# Patient Record
Sex: MALE | Race: WHITE | NOT HISPANIC OR LATINO | Employment: OTHER | URBAN - METROPOLITAN AREA
[De-identification: names, ages, dates, MRNs, and addresses within clinical notes are randomized per-mention and may not be internally consistent; named-entity substitution may affect disease eponyms.]

---

## 2018-02-11 ENCOUNTER — HOSPITAL ENCOUNTER (EMERGENCY)
Facility: HOSPITAL | Age: 68
Discharge: HOME | End: 2018-02-11
Attending: EMERGENCY MEDICINE | Admitting: EMERGENCY MEDICINE
Payer: MEDICARE

## 2018-02-11 ENCOUNTER — APPOINTMENT (EMERGENCY)
Dept: RADIOLOGY | Facility: HOSPITAL | Age: 68
End: 2018-02-11
Payer: MEDICARE

## 2018-02-11 VITALS
WEIGHT: 201 LBS | HEART RATE: 89 BPM | OXYGEN SATURATION: 96 % | BODY MASS INDEX: 29.68 KG/M2 | SYSTOLIC BLOOD PRESSURE: 129 MMHG | TEMPERATURE: 98.4 F | DIASTOLIC BLOOD PRESSURE: 66 MMHG | RESPIRATION RATE: 18 BRPM

## 2018-02-11 DIAGNOSIS — S73.102A HIP SPRAIN, LEFT, INITIAL ENCOUNTER: Primary | ICD-10-CM

## 2018-02-11 PROCEDURE — 73502 X-RAY EXAM HIP UNI 2-3 VIEWS: CPT

## 2018-02-11 PROCEDURE — 99283 EMERGENCY DEPT VISIT LOW MDM: CPT

## 2018-02-11 RX ORDER — OXYCODONE HYDROCHLORIDE AND ACETAMINOPHEN 5; 325 MG/1; MG/1
1 TABLET ORAL EVERY 6 HOURS PRN
Qty: 15 TABLET | Refills: 0 | Status: SHIPPED | OUTPATIENT
Start: 2018-02-11 | End: 2021-01-21

## 2018-02-11 RX ORDER — OXYCODONE HYDROCHLORIDE AND ACETAMINOPHEN 5; 325 MG/1; MG/1
1 TABLET ORAL ONCE
Status: COMPLETED | OUTPATIENT
Start: 2018-02-11 | End: 2018-02-11

## 2018-02-11 RX ADMIN — OXYCODONE HYDROCHLORIDE AND ACETAMINOPHEN 1 TABLET: 5; 325 TABLET ORAL at 20:52

## 2018-02-12 NOTE — DISCHARGE INSTRUCTIONS
Hip Sprain   AMBULATORY CARE:   A hip sprain  is when a ligament in your hip is stretched or torn  Ligaments (tough tissue that connects bones) surround your hip and hold it in place  Contact your healthcare provider if:   · You have trouble standing on the leg on your injured side  · You develop new or increased stiffness or trouble moving your injured hip  · You develop new or increased numbness in the leg on your injured side  · You have increased swelling and pain in your hip  · You have bluish or pale skin on the leg on your injured side  Treatment for a hip sprain  may include any of the following:  · NSAIDs , such as ibuprofen, help decrease swelling, pain, and fever  This medicine is available with or without a doctor's order  NSAIDs can cause stomach bleeding or kidney problems in certain people  If you take blood thinner medicine, always ask your healthcare provider if NSAIDs are safe for you  Always read the medicine label and follow directions  · Hip exercises  help decrease stiffness  Your healthcare provider may want you to start hip exercises after you rest your hip for about 3 days  He may also have you start physical therapy  A physical therapist teaches you light exercises to help decrease pain and swelling and improve hip movement  Once you are able to move your hip without pain, you will be taught exercises to improve your strength  If you have a severe sprain, you may need to wait 1 to 3 weeks before you exercise your hip  Manage your hip sprain:   · Rest your hip for 2 to 3 days after your injury  This will help decrease the risk of more damage to your hip  · Apply ice  on your hip for 15 to 20 minutes every hour or as directed  Use an ice pack, or put crushed ice in a plastic bag  Cover it with a towel  Ice helps prevent tissue damage and decreases swelling and pain  You may need to apply ice to your hip at least 4 to 8 times each day for the first 48 hours    Prevent another hip sprain:   · Do not exercise when you feel pain or you are tired  · Exercise daily  Make sure you warm up and stretch before you exercise  · Wear equipment to protect yourself when you play sports  · Wear shoes that fit well to decrease your risk for falls  · Stop exercising and playing sports if your symptoms from a past injury return  Follow up with your healthcare provider as directed:  Write down your questions so you remember to ask them during your visits  © 2017 [address] Information is for End User's use only and may not be sold, redistributed or otherwise used for commercial purposes  All illustrations and images included in CareNotes® are the copyrighted property of [employer] M [employer]  or [address]  The above information is an  only  It is not intended as medical advice for individual conditions or treatments  Talk to your doctor, nurse or pharmacist before following any medical regimen to see if it is safe and effective for you

## 2018-02-12 NOTE — ED PROVIDER NOTES
History  Chief Complaint   Patient presents with    Hip Pain     pt reports tripping over dog last night, twisting body to stop fall and felling L hip pain  pt denies radiation of pain  75-year-old male presents with chief complaint of left hip pain  Patient reports last night when he got up from the chair accidentally stepped on his dog  This startled him and he jumped up  Patient reports he almost fell but was able to catch himself prior to falling  He did twist his hip however  Patient had pain last night the pain was worse today specially with ambulation  Pain is in the lateral aspect of his hip radiating around to the inguinal crease  No deformity or shortening  No loss of sensation or strength  History provided by:  Patient   used: No    Hip Pain   Location:  Left hip, lateral radiating around to inguinal canal  Severity:  Moderate  Onset quality:  Sudden  Duration:  2 days  Timing:  Constant  Progression:  Unchanged  Chronicity:  New  Context:  Twisted to avoid falling  Relieved by:  Rest, immobilization  Worsened by:  Ambulation, bearing weight  Ineffective treatments:  Nothing   Associated symptoms: fever        None       Past Medical History:   Diagnosis Date    Arthritis     GERD (gastroesophageal reflux disease)        History reviewed  No pertinent surgical history  History reviewed  No pertinent family history  I have reviewed and agree with the history as documented  Social History   Substance Use Topics    Smoking status: Never Smoker    Smokeless tobacco: Never Used    Alcohol use No        Review of Systems   Constitutional: Positive for fever  Negative for chills  Musculoskeletal: Positive for arthralgias (left hip)  Negative for gait problem  Skin: Negative for color change and wound  Neurological: Negative for weakness and numbness  Hematological: Does not bruise/bleed easily     All other systems reviewed and are negative  Physical Exam  ED Triage Vitals   Temperature Pulse Respirations Blood Pressure SpO2   02/11/18 1940 02/11/18 1938 02/11/18 1938 02/11/18 1938 02/11/18 1938   98 4 °F (36 9 °C) 99 18 138/70 97 %      Temp Source Heart Rate Source Patient Position - Orthostatic VS BP Location FiO2 (%)   02/11/18 1940 02/11/18 1938 02/11/18 1938 02/11/18 1938 --   Oral Monitor Lying Right arm       Pain Score       02/11/18 1939       Worst Possible Pain           Orthostatic Vital Signs  Vitals:    02/11/18 1938   BP: 138/70   Pulse: 99   Patient Position - Orthostatic VS: Lying       Physical Exam   Constitutional: He is oriented to person, place, and time  He appears well-developed and well-nourished  He appears distressed (mild)  HENT:   Head: Normocephalic and atraumatic  Eyes: EOM are normal  Pupils are equal, round, and reactive to light  Neck: Normal range of motion  No JVD present  Cardiovascular: Normal rate, regular rhythm, normal heart sounds and intact distal pulses  Exam reveals no gallop and no friction rub  No murmur heard  Pulmonary/Chest: Effort normal and breath sounds normal  No respiratory distress  He has no wheezes  He has no rales  He exhibits no tenderness  Musculoskeletal: Normal range of motion  He exhibits tenderness (over greater trochanter left hip)  He exhibits no deformity  Neurological: He is alert and oriented to person, place, and time  Skin: Skin is warm and dry  Psychiatric: He has a normal mood and affect  His behavior is normal  Judgment and thought content normal    Nursing note and vitals reviewed  ED Medications  Medications   oxyCODONE-acetaminophen (PERCOCET) 5-325 mg per tablet 1 tablet (1 tablet Oral Given 2/11/18 2052)       Diagnostic Studies  Results Reviewed     None                 XR hip/pelv 2-3 vws left   ED Interpretation by Papito Mcpherson MD (02/11 2149)   This film was interpreted independently by me  No fracture or dislocation  Procedures  Procedures       Phone Contacts  ED Phone Contact    ED Course  ED Course                                MDM  Number of Diagnoses or Management Options  Hip sprain, left, initial encounter: new and requires workup  Diagnosis management comments: Background: 69 y o  male with left hip pain after injury    Differential DX includes but is not limited to: fracture vs contusion vs sprain     Plan: imaging, symptom control         Amount and/or Complexity of Data Reviewed  Tests in the radiology section of CPT®: ordered and reviewed  Independent visualization of images, tracings, or specimens: yes    Patient Progress  Patient progress: stable    CritCare Time    Disposition  Final diagnoses:   Hip sprain, left, initial encounter     Time reflects when diagnosis was documented in both MDM as applicable and the Disposition within this note     Time User Action Codes Description Comment    2/11/2018  9:51 PM Axel Gillette Add [C06 102A] Hip sprain, left, initial encounter       ED Disposition     ED Disposition Condition Comment    Discharge  Zaria Burn discharge to home/self care  Condition at discharge: Good        Follow-up Information     Follow up With Specialties Details Why [address]  Schedule an appointment as soon as possible for a visit As needed [place]  [phone]        Patient's Medications   Discharge Prescriptions    OXYCODONE-ACETAMINOPHEN (PERCOCET) 5-325 MG PER TABLET    Take 1 tablet by mouth every 6 (six) hours as needed for moderate pain for up to 15 doses Max Daily Amount: 4 tablets       Start Date: 2/11/2018 End Date: --       Order Dose: 1 tablet       Quantity: 15 tablet    Refills: 0     No discharge procedures on file      ED Provider  Electronically Signed by           Lucita Hoover MD  02/11/18 6866

## 2019-06-12 ENCOUNTER — LAB REQUISITION (OUTPATIENT)
Dept: LAB | Facility: HOSPITAL | Age: 69
End: 2019-06-12
Payer: MEDICARE

## 2019-06-12 DIAGNOSIS — K22.70 BARRETT'S ESOPHAGUS WITHOUT DYSPLASIA: ICD-10-CM

## 2019-06-12 PROCEDURE — 88305 TISSUE EXAM BY PATHOLOGIST: CPT | Performed by: PATHOLOGY

## 2019-10-05 ENCOUNTER — HOSPITAL ENCOUNTER (EMERGENCY)
Facility: HOSPITAL | Age: 69
Discharge: HOME | End: 2019-10-05
Attending: EMERGENCY MEDICINE | Admitting: EMERGENCY MEDICINE
Payer: MEDICARE

## 2019-10-05 VITALS
HEIGHT: 69 IN | WEIGHT: 205.03 LBS | TEMPERATURE: 98.4 F | OXYGEN SATURATION: 98 % | RESPIRATION RATE: 18 BRPM | BODY MASS INDEX: 30.37 KG/M2 | HEART RATE: 83 BPM | SYSTOLIC BLOOD PRESSURE: 118 MMHG | DIASTOLIC BLOOD PRESSURE: 68 MMHG

## 2019-10-05 DIAGNOSIS — S05.02XA LEFT CORNEAL ABRASION, INITIAL ENCOUNTER: Primary | ICD-10-CM

## 2019-10-05 PROCEDURE — 99284 EMERGENCY DEPT VISIT MOD MDM: CPT | Performed by: PHYSICIAN ASSISTANT

## 2019-10-05 PROCEDURE — 99283 EMERGENCY DEPT VISIT LOW MDM: CPT

## 2019-10-05 RX ORDER — TETRACAINE HYDROCHLORIDE 5 MG/ML
2 SOLUTION OPHTHALMIC ONCE
Status: COMPLETED | OUTPATIENT
Start: 2019-10-05 | End: 2019-10-05

## 2019-10-05 RX ORDER — CIPROFLOXACIN HYDROCHLORIDE 3.5 MG/ML
1 SOLUTION/ DROPS TOPICAL ONCE
Status: COMPLETED | OUTPATIENT
Start: 2019-10-05 | End: 2019-10-05

## 2019-10-05 RX ADMIN — CIPROFLOXACIN HYDROCHLORIDE 1 DROP: 3 SOLUTION/ DROPS OPHTHALMIC at 18:22

## 2019-10-05 RX ADMIN — FLUORESCEIN SODIUM 1 STRIP: 1 STRIP OPHTHALMIC at 17:50

## 2019-10-05 RX ADMIN — TETRACAINE HYDROCHLORIDE 2 DROP: 5 SOLUTION OPHTHALMIC at 17:50

## 2019-10-05 NOTE — DISCHARGE INSTRUCTIONS
Corneal Abrasion   WHAT YOU NEED TO KNOW:   A corneal abrasion is a scratch on the cornea of your eye  The cornea is the clear layer that covers the front of your eye  A small scratch may heal in 1 to 2 days  Deeper or larger scratches may take longer to heal         DISCHARGE INSTRUCTIONS:   Contact your healthcare provider if:   · Your eye pain or vision gets worse  · You have yellow or green drainage from your eye  · You have questions or concerns about your condition or care  Medicines:   · Medicines  may be given in the form of eyedrops or ointment to help prevent an eye infection  You may also be given eye drops to decrease pain  Ask how to take this medicine safely  · Take your medicine as directed  Contact your healthcare provider if you think your medicine is not helping or if you have side effects  Tell him or her if you are allergic to any medicine  Keep a list of the medicines, vitamins, and herbs you take  Include the amounts, and when and why you take them  Bring the list or the pill bottles to follow-up visits  Carry your medicine list with you in case of an emergency  Follow up with your healthcare provider as directed:  Write down your questions so you remember to ask them during your visits  Self-care:   · Do not touch or rub your eye  · Ask your healthcare provider when you can start your normal activities  · Ask your healthcare provider when you can wear your contact lenses  · Wear sunglasses in bright light until your eyes feel better  Help prevent corneal abrasions:   · Remove your contact lenses if your eyes feel dry or irritated  · Wash your hands if you need to touch your eyes or your face  · Trim your child's fingernails so he cannot scratch his eye  · Wear protective eyewear when you work with chemicals, wood, dust, or metal      · Wear protective eyewear when you play sports  · Do not wear your contacts for longer than you should       · Do not wear colored lenses or lenses with shapes on them  These lenses may cause eye damage and vision loss  · Do not wear glitter makeup  Glitter can easily get into your eyes and under contact lenses  · Do not sleep with your contacts in your eyes  © 2017 [address] Information is for End User's use only and may not be sold, redistributed or otherwise used for commercial purposes  All illustrations and images included in CareNotes® are the copyrighted property of [employer] Inc  or [employer]  The above information is an  only  It is not intended as medical advice for individual conditions or treatments  Talk to your doctor, nurse or pharmacist before following any medical regimen to see if it is safe and effective for you

## 2019-10-05 NOTE — ED PROVIDER NOTES
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 when he feels like something may have injured his left eye  Patient reports that he believes this was most likely pollen  Patient states he has been using over-the-counter eye wash without improvement of symptoms  Wife states that during I washing he felt like there was an object in the center of his eye that went toward the lateral aspect of his eye  Patient admits to tearing  Denies changes in vision, pain with extraocular movement, photophobia, headache, nausea, vomiting, chest pain, shortness of breath, fever, chills  History provided by:  Patient  Eye Problem   Location:  Left eye  Quality:  Stinging and tearing  Duration:  3 days  Associated symptoms: itching, redness and tearing    Associated symptoms: no blurred vision, no crusting, no decreased vision, no discharge, no double vision, no facial rash, no headaches, no inflammation, no nausea, no numbness, no photophobia, no scotomas, no swelling, no tingling, no vomiting and no weakness        Prior to Admission Medications   Prescriptions Last Dose Informant Patient Reported? Taking?   oxyCODONE-acetaminophen (PERCOCET) 5-325 mg per tablet   No No   Sig: Take 1 tablet by mouth every 6 (six) hours as needed for moderate pain for up to 15 doses Max Daily Amount: 4 tablets      Facility-Administered Medications: None       Past Medical History:   Diagnosis Date    Arthritis     GERD (gastroesophageal reflux disease)        History reviewed  No pertinent surgical history  History reviewed  No pertinent family history    I have reviewed and agree with the history as documented  Social History     Tobacco Use    Smoking status: Never Smoker    Smokeless tobacco: Never Used   Substance Use Topics    Alcohol use: No    Drug use: Yes     Types: Marijuana     Comment: daily        Review of Systems   Constitutional: Negative for appetite change, chills, fever and unexpected weight change  HENT: Negative for congestion, drooling, ear pain, rhinorrhea, sore throat, trouble swallowing and voice change  Eyes: Positive for pain, redness and itching  Negative for blurred vision, double vision, photophobia, discharge and visual disturbance  Respiratory: Negative for cough, shortness of breath, wheezing and stridor  Cardiovascular: Negative for chest pain, palpitations and leg swelling  Gastrointestinal: Negative for abdominal pain, blood in stool, constipation, diarrhea, nausea and vomiting  Genitourinary: Negative for dysuria, flank pain, frequency, hematuria and urgency  Musculoskeletal: Negative for gait problem, joint swelling, neck pain and neck stiffness  Skin: Negative for color change and rash  Neurological: Negative for dizziness, tingling, seizures, weakness, light-headedness, numbness and headaches  Physical Exam  Physical Exam   Constitutional: He is oriented to person, place, and time  He appears well-developed and well-nourished  HENT:   Head: Normocephalic and atraumatic  Nose: Nose normal    Mouth/Throat: Oropharynx is clear and moist    Eyes: Pupils are equal, round, and reactive to light  EOM and lids are normal  Lids are everted and swept, no foreign bodies found  Left conjunctiva is injected  Lateral aspect of the left conjunctiva injected, no erythema or edema of the eyelid  Fluorescein examination revealed fluorescein uptake of the lateral aspect of the left eye and overlying the iris at the 8' o' clock position  Negative Solomon sign  No pain with extraocular movement     Neck: Normal range of motion  Neck supple  Cardiovascular: Normal rate, regular rhythm and intact distal pulses  Pulmonary/Chest: Effort normal and breath sounds normal    Musculoskeletal: Normal range of motion  Neurological: He is alert and oriented to person, place, and time  Skin: Skin is warm and dry  Capillary refill takes less than 2 seconds  Nursing note and vitals reviewed  Vital Signs  ED Triage Vitals [10/05/19 1717]   Temperature Pulse Respirations Blood Pressure SpO2   98 4 °F (36 9 °C) 83 18 118/68 98 %      Temp Source Heart Rate Source Patient Position - Orthostatic VS BP Location FiO2 (%)   Oral Monitor Lying Right arm --      Pain Score       1           Vitals:    10/05/19 1717   BP: 118/68   Pulse: 83   Patient Position - Orthostatic VS: Lying         Visual Acuity  Visual Acuity      Most Recent Value   Visual acuity R eye is  20/100   Visual acuity Left eye is  20/100   Visual acuity in both eyes is  20/100   Wearing corrective eyewear/lenses? Yes          ED Medications  Medications   fluorescein sodium sterile ophthalmic strip 1 strip (has no administration in time range)   tetracaine 0 5 % ophthalmic solution 2 drop (has no administration in time range)   ciprofloxacin (CILOXAN) 0 3 % ophthalmic solution 1 drop (has no administration in time range)       Diagnostic Studies  Results Reviewed     None                 No orders to display              Procedures  Procedures       ED Course                               MDM  Number of Diagnoses or Management Options  Left corneal abrasion, initial encounter: new and does not require workup  Diagnosis management comments: Chano Man is a 77 y o  male who presents to the ED with complaints of burning stinging pain of the left eye and left redness since Thursday  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 when he feels like something may have injured his left eye  Patient reports that he believes this was most likely pollen    Patient states he has been using over-the-counter eye wash without improvement of symptoms  Wife states that during I washing he felt like there was an object in the center of his eye that went toward the lateral aspect of his eye  Patient admits to tearing  Although no foreign body was visualized, patient states he felt better after eyelid inversion  During fluorescein examination, there was uptake to the lateral aspect of the left eye and overlying the iris  Patient will be advised to follow up with outpatient ophthalmology  I provided patient with strict RTER precautions  I advised patient follow-up with PCP in 24-48 hours  Patient verbalized understanding  Amount and/or Complexity of Data Reviewed  Review and summarize past medical records: yes    Risk of Complications, Morbidity, and/or Mortality  Presenting problems: low  Diagnostic procedures: low  Management options: low    Patient Progress  Patient progress: improved      Disposition  Final diagnoses:   Left corneal abrasion, initial encounter     Time reflects when diagnosis was documented in both MDM as applicable and the Disposition within this note     Time User Action Codes Description Comment    10/5/2019  6:00 PM Briana Post [S05  02XA] Left corneal abrasion, initial encounter       ED Disposition     ED Disposition Condition Date/Time Comment    Discharge Stable Sat Oct 5, 2019  6:00 PM [address] discharge to home/self care              Follow-up Information     Follow up With Specialties Details Why Contact Info Additional [address] Emergency Department Emergency Medicine Go to  If symptoms worsen [address]  [phone] [facility], [place], [place], [address]    Fauzia Fernandez MD Internal Medicine Schedule an appointment as soon as possible for a visit   [facility]  [address]  [address] [address]       Maribel Boozcharlene Emerson Finnegan MD Ophthalmology Schedule an appointment as soon as possible for a visit   [address]  [address] [address] [address]  [phone]             Patient's Medications   Discharge Prescriptions    CIPROFLOXACIN (CILOXAN) 0 3 % OPHTHALMIC OINTMENT    Administer into the left eye every 6 (six) hours while awake for 5 days       Start Date: 10/5/2019 End Date: 10/10/2019       Order Dose: --       Quantity: 3 5 g    Refills: 0     No discharge procedures on file      ED Provider  Electronically Signed by           Trinidad Jenkins PA-C  10/05/19 8973

## 2020-12-19 ENCOUNTER — HOSPITAL ENCOUNTER (EMERGENCY)
Facility: HOSPITAL | Age: 70
Discharge: HOME | End: 2020-12-19
Attending: EMERGENCY MEDICINE | Admitting: EMERGENCY MEDICINE
Payer: MEDICARE

## 2020-12-19 ENCOUNTER — APPOINTMENT (EMERGENCY)
Dept: RADIOLOGY | Facility: HOSPITAL | Age: 70
End: 2020-12-19
Payer: MEDICARE

## 2020-12-19 VITALS
HEART RATE: 101 BPM | RESPIRATION RATE: 20 BRPM | OXYGEN SATURATION: 98 % | DIASTOLIC BLOOD PRESSURE: 77 MMHG | TEMPERATURE: 99.4 F | SYSTOLIC BLOOD PRESSURE: 129 MMHG

## 2020-12-19 DIAGNOSIS — J12.82 PNEUMONIA DUE TO COVID-19 VIRUS: ICD-10-CM

## 2020-12-19 DIAGNOSIS — J18.9 PNEUMONIA: Primary | ICD-10-CM

## 2020-12-19 DIAGNOSIS — U07.1 PNEUMONIA DUE TO COVID-19 VIRUS: ICD-10-CM

## 2020-12-19 LAB
ALBUMIN SERPL BCP-MCNC: 3.7 G/DL (ref 3.5–5)
ALP SERPL-CCNC: 112 U/L (ref 46–116)
ALT SERPL W P-5'-P-CCNC: 62 U/L (ref 12–78)
ANION GAP SERPL CALCULATED.3IONS-SCNC: 10 MMOL/L (ref 4–13)
AST SERPL W P-5'-P-CCNC: 52 U/L (ref 5–45)
BILIRUB SERPL-MCNC: 0.81 MG/DL (ref 0.2–1)
BUN SERPL-MCNC: 13 MG/DL (ref 5–25)
CALCIUM SERPL-MCNC: 9.1 MG/DL (ref 8.3–10.1)
CHLORIDE SERPL-SCNC: 101 MMOL/L (ref 100–108)
CK SERPL-CCNC: 128 U/L (ref 39–308)
CO2 SERPL-SCNC: 28 MMOL/L (ref 21–32)
CREAT SERPL-MCNC: 0.89 MG/DL (ref 0.6–1.3)
CRP SERPL QL: 141.5 MG/L
ERYTHROCYTE [DISTWIDTH] IN BLOOD BY AUTOMATED COUNT: 13.4 % (ref 11.6–15.1)
FERRITIN SERPL-MCNC: 284 NG/ML (ref 8–388)
GFR SERPL CREATININE-BSD FRML MDRD: 85 ML/MIN/1.73SQ M
GLUCOSE SERPL-MCNC: 137 MG/DL (ref 65–140)
HCT VFR BLD AUTO: 39.1 % (ref 36.5–49.3)
HGB BLD-MCNC: 15.5 G/DL (ref 12–17)
LACTATE SERPL-SCNC: 1.6 MMOL/L (ref 0.5–2)
MCH RBC QN AUTO: 38 PG (ref 26.8–34.3)
MCHC RBC AUTO-ENTMCNC: 39.6 G/DL (ref 31.4–37.4)
MCV RBC AUTO: 96 FL (ref 82–98)
NRBC BLD AUTO-RTO: 0 /100 WBCS
NT-PROBNP SERPL-MCNC: 114 PG/ML
PLATELET # BLD AUTO: 169 THOUSANDS/UL (ref 149–390)
PMV BLD AUTO: 11.9 FL (ref 8.9–12.7)
POTASSIUM SERPL-SCNC: 3.5 MMOL/L (ref 3.5–5.3)
PROCALCITONIN SERPL-MCNC: <0.05 NG/ML
PROT SERPL-MCNC: 7.9 G/DL (ref 6.4–8.2)
RBC # BLD AUTO: 4.08 MILLION/UL (ref 3.88–5.62)
SODIUM SERPL-SCNC: 139 MMOL/L (ref 136–145)
TROPONIN I SERPL-MCNC: <0.02 NG/ML
WBC # BLD AUTO: 4.35 THOUSAND/UL (ref 4.31–10.16)

## 2020-12-19 PROCEDURE — 84145 PROCALCITONIN (PCT): CPT | Performed by: EMERGENCY MEDICINE

## 2020-12-19 PROCEDURE — 86140 C-REACTIVE PROTEIN: CPT | Performed by: EMERGENCY MEDICINE

## 2020-12-19 PROCEDURE — 94640 AIRWAY INHALATION TREATMENT: CPT

## 2020-12-19 PROCEDURE — 71045 X-RAY EXAM CHEST 1 VIEW: CPT

## 2020-12-19 PROCEDURE — 83605 ASSAY OF LACTIC ACID: CPT | Performed by: EMERGENCY MEDICINE

## 2020-12-19 PROCEDURE — 83880 ASSAY OF NATRIURETIC PEPTIDE: CPT | Performed by: EMERGENCY MEDICINE

## 2020-12-19 PROCEDURE — 85025 COMPLETE CBC W/AUTO DIFF WBC: CPT | Performed by: EMERGENCY MEDICINE

## 2020-12-19 PROCEDURE — 99285 EMERGENCY DEPT VISIT HI MDM: CPT

## 2020-12-19 PROCEDURE — 87040 BLOOD CULTURE FOR BACTERIA: CPT | Performed by: EMERGENCY MEDICINE

## 2020-12-19 PROCEDURE — 99285 EMERGENCY DEPT VISIT HI MDM: CPT | Performed by: EMERGENCY MEDICINE

## 2020-12-19 PROCEDURE — 82728 ASSAY OF FERRITIN: CPT | Performed by: EMERGENCY MEDICINE

## 2020-12-19 PROCEDURE — 93005 ELECTROCARDIOGRAM TRACING: CPT

## 2020-12-19 PROCEDURE — 36415 COLL VENOUS BLD VENIPUNCTURE: CPT | Performed by: EMERGENCY MEDICINE

## 2020-12-19 PROCEDURE — 84484 ASSAY OF TROPONIN QUANT: CPT | Performed by: EMERGENCY MEDICINE

## 2020-12-19 PROCEDURE — 80053 COMPREHEN METABOLIC PANEL: CPT | Performed by: EMERGENCY MEDICINE

## 2020-12-19 PROCEDURE — 82550 ASSAY OF CK (CPK): CPT | Performed by: EMERGENCY MEDICINE

## 2020-12-19 RX ORDER — DIPHENOXYLATE HYDROCHLORIDE AND ATROPINE SULFATE 2.5; .025 MG/1; MG/1
1 TABLET ORAL EVERY MORNING
COMMUNITY

## 2020-12-19 RX ORDER — LANSOPRAZOLE 30 MG/1
30 CAPSULE, DELAYED RELEASE ORAL DAILY
COMMUNITY
Start: 2020-08-17 | End: 2021-05-13

## 2020-12-19 RX ORDER — DOXYCYCLINE HYCLATE 100 MG/1
100 CAPSULE ORAL EVERY 12 HOURS SCHEDULED
COMMUNITY
End: 2021-01-21

## 2020-12-19 RX ORDER — MELATONIN
1000 DAILY
COMMUNITY
End: 2021-05-13

## 2020-12-19 RX ORDER — ZINC GLUCONATE 50 MG
50 TABLET ORAL DAILY
COMMUNITY
End: 2021-05-13

## 2020-12-19 RX ORDER — CEFPODOXIME PROXETIL 200 MG/1
200 TABLET, FILM COATED ORAL ONCE
Status: COMPLETED | OUTPATIENT
Start: 2020-12-19 | End: 2020-12-19

## 2020-12-19 RX ORDER — ALBUTEROL SULFATE 90 UG/1
6 AEROSOL, METERED RESPIRATORY (INHALATION) ONCE
Status: COMPLETED | OUTPATIENT
Start: 2020-12-19 | End: 2020-12-19

## 2020-12-19 RX ADMIN — ALBUTEROL SULFATE 6 PUFF: 90 AEROSOL, METERED RESPIRATORY (INHALATION) at 15:26

## 2020-12-19 RX ADMIN — CEFPODOXIME PROXETIL 200 MG: 200 TABLET, FILM COATED ORAL at 16:38

## 2020-12-22 LAB
ATRIAL RATE: 88 BPM
P AXIS: 60 DEGREES
PR INTERVAL: 172 MS
QRS AXIS: -39 DEGREES
QRSD INTERVAL: 100 MS
QT INTERVAL: 342 MS
QTC INTERVAL: 413 MS
T WAVE AXIS: 52 DEGREES
VENTRICULAR RATE: 88 BPM

## 2020-12-22 PROCEDURE — 93010 ELECTROCARDIOGRAM REPORT: CPT | Performed by: INTERNAL MEDICINE

## 2020-12-23 ENCOUNTER — HOSPITAL ENCOUNTER (EMERGENCY)
Facility: HOSPITAL | Age: 70
Discharge: HOME | End: 2020-12-23
Attending: EMERGENCY MEDICINE | Admitting: EMERGENCY MEDICINE
Payer: MEDICARE

## 2020-12-23 ENCOUNTER — APPOINTMENT (EMERGENCY)
Dept: CT IMAGING | Facility: HOSPITAL | Age: 70
End: 2020-12-23
Payer: MEDICARE

## 2020-12-23 ENCOUNTER — APPOINTMENT (EMERGENCY)
Dept: RADIOLOGY | Facility: HOSPITAL | Age: 70
End: 2020-12-23
Payer: MEDICARE

## 2020-12-23 VITALS
OXYGEN SATURATION: 98 % | DIASTOLIC BLOOD PRESSURE: 77 MMHG | TEMPERATURE: 98.1 F | HEART RATE: 85 BPM | SYSTOLIC BLOOD PRESSURE: 156 MMHG | RESPIRATION RATE: 18 BRPM

## 2020-12-23 DIAGNOSIS — U07.1 PNEUMONIA DUE TO COVID-19 VIRUS: Primary | ICD-10-CM

## 2020-12-23 DIAGNOSIS — J12.82 PNEUMONIA DUE TO COVID-19 VIRUS: Primary | ICD-10-CM

## 2020-12-23 LAB
ANION GAP SERPL CALCULATED.3IONS-SCNC: 9 MMOL/L (ref 4–13)
BASOPHILS # BLD AUTO: 0.02 THOUSANDS/ΜL (ref 0–0.1)
BASOPHILS NFR BLD AUTO: 0 % (ref 0–1)
BNP SERPL-MCNC: 26.7 PG/ML (ref 1–100)
BUN SERPL-MCNC: 15 MG/DL (ref 6–20)
CALCIUM SERPL-MCNC: 9.3 MG/DL (ref 8.4–10.2)
CHLORIDE SERPL-SCNC: 103 MMOL/L (ref 96–108)
CO2 SERPL-SCNC: 27 MMOL/L (ref 22–33)
CREAT SERPL-MCNC: 0.77 MG/DL (ref 0.5–1.2)
D DIMER PPP FEU-MCNC: 1.97 MG/L FEU (ref 0.19–0.49)
EOSINOPHIL # BLD AUTO: 0.05 THOUSAND/ΜL (ref 0–0.61)
EOSINOPHIL NFR BLD AUTO: 1 % (ref 0–6)
ERYTHROCYTE [DISTWIDTH] IN BLOOD BY AUTOMATED COUNT: 12.4 % (ref 11.6–15.1)
GFR SERPL CREATININE-BSD FRML MDRD: 91 ML/MIN/1.73SQ M
GLUCOSE SERPL-MCNC: 143 MG/DL (ref 65–140)
HCT VFR BLD AUTO: 37.3 % (ref 36.5–49.3)
HGB BLD-MCNC: 14.1 G/DL (ref 12–17)
IMM GRANULOCYTES # BLD AUTO: 0.02 THOUSAND/UL (ref 0–0.2)
IMM GRANULOCYTES NFR BLD AUTO: 0 % (ref 0–2)
LYMPHOCYTES # BLD AUTO: 1.22 THOUSANDS/ΜL (ref 0.6–4.47)
LYMPHOCYTES NFR BLD AUTO: 21 % (ref 14–44)
MCH RBC QN AUTO: 35.7 PG (ref 26.8–34.3)
MCHC RBC AUTO-ENTMCNC: 37.8 G/DL (ref 31.4–37.4)
MCV RBC AUTO: 94 FL (ref 82–98)
MONOCYTES # BLD AUTO: 0.91 THOUSAND/ΜL (ref 0.17–1.22)
MONOCYTES NFR BLD AUTO: 16 % (ref 4–12)
NEUTROPHILS # BLD AUTO: 3.58 THOUSANDS/ΜL (ref 1.85–7.62)
NEUTS SEG NFR BLD AUTO: 62 % (ref 43–75)
PLATELET # BLD AUTO: 252 THOUSANDS/UL (ref 149–390)
PMV BLD AUTO: 11 FL (ref 8.9–12.7)
POTASSIUM SERPL-SCNC: 3.7 MMOL/L (ref 3.5–5)
RBC # BLD AUTO: 3.95 MILLION/UL (ref 3.88–5.62)
SODIUM SERPL-SCNC: 139 MMOL/L (ref 133–145)
TROPONIN I SERPL-MCNC: <0.03 NG/ML (ref 0–0.07)
WBC # BLD AUTO: 5.8 THOUSAND/UL (ref 4.31–10.16)

## 2020-12-23 PROCEDURE — 83880 ASSAY OF NATRIURETIC PEPTIDE: CPT | Performed by: EMERGENCY MEDICINE

## 2020-12-23 PROCEDURE — 80048 BASIC METABOLIC PNL TOTAL CA: CPT | Performed by: EMERGENCY MEDICINE

## 2020-12-23 PROCEDURE — 71045 X-RAY EXAM CHEST 1 VIEW: CPT

## 2020-12-23 PROCEDURE — 93005 ELECTROCARDIOGRAM TRACING: CPT

## 2020-12-23 PROCEDURE — 99285 EMERGENCY DEPT VISIT HI MDM: CPT

## 2020-12-23 PROCEDURE — 36415 COLL VENOUS BLD VENIPUNCTURE: CPT | Performed by: EMERGENCY MEDICINE

## 2020-12-23 PROCEDURE — G1004 CDSM NDSC: HCPCS

## 2020-12-23 PROCEDURE — 99284 EMERGENCY DEPT VISIT MOD MDM: CPT | Performed by: EMERGENCY MEDICINE

## 2020-12-23 PROCEDURE — 85025 COMPLETE CBC W/AUTO DIFF WBC: CPT | Performed by: EMERGENCY MEDICINE

## 2020-12-23 PROCEDURE — 84484 ASSAY OF TROPONIN QUANT: CPT | Performed by: EMERGENCY MEDICINE

## 2020-12-23 PROCEDURE — 71275 CT ANGIOGRAPHY CHEST: CPT

## 2020-12-23 PROCEDURE — 85379 FIBRIN DEGRADATION QUANT: CPT | Performed by: EMERGENCY MEDICINE

## 2020-12-23 RX ORDER — ACETAMINOPHEN 325 MG/1
650 TABLET ORAL ONCE
Status: COMPLETED | OUTPATIENT
Start: 2020-12-23 | End: 2020-12-23

## 2020-12-23 RX ORDER — SODIUM CHLORIDE 9 MG/ML
3 INJECTION INTRAVENOUS
Status: DISCONTINUED | OUTPATIENT
Start: 2020-12-23 | End: 2020-12-24 | Stop reason: HOSPADM

## 2020-12-23 RX ADMIN — IOHEXOL 85 ML: 350 INJECTION, SOLUTION INTRAVENOUS at 22:46

## 2020-12-23 RX ADMIN — ACETAMINOPHEN 650 MG: 325 TABLET, FILM COATED ORAL at 21:52

## 2020-12-24 LAB
BACTERIA BLD CULT: NORMAL
BACTERIA BLD CULT: NORMAL

## 2020-12-25 LAB
ATRIAL RATE: 103 BPM
P AXIS: 51 DEGREES
PR INTERVAL: 181 MS
QRS AXIS: -59 DEGREES
QRSD INTERVAL: 101 MS
QT INTERVAL: 350 MS
QTC INTERVAL: 458 MS
T WAVE AXIS: 53 DEGREES
VENTRICULAR RATE: 103 BPM

## 2020-12-25 PROCEDURE — 93010 ELECTROCARDIOGRAM REPORT: CPT | Performed by: INTERNAL MEDICINE

## 2021-01-20 RX ORDER — OLOPATADINE HYDROCHLORIDE 2 MG/ML
SOLUTION/ DROPS OPHTHALMIC
COMMUNITY
End: 2021-05-13

## 2021-01-20 RX ORDER — CELECOXIB 200 MG/1
CAPSULE ORAL
COMMUNITY
End: 2021-01-21

## 2021-01-21 ENCOUNTER — OFFICE VISIT (OUTPATIENT)
Dept: FAMILY MEDICINE CLINIC | Facility: CLINIC | Age: 71
End: 2021-01-21
Payer: MEDICARE

## 2021-01-21 VITALS
TEMPERATURE: 97.3 F | SYSTOLIC BLOOD PRESSURE: 122 MMHG | WEIGHT: 196.5 LBS | RESPIRATION RATE: 16 BRPM | HEART RATE: 86 BPM | BODY MASS INDEX: 29.1 KG/M2 | DIASTOLIC BLOOD PRESSURE: 82 MMHG | OXYGEN SATURATION: 99 % | HEIGHT: 69 IN

## 2021-01-21 DIAGNOSIS — Z12.11 SCREENING FOR COLON CANCER: ICD-10-CM

## 2021-01-21 DIAGNOSIS — J12.82 PNEUMONIA DUE TO COVID-19 VIRUS: ICD-10-CM

## 2021-01-21 DIAGNOSIS — Z11.59 NEED FOR HEPATITIS C SCREENING TEST: ICD-10-CM

## 2021-01-21 DIAGNOSIS — Z76.89 ENCOUNTER TO ESTABLISH CARE: Primary | ICD-10-CM

## 2021-01-21 DIAGNOSIS — Z28.21 INFLUENZA VACCINATION DECLINED BY PATIENT: ICD-10-CM

## 2021-01-21 DIAGNOSIS — U07.1 PNEUMONIA DUE TO COVID-19 VIRUS: ICD-10-CM

## 2021-01-21 DIAGNOSIS — Z12.5 SCREENING FOR PROSTATE CANCER: ICD-10-CM

## 2021-01-21 PROCEDURE — 99204 OFFICE O/P NEW MOD 45 MIN: CPT | Performed by: INTERNAL MEDICINE

## 2021-01-21 RX ORDER — DOXYCYCLINE HYCLATE 100 MG
TABLET ORAL
COMMUNITY
Start: 2020-12-18 | End: 2021-01-21

## 2021-01-21 RX ORDER — CEFPODOXIME PROXETIL 200 MG/1
TABLET, FILM COATED ORAL
COMMUNITY
Start: 2020-12-18 | End: 2021-01-21

## 2021-01-21 RX ORDER — ALBUTEROL SULFATE 90 UG/1
2 AEROSOL, METERED RESPIRATORY (INHALATION) EVERY 6 HOURS PRN
COMMUNITY
Start: 2021-01-06 | End: 2021-01-21

## 2021-01-21 NOTE — ASSESSMENT & PLAN NOTE
Seems much better-he wants to hold off on pulmonary consult or pfts and pneumonia vaccine for now-will let me know if anything changes

## 2021-01-21 NOTE — PROGRESS NOTES
BMI Counseling: Body mass index is 29 02 kg/m²  The BMI is above normal  Nutrition recommendations include reducing portion sizes, decreasing overall calorie intake, 3-5 servings of fruits/vegetables daily, reducing fast food intake, consuming healthier snacks, decreasing soda and/or juice intake and moderation in carbohydrate intake  Assessment/Plan:    Pneumonia due to COVID-19 virus  Seems much better-he wants to hold off on pulmonary consult or pfts and pneumonia vaccine for now-will let me know if anything changes    Screening for prostate cancer  UTD per patient    Screening for colon cancer  UTD per patient       Diagnoses and all orders for this visit:    Encounter to establish care    BMI 29 0-29 9,adult    Influenza vaccination declined by patient    Need for hepatitis C screening test  -     Hepatitis C antibody; Future    Pneumonia due to COVID-19 virus    Screening for prostate cancer    Screening for colon cancer    Other orders  -     Discontinue: cefpodoxime (VANTIN) 200 mg tablet; TAKE 1 TABLET (200 MG TOTAL) BY MOUTH 2 (TWO) TIMES A DAY FOR 7 DAYS  -     Discontinue: doxycycline hyclate (VIBRA-TABS) 100 mg tablet; TAKE 1 TABLET (100 MG TOTAL) BY MOUTH 2 (TWO) TIMES A DAY WITH MEALS FOR 7 DAYS  Subjective:      Patient ID: Stephany Jacobo is a 65 y o  male      Candace Phipps with a history of GERD, had tested positive for covid in December and ended up getting chest pain and sob and found to have covid associated pneumonia -CTA was negative for PE- he is much better now but still has episodes of some low energy and sob-non smoker but does have his medical marijuana card-utd on other screenings-I also reviewed his labs that were done recently-he is interested in receiving the covid vaccine-      The following portions of the patient's history were reviewed and updated as appropriate: allergies, past family history, past surgical history and problem list     Review of Systems Constitutional: Negative  HENT: Negative  Eyes: Negative  Respiratory: Positive for shortness of breath  Cardiovascular: Negative  Gastrointestinal: Negative  Endocrine: Negative  Genitourinary: Negative  Musculoskeletal: Negative  Neurological: Negative  Hematological: Negative  Psychiatric/Behavioral: Negative  Objective:      /82 (BP Location: Left arm, Patient Position: Sitting, Cuff Size: Adult)   Pulse 86   Temp (!) 97 3 °F (36 3 °C) (Temporal)   Resp 16   Ht 5' 9" (1 753 m)   Wt 89 1 kg (196 lb 8 oz)   SpO2 99%   BMI 29 02 kg/m²          Physical Exam  Vitals signs reviewed  Constitutional:       Appearance: Normal appearance  HENT:      Head: Normocephalic and atraumatic  Right Ear: External ear normal       Left Ear: External ear normal       Nose: Nose normal    Neck:      Musculoskeletal: Normal range of motion and neck supple  Vascular: No carotid bruit  Cardiovascular:      Rate and Rhythm: Normal rate and regular rhythm  Pulmonary:      Effort: Pulmonary effort is normal       Breath sounds: Normal breath sounds  Musculoskeletal: Normal range of motion  Right lower leg: No edema  Left lower leg: No edema  Neurological:      General: No focal deficit present  Mental Status: He is alert and oriented to person, place, and time  Psychiatric:         Mood and Affect: Mood normal          Behavior: Behavior normal          Thought Content:  Thought content normal          Judgment: Judgment normal

## 2021-02-13 DIAGNOSIS — Z23 ENCOUNTER FOR IMMUNIZATION: ICD-10-CM

## 2021-05-13 ENCOUNTER — OFFICE VISIT (OUTPATIENT)
Dept: FAMILY MEDICINE CLINIC | Facility: CLINIC | Age: 71
End: 2021-05-13
Payer: MEDICARE

## 2021-05-13 VITALS
SYSTOLIC BLOOD PRESSURE: 122 MMHG | DIASTOLIC BLOOD PRESSURE: 62 MMHG | HEART RATE: 79 BPM | OXYGEN SATURATION: 99 % | HEIGHT: 69 IN | RESPIRATION RATE: 16 BRPM | BODY MASS INDEX: 30.57 KG/M2 | WEIGHT: 206.38 LBS | TEMPERATURE: 97.5 F

## 2021-05-13 DIAGNOSIS — R73.9 HYPERGLYCEMIA: ICD-10-CM

## 2021-05-13 DIAGNOSIS — E78.5 HYPERLIPIDEMIA, UNSPECIFIED HYPERLIPIDEMIA TYPE: ICD-10-CM

## 2021-05-13 DIAGNOSIS — Z28.21 COVID-19 VACCINATION DECLINED: ICD-10-CM

## 2021-05-13 DIAGNOSIS — R53.83 OTHER FATIGUE: ICD-10-CM

## 2021-05-13 DIAGNOSIS — K22.719 BARRETT'S ESOPHAGUS WITH DYSPLASIA: ICD-10-CM

## 2021-05-13 DIAGNOSIS — Z00.00 MEDICARE ANNUAL WELLNESS VISIT, SUBSEQUENT: Primary | ICD-10-CM

## 2021-05-13 DIAGNOSIS — Z28.21 PNEUMOCOCCAL VACCINE REFUSED: ICD-10-CM

## 2021-05-13 DIAGNOSIS — Z28.21 TETANUS, DIPHTHERIA, AND ACELLULAR PERTUSSIS (TDAP) VACCINATION DECLINED: ICD-10-CM

## 2021-05-13 PROCEDURE — 1123F ACP DISCUSS/DSCN MKR DOCD: CPT | Performed by: INTERNAL MEDICINE

## 2021-05-13 PROCEDURE — G0438 PPPS, INITIAL VISIT: HCPCS | Performed by: INTERNAL MEDICINE
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
## 2021-05-13 NOTE — PATIENT INSTRUCTIONS

## 2021-05-25 ENCOUNTER — TELEPHONE (OUTPATIENT)
Dept: ADMINISTRATIVE | Facility: OTHER | Age: 71
End: 2021-05-25

## 2021-05-25 NOTE — LETTER
Procedure Request Form: Colonoscopy      Date Requested: 21  Patient: Daivd Maywood  Patient : 1950   Referring Provider: Bibi Coke, MD        Date of Procedure ______________________________       The above patient has informed us that they have completed their   most recent Colonoscopy at your facility  Please complete   this form and attach all corresponding procedure reports/results  Comments __________________________________________________________  ____________________________________________________________________  ____________________________________________________________________  ____________________________________________________________________    Facility Completing Procedure _________________________________________    Form Completed By (print name) _______________________________________      Signature __________________________________________________________      These reports are needed for  compliance    Please fax this completed form and a copy of the procedure report to our office located at [place] [address] as soon as possible to [phone] lexie Fuller: Phone [phone]    We thank you for your assistance in treating our mutual patient

## 2021-05-25 NOTE — LETTER
Procedure Request Form: Colonoscopy      Date Requested: 21  Patient: Garald Query  Patient : 1950   Referring Provider: Costa Cook, MD        Date of Procedure ___2016________________       The above patient has informed us that they have completed their   most recent Colonoscopy at your facility  Please complete   this form and attach all corresponding procedure reports/results  Comments _______________________________________________________  ___________________________________________________________________  ____________________________________________________________________  ____________________________________________________________________    Facility Completing Procedure _________________________________________    Form Completed By (print name) _______________________________________      Signature __________________________________________________________      These reports are needed for  compliance    Please fax this completed form and a copy of the procedure report to our office located at [place] [address] as soon as possible to [phone] lexie [employer]: Phone [phone]    We thank you for your assistance in treating our mutual patient

## 2021-06-17 ENCOUNTER — TELEPHONE (OUTPATIENT)
Dept: GASTROENTEROLOGY | Facility: CLINIC | Age: 71
End: 2021-06-17

## 2021-06-17 NOTE — TELEPHONE ENCOUNTER
Returned Pt call asking to be scheduled for colonoscopy  He is not an existing Pt and would need to be seen as new pt  LMOM asking him to return the call  Please schedule if he returns call

## 2021-08-20 ENCOUNTER — OFFICE VISIT (OUTPATIENT)
Dept: GASTROENTEROLOGY | Facility: CLINIC | Age: 71
End: 2021-08-20
Payer: MEDICARE

## 2021-08-20 VITALS
HEART RATE: 78 BPM | SYSTOLIC BLOOD PRESSURE: 146 MMHG | BODY MASS INDEX: 30.51 KG/M2 | DIASTOLIC BLOOD PRESSURE: 93 MMHG | HEIGHT: 69 IN | WEIGHT: 206 LBS

## 2021-08-20 DIAGNOSIS — K22.70 BARRETT'S ESOPHAGUS WITHOUT DYSPLASIA: ICD-10-CM

## 2021-08-20 DIAGNOSIS — R79.89 ELEVATED LFTS: ICD-10-CM

## 2021-08-20 DIAGNOSIS — Z86.010 HISTORY OF COLON POLYPS: Primary | ICD-10-CM

## 2021-08-20 PROBLEM — Z86.0100 HISTORY OF COLON POLYPS: Status: ACTIVE | Noted: 2021-08-20

## 2021-08-20 PROCEDURE — 99203 OFFICE O/P NEW LOW 30 MIN: CPT | Performed by: INTERNAL MEDICINE
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
## 2021-09-09 ENCOUNTER — ANESTHESIA EVENT (OUTPATIENT)
Dept: GASTROENTEROLOGY | Facility: AMBULATORY SURGERY CENTER | Age: 71
End: 2021-09-09

## 2021-09-09 ENCOUNTER — ANESTHESIA (OUTPATIENT)
Dept: GASTROENTEROLOGY | Facility: AMBULATORY SURGERY CENTER | Age: 71
End: 2021-09-09
Payer: MEDICARE

## 2021-09-09 ENCOUNTER — HOSPITAL ENCOUNTER (OUTPATIENT)
Dept: GASTROENTEROLOGY | Facility: AMBULATORY SURGERY CENTER | Age: 71
Discharge: HOME | End: 2021-09-09
Payer: MEDICARE

## 2021-09-09 VITALS
OXYGEN SATURATION: 98 % | DIASTOLIC BLOOD PRESSURE: 79 MMHG | RESPIRATION RATE: 18 BRPM | HEART RATE: 63 BPM | SYSTOLIC BLOOD PRESSURE: 126 MMHG | WEIGHT: 195 LBS | TEMPERATURE: 98 F | BODY MASS INDEX: 29.55 KG/M2 | HEIGHT: 68 IN

## 2021-09-09 DIAGNOSIS — Z86.010 HISTORY OF COLON POLYPS: ICD-10-CM

## 2021-09-09 PROCEDURE — 45385 COLONOSCOPY W/LESION REMOVAL: CPT | Performed by: INTERNAL MEDICINE

## 2021-09-09 PROCEDURE — 88305 TISSUE EXAM BY PATHOLOGIST: CPT | Performed by: SPECIALIST

## 2021-09-09 PROCEDURE — 00811 ANES LWR INTST NDSC NOS: CPT | Performed by: NURSE ANESTHETIST, CERTIFIED REGISTERED

## 2021-09-09 PROCEDURE — 45380 COLONOSCOPY AND BIOPSY: CPT | Performed by: INTERNAL MEDICINE

## 2021-09-09 PROCEDURE — 99100 ANES PT EXTEME AGE<1 YR&>70: CPT | Performed by: NURSE ANESTHETIST, CERTIFIED REGISTERED

## 2021-09-09 RX ORDER — PROPOFOL 10 MG/ML
INJECTION, EMULSION INTRAVENOUS AS NEEDED
Status: DISCONTINUED | OUTPATIENT
Start: 2021-09-09 | End: 2021-09-09

## 2021-09-09 RX ORDER — SODIUM CHLORIDE 9 MG/ML
INJECTION, SOLUTION INTRAVENOUS CONTINUOUS PRN
Status: DISCONTINUED | OUTPATIENT
Start: 2021-09-09 | End: 2021-09-09

## 2021-09-09 RX ORDER — SODIUM CHLORIDE 9 MG/ML
20 INJECTION, SOLUTION INTRAVENOUS CONTINUOUS
Status: DISCONTINUED | OUTPATIENT
Start: 2021-09-09 | End: 2021-09-13 | Stop reason: HOSPADM

## 2021-09-09 RX ORDER — SODIUM CHLORIDE 9 MG/ML
30 INJECTION, SOLUTION INTRAVENOUS CONTINUOUS
Status: DISCONTINUED | OUTPATIENT
Start: 2021-09-09 | End: 2021-09-13 | Stop reason: HOSPADM

## 2021-09-09 RX ORDER — LANSOPRAZOLE 30 MG/1
30 CAPSULE, DELAYED RELEASE ORAL EVERY MORNING
COMMUNITY

## 2021-09-09 RX ADMIN — PROPOFOL 100 MG: 10 INJECTION, EMULSION INTRAVENOUS at 13:46

## 2021-09-09 RX ADMIN — PROPOFOL 50 MG: 10 INJECTION, EMULSION INTRAVENOUS at 14:02

## 2021-09-09 RX ADMIN — PROPOFOL 50 MG: 10 INJECTION, EMULSION INTRAVENOUS at 13:55

## 2021-09-09 RX ADMIN — PROPOFOL 50 MG: 10 INJECTION, EMULSION INTRAVENOUS at 13:51

## 2021-09-09 RX ADMIN — SODIUM CHLORIDE: 9 INJECTION, SOLUTION INTRAVENOUS at 13:46

## 2021-09-09 NOTE — ANESTHESIA PREPROCEDURE EVALUATION
Procedure:  COLONOSCOPY    Relevant Problems   NEURO/PSYCH   (+) History of colon polyps      PULMONARY   (+) Pneumonia due to COVID-19 virus        Physical Exam    Airway    Mallampati score: I  TM Distance: <3 FB  Neck ROM: full     Dental   upper dentures and lower dentures,     Cardiovascular  Rhythm: regular, Rate: normal, Cardiovascular exam normal    Pulmonary  Pulmonary exam normal Breath sounds clear to auscultation,     Other Findings        Anesthesia Plan  ASA Score- 2     Anesthesia Type- IV sedation with anesthesia with ASA Monitors  Additional Monitors:   Airway Plan:           Plan Factors-    Chart reviewed  Induction- intravenous  Postoperative Plan-     Informed Consent- Anesthetic plan and risks discussed with patient

## 2021-09-09 NOTE — INTERVAL H&P NOTE
H&P reviewed  After examining the patient I find no changes in the patients condition since the H&P had been written      Vitals:    09/09/21 1307   BP: 116/76   Pulse: 70   Resp: 18   Temp: 98 2 °F (36 8 °C)   SpO2: 99%

## 2021-09-09 NOTE — ANESTHESIA POSTPROCEDURE EVALUATION
Post-Op Assessment Note    CV Status:  Stable  Pain Score: 0    Pain management: adequate     Mental Status:  Sleepy   Hydration Status:  Stable   PONV Controlled:  None   Airway Patency:  Patent      Post Op Vitals Reviewed: Yes      Staff: CRNA         No complications documented      /81 (09/09/21 1415)    Temp 98 °F (36 7 °C) (09/09/21 1415)    Pulse 60 (09/09/21 1415)   Resp 16 (09/09/21 1415)    SpO2 98 % (09/09/21 1415)

## 2021-09-09 NOTE — DISCHARGE INSTRUCTIONS
Colonoscopy   WHAT YOU NEED TO KNOW:   A colonoscopy is a procedure to examine the inside of your colon (intestine) with a scope  Polyps or tissue growths may have been removed during your colonoscopy  It is normal to feel bloated and to have some abdominal discomfort  You should be passing gas  If you have hemorrhoids or you had polyps removed, you may have a small amount of bleeding  DISCHARGE INSTRUCTIONS:   Seek care immediately if:    You have sudden, severe abdominal pain   You have problems swallowing   You have a large amount of black, sticky bowel movements or blood in your bowel movements   You have sudden trouble breathing   You feel weak, lightheaded, or faint or your heart beats faster than normal for you  Contact your healthcare provider if:    You have a fever and chills   You have nausea or are vomiting   Your abdomen is bloated or feels full and hard   You have abdominal pain   You have black, sticky bowel movements or blood in your bowel movements   You have not had a bowel movement for 3 days after your procedure   You have rash or hives   You have questions or concerns about your procedure  Activity:    Do not lift, strain, or run for 24 hours after your procedure   Rest after your procedure  You have been given medicine to relax you  Do not drive or make important decisions until the day after your procedure  Return to your normal activity as directed   Relieve gas and discomfort from bloating by lying on your right side with a heating pad on your abdomen  You may need to take short walks to help the gas move out  Eat small meals until bloating is relieved  Follow up with your healthcare provider as directed: Write down your questions so you remember to ask them during your visits  If you take a blood thinner, please review the specific instructions from your endoscopist about when you should resume it   These can be found in the Recommendation and Your Medication list sections of this After Visit Summary  Colorectal Polyps   WHAT YOU NEED TO KNOW:   Colorectal polyps are small growths of tissue in the lining of the colon and rectum  Most polyps are hyperplastic polyps and are usually benign (noncancerous)  Certain types of polyps, called adenomatous polyps, may turn into cancer  DISCHARGE INSTRUCTIONS:   Follow up with your healthcare provider or gastroenterologist as directed: You may need to return for more tests, such as another colonoscopy  Write down your questions so you remember to ask them during your visits  Reduce your risk for colorectal polyps:   · Eat a variety of healthy foods:  Healthy foods include fruit, vegetables, whole-grain breads, low-fat dairy products, beans, lean meat, and fish  Ask if you need to be on a special diet  · Maintain a healthy weight:  Ask your healthcare provider if you need to lose weight and how much you need to lose  Ask for help with a weight loss program     · Exercise:  Begin to exercise slowly and do more as you get stronger  Talk with your healthcare provider before you start an exercise program      · Limit alcohol:  Your risk for polyps increases the more you drink  · Do not smoke: If you smoke, it is never too late to quit  Ask for information about how to stop  For support and more information:   · Kiara Chang (NDDIC)  [address] [address]  Phone: [phone]  Web Address: www digestive  niddk nih gov    Contact your healthcare provider or gastroenterologist if:   · You have a fever  · You have chills, a cough, or feel weak and achy  · You have abdominal pain that does not go away or gets worse after you take medicine  · Your abdomen is swollen  · You are losing weight without trying  · You have questions or concerns about your condition or care      Seek care immediately or call 911 if:   · You have sudden shortness of breath  · You have a fast heart rate, fast breathing, or are too dizzy to stand up  · You have severe abdominal pain  · You see blood in your bowel movement  © Copyright [address] Information is for End User's use only and may not be sold, redistributed or otherwise used for commercial purposes  All illustrations and images included in CareNotes® are the copyrighted property of [employer]  or [facility] Valeriy Koroma   The above information is an  only  It is not intended as medical advice for individual conditions or treatments  Talk to your doctor, nurse or pharmacist before following any medical regimen to see if it is safe and effective for you  High Fiber Diet   WHAT YOU NEED TO KNOW:   What is a high-fiber diet? A high-fiber diet includes foods that have a high amount of fiber  Fiber is the part of fruits, vegetables, and grains that is not broken down by your body  Fiber keeps your bowel movements regular  Fiber can also help lower your cholesterol level, control blood sugar in people with diabetes, and relieve constipation  Fiber can also help you control your weight because it helps you feel full faster  Most adults should eat 25 to 35 grams of fiber each day  Talk to your dietitian or healthcare provider about the amount of fiber you need  What foods are good sources of fiber? · Foods with at least 4 grams of fiber per serving:      ? ? to ½ cup of high-fiber cereal (check the nutrition label on the box)    ? ½ cup of blackberries or raspberries    ? 4 dried prunes    ? 1 cooked artichoke    ? ½ cup of cooked legumes, such as lentils, or red, kidney, and merida beans    · Foods with 1 to 3 grams of fiber per serving:      ? 1 slice of whole-wheat, pumpernickel, or rye bread    ? ½ cup of cooked brown rice    ? 4 whole-wheat crackers    ?  1 cup of oatmeal    ? ½ cup of cereal with 1 to 3 grams of fiber per serving (check the nutrition label on the box)    ? 1 small piece of fruit, such as an apple, banana, pear, kiwi, or orange    ? 3 dates    ? ½ cup of canned apricots, fruit cocktail, peaches, or pears    ? ½ cup of raw or cooked vegetables, such as carrots, cauliflower, cabbage, spinach, squash, or corn  What are some ways that I can increase fiber in my diet? · Choose brown or wild rice instead of white rice  · Use whole wheat flour in recipes instead of white or all-purpose flour  · Add beans and peas to casseroles or soups  · Choose fresh fruit and vegetables with peels or skins on instead of juices  What other guidelines should I follow? · Add fiber to your diet slowly  You may have abdominal discomfort, bloating, and gas if you add fiber to your diet too quickly  · Drink plenty of liquids as you add fiber to your diet  You may have nausea or develop constipation if you do not drink enough water  Ask how much liquid to drink each day and which liquids are best for you  CARE AGREEMENT:   You have the right to help plan your care  Discuss treatment options with your healthcare provider to decide what care you want to receive  You always have the right to refuse treatment  The above information is an  only  It is not intended as medical advice for individual conditions or treatments  Talk to your doctor, nurse or pharmacist before following any medical regimen to see if it is safe and effective for you  © Copyright [employer] 2021 Information is for End User's use only and may not be sold, redistributed or otherwise used for commercial purposes   All illustrations and images included in CareNotes® are the copyrighted property of A D A M [employer]  or [address] [place] [address] [employer]

## 2022-02-17 ENCOUNTER — OFFICE VISIT (OUTPATIENT)
Dept: OBGYN CLINIC | Facility: CLINIC | Age: 72
End: 2022-02-17
Payer: MEDICARE

## 2022-02-17 VITALS — BODY MASS INDEX: 31.1 KG/M2 | HEART RATE: 68 BPM | OXYGEN SATURATION: 99 % | HEIGHT: 68 IN | WEIGHT: 205.2 LBS

## 2022-02-17 DIAGNOSIS — M12.811 ROTATOR CUFF TEAR ARTHROPATHY OF BOTH SHOULDERS: Primary | ICD-10-CM

## 2022-02-17 DIAGNOSIS — M12.812 ROTATOR CUFF TEAR ARTHROPATHY OF BOTH SHOULDERS: Primary | ICD-10-CM

## 2022-02-17 DIAGNOSIS — M75.102 ROTATOR CUFF TEAR ARTHROPATHY OF BOTH SHOULDERS: Primary | ICD-10-CM

## 2022-02-17 DIAGNOSIS — M75.101 ROTATOR CUFF TEAR ARTHROPATHY OF BOTH SHOULDERS: Primary | ICD-10-CM

## 2022-02-17 PROCEDURE — 99203 OFFICE O/P NEW LOW 30 MIN: CPT | Performed by: PHYSICIAN ASSISTANT
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
## 2022-03-03 ENCOUNTER — APPOINTMENT (OUTPATIENT)
Dept: RADIOLOGY | Facility: OTHER | Age: 72
End: 2022-03-03
Payer: MEDICARE

## 2022-03-03 ENCOUNTER — TELEPHONE (OUTPATIENT)
Dept: OBGYN CLINIC | Facility: HOSPITAL | Age: 72
End: 2022-03-03

## 2022-03-03 ENCOUNTER — TELEPHONE (OUTPATIENT)
Dept: OBGYN CLINIC | Facility: OTHER | Age: 72
End: 2022-03-03

## 2022-03-03 ENCOUNTER — OFFICE VISIT (OUTPATIENT)
Dept: OBGYN CLINIC | Facility: OTHER | Age: 72
End: 2022-03-03
Payer: MEDICARE

## 2022-03-03 VITALS
SYSTOLIC BLOOD PRESSURE: 131 MMHG | BODY MASS INDEX: 30.71 KG/M2 | WEIGHT: 202.6 LBS | DIASTOLIC BLOOD PRESSURE: 83 MMHG | HEART RATE: 75 BPM | HEIGHT: 68 IN

## 2022-03-03 DIAGNOSIS — M75.101 ROTATOR CUFF TEAR ARTHROPATHY OF RIGHT SHOULDER: Primary | ICD-10-CM

## 2022-03-03 DIAGNOSIS — M75.102 ROTATOR CUFF TEAR ARTHROPATHY OF LEFT SHOULDER: ICD-10-CM

## 2022-03-03 DIAGNOSIS — M25.511 RIGHT SHOULDER PAIN, UNSPECIFIED CHRONICITY: ICD-10-CM

## 2022-03-03 DIAGNOSIS — M12.812 ROTATOR CUFF TEAR ARTHROPATHY OF LEFT SHOULDER: ICD-10-CM

## 2022-03-03 DIAGNOSIS — M12.811 ROTATOR CUFF TEAR ARTHROPATHY OF RIGHT SHOULDER: Primary | ICD-10-CM

## 2022-03-03 PROCEDURE — 99214 OFFICE O/P EST MOD 30 MIN: CPT | Performed by: ORTHOPAEDIC SURGERY

## 2022-03-03 PROCEDURE — 73030 X-RAY EXAM OF SHOULDER: CPT

## 2022-03-03 RX ORDER — CHLORHEXIDINE GLUCONATE 0.12 MG/ML
15 RINSE ORAL ONCE
Status: CANCELLED | OUTPATIENT
Start: 2022-03-03 | End: 2022-03-03

## 2022-03-03 NOTE — PATIENT INSTRUCTIONS
What to Expect Before and After Shoulder Replacement Surgery  You are being scheduled for a shoulder replacement by Dr Janine Monet to treat your shoulder condition  Here is some information which may help to answer questions that you may have  Please do not hesitate to reach out to our team to answer questions not addressed here  Before Surgery  You will be contacted the evening prior to your surgery to confirm the scheduled time of the procedure and when to arrive at the hospital    Do not eat or drink anything after midnight the night before your surgery so that the anesthesia can be performed safely  If you have been fitted for a sling in the office prior to the surgery please remember to bring it to the hospital   You will meet the anesthesiologist the morning of the surgery  The surgery is performed under a general anesthetic but they will also offer you a regional block (shot to numb the arm) to help control your post-operative pain as well as with a catheter that is left in place after the block  The catheter is connected to a small pump which will continue to provide numbing medicine and help prolong the pain control from the block  Unfortunately this catheter is not as effective as the initial block, but can still be very helpful in managing the pain  After Surgery and in the Hospital  The shoulder replacement surgery typically takes 60-90 minutes  When surgery is completed, your surgeon will update your family and friends on your condition and progress  You will remain in the recovery room for at least an hour or until the anesthesia has worn off and your blood pressure and pulse are stable  If you have pain, the nurses will give you medication  Once out of surgery, your surgeon will decide on how long you will be using a sling in order to protect and position your shoulder  However, this won't keep you from starting physical therapy    Exercises typically begin on the day after surgery with emphasis on moving the shoulder, wrist, and hand  The physical therapist will be provided with a detailed protocol but typically the first 6 weeks are used to regain range of motion and then strengthening is initiated  Starting strengthening exercises too early may lead to complications  When You Are Discharged from the [address] [place] [address] can expect to be released from the hospital the day after surgery (this may change if you have special needs or medical conditions)  Before you are released, the treatment team (Orthopaedic surgery residents, physician assistants and physical therapists) will talk with you about the importance of limiting any sudden or stressful movements to the arm for several weeks or longer  Activities that involve pushing, pulling, and lifting should not be done until you are given permission from your surgeon  Your First Day at [address] [place] [address] may need help with your daily activities, so it is a good idea to have family and friends prepared to help you  It is okay to remove the sling and let the arm hang at the side so that you can get cleaned and change your clothes  To put on a shirt, place the bad arm in first and then the good arm  Reverse to take it off  Don't forget to wear the sling every night for at least the first month after surgery, and never use your arm to push yourself up in bed or from a chair  The added weight on your shoulder may cause you to re-injure the joint  How to [place] in the First Week  You are encouraged to return to your normal eating and sleeping patterns as soon as possible  It is important for you to be active in order to control your weight and muscle tone  It is ok to increase your activity level and even perform light aerobic exercise (like walking or riding a stationary bike) within the first week or so if you are feeling up to it    If there is concern about these activates best to wait until the first post-operative visit and discuss this with the monico Soni might be able to return to work within several days if you can perform your job while wearing a sling  Consult with your doctor, as this differs from patient to patient  However, if your job requires heavy lifting or climbing, there may be a delay for several months  Until you are seen for your first follow-up visit, please try and keep wound dry  It is okay to shower but try your best to keep the incision out of direct contact with the water (or consider using a waterproof bandage)  If it does gets wet please dry as best as possible afterwards  If you notice any drainage or a foul odor from your incision or your temperature goes above 101 5 degrees, please contact the office  What You Can Expect in the First Month  Your first post-operative appointment will be with Dr Sd De Santiago physician assistant ([place]) around 2 weeks after the surgery  You skin staples will be removed and X-rays will be obtained at that visit  Please understand that it is quite common to still experience pain at that time but the pain should be steadily improving  Hopefully physical therapy has already begun but if not it will be initiated at this visit and will continue for the 8-12 weeks  At about 12 weeks after surgery you will start a progressive strengthening program  Physical therapy is a deliberate process of not only strengthening your shoulder but also altering how you use your arm  It may be many months before your desired results are achieved, so do not get discouraged  Your shoulder will generally continue to improve steadily up to 6-8 months after surgery  After that point further improvement is very slow; although it has been shown that even after a year or more, activity can increase as muscle strength continues to improve  After the First Month at Home   Because each person heals differently, there are different recovery timelines  An average recovery period typically lasts about between 3-6 months  Talk with your surgeon about which activities will be appropriate for you once you have recovered

## 2022-03-03 NOTE — TELEPHONE ENCOUNTER
Spoke to patient, asked to have him get the disc for his xrays from [facility]

## 2022-03-03 NOTE — TELEPHONE ENCOUNTER
We cannot call another hospital to get xrays  He has to physically get himself in person from their radiology department  He will not be able to get 1 hour in advance of appointment  We will get new xrays when he gets to our office

## 2022-03-03 NOTE — PROGRESS NOTES
Assessment  Diagnoses and all orders for this visit:    Rotator cuff tear arthropathy of right shoulder    Rotator cuff tear arthropathy of left shoulder    Discussion and Plan:    · Explained to the patient that as he has tried and failed non operative treatments and is a candidate for a reverse total shoulder of the right shoulder as this is the more painful shoulder at this time  He understood and all questions were answered  He was agreeable to proceed forward with this treatment  He is affected with both shoulders but does state the right is worse so we will start with the right shoulder and then take on the contralateral shoulder when he is recovered enough  · A thorough discussion was performed with the patient reviewing all operative and nonoperative options as well as the risks of the procedure  Risks discussed include but not limited to persistent pain, dislocation, loosening of the prosthesis, infection, need for further surgery including revision to a reverse total shoulder, rotator cuff rupture , neurovascular injury, as well as the risk of anesthesia  After this discussion all questions were answered and informed consent was obtained for Reverse Total Shoulder Arthroplasty of the Right Shoulder  · Follow up post operatively with Dewayne Jenkins PA-C    Subjective:   Patient ID: Merna Davis is a 66 y o  male    The patient presents with a chief complaint of bilateral, right worse than left shoulder pain  The pain began several year(s) ago and is not associated with an acute injury  Although patient reports a slip and fall on ice on 2/6/2022 that exacerbated his already present symptoms  The patient describes the pain as aching and dull in intensity,  constant, awakening patient from sleep in timing, and localizes the pain to the  bilateral glenohumeral joint, deltoid  The pain is worse with movement, overhead work and overuse and relieved by rest, ice, avoiding the painful activities    The pain is not associated with numbness and tingling  The pain is not associated with constitutional symptoms  The patient is awoken at night by the pain  The patient has had prior treatment at [facility] with Dr Rebeca Adams in the form of a cortisone injection into his right shoulder as well as physical therapy without benefit  The following portions of the patient's history were reviewed and updated as appropriate: allergies, current medications, past family history, past medical history, past social history, past surgical history and problem list     Objective:  /83   Pulse 75   Ht 5' 8" (1 727 m)   Wt 91 9 kg (202 lb 9 6 oz)   BMI 30 81 kg/m²       Right Shoulder Exam     Tenderness   The patient is experiencing no tenderness  Muscle Strength   Abduction: 3/5   External rotation: 3/5     Other   Erythema: absent  Sensation: normal  Pulse: present    Comments:  Palpable anterior superior escape on examination      Left Shoulder Exam     Tenderness   The patient is experiencing no tenderness  Muscle Strength   Abduction: 3/5   External rotation: 3/5     Other   Erythema: absent  Sensation: normal  Pulse: present             Physical Exam  Constitutional:       General: He is not in acute distress  Appearance: He is well-developed  Eyes:      Conjunctiva/sclera: Conjunctivae normal       Pupils: Pupils are equal, round, and reactive to light  Cardiovascular:      Rate and Rhythm: Normal rate and regular rhythm  Pulses: Normal pulses  Heart sounds: Normal heart sounds  Pulmonary:      Effort: Pulmonary effort is normal       Breath sounds: Normal breath sounds  Abdominal:      General: Bowel sounds are normal       Palpations: Abdomen is soft  Musculoskeletal:      Cervical back: Normal range of motion and neck supple  Skin:     General: Skin is warm and dry  Findings: No erythema or rash  Neurological:      Mental Status: He is alert and oriented to person, place, and time  Deep Tendon Reflexes: Reflexes are normal and symmetric  Psychiatric:         Behavior: Behavior normal        I have personally reviewed pertinent films in PACS and my interpretation is as follows      X Ray Bilateral Shoulders 3/3/2022: High riding humeral head in relation to the glenoid, consistent with a chronic, retracted rotator cuff tear, with mild to moderate glenohumeral joint osteoarthritis    I did review the notes in the electronic medical record system of his visit with Dr Jaden Hooker at [facility] which document his treatment with corticosteroid injection as well as their diagnosis of rotator cuff tear arthropathy    Scribe Attestation    I,:  Aria Solorio am acting as a scribe while in the presence of the attending physician :       I,:  Natasha Guzmán MD personally performed the services described in this documentation    as scribed in my presence :

## 2022-03-03 NOTE — TELEPHONE ENCOUNTER
Patient called stating that he was to bring xrays from [facility] to [facility] he made 3 attempts at getting xrays and was unable to reach anyone  He was asking if there was a way that clinical team could reach out to [facility] to obtain Xrays  Patient asked to speak to clinical team   Patient seeing Dr Mackenzie Moe at [facility] today

## 2022-03-07 DIAGNOSIS — M12.811 ROTATOR CUFF TEAR ARTHROPATHY OF RIGHT SHOULDER: Primary | ICD-10-CM

## 2022-03-07 DIAGNOSIS — M75.101 ROTATOR CUFF TEAR ARTHROPATHY OF RIGHT SHOULDER: Primary | ICD-10-CM

## 2022-03-11 ENCOUNTER — HOSPITAL ENCOUNTER (OUTPATIENT)
Dept: CT IMAGING | Facility: HOSPITAL | Age: 72
Discharge: HOME | End: 2022-03-11
Attending: ORTHOPAEDIC SURGERY
Payer: MEDICARE

## 2022-03-11 DIAGNOSIS — M12.811 ROTATOR CUFF TEAR ARTHROPATHY OF RIGHT SHOULDER: ICD-10-CM

## 2022-03-11 DIAGNOSIS — M75.101 ROTATOR CUFF TEAR ARTHROPATHY OF RIGHT SHOULDER: ICD-10-CM

## 2022-03-11 PROCEDURE — 73200 CT UPPER EXTREMITY W/O DYE: CPT

## 2022-03-11 PROCEDURE — G1004 CDSM NDSC: HCPCS

## 2022-03-14 ENCOUNTER — HOSPITAL ENCOUNTER (EMERGENCY)
Facility: HOSPITAL | Age: 72
Discharge: HOME | End: 2022-03-14
Attending: EMERGENCY MEDICINE
Payer: MEDICARE

## 2022-03-14 VITALS
DIASTOLIC BLOOD PRESSURE: 87 MMHG | RESPIRATION RATE: 18 BRPM | OXYGEN SATURATION: 100 % | TEMPERATURE: 97.8 F | HEIGHT: 68 IN | BODY MASS INDEX: 30.44 KG/M2 | WEIGHT: 200.84 LBS | SYSTOLIC BLOOD PRESSURE: 156 MMHG | HEART RATE: 64 BPM

## 2022-03-14 DIAGNOSIS — M25.519 PAIN IN SHOULDER: Primary | ICD-10-CM

## 2022-03-14 PROCEDURE — 99283 EMERGENCY DEPT VISIT LOW MDM: CPT | Performed by: EMERGENCY MEDICINE

## 2022-03-14 PROCEDURE — 99283 EMERGENCY DEPT VISIT LOW MDM: CPT

## 2022-03-14 RX ORDER — ACETAMINOPHEN 325 MG/1
650 TABLET ORAL ONCE
Status: COMPLETED | OUTPATIENT
Start: 2022-03-14 | End: 2022-03-14

## 2022-03-14 RX ADMIN — ACETAMINOPHEN 650 MG: 325 TABLET, FILM COATED ORAL at 21:39
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
## 2022-03-18 ENCOUNTER — TELEPHONE (OUTPATIENT)
Dept: OBGYN CLINIC | Facility: OTHER | Age: 72
End: 2022-03-18

## 2022-03-18 ENCOUNTER — APPOINTMENT (OUTPATIENT)
Dept: LAB | Facility: HOSPITAL | Age: 72
End: 2022-03-18
Attending: ORTHOPAEDIC SURGERY
Payer: MEDICARE

## 2022-03-18 DIAGNOSIS — M12.811 ROTATOR CUFF TEAR ARTHROPATHY OF RIGHT SHOULDER: ICD-10-CM

## 2022-03-18 DIAGNOSIS — M75.101 ROTATOR CUFF TEAR ARTHROPATHY OF RIGHT SHOULDER: ICD-10-CM

## 2022-03-18 DIAGNOSIS — Z01.812 PRE-OPERATIVE LABORATORY EXAMINATION: ICD-10-CM

## 2022-03-18 DIAGNOSIS — Z01.818 OTHER SPECIFIED PRE-OPERATIVE EXAMINATION: ICD-10-CM

## 2022-03-18 LAB
ALBUMIN SERPL BCP-MCNC: 4.6 G/DL (ref 3.5–5)
ALP SERPL-CCNC: 79 U/L (ref 34–104)
ALT SERPL W P-5'-P-CCNC: 33 U/L (ref 7–52)
ANION GAP SERPL CALCULATED.3IONS-SCNC: 7 MMOL/L (ref 4–13)
ANISOCYTOSIS BLD QL SMEAR: PRESENT
APTT PPP: 34 SECONDS (ref 23–37)
AST SERPL W P-5'-P-CCNC: 23 U/L (ref 13–39)
BASOPHILS # BLD MANUAL: 0.04 THOUSAND/UL (ref 0–0.1)
BASOPHILS NFR MAR MANUAL: 1 % (ref 0–1)
BILIRUB SERPL-MCNC: 0.68 MG/DL (ref 0.2–1)
BUN SERPL-MCNC: 17 MG/DL (ref 5–25)
CALCIUM SERPL-MCNC: 9.4 MG/DL (ref 8.4–10.2)
CHLORIDE SERPL-SCNC: 103 MMOL/L (ref 96–108)
CO2 SERPL-SCNC: 29 MMOL/L (ref 21–32)
CREAT SERPL-MCNC: 0.87 MG/DL (ref 0.6–1.3)
EOSINOPHIL # BLD MANUAL: 0 THOUSAND/UL (ref 0–0.4)
EOSINOPHIL NFR BLD MANUAL: 0 % (ref 0–6)
ERYTHROCYTE [DISTWIDTH] IN BLOOD BY AUTOMATED COUNT: 12.6 % (ref 11.6–15.1)
GFR SERPL CREATININE-BSD FRML MDRD: 85 ML/MIN/1.73SQ M
GLUCOSE P FAST SERPL-MCNC: 108 MG/DL (ref 65–99)
HCT VFR BLD AUTO: 45.9 % (ref 36.5–49.3)
HGB BLD-MCNC: 15.7 G/DL (ref 12–17)
INR PPP: 1.02 (ref 0.84–1.19)
LYMPHOCYTES # BLD AUTO: 1.42 THOUSAND/UL (ref 0.6–4.47)
LYMPHOCYTES # BLD AUTO: 33 % (ref 14–44)
MCH RBC QN AUTO: 31.6 PG (ref 26.8–34.3)
MCHC RBC AUTO-ENTMCNC: 34.2 G/DL (ref 31.4–37.4)
MCV RBC AUTO: 92 FL (ref 82–98)
MONOCYTES # BLD AUTO: 0.26 THOUSAND/UL (ref 0–1.22)
MONOCYTES NFR BLD: 6 % (ref 4–12)
NEUTROPHILS # BLD MANUAL: 2.57 THOUSAND/UL (ref 1.85–7.62)
NEUTS SEG NFR BLD AUTO: 60 % (ref 43–75)
OVALOCYTES BLD QL SMEAR: PRESENT
PLATELET # BLD AUTO: 128 THOUSANDS/UL (ref 149–390)
PLATELET BLD QL SMEAR: ABNORMAL
PMV BLD AUTO: 11.7 FL (ref 8.9–12.7)
POIKILOCYTOSIS BLD QL SMEAR: PRESENT
POLYCHROMASIA BLD QL SMEAR: PRESENT
POTASSIUM SERPL-SCNC: 4.3 MMOL/L (ref 3.5–5.3)
PROT SERPL-MCNC: 6.8 G/DL (ref 6.4–8.4)
PROTHROMBIN TIME: 13.3 SECONDS (ref 11.6–14.5)
RBC # BLD AUTO: 4.97 MILLION/UL (ref 3.88–5.62)
RBC MORPH BLD: PRESENT
SODIUM SERPL-SCNC: 139 MMOL/L (ref 135–147)
WBC # BLD AUTO: 4.29 THOUSAND/UL (ref 4.31–10.16)

## 2022-03-18 PROCEDURE — 85730 THROMBOPLASTIN TIME PARTIAL: CPT

## 2022-03-18 PROCEDURE — 85007 BL SMEAR W/DIFF WBC COUNT: CPT

## 2022-03-18 PROCEDURE — 85610 PROTHROMBIN TIME: CPT

## 2022-03-18 PROCEDURE — 93005 ELECTROCARDIOGRAM TRACING: CPT

## 2022-03-18 PROCEDURE — 85027 COMPLETE CBC AUTOMATED: CPT

## 2022-03-18 PROCEDURE — 36415 COLL VENOUS BLD VENIPUNCTURE: CPT

## 2022-03-18 PROCEDURE — 80053 COMPREHEN METABOLIC PANEL: CPT

## 2022-03-18 PROCEDURE — 83036 HEMOGLOBIN GLYCOSYLATED A1C: CPT

## 2022-03-18 NOTE — TELEPHONE ENCOUNTER
----- Message from Rich De La Torre PA-C sent at 3/15/2022 11:26 AM EDT -----  Regarding: FW: injections  No   He cannot have a right shoulder injection, unless he wants to reschedule his total shoulder 3 months out  Surgery cannot be done within 3 months of a total joint  He can make an appointment for a left shoulder injection  Thanks  Herminia Gilliam   ----- Message -----  From: Jameel Torres MA  Sent: 3/15/2022  11:14 AM EDT  To: Jack Sanders MD  Subject: injections                                       Patient is having R shoulder replacement on 4/26, patient is asking if he could bilateral shoulder injections  Please advise, please call Carole to let her know to set him up      Thanks  [employer]

## 2022-03-19 ENCOUNTER — LAB REQUISITION (OUTPATIENT)
Dept: LAB | Facility: HOSPITAL | Age: 72
End: 2022-03-19
Payer: MEDICARE

## 2022-03-19 DIAGNOSIS — Z01.818 ENCOUNTER FOR OTHER PREPROCEDURAL EXAMINATION: ICD-10-CM

## 2022-03-19 LAB
ABO GROUP BLD: NORMAL
BLD GP AB SCN SERPL QL: NEGATIVE
EST. AVERAGE GLUCOSE BLD GHB EST-MCNC: 126 MG/DL
HBA1C MFR BLD: 6 %
RH BLD: POSITIVE
SPECIMEN EXPIRATION DATE: NORMAL

## 2022-03-19 PROCEDURE — 86850 RBC ANTIBODY SCREEN: CPT | Performed by: ORTHOPAEDIC SURGERY

## 2022-03-19 PROCEDURE — 86900 BLOOD TYPING SEROLOGIC ABO: CPT | Performed by: ORTHOPAEDIC SURGERY

## 2022-03-19 PROCEDURE — 86901 BLOOD TYPING SEROLOGIC RH(D): CPT | Performed by: ORTHOPAEDIC SURGERY

## 2022-03-21 ENCOUNTER — EVALUATION (OUTPATIENT)
Dept: PHYSICAL THERAPY | Facility: CLINIC | Age: 72
End: 2022-03-21
Payer: MEDICARE

## 2022-03-21 DIAGNOSIS — M12.811 ROTATOR CUFF TEAR ARTHROPATHY OF RIGHT SHOULDER: ICD-10-CM

## 2022-03-21 DIAGNOSIS — M75.101 ROTATOR CUFF TEAR ARTHROPATHY OF RIGHT SHOULDER: ICD-10-CM

## 2022-03-21 PROCEDURE — 97161 PT EVAL LOW COMPLEX 20 MIN: CPT | Performed by: PHYSICAL THERAPIST

## 2022-03-21 NOTE — PROGRESS NOTES
PT Evaluation     Today's date: 3/21/2022  Patient name: Aretha Solorio  : 1950  MRN: [id number]  Referring provider: Rand Goodwin  Dx:   Encounter Diagnosis     ICD-10-CM    1  Rotator cuff tear arthropathy of right shoulder  M75 101 Ambulatory referral to Physical Therapy    M12 [id number]                   Assessment  Assessment details: Aretha Solorio is a 66 y o  male presents for pre-op visit for R Reverse TSA  He presents with pain, decreased strength, decreased ROM, and postural  dysfunction  Due to these impairments, Patient has difficulty performing a/iadls, recreational activities and engaging in social activities  Patient's clinical presentation is consistent with their referring diagnosis  He will be returning on 22 for his first post-op visit  He has been given a home exercise program and is in agreement with the plan of care  Thank you for your referral   Impairments: abnormal or restricted ROM, activity intolerance, impaired physical strength, lacks appropriate home exercise program and pain with function    Symptom irritability: moderateUnderstanding of Dx/Px/POC: excellent  Goals  Return post-op post op TSA  1   Independent with HEP in 2 visits  2  Can don and doff sling independently in 2 visits  3  Improve ROM by 25% w/in protocol in 4 weeks  4  Increase strength by 25% in 4 weeks as per protocol  5    Reduce pain by 25% in 4 weeks       Plan  Patient would benefit from: skilled physical therapy  Referral necessary: No  Planned therapy interventions: manual therapy, muscle pump exercises, neuromuscular re-education, postural training, strengthening, stretching, therapeutic activities and therapeutic exercise  Frequency: 2x week  Duration in visits: 20  Duration in weeks: 20  Plan of Care beginning date: 3/21/2022  Plan of Care expiration date: 2022  Treatment plan discussed with: patient        Subjective Evaluation    History of Present Illness  Mechanism of injury: Patient reports that he has had a long history of B shoulder pain  He had a fall 5-6 years ago when he felt a pop in each shoulder  He notes that approx 1-2 months ago he was pouring a gallon of water and felt terrible pain  He went to the MD who referred him to PT  He states that he went for 1 visit and had more pain  Therefore he saw a second opinion with Dr Heather Schofield who found the tears in his shoulder and recommended the TSA  He did have one cortisone injection which made his pain worse  That has also delayed his surgery which is scheduled for 4/26/22  CC: He reports a burning sensation in the R shoulder as he is sitting  If he attempts to reach up wth the R he has moderate pain  He is RHD  He denies nighttime pain  He uses tylenol for pain  Function:  He is retired for 16 years  He worked for [employer]  He likes to exercise at the [employer]  He is unable to comb his hair or wash his back  He cannot wash his back  He is very active and splits wood at home also  Recurrent probem    Quality of life: excellent    Pain  Quality: sharp  Relieving factors: medications  Progression: worsening    Social Support    Employment status: not working  Hand dominance: right      Diagnostic Tests  X-ray: abnormal  CT scan: abnormal        Objective     Postural Observations  Seated posture: fair  Standing posture: fair        Palpation     Right   Tenderness of the biceps and posterior deltoid       Active Range of Motion   Left Shoulder   Flexion: 135 degrees   Abduction: 123 degrees   External rotation BTH: C2   Internal rotation BTB: sacrum     Right Shoulder   Flexion: 130 degrees   Abduction: 78 degrees   Internal rotation BTB: L3     Additional Active Range of Motion Details  Unable to reach top of head with R ER      Strength/Myotome Testing     Left Shoulder     Planes of Motion   Abduction: 4-   External rotation at 0°: 4-   External rotation at 45°: 3+     Right Shoulder     Planes of Motion   Flexion: 4-   Extension: 3+   External rotation at 0°: 3-   Internal rotation at 0°: 4-              Precautions: L RTCTs, melanomas  R Reverse TSA protocol      Manuals 3/21            PROM as per protocol             Protocol reviewed with patient 10'                                      Neuro Re-Ed                          SBS                                                                              Ther Ex                          Pulley             Pendulums                                                                              Ther Activity                                       Gait Training                                       Modalities

## 2022-03-22 LAB
ATRIAL RATE: 62 BPM
P AXIS: 31 DEGREES
PR INTERVAL: 190 MS
QRS AXIS: -30 DEGREES
QRSD INTERVAL: 102 MS
QT INTERVAL: 412 MS
QTC INTERVAL: 418 MS
T WAVE AXIS: 33 DEGREES
VENTRICULAR RATE: 62 BPM

## 2022-03-22 PROCEDURE — 93010 ELECTROCARDIOGRAM REPORT: CPT | Performed by: INTERNAL MEDICINE

## 2022-03-23 ENCOUNTER — ANESTHESIA EVENT (OUTPATIENT)
Dept: PERIOP | Facility: HOSPITAL | Age: 72
End: 2022-03-23
Payer: MEDICARE

## 2022-03-29 ENCOUNTER — OFFICE VISIT (OUTPATIENT)
Dept: OTOLARYNGOLOGY | Facility: CLINIC | Age: 72
End: 2022-03-29
Payer: MEDICARE

## 2022-03-29 VITALS — BODY MASS INDEX: 29.55 KG/M2 | WEIGHT: 195 LBS | HEIGHT: 68 IN | TEMPERATURE: 97.4 F

## 2022-03-29 DIAGNOSIS — H90.3 SENSORINEURAL HEARING LOSS (SNHL), BILATERAL: ICD-10-CM

## 2022-03-29 DIAGNOSIS — H61.23 BILATERAL IMPACTED CERUMEN: Primary | ICD-10-CM

## 2022-03-29 PROCEDURE — 99203 OFFICE O/P NEW LOW 30 MIN: CPT | Performed by: NURSE PRACTITIONER

## 2022-03-29 PROCEDURE — 69210 REMOVE IMPACTED EAR WAX UNI: CPT | Performed by: NURSE PRACTITIONER

## 2022-03-29 RX ORDER — ZINC SULFATE 50(220)MG
220 CAPSULE ORAL EVERY MORNING
COMMUNITY
End: 2022-07-11 | Stop reason: ALTCHOICE

## 2022-03-29 NOTE — PROGRESS NOTES
Assessment/Plan:    Bilateral impacted cerumen    On exam noted bilateral cerumen impaction and unable to fully view tympanic membrane  Cerumen impaction removed bilateral eac with alligator forceps and suction, pt tolerated procedure well  Upon removal, improved hearing and decreased clogged sensation of bilateral ears  Discussed routine cerumen care including avoidance of q-tips and cerumen softeners  Hydrocortisone cream to both ears at bedtime for 30 days then as needed for itching  Debrox (cleaning drops) ear drops - put drops in ears after hair and ear trimming  Encourage ongoing follow up in 3 months to monitor for cerumen and hearing  Audiogram if symptoms worsen  Continue binaural hearing aids             Diagnoses and all orders for this visit:    Bilateral impacted cerumen    Sensorineural hearing loss (SNHL), bilateral    Other orders  -     zinc sulfate (ZINCATE) 220 mg capsule; Take 220 mg by mouth daily  -     Ear cerumen removal          Subjective:      Patient ID: Mary Davison is a 66 y o  male  Presents today as a new patient due to hearing loss  Current hearing aids, about 3to 11years old  Hearing aid facility in [facility]  Difficulty hearing TV at times  Certain sounds difficulty hearing  During routine audiology care they did remove some cerumen  Both ears itch often  The following portions of the patient's history were reviewed and updated as appropriate: allergies, current medications, past family history, past medical history, past social history, past surgical history and problem list     Review of Systems   Constitutional: Negative  HENT: Positive for hearing loss  Negative for congestion, ear discharge, ear pain, nosebleeds, postnasal drip, rhinorrhea, sinus pressure, sinus pain, sore throat, tinnitus and voice change  Eyes: Negative  Respiratory: Negative for chest tightness and shortness of breath  Cardiovascular: Negative  Gastrointestinal: Negative  Endocrine: Negative  Musculoskeletal: Negative  Skin: Negative for color change  Neurological: Negative for dizziness, numbness and headaches  Psychiatric/Behavioral: Negative  Objective:      Temp (!) 97 4 °F (36 3 °C) (Temporal)   Ht 5' 8" (1 727 m)   Wt 88 5 kg (195 lb)   BMI 29 65 kg/m²            Physical Exam  Constitutional:       Appearance: He is well-developed  HENT:      Head: Normocephalic  Right Ear: Hearing, tympanic membrane, ear canal and external ear normal  No decreased hearing noted  No drainage or tenderness  There is impacted cerumen  Tympanic membrane is not perforated or erythematous  Left Ear: Hearing, tympanic membrane, ear canal and external ear normal  No decreased hearing noted  No drainage or tenderness  There is impacted cerumen  Tympanic membrane is not perforated or erythematous  Nose: Nose normal  No nasal deformity or septal deviation  Mouth/Throat:      Mouth: Mucous membranes are not pale and not dry  No oral lesions  Dentition: Normal dentition  Pharynx: Uvula midline  No oropharyngeal exudate  Neck:      Trachea: No tracheal deviation  Cardiovascular:      Rate and Rhythm: Normal rate  Pulmonary:      Effort: Pulmonary effort is normal  No accessory muscle usage or respiratory distress  Musculoskeletal:      Right shoulder: Normal range of motion  Cervical back: Full passive range of motion without pain, normal range of motion and neck supple  Lymphadenopathy:      Cervical: No cervical adenopathy  Skin:     General: Skin is warm and dry  Neurological:      Mental Status: He is alert and oriented to person, place, and time  Cranial Nerves: No cranial nerve deficit  Sensory: No sensory deficit  Psychiatric:         Behavior: Behavior is cooperative         Ear cerumen removal    Date/Time: 3/29/2022 2:45 PM  Performed by: Josph Cooks, CRNP  Authorized by: Yary Rothman CHANEL Walter   Universal Protocol:  Consent: Verbal consent obtained  Risks and benefits: risks, benefits and alternatives were discussed  Consent given by: patient  Patient understanding: patient states understanding of the procedure being performed      Patient location:  Clinic  Procedure details:     Local anesthetic:  None    Location:  L ear and R ear    Approach:  External  Post-procedure details:     Complication:  None    Hearing quality:  Normal    Patient tolerance of procedure:   Tolerated well, no immediate complications

## 2022-03-29 NOTE — PATIENT INSTRUCTIONS
Hydrocortisone cream to both ears at bedtime for 30 days then as needed for itching    Debrox (cleaning drops) ear drops - put drops in ears after hair and ear trimming

## 2022-04-07 NOTE — PRE-PROCEDURE INSTRUCTIONS
Pre-Surgery Instructions:   Medication Instructions    Cholecalciferol 25 MCG (1000 UT) tablet Instructed to avoid all ASA and OTC Vit/Supp 1 week prior to surgery and to avoid NSAIDs 3 days prior to surgery per anesthesia instructions  Tylenol ok to take prn   lansoprazole (PREVACID) 30 mg capsule Instructed to take as needed including DOS with sips water    multivitamin (THERAGRAN) TABS Instructed to avoid all ASA and OTC Vit/Supp 1 week prior to surgery and to avoid NSAIDs 3 days prior to surgery per anesthesia instructions  Tylenol ok to take prn   zinc sulfate (ZINCATE) 220 mg capsule Instructed to avoid all ASA and OTC Vit/Supp 1 week prior to surgery and to avoid NSAIDs 3 days prior to surgery per anesthesia instructions  Tylenol ok to take prn  Reviewed with patient, in detail, instructions from "My Surgical Experience"  Instructed to avoid all  OTC Vit/Supp 1 week prior to surgery and to avoid NSAIDs 3 days prior to surgery per anesthesia instructions  Tylenol ok to take prn  Advised patient that [facility] will call with surgery arrival time and hospital directions the business day prior to surgery  Advised patient nothing eat or drink after midnight  Instructed to call surgeon's office in meantime with any new illness  Patient verbalized understanding of current visitor restrictions and advised that he/she can confirm these at time of arrival call with [facility]  Pt will be calling back to alert us on an antibx he had in 2019 that he is allergic to  Instructed pt to bring immobilizer DOS  See Geriatric Assessment below        Cognitive Assessment: No issues   CAM: n/a   TUG <15 sec: n/a   Falls (last 6 months): None   Hand  score: n/a  -Attempt 1:  -Attempt 2:  -Attempt 3:   Sohail Total Score: 22   PHQ- 9 Depression Scale: 0   Nutrition Assessment Score: 11   METS: 9 89   Health goals:  -What are your overall health goals?  Wants to get back to his outside activities     -What brings you strength? His wife     -What activities are important to you? Wants to get back to gardening, mowing lawn, other outside activities and walks everyday  Does 8,000 steps  Patient verbalized understanding and knows to call surgeon's office with any additional questions prior to surgery

## 2022-04-22 ENCOUNTER — TELEPHONE (OUTPATIENT)
Dept: OTHER | Facility: OTHER | Age: 72
End: 2022-04-22

## 2022-04-22 PROCEDURE — U0005 INFEC AGEN DETEC AMPLI PROBE: HCPCS | Performed by: ORTHOPAEDIC SURGERY

## 2022-04-22 PROCEDURE — U0003 INFECTIOUS AGENT DETECTION BY NUCLEIC ACID (DNA OR RNA); SEVERE ACUTE RESPIRATORY SYNDROME CORONAVIRUS 2 (SARS-COV-2) (CORONAVIRUS DISEASE [COVID-19]), AMPLIFIED PROBE TECHNIQUE, MAKING USE OF HIGH THROUGHPUT TECHNOLOGIES AS DESCRIBED BY CMS-2020-01-R: HCPCS | Performed by: ORTHOPAEDIC SURGERY

## 2022-04-22 NOTE — TELEPHONE ENCOUNTER
Pt called in stating he is scheduled for shoulder replacement surgery on Tuesday and he needs a covid test  He is requesting a call back

## 2022-04-26 ENCOUNTER — HOSPITAL ENCOUNTER (OUTPATIENT)
Facility: HOSPITAL | Age: 72
Setting detail: OUTPATIENT SURGERY
Discharge: HOME | End: 2022-04-27
Attending: ORTHOPAEDIC SURGERY | Admitting: ORTHOPAEDIC SURGERY
Payer: MEDICARE

## 2022-04-26 ENCOUNTER — ANESTHESIA (OUTPATIENT)
Dept: PERIOP | Facility: HOSPITAL | Age: 72
End: 2022-04-26
Payer: MEDICARE

## 2022-04-26 ENCOUNTER — APPOINTMENT (OUTPATIENT)
Dept: RADIOLOGY | Facility: HOSPITAL | Age: 72
End: 2022-04-26
Payer: MEDICARE

## 2022-04-26 DIAGNOSIS — M12.811 ROTATOR CUFF TEAR ARTHROPATHY OF RIGHT SHOULDER: Primary | ICD-10-CM

## 2022-04-26 DIAGNOSIS — M75.101 ROTATOR CUFF TEAR ARTHROPATHY OF RIGHT SHOULDER: Primary | ICD-10-CM

## 2022-04-26 LAB
ABO GROUP BLD: NORMAL
BASOPHILS # BLD MANUAL: 0.04 THOUSAND/UL (ref 0–0.1)
BASOPHILS NFR MAR MANUAL: 1 % (ref 0–1)
BLD GP AB SCN SERPL QL: NEGATIVE
EOSINOPHIL # BLD MANUAL: 0 THOUSAND/UL (ref 0–0.4)
EOSINOPHIL NFR BLD MANUAL: 0 % (ref 0–6)
ERYTHROCYTE [DISTWIDTH] IN BLOOD BY AUTOMATED COUNT: 13.1 % (ref 11.6–15.1)
GLUCOSE SERPL-MCNC: 123 MG/DL (ref 65–140)
GLUCOSE SERPL-MCNC: 142 MG/DL (ref 65–140)
HCT VFR BLD AUTO: 44.1 % (ref 36.5–49.3)
HGB BLD-MCNC: 15.1 G/DL (ref 12–17)
LYMPHOCYTES # BLD AUTO: 1.45 THOUSAND/UL (ref 0.6–4.47)
LYMPHOCYTES # BLD AUTO: 38 % (ref 14–44)
MCH RBC QN AUTO: 31.6 PG (ref 26.8–34.3)
MCHC RBC AUTO-ENTMCNC: 34.2 G/DL (ref 31.4–37.4)
MCV RBC AUTO: 92 FL (ref 82–98)
MONOCYTES # BLD AUTO: 0.27 THOUSAND/UL (ref 0–1.22)
MONOCYTES NFR BLD: 7 % (ref 4–12)
NEUTROPHILS # BLD MANUAL: 2.06 THOUSAND/UL (ref 1.85–7.62)
NEUTS BAND NFR BLD MANUAL: 4 % (ref 0–8)
NEUTS SEG NFR BLD AUTO: 50 % (ref 43–75)
PLATELET # BLD AUTO: 106 THOUSANDS/UL (ref 149–390)
PLATELET BLD QL SMEAR: ABNORMAL
PMV BLD AUTO: 11.2 FL (ref 8.9–12.7)
RBC # BLD AUTO: 4.78 MILLION/UL (ref 3.88–5.62)
RH BLD: POSITIVE
SPECIMEN EXPIRATION DATE: NORMAL
WBC # BLD AUTO: 3.81 THOUSAND/UL (ref 4.31–10.16)

## 2022-04-26 PROCEDURE — C1713 ANCHOR/SCREW BN/BN,TIS/BN: HCPCS | Performed by: ORTHOPAEDIC SURGERY

## 2022-04-26 PROCEDURE — 85007 BL SMEAR W/DIFF WBC COUNT: CPT | Performed by: NURSE PRACTITIONER

## 2022-04-26 PROCEDURE — C1776 JOINT DEVICE (IMPLANTABLE): HCPCS | Performed by: ORTHOPAEDIC SURGERY

## 2022-04-26 PROCEDURE — 85027 COMPLETE CBC AUTOMATED: CPT | Performed by: NURSE PRACTITIONER

## 2022-04-26 PROCEDURE — NC001 PR NO CHARGE: Performed by: ORTHOPAEDIC SURGERY

## 2022-04-26 PROCEDURE — 73020 X-RAY EXAM OF SHOULDER: CPT

## 2022-04-26 PROCEDURE — 23472 RECONSTRUCT SHOULDER JOINT: CPT | Performed by: ORTHOPAEDIC SURGERY

## 2022-04-26 PROCEDURE — 86850 RBC ANTIBODY SCREEN: CPT | Performed by: ORTHOPAEDIC SURGERY

## 2022-04-26 PROCEDURE — C9290 INJ, BUPIVACAINE LIPOSOME: HCPCS | Performed by: ANESTHESIOLOGY

## 2022-04-26 PROCEDURE — 86901 BLOOD TYPING SEROLOGIC RH(D): CPT | Performed by: ORTHOPAEDIC SURGERY

## 2022-04-26 PROCEDURE — 23472 RECONSTRUCT SHOULDER JOINT: CPT | Performed by: PHYSICIAN ASSISTANT

## 2022-04-26 PROCEDURE — 82948 REAGENT STRIP/BLOOD GLUCOSE: CPT

## 2022-04-26 PROCEDURE — 99214 OFFICE O/P EST MOD 30 MIN: CPT | Performed by: INTERNAL MEDICINE

## 2022-04-26 PROCEDURE — 86900 BLOOD TYPING SEROLOGIC ABO: CPT | Performed by: ORTHOPAEDIC SURGERY

## 2022-04-26 DEVICE — SCREW CENTRAL 6 X 30MM: Type: IMPLANTABLE DEVICE | Site: SHOULDER | Status: FUNCTIONAL

## 2022-04-26 DEVICE — IMPLANTABLE DEVICE
Type: IMPLANTABLE DEVICE | Site: SHOULDER | Status: FUNCTIONAL
Brand: FLEX SHOULDER SYSTEM

## 2022-04-26 DEVICE — GLENOSPHERE STD 39MM: Type: IMPLANTABLE DEVICE | Site: SHOULDER | Status: FUNCTIONAL

## 2022-04-26 DEVICE — SCREW PERIPHERAL 5 X 30MM: Type: IMPLANTABLE DEVICE | Site: SHOULDER | Status: FUNCTIONAL

## 2022-04-26 DEVICE — SCREW PERIPHERAL 5 X 26MM: Type: IMPLANTABLE DEVICE | Site: SHOULDER | Status: FUNCTIONAL

## 2022-04-26 DEVICE — SCREW PERIPHERAL 5 X 18MM: Type: IMPLANTABLE DEVICE | Site: SHOULDER | Status: FUNCTIONAL

## 2022-04-26 DEVICE — AUGMENT BASEPLATE 15DEG 25MM FULL WEDGE: Type: IMPLANTABLE DEVICE | Site: SHOULDER | Status: FUNCTIONAL

## 2022-04-26 DEVICE — IMPLANTABLE DEVICE
Type: IMPLANTABLE DEVICE | Site: SHOULDER | Status: FUNCTIONAL
Brand: AEQUALIS™ ASCEND™ FLEX

## 2022-04-26 RX ORDER — FENTANYL CITRATE 50 UG/ML
INJECTION, SOLUTION INTRAMUSCULAR; INTRAVENOUS AS NEEDED
Status: DISCONTINUED | OUTPATIENT
Start: 2022-04-26 | End: 2022-04-26

## 2022-04-26 RX ORDER — HYDRALAZINE HYDROCHLORIDE 25 MG/1
25 TABLET, FILM COATED ORAL EVERY 8 HOURS PRN
Status: DISCONTINUED | OUTPATIENT
Start: 2022-04-26 | End: 2022-04-27 | Stop reason: HOSPADM

## 2022-04-26 RX ORDER — CEFAZOLIN SODIUM 2 G/50ML
2000 SOLUTION INTRAVENOUS ONCE
Status: COMPLETED | OUTPATIENT
Start: 2022-04-26 | End: 2022-04-26

## 2022-04-26 RX ORDER — ONDANSETRON 4 MG/1
4 TABLET, FILM COATED ORAL EVERY 8 HOURS PRN
Qty: 20 TABLET | Refills: 0 | Status: SHIPPED | OUTPATIENT
Start: 2022-04-26 | End: 2022-07-11 | Stop reason: ALTCHOICE

## 2022-04-26 RX ORDER — SODIUM CHLORIDE, SODIUM LACTATE, POTASSIUM CHLORIDE, CALCIUM CHLORIDE 600; 310; 30; 20 MG/100ML; MG/100ML; MG/100ML; MG/100ML
125 INJECTION, SOLUTION INTRAVENOUS CONTINUOUS
Status: DISCONTINUED | OUTPATIENT
Start: 2022-04-26 | End: 2022-04-27 | Stop reason: HOSPADM

## 2022-04-26 RX ORDER — ONDANSETRON 2 MG/ML
INJECTION INTRAMUSCULAR; INTRAVENOUS AS NEEDED
Status: DISCONTINUED | OUTPATIENT
Start: 2022-04-26 | End: 2022-04-26

## 2022-04-26 RX ORDER — EPHEDRINE SULFATE 50 MG/ML
INJECTION INTRAVENOUS AS NEEDED
Status: DISCONTINUED | OUTPATIENT
Start: 2022-04-26 | End: 2022-04-26

## 2022-04-26 RX ORDER — HYDROMORPHONE HCL/PF 1 MG/ML
0.5 SYRINGE (ML) INJECTION EVERY 2 HOUR PRN
Status: DISCONTINUED | OUTPATIENT
Start: 2022-04-26 | End: 2022-04-27 | Stop reason: HOSPADM

## 2022-04-26 RX ORDER — BUPIVACAINE HYDROCHLORIDE 2.5 MG/ML
INJECTION, SOLUTION EPIDURAL; INFILTRATION; INTRACAUDAL AS NEEDED
Status: DISCONTINUED | OUTPATIENT
Start: 2022-04-26 | End: 2022-04-26

## 2022-04-26 RX ORDER — GLYCOPYRROLATE 0.2 MG/ML
INJECTION INTRAMUSCULAR; INTRAVENOUS AS NEEDED
Status: DISCONTINUED | OUTPATIENT
Start: 2022-04-26 | End: 2022-04-26

## 2022-04-26 RX ORDER — SODIUM CHLORIDE, SODIUM LACTATE, POTASSIUM CHLORIDE, CALCIUM CHLORIDE 600; 310; 30; 20 MG/100ML; MG/100ML; MG/100ML; MG/100ML
1.5 INJECTION, SOLUTION INTRAVENOUS CONTINUOUS
Status: DISPENSED | OUTPATIENT
Start: 2022-04-26 | End: 2022-04-27

## 2022-04-26 RX ORDER — PANTOPRAZOLE SODIUM 40 MG/1
40 TABLET, DELAYED RELEASE ORAL
Status: DISCONTINUED | OUTPATIENT
Start: 2022-04-27 | End: 2022-04-27 | Stop reason: HOSPADM

## 2022-04-26 RX ORDER — ALBUMIN, HUMAN INJ 5% 5 %
12.5 SOLUTION INTRAVENOUS ONCE
Status: COMPLETED | OUTPATIENT
Start: 2022-04-26 | End: 2022-04-26

## 2022-04-26 RX ORDER — ONDANSETRON 2 MG/ML
4 INJECTION INTRAMUSCULAR; INTRAVENOUS ONCE AS NEEDED
Status: DISCONTINUED | OUTPATIENT
Start: 2022-04-26 | End: 2022-04-26 | Stop reason: HOSPADM

## 2022-04-26 RX ORDER — MIDAZOLAM HYDROCHLORIDE 2 MG/2ML
INJECTION, SOLUTION INTRAMUSCULAR; INTRAVENOUS AS NEEDED
Status: DISCONTINUED | OUTPATIENT
Start: 2022-04-26 | End: 2022-04-26

## 2022-04-26 RX ORDER — LIDOCAINE HYDROCHLORIDE 10 MG/ML
INJECTION, SOLUTION EPIDURAL; INFILTRATION; INTRACAUDAL; PERINEURAL AS NEEDED
Status: DISCONTINUED | OUTPATIENT
Start: 2022-04-26 | End: 2022-04-26

## 2022-04-26 RX ORDER — OXYCODONE HYDROCHLORIDE 5 MG/1
2.5 TABLET ORAL EVERY 4 HOURS PRN
Status: DISCONTINUED | OUTPATIENT
Start: 2022-04-26 | End: 2022-04-27 | Stop reason: HOSPADM

## 2022-04-26 RX ORDER — NEOSTIGMINE METHYLSULFATE 1 MG/ML
INJECTION INTRAVENOUS AS NEEDED
Status: DISCONTINUED | OUTPATIENT
Start: 2022-04-26 | End: 2022-04-26

## 2022-04-26 RX ORDER — MAGNESIUM HYDROXIDE 1200 MG/15ML
LIQUID ORAL AS NEEDED
Status: DISCONTINUED | OUTPATIENT
Start: 2022-04-26 | End: 2022-04-26 | Stop reason: HOSPADM

## 2022-04-26 RX ORDER — PROPOFOL 10 MG/ML
INJECTION, EMULSION INTRAVENOUS AS NEEDED
Status: DISCONTINUED | OUTPATIENT
Start: 2022-04-26 | End: 2022-04-26

## 2022-04-26 RX ORDER — ZINC SULFATE 50(220)MG
220 CAPSULE ORAL EVERY MORNING
Status: DISCONTINUED | OUTPATIENT
Start: 2022-04-27 | End: 2022-04-27 | Stop reason: HOSPADM

## 2022-04-26 RX ORDER — ONDANSETRON 2 MG/ML
4 INJECTION INTRAMUSCULAR; INTRAVENOUS EVERY 6 HOURS PRN
Status: DISCONTINUED | OUTPATIENT
Start: 2022-04-26 | End: 2022-04-27 | Stop reason: HOSPADM

## 2022-04-26 RX ORDER — CEFAZOLIN SODIUM 2 G/50ML
2000 SOLUTION INTRAVENOUS EVERY 8 HOURS
Status: COMPLETED | OUTPATIENT
Start: 2022-04-26 | End: 2022-04-26

## 2022-04-26 RX ORDER — OXYCODONE HYDROCHLORIDE 5 MG/1
5 TABLET ORAL EVERY 4 HOURS PRN
Status: DISCONTINUED | OUTPATIENT
Start: 2022-04-26 | End: 2022-04-27 | Stop reason: HOSPADM

## 2022-04-26 RX ORDER — ACETAMINOPHEN 325 MG/1
650 TABLET ORAL EVERY 6 HOURS PRN
Status: DISCONTINUED | OUTPATIENT
Start: 2022-04-26 | End: 2022-04-27 | Stop reason: HOSPADM

## 2022-04-26 RX ORDER — FENTANYL CITRATE/PF 50 MCG/ML
50 SYRINGE (ML) INJECTION
Status: DISCONTINUED | OUTPATIENT
Start: 2022-04-26 | End: 2022-04-26 | Stop reason: HOSPADM

## 2022-04-26 RX ORDER — SENNOSIDES 8.6 MG
1 TABLET ORAL DAILY
Status: DISCONTINUED | OUTPATIENT
Start: 2022-04-26 | End: 2022-04-27 | Stop reason: HOSPADM

## 2022-04-26 RX ORDER — SODIUM CHLORIDE, SODIUM LACTATE, POTASSIUM CHLORIDE, CALCIUM CHLORIDE 600; 310; 30; 20 MG/100ML; MG/100ML; MG/100ML; MG/100ML
20 INJECTION, SOLUTION INTRAVENOUS CONTINUOUS
Status: DISCONTINUED | OUTPATIENT
Start: 2022-04-26 | End: 2022-04-27 | Stop reason: HOSPADM

## 2022-04-26 RX ORDER — CHLORHEXIDINE GLUCONATE 0.12 MG/ML
15 RINSE ORAL ONCE
Status: COMPLETED | OUTPATIENT
Start: 2022-04-26 | End: 2022-04-26

## 2022-04-26 RX ORDER — OXYCODONE HYDROCHLORIDE 5 MG/1
TABLET ORAL
Qty: 12 TABLET | Refills: 0 | Status: SHIPPED | OUTPATIENT
Start: 2022-04-26 | End: 2022-05-09 | Stop reason: ALTCHOICE

## 2022-04-26 RX ORDER — SODIUM CHLORIDE, SODIUM LACTATE, POTASSIUM CHLORIDE, CALCIUM CHLORIDE 600; 310; 30; 20 MG/100ML; MG/100ML; MG/100ML; MG/100ML
INJECTION, SOLUTION INTRAVENOUS CONTINUOUS PRN
Status: DISCONTINUED | OUTPATIENT
Start: 2022-04-26 | End: 2022-04-26

## 2022-04-26 RX ORDER — DEXAMETHASONE SODIUM PHOSPHATE 10 MG/ML
INJECTION, SOLUTION INTRAMUSCULAR; INTRAVENOUS AS NEEDED
Status: DISCONTINUED | OUTPATIENT
Start: 2022-04-26 | End: 2022-04-26

## 2022-04-26 RX ORDER — ROCURONIUM BROMIDE 10 MG/ML
INJECTION, SOLUTION INTRAVENOUS AS NEEDED
Status: DISCONTINUED | OUTPATIENT
Start: 2022-04-26 | End: 2022-04-26

## 2022-04-26 RX ADMIN — LIDOCAINE HYDROCHLORIDE 40 MG: 10 INJECTION, SOLUTION EPIDURAL; INFILTRATION; INTRACAUDAL; PERINEURAL at 07:32

## 2022-04-26 RX ADMIN — GLYCOPYRROLATE 0.1 MG: 0.2 INJECTION, SOLUTION INTRAMUSCULAR; INTRAVENOUS at 07:25

## 2022-04-26 RX ADMIN — BUPIVACAINE HYDROCHLORIDE 10 ML: 2.5 INJECTION, SOLUTION EPIDURAL; INFILTRATION; INTRACAUDAL at 07:22

## 2022-04-26 RX ADMIN — CEFAZOLIN SODIUM 2000 MG: 2 SOLUTION INTRAVENOUS at 07:25

## 2022-04-26 RX ADMIN — PROPOFOL 50 MG: 10 INJECTION, EMULSION INTRAVENOUS at 07:35

## 2022-04-26 RX ADMIN — SODIUM CHLORIDE, SODIUM LACTATE, POTASSIUM CHLORIDE, AND CALCIUM CHLORIDE 1.5 ML/KG/HR: .6; .31; .03; .02 INJECTION, SOLUTION INTRAVENOUS at 21:44

## 2022-04-26 RX ADMIN — ROCURONIUM BROMIDE 30 MG: 50 INJECTION INTRAVENOUS at 07:33

## 2022-04-26 RX ADMIN — FENTANYL CITRATE 50 MCG: 50 INJECTION INTRAMUSCULAR; INTRAVENOUS at 07:35

## 2022-04-26 RX ADMIN — EPHEDRINE SULFATE 5 MG: 50 INJECTION INTRAVENOUS at 07:33

## 2022-04-26 RX ADMIN — SODIUM CHLORIDE, SODIUM LACTATE, POTASSIUM CHLORIDE, AND CALCIUM CHLORIDE 125 ML/HR: .6; .31; .03; .02 INJECTION, SOLUTION INTRAVENOUS at 14:11

## 2022-04-26 RX ADMIN — MIDAZOLAM 2 MG: 1 INJECTION INTRAMUSCULAR; INTRAVENOUS at 07:20

## 2022-04-26 RX ADMIN — DEXAMETHASONE SODIUM PHOSPHATE 10 MG: 10 INJECTION, SOLUTION INTRAMUSCULAR; INTRAVENOUS at 07:34

## 2022-04-26 RX ADMIN — GLYCOPYRROLATE 0.1 MG: 0.2 INJECTION, SOLUTION INTRAMUSCULAR; INTRAVENOUS at 07:51

## 2022-04-26 RX ADMIN — NEOSTIGMINE METHYLSULFATE 3 MG: 1 INJECTION INTRAVENOUS at 08:52

## 2022-04-26 RX ADMIN — SODIUM CHLORIDE, SODIUM LACTATE, POTASSIUM CHLORIDE, AND CALCIUM CHLORIDE: .6; .31; .03; .02 INJECTION, SOLUTION INTRAVENOUS at 08:46

## 2022-04-26 RX ADMIN — SODIUM CHLORIDE, SODIUM LACTATE, POTASSIUM CHLORIDE, AND CALCIUM CHLORIDE 1000 ML: .6; .31; .03; .02 INJECTION, SOLUTION INTRAVENOUS at 13:23

## 2022-04-26 RX ADMIN — MULTIPLE VITAMINS W/ MINERALS TAB 1 TABLET: TAB ORAL at 14:58

## 2022-04-26 RX ADMIN — OXYCODONE HYDROCHLORIDE 2.5 MG: 5 TABLET ORAL at 21:43

## 2022-04-26 RX ADMIN — ALBUMIN (HUMAN) 12.5 G: 12.5 INJECTION, SOLUTION INTRAVENOUS at 13:42

## 2022-04-26 RX ADMIN — SODIUM CHLORIDE, SODIUM LACTATE, POTASSIUM CHLORIDE, AND CALCIUM CHLORIDE: .6; .31; .03; .02 INJECTION, SOLUTION INTRAVENOUS at 06:53

## 2022-04-26 RX ADMIN — CHLORHEXIDINE GLUCONATE 15 ML: 1.2 SOLUTION ORAL at 06:05

## 2022-04-26 RX ADMIN — EPHEDRINE SULFATE 5 MG: 50 INJECTION INTRAVENOUS at 08:06

## 2022-04-26 RX ADMIN — GLYCOPYRROLATE 0.4 MG: 0.2 INJECTION, SOLUTION INTRAMUSCULAR; INTRAVENOUS at 08:52

## 2022-04-26 RX ADMIN — CEFAZOLIN SODIUM 2000 MG: 2 SOLUTION INTRAVENOUS at 22:39

## 2022-04-26 RX ADMIN — BUPIVACAINE 20 ML: 13.3 INJECTION, SUSPENSION, LIPOSOMAL INFILTRATION at 07:22

## 2022-04-26 RX ADMIN — SENNOSIDES 8.6 MG: 8.6 TABLET, FILM COATED ORAL at 14:58

## 2022-04-26 RX ADMIN — ONDANSETRON 4 MG: 2 INJECTION INTRAMUSCULAR; INTRAVENOUS at 08:41

## 2022-04-26 RX ADMIN — PROPOFOL 150 MG: 10 INJECTION, EMULSION INTRAVENOUS at 07:32

## 2022-04-26 RX ADMIN — CEFAZOLIN SODIUM 2000 MG: 2 SOLUTION INTRAVENOUS at 14:57

## 2022-04-26 RX ADMIN — NOREPINEPHRINE BITARTRATE 3 MCG/MIN: 1 INJECTION, SOLUTION, CONCENTRATE INTRAVENOUS at 07:45

## 2022-04-26 RX ADMIN — Medication 50 MCG: at 09:35

## 2022-04-26 NOTE — CONSULTS
Internal Medicine  Consultation Note    Patient: Buddy Harmon  Age/sex: 66 y o  male  Medical Record #: [id number]    Assessment/Plan    Status Post right reverseTotal Shoulder Arthroplasty   Continue post op pain control measures as prescribed   Follow bowel regimen to help decrease narcotic induced constipation    Follow post operative hemoglobin with serial CBC and treat accordingly   Monitor WBC and fever curve post op while encouraging use of incentive spirometer   DVT prophylaxis in place and reviewed  Impaired fasting glucose   Hgb A1c 6 0   Monitor FBS    Gerd  · Cont PPI      PRE-OP HGB LEVEL: 15 7    Subjective/ HPI: Buddy Harmon was seen and examined  Hx of shoulder pain failed out patient conservative measures  Elected to undergo total shoulder arthroplasty We are asked to see patient for post op management of underlying medical co-morbidities as outlined above  Pt did well intra and post operatively with good hemodynamics  Pt currently comfortable and without any reported post op nausea  ROS:   A 10 point ROS was performed; negative except as noted above       Social History:    Substance Use History:   Social History     Substance and Sexual Activity   Alcohol Use No    Comment: former drinker     Social History     Tobacco Use   Smoking Status Never Smoker   Smokeless Tobacco Never Used     Social History     Substance and Sexual Activity   Drug Use Yes    Types: Marijuana    Comment: daily  - medical card       Family History:    Family History   Problem Relation Age of Onset    Arthritis Mother     Other Mother         Gangrene    Diabetes Father     Heart disease Father     Lung cancer Father     Ovarian cancer Maternal Grandmother     Arthritis Maternal Grandmother          Review of Scheduled Meds:  Current Facility-Administered Medications   Medication Dose Route Frequency Provider Last Rate    acetaminophen  650 mg Oral Q6H PRN Pablo Dennis PA-C  cefazolin  2,000 mg Intravenous Q8H Sav Downs PA-C      HYDROmorphone  0 5 mg Intravenous Q2H PRN Sav Downs PA-C      lactated ringers  20 mL/hr Intravenous Continuous Shree Hendrickson MD      lactated ringers  125 mL/hr Intravenous Continuous Shree Hendrickson  mL/hr (22 1411)    lactated ringers  1 5 mL/kg/hr Intravenous Continuous Sav Downs PA-C      multivitamin-minerals  1 tablet Oral Daily Sav Downs PA-C      ondansetron  4 mg Intravenous Q6H PRN Sav Downs PA-C      oxyCODONE  2 5 mg Oral Q4H PRN Sav Downs PA-C      oxyCODONE  5 mg Oral Q4H PRN Sav Downs PA-C      [START ON 2022] pantoprazole  40 mg Oral Early Morning Sav Downs PA-C      senna  1 tablet Oral Daily Sav Downs PA-C      [START ON 2022] zinc sulfate  220 mg Oral QAM Sav Downs PA-C         Labs:     Results from last 7 days   Lab Units 22  0559   WBC Thousand/uL 3 81*   HEMOGLOBIN g/dL 15 1   HEMATOCRIT % 44 1   PLATELETS Thousands/uL 106*           Invalid input(s): LABGLOM, CMP               Results from last 7 days   Lab Units 22  0933 22  0558   POC GLUCOSE mg/dl 142* 123       Lab Results   Component Value Date    BLOODCX No Growth After 5 Days  2020    BLOODCX No Growth After 5 Days  2020       Input and Output Summary (last 24 hours):        Intake/Output Summary (Last 24 hours) at 2022 1429  Last data filed at 2022 1411  Gross per 24 hour   Intake 2250 ml   Output 1925 ml   Net 325 ml       Imaging:     XR shoulder right 1 view    (Results Pending)       *Labs /Radiology studiesLabs reviewed  *Medications reviewed and reconciled as needed  *Please refer to order section for additional ordered labs studies  *Case discussed with primary attending during morning huddle case rounds    Vitals:   Temp (24hrs), Av 6 °F (36 4 °C), Min:97 1 °F (36 2 °C), Max:98 1 °F (36 7 °C)    Temp:  [97 1 °F (36 2 °C)-98 1 °F (36 7 °C)] 97 6 °F (36 4 °C)  HR:  [] 114  Resp:  [13-22] 13  BP: (129-163)/(65-96) 163/96  SpO2:  [94 %-100 %] 95 %  Body mass index is 28 89 kg/m²  Physical Exam:   General Appearance: no distress, conversive  HEENT: PERRLA, conjuctiva normal; oropharynx clear; mucous membranes moist;   Neck:  Supple, no lymphadenopathy or thyromegaly  Lungs: CTA, normal respiratory effort, no retractions, expiratory effort normal  CV: regular rate and rhythm , PMI normal   ABD: soft non tender, no masses , no hepatic or splenomegaly  EXT: DP pulses intact, no lymphadenopathy, no edema ;  right shoulder dressing in place  Skin: normal turgor, normal texture, no rash  Psych: affect normal, mood normal  Neuro: AAOx3          Invasive Devices  Report    Peripheral Intravenous Line            Peripheral IV 04/26/22 Left Forearm <1 day                   Code Status: No Order  Current Length of Stay: 0 day(s)    Total floor / unit time spent today 1 hour  Coordination of patient's care was performed in conjunction with primary service  Time invested included review of patient's labs, vitals, and management of their comorbidities with continued monitoring, examination of patient as well as answering patient questions, documenting her findings and creating progress note in electronic medical record,  ordering appropriate diagnostic testing  ** Please Note: Fluency Direct voice to text software may have been used in the creation of this document   Audio transcription errors may occur**

## 2022-04-26 NOTE — ANESTHESIA PROCEDURE NOTES
Peripheral Block    Patient location during procedure: holding area  Start time: 4/26/2022 7:22 AM  Reason for block: at surgeon's request and post-op pain management  Staffing  Performed: Anesthesiologist   Anesthesiologist: Shree Hendrickson MD  Preanesthetic Checklist  Completed: patient identified, IV checked, site marked, risks and benefits discussed, surgical consent, monitors and equipment checked, pre-op evaluation and timeout performed  Peripheral Block  Patient position: supine  Prep: ChloraPrep  Patient monitoring: continuous pulse ox and frequent blood pressure checks  Block type: interscalene  Laterality: right  Injection technique: single-shot  Procedures: ultrasound guided, Ultrasound guidance required for the procedure to increase accuracy and safety of medication placement and decrease risk of complications    Ultrasound permanent image saved  Needle  Needle type: Stimuplex   Needle gauge: 22 G  Needle length: 5 cm  Needle localization: anatomical landmarks and ultrasound guidance  Test dose: negative  Assessment  Injection assessment: incremental injection, local visualized surrounding nerve on ultrasound, negative aspiration for heme and no paresthesia on injection  Paresthesia pain: none  patient tolerated the procedure well with no immediate complications

## 2022-04-26 NOTE — H&P
I identified and marked the patient in the pre-op holding area after confirming the surgical consent  No changes to medical health since the H&P was preformed  [place] [id number] [place] laws  Assessment  Diagnoses and all orders for this visit:     Rotator cuff tear arthropathy of right shoulder        Discussion and Plan:     · Explained to the patient that as he has tried and failed non operative treatments and is a candidate for a reverse total shoulder of the right shoulder as this is the more painful shoulder at this time  He understood and all questions were answered  He was agreeable to proceed forward with this treatment  He is affected with both shoulders but does state the right is worse so we will start with the right shoulder and then take on the contralateral shoulder when he is recovered enough  · A thorough discussion was performed with the patient reviewing all operative and nonoperative options as well as the risks of the procedure   Risks discussed include but not limited to persistent pain, dislocation, loosening of the prosthesis, infection, need for further surgery including revision to a reverse total shoulder, rotator cuff rupture , neurovascular injury, as well as the risk of anesthesia   After this discussion all questions were answered and informed consent was obtained for Reverse Total Shoulder Arthroplasty of the Right Shoulder  · Follow up post operatively with Mica Camejo PA-C     Subjective:   Patient ID: Constanza Heller is a 66 y o  male     The patient presents with a chief complaint of bilateral, right worse than left shoulder pain  The pain began several year(s) ago and is not associated with an acute injury  Although patient reports a slip and fall on ice on 2/6/2022 that exacerbated his already present symptoms   The patient describes the pain as aching and dull in intensity,  constant, awakening patient from sleep in timing, and localizes the pain to the  bilateral glenohumeral joint, deltoid  The pain is worse with movement, overhead work and overuse and relieved by rest, ice, avoiding the painful activities  The pain is not associated with numbness and tingling  The pain is not associated with constitutional symptoms  The patient is awoken at night by the pain  The patient has had prior treatment at [facility] with Dr Rebeca Beverly in the form of a cortisone injection into his right shoulder as well as physical therapy without benefit      The following portions of the patient's history were reviewed and updated as appropriate: allergies, current medications, past family history, past medical history, past social history, past surgical history and problem list      Objective:  /80   Pulse 67   Temp 98 1 °F (36 7 °C) (Temporal)   Resp 16   Ht 5' 8" (1 727 m)   Wt 86 2 kg (190 lb)   SpO2 99%   BMI 28 89 kg/m²           Right Shoulder Exam      Tenderness   The patient is experiencing no tenderness      Muscle Strength   Abduction: 3/5   External rotation: 3/5      Other   Erythema: absent  Sensation: normal  Pulse: present     Comments:  Palpable anterior superior escape on examination        Left Shoulder Exam      Tenderness   The patient is experiencing no tenderness       Muscle Strength   Abduction: 3/5   External rotation: 3/5      Other   Erythema: absent  Sensation: normal  Pulse: present                  Physical Exam  Constitutional:       General: He is not in acute distress  Appearance: He is well-developed  Eyes:      Conjunctiva/sclera: Conjunctivae normal       Pupils: Pupils are equal, round, and reactive to light  Cardiovascular:      Rate and Rhythm: Normal rate and regular rhythm  Pulses: Normal pulses  Heart sounds: Normal heart sounds  Pulmonary:      Effort: Pulmonary effort is normal       Breath sounds: Normal breath sounds  Abdominal:      General: Bowel sounds are normal       Palpations: Abdomen is soft  Musculoskeletal:      Cervical back: Normal range of motion and neck supple  Skin:     General: Skin is warm and dry  Findings: No erythema or rash  Neurological:      Mental Status: He is alert and oriented to person, place, and time  Deep Tendon Reflexes: Reflexes are normal and symmetric     Psychiatric:         Behavior: Behavior normal          I have personally reviewed pertinent films in PACS and my interpretation is as follows      X Ray Bilateral Shoulders 3/3/2022: High riding humeral head in relation to the glenoid, consistent with a chronic, retracted rotator cuff tear, with mild to moderate glenohumeral joint osteoarthritis     I did review the notes in the electronic medical record system of his visit with Dr Aries Watkins at [facility] which document his treatment with corticosteroid injection as well as their diagnosis of rotator cuff tear arthropathy

## 2022-04-26 NOTE — ANESTHESIA PREPROCEDURE EVALUATION
Procedure:  ARTHROPLASTY SHOULDER REVERSE (Right Shoulder)    Relevant Problems   GI/HEPATIC   (+) Gastroesophageal reflux disease without esophagitis      PULMONARY   (+) Pneumonia due to COVID-19 virus (5/2021)      Other   (+) Brady's esophagus with dysplasia        Physical Exam    Airway    Mallampati score: III  TM Distance: >3 FB  Neck ROM: full     Dental   No notable dental hx     Cardiovascular      Pulmonary      Other Findings        Anesthesia Plan  ASA Score- 2     Anesthesia Type- general with ASA Monitors  Additional Monitors:   Airway Plan: ETT  Comment: Right ISB  Plan Factors-    Chart reviewed  EKG reviewed  Existing labs reviewed  Patient summary reviewed  Induction- intravenous  Postoperative Plan- Plan for postoperative opioid use  Informed Consent- Anesthetic plan and risks discussed with patient  I personally reviewed this patient with the CRNA  Discussed and agreed on the Anesthesia Plan with the CRNA  Star Vora

## 2022-04-26 NOTE — OP NOTE
OPERATIVE REPORT  PATIENT NAME: Fernandez Dallas    :  1950  MRN: [id number]  Pt Location:  OR ROOM 15    SURGERY DATE: 2022     SURGEON: Drew Dorantes MD     ASSISTANT: Marielle Lipscomb PA-C and Mariela Martini PA-C     NOTE: Marielle Lipscomb PA-C and Mariela Martini PA-C were present throughout the entire procedure and performed essential assistance with patient prepping, draping, positioning, trial implantation/range of motion, final implant insertion, careful retraction, wound closure, sterile dressing application and sling application, all under my direct supervision  NOTE: No qualified resident physician was available for assistance    PREOPERATIVE DIAGNOSIS: Right Shoulder Rotator Cuff Tear Arthropathy    POSTOPERATIVE DIAGNOSIS: Same    PROCEDURES: Right Shoulder with Reverse Total Shoulder Arthroplasty     ANESTHESIA STAFF: Lino Hector MD     ANESTHESIA TYPE: General with endotracheal tube with ultrasound guided interscalene block (Exparel)  The interscalene block was provided by the anesthesia staff per my request for postoperative pain control and to decrease the use of postoperative narcotic medication for pain control  COMPLICATIONS: None    FINDINGS: Rotator Cuff Tear Arthropathy    SPECIMEN(S): None    ESTIMATED BLOOD LOSS: 75 mL    INDICATIONS FOR PROCEDURE:  The patient is a 66 y o  male presenting with pain and lack of function secondary to rotator cuff tear arthropathy of the right shoulder  After a thorough discussion of the risks and benefits of operative and nonoperative care the patient elected for right reverse total shoulder arthroplasty  Informed consent was obtained in the office  OPERATIVE TECHNIQUE:  The day of surgery I identified the patient's right shoulder and marked it with my initials  The patient was taken back to the operating room where anesthesia was induced by the anesthesia staff without complication   The patient was placed in the beachchair position with all bony prominences padded  The right shoulder was prepped and draped in routine sterile fashion and after a time-out for safety and confirming 2 grams of IV Cefazolin were given, a standard deltopectoral approach was performed  The dissection was carried down to the subscapularis which was torn and no longer available for repair, the anterior capsule however was available and so was carefully released and preserved for later repair at the end of the procedure  There was a long-head biceps tendon rupture so this was not not available for tenodesis  The humeral head was exposed and found to have severe degenerative change with no remaining rotator cuff attached  The humerus was prepared keeping with the surgical technique for a Tornier Flex reverse prosthesis  After preparing the humerus the glenoid was exposed and prepared for a 25 mm Tornier Perform Plus full wedge augmented baseplate  The full wedge augment was required to correct the significant superior inclination appreciated on preoperative templating  The baseplate was placed and then secured with a central 6 5 mm diameter 30 mm length screw  Additional fixation was achieved with a 30 mm non-locking screw followed by a superior 26 mm and an inferior 18 mm locking screws  These achieved excellent fixation of the baseplate to the glenoid  A 39 mm glenosphere was then fixated to the baseplate  The humerus was finished and a size 5A Tornier Flex Stem with a high offset tray and a +6 mm C angle polyethylene (135 degree construct) was trialed and found to have excellent stability with full range of motion and appropriate soft tissue tension  Final implants were placed and the area was irrigated with pulse lavage  The anterior capsule remnant was repaired to the anterior humerus using trans-osseous sutures  The deltopectoral interval was loosely closed with 0 Vicryl and the subdermal layer with 2-0 Vicryl with staples for skin   Sterile dressings and a sling with abduction pillow was placed and the patient was awoken  The patient was transported to the recovery room in good condition and will be admitted for post-operative care and physical therapy will be initiated following the standard reverse total shoulder arthroplasty protocol      PATIENT DISPOSITION:  Stable to PACU      SIGNATURE: Chalice Meckel, MD  DATE: April 26, 2022  TIME: 8:54 AM

## 2022-04-26 NOTE — DISCHARGE INSTRUCTIONS
What to Expect Before and After Shoulder Replacement Surgery  You are being scheduled for a shoulder replacement by Dr Aneesh Contreras to treat your shoulder condition  Here is some information which may help to answer questions that you may have  Please do not hesitate to reach out to our team to answer questions not addressed here  Before Surgery  You will be contacted the evening prior to your surgery to confirm the scheduled time of the procedure and when to arrive at the hospital    Do not eat or drink anything after midnight the night before your surgery so that the anesthesia can be performed safely  If you have been fitted for a sling in the office prior to the surgery please remember to bring it to the hospital   You will meet the anesthesiologist the morning of the surgery  The surgery is performed under a general anesthetic but they will also offer you a regional block (shot to numb the arm) to help control your post-operative pain as well as with a catheter that is left in place after the block  The catheter is connected to a small pump which will continue to provide numbing medicine and help prolong the pain control from the block  Unfortunately this catheter is not as effective as the initial block, but can still be very helpful in managing the pain  After Surgery and in the Hospital  The shoulder replacement surgery typically takes 60-90 minutes  When surgery is completed, your surgeon will update your family and friends on your condition and progress  You will remain in the recovery room for at least an hour or until the anesthesia has worn off and your blood pressure and pulse are stable  If you have pain, the nurses will give you medication  Once out of surgery, your surgeon will decide on how long you will be using a sling in order to protect and position your shoulder  However, this won't keep you from starting physical therapy    Exercises typically begin on the day after surgery with emphasis on moving the shoulder, wrist, and hand  The physical therapist will be provided with a detailed protocol but typically the first 6 weeks are used to regain range of motion and then strengthening is initiated  Starting strengthening exercises too early may lead to complications  When You Are Discharged from the [address] [place] [address] can expect to be released from the hospital the day after surgery (this may change if you have special needs or medical conditions)  Before you are released, the treatment team (Orthopaedic surgery residents, physician assistants and physical therapists) will talk with you about the importance of limiting any sudden or stressful movements to the arm for several weeks or longer  Activities that involve pushing, pulling, and lifting should not be done until you are given permission from your surgeon  Your First Day at [address] [place] [address] may need help with your daily activities, so it is a good idea to have family and friends prepared to help you  It is okay to remove the sling and let the arm hang at the side so that you can get cleaned and change your clothes  To put on a shirt, place the bad arm in first and then the good arm  Reverse to take it off  Don't forget to wear the sling every night for at least the first month after surgery, and never use your arm to push yourself up in bed or from a chair  The added weight on your shoulder may cause you to re-injure the joint  How to [place] in the First Week  You are encouraged to return to your normal eating and sleeping patterns as soon as possible  It is important for you to be active in order to control your weight and muscle tone  It is ok to increase your activity level and even perform light aerobic exercise (like walking or riding a stationary bike) within the first week or so if you are feeling up to it    If there is concern about these activates best to wait until the first post-operative visit and discuss this with the monico Hull Jose might be able to return to work within several days if you can perform your job while wearing a sling  Consult with your doctor, as this differs from patient to patient  However, if your job requires heavy lifting or climbing, there may be a delay for several months  Until you are seen for your first follow-up visit, please try and keep wound dry  It is okay to shower but try your best to keep the incision out of direct contact with the water (or consider using a waterproof bandage)  If it does gets wet please dry as best as possible afterwards  If you notice any drainage or a foul odor from your incision or your temperature goes above 101 5 degrees, please contact the office  What You Can Expect in the First Month  Your first post-operative appointment will be with Dr Astorga Session physician assistant (PA) around 2 weeks after the surgery  You skin staples will be removed and X-rays will be obtained at that visit  Please understand that it is quite common to still experience pain at that time but the pain should be steadily improving  Hopefully physical therapy has already begun but if not it will be initiated at this visit and will continue for the 8-12 weeks  At about 12 weeks after surgery you will start a progressive strengthening program  Physical therapy is a deliberate process of not only strengthening your shoulder but also altering how you use your arm  It may be many months before your desired results are achieved, so do not get discouraged  Your shoulder will generally continue to improve steadily up to 6-8 months after surgery  After that point further improvement is very slow; although it has been shown that even after a year or more, activity can increase as muscle strength continues to improve  After the First Month at Home   Because each person heals differently, there are different recovery timelines  An average recovery period typically lasts about between 3-6 months  Talk with your surgeon about which activities will be appropriate for you once you have recovered

## 2022-04-26 NOTE — PERIOPERATIVE NURSING NOTE
Late entry due to patient care  Pt's HR increasing into 120s  Dr Darwin Melendez aware, 1L IVF bolus ordered and given  HR remains 118-125  Albumin ordered and given  HR improved to 110-115   Per david Rutherford to send patient to [facility]

## 2022-04-26 NOTE — ANESTHESIA POSTPROCEDURE EVALUATION
Post-Op Assessment Note    CV Status:  Stable  Pain Score: 0    Pain management: adequate  Multimodal analgesia used between 6 hours prior to anesthesia start to PACU discharge    Mental Status:  Sleepy and arousable   Hydration Status:  Euvolemic   PONV Controlled:  Controlled   Airway Patency:  Patent  Airway: intubated      Post Op Vitals Reviewed: Yes      Staff: Anesthesiologist, CRNA         No complications documented      /67 (04/26/22 0910)    Temp (!) 97 1 °F (36 2 °C) (04/26/22 0910)    Pulse 84 (04/26/22 0910)   Resp 20 (04/26/22 0910)    SpO2 98 % (04/26/22 0910)

## 2022-04-27 VITALS
OXYGEN SATURATION: 93 % | BODY MASS INDEX: 28.79 KG/M2 | RESPIRATION RATE: 18 BRPM | WEIGHT: 190 LBS | HEART RATE: 114 BPM | TEMPERATURE: 99.1 F | DIASTOLIC BLOOD PRESSURE: 81 MMHG | HEIGHT: 68 IN | SYSTOLIC BLOOD PRESSURE: 144 MMHG

## 2022-04-27 LAB
ANION GAP SERPL CALCULATED.3IONS-SCNC: 4 MMOL/L (ref 4–13)
BUN SERPL-MCNC: 16 MG/DL (ref 5–25)
CALCIUM SERPL-MCNC: 9.3 MG/DL (ref 8.3–10.1)
CHLORIDE SERPL-SCNC: 106 MMOL/L (ref 100–108)
CO2 SERPL-SCNC: 30 MMOL/L (ref 21–32)
CREAT SERPL-MCNC: 0.98 MG/DL (ref 0.6–1.3)
ERYTHROCYTE [DISTWIDTH] IN BLOOD BY AUTOMATED COUNT: 13.6 % (ref 11.6–15.1)
GFR SERPL CREATININE-BSD FRML MDRD: 76 ML/MIN/1.73SQ M
GLUCOSE P FAST SERPL-MCNC: 138 MG/DL (ref 65–99)
GLUCOSE SERPL-MCNC: 138 MG/DL (ref 65–140)
HCT VFR BLD AUTO: 47.1 % (ref 36.5–49.3)
HGB BLD-MCNC: 16 G/DL (ref 12–17)
MCH RBC QN AUTO: 31.7 PG (ref 26.8–34.3)
MCHC RBC AUTO-ENTMCNC: 34 G/DL (ref 31.4–37.4)
MCV RBC AUTO: 94 FL (ref 82–98)
PLATELET # BLD AUTO: 105 THOUSANDS/UL (ref 149–390)
PMV BLD AUTO: 11.8 FL (ref 8.9–12.7)
POTASSIUM SERPL-SCNC: 4.1 MMOL/L (ref 3.5–5.3)
RBC # BLD AUTO: 5.04 MILLION/UL (ref 3.88–5.62)
SODIUM SERPL-SCNC: 140 MMOL/L (ref 136–145)
WBC # BLD AUTO: 9.83 THOUSAND/UL (ref 4.31–10.16)

## 2022-04-27 PROCEDURE — 97535 SELF CARE MNGMENT TRAINING: CPT

## 2022-04-27 PROCEDURE — 99214 OFFICE O/P EST MOD 30 MIN: CPT | Performed by: INTERNAL MEDICINE

## 2022-04-27 PROCEDURE — 85027 COMPLETE CBC AUTOMATED: CPT | Performed by: PHYSICIAN ASSISTANT

## 2022-04-27 PROCEDURE — 97166 OT EVAL MOD COMPLEX 45 MIN: CPT

## 2022-04-27 PROCEDURE — NC001 PR NO CHARGE: Performed by: ORTHOPAEDIC SURGERY

## 2022-04-27 PROCEDURE — 80048 BASIC METABOLIC PNL TOTAL CA: CPT | Performed by: PHYSICIAN ASSISTANT

## 2022-04-27 RX ADMIN — OXYCODONE HYDROCHLORIDE 5 MG: 5 TABLET ORAL at 01:58

## 2022-04-27 RX ADMIN — OXYCODONE HYDROCHLORIDE 5 MG: 5 TABLET ORAL at 06:28

## 2022-04-27 RX ADMIN — PANTOPRAZOLE SODIUM 40 MG: 40 TABLET, DELAYED RELEASE ORAL at 06:28
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
## 2022-04-27 NOTE — PLAN OF CARE
Problem: PAIN - ADULT  Goal: Verbalizes/displays adequate comfort level or baseline comfort level  Description: Interventions:  - Encourage patient to monitor pain and request assistance  - Assess pain using appropriate pain scale  - Administer analgesics based on type and severity of pain and evaluate response  - Implement non-pharmacological measures as appropriate and evaluate response  - Consider cultural and social influences on pain and pain management  - Notify physician/advanced practitioner if interventions unsuccessful or patient reports new pain  Outcome: Progressing     Problem: INFECTION - ADULT  Goal: Absence or prevention of progression during hospitalization  Description: INTERVENTIONS:  - Assess and monitor for signs and symptoms of infection  - Monitor lab/diagnostic results  - Monitor all insertion sites, i e  indwelling lines, tubes, and drains  - Monitor endotracheal if appropriate and nasal secretions for changes in amount and color  - [place] appropriate cooling/warming therapies per order  - Administer medications as ordered  - Instruct and encourage patient and family to use good hand hygiene technique  - Identify and instruct in appropriate isolation precautions for identified infection/condition  Outcome: Progressing  Goal: Absence of fever/infection during neutropenic period  Description: INTERVENTIONS:  - Monitor WBC    Outcome: Progressing

## 2022-04-27 NOTE — PROGRESS NOTES
Progress Note - Orthopedics   Corby Harkinss 66 y o  male MRN: [id number]  Unit/Bed#: -01      Subjective:    71 y o male POD 1 R rTSA  Doing well, some shoulder pain       Labs:  0   Lab Value Date/Time    HCT 44 1 04/26/2022 0559    HCT 45 9 03/18/2022 1346    HCT 37 3 12/23/2020 2147    HCT 47 4 02/26/2015 0334    HGB 15 1 04/26/2022 0559    HGB 15 7 03/18/2022 1346    HGB 14 1 12/23/2020 2147    HGB 15 8 02/26/2015 0334    INR 1 02 03/18/2022 1346    WBC 3 81 (L) 04/26/2022 0559    WBC 4 29 (L) 03/18/2022 1346    WBC 5 80 12/23/2020 2147    WBC 5 69 02/26/2015 0334     5 (H) 12/19/2020 1504       Meds:    Current Facility-Administered Medications:     acetaminophen (TYLENOL) tablet 650 mg, 650 mg, Oral, Q6H PRN, Yaneth Mcclendon PA-C    hydrALAZINE (APRESOLINE) tablet 25 mg, 25 mg, Oral, Q8H PRN, CHANEL Gasca    HYDROmorphone (DILAUDID) injection 0 5 mg, 0 5 mg, Intravenous, Q2H PRN, Yaneth Mcclendon PA-C    lactated ringers infusion, 20 mL/hr, Intravenous, Continuous, Kellie Menchaca MD    lactated ringers infusion, 125 mL/hr, Intravenous, Continuous, Kellie Menchaca MD, Stopped at 04/26/22 1454    lactated ringers infusion, 1 5 mL/kg/hr, Intravenous, Continuous, Alan Pal PA-C, Last Rate: 129 3 mL/hr at 04/26/22 2144, 1 5 mL/kg/hr at 04/26/22 2144    multivitamin-minerals (CENTRUM) tablet 1 tablet, 1 tablet, Oral, Daily, Yaneth Mcclendon PA-C, 1 tablet at 04/26/22 1458    ondansetron (ZOFRAN) injection 4 mg, 4 mg, Intravenous, Q6H PRN, Yaneth Mcclendon PA-C    oxyCODONE (ROXICODONE) IR tablet 2 5 mg, 2 5 mg, Oral, Q4H PRN, Yaneth Mcclendon PA-C, 2 5 mg at 04/26/22 2143    oxyCODONE (ROXICODONE) IR tablet 5 mg, 5 mg, Oral, Q4H PRN, Yaneth Mcclendon PA-C, 5 mg at 04/27/22 5814    pantoprazole (PROTONIX) EC tablet 40 mg, 40 mg, Oral, Early Morning, Alan Pal PA-C, 40 mg at 04/27/22 3989    senna (SENOKOT) tablet 8 6 mg, 1 tablet, Oral, Daily, Yaneth Mcclendon CLAUDIA, 8 6 mg at 04/26/22 1458    zinc sulfate (ZINCATE) capsule 220 mg, 220 mg, Oral, JAVIGail BENAVIDES PA-C    Blood Culture:   Lab Results   Component Value Date    BLOODCX No Growth After 5 Days  12/19/2020       Wound Culture:   No results found for: WOUNDCULT    Ins and Outs:  I/O last 24 hours: In: 3133 6 [I V :1883 6; IV Piggyback:1250]  Out: 6425 [Urine:3470; Blood:75]          Physical:  Vitals:    04/27/22 0259   BP: 151/82   Pulse: 101   Resp: 18   Temp: 99 1 °F (37 3 °C)   SpO2: 93%     Musculoskeletal: right Upper Extremity  · Dressing with small area of spotting at inferior portion  · TTP shoulder  · Sensation intact to light touch m/r/u  · Motor intact m/r/u/ain/pin  · 2+ rad pulse    Assessment:    71 y o male POD 1 R rTSA  Doing well  Plan:  · NWB RUE  · Will monitor for ABLA  · PT/OT  · Pain control  · DVT PPX: SCDs, encourage ambulation  · Dispo:  Ok to discharge from ortho standpoint    Gabbi Marroquin MD

## 2022-04-27 NOTE — DISCHARGE SUMMARY
ORTHOPEDICS DISCHARGE SUMMARY  Cameron Harris 66 y o  male MRN: [id number]  Unit/Bed#: -55    Attending Physician: Josefina Tovar    Admitting diagnosis: Rotator cuff tear arthropathy of right shoulder [M75 101, M12 811]    Discharge diagnosis: Rotator cuff tear arthropathy of right shoulder [M75 101, M12 811]    Date of admission: 4/26/2022    Date of discharge: 04/27/22         Procedure:  Right reverse total shoulder arthroplasty    HPI:  This is a 66y o  year old male that presented to the office with signs and symptoms of right shoulder rotator cuff arthropathy and/or other pathology  They tried and failed conservative treatment measures and wished to proceed with surgical intervention  The risks, benefits, and complications of the procedure were discussed with the patient and informed consent was obtained  Hospital Course: The patient was admitted to the hospital on 4/26/2022 and underwent an uncomplicated right reverse total shoulder arthroplasty  They were transferred to the floor after a brief stay in the post-anesthesia care unit  Their pain was well managed with IV and oral pain medications  On discharge date pt was cleared by PT and the medicine team and determined to be safe for discharge    0   Lab Value Date/Time    HGB 16 0 04/27/2022 0640    HGB 15 1 04/26/2022 0559    HGB 15 7 03/18/2022 1346    HGB 14 1 12/23/2020 2147    HGB 15 5 12/19/2020 1504    HGB 15 8 02/26/2015 0334       Discharge Instructions: The patient was discharged nonweight bearing to the right upper extremity  Refrain from PT/OT until cleared by surgeon  Take pain medications as instructed  Discharge Medications: For the complete list of discharge medications, please refer to the patient's medication reconciliation
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
## 2022-04-27 NOTE — PROGRESS NOTES
Internal Medicine Progress Note  Patient: Kamila Banda  Age/sex: 66 y o  male  Medical Record #: [id number]      ASSESSMENT/PLAN: (Interval History)  Kamila Banda is seen and examined and management for following issues:    Status Post right reverse Total SHOULDER ARTHROPLASTY   Pain controlled   Continue encourage incentive spirometry; monitor fever curve   DVT prophylaxis in place and reviewed  Results from last 7 days   Lab Units 04/27/22  0640   WBC Thousand/uL 9 83   HEMOGLOBIN g/dL 16 0   HEMATOCRIT % 47 1   PLATELETS Thousands/uL 105*        Impaired fasting glucose  · Hgb A1c 6 0  · Monitor FBS     Gerd  · Cont PPI      OK for dc from medicine standpoint  PRE-OP HGB LEVEL: 15 7  The above assessment and plan was reviewed and updated as determined by my evaluation of the patient on 4/27/2022      Labs:   Results from last 7 days   Lab Units 04/27/22  0640 04/26/22  0559   WBC Thousand/uL 9 83 3 81*   HEMOGLOBIN g/dL 16 0 15 1   HEMATOCRIT % 47 1 44 1   PLATELETS Thousands/uL 105* 106*     Results from last 7 days   Lab Units 04/27/22  0640   SODIUM mmol/L 140   POTASSIUM mmol/L 4 1   CHLORIDE mmol/L 106   CO2 mmol/L 30   BUN mg/dL 16   CREATININE mg/dL 0 98   CALCIUM mg/dL 9 3             Results from last 7 days   Lab Units 04/26/22  0933 04/26/22  0558   POC GLUCOSE mg/dl 142* 123       Review of Scheduled Meds:  Current Facility-Administered Medications   Medication Dose Route Frequency Provider Last Rate    acetaminophen  650 mg Oral Q6H PRN Elizabeth Arnold PA-C      hydrALAZINE  25 mg Oral Q8H PRN CHANEL Ariza      HYDROmorphone  0 5 mg Intravenous Q2H PRN Elizabeth Arnold PA-C      lactated ringers  20 mL/hr Intravenous Continuous Nereyda Phan MD      lactated ringers  125 mL/hr Intravenous Continuous Nereyda Phan MD Stopped (04/26/22 7271)    multivitamin-minerals  1 tablet Oral Daily Alan Pal PA-C      ondansetron  4 mg Intravenous Q6H PRN Elizabeth Arnold PA-C  oxyCODONE  2 5 mg Oral Q4H PRN Jose Starch, PA-C      oxyCODONE  5 mg Oral Q4H PRN Jose Starch, PA-C      pantoprazole  40 mg Oral Early Morning Jose Starch, PA-C      senna  1 tablet Oral Daily Jose Starch, PA-C      zinc sulfate  220 mg Oral QAM Jose Starch, PA-C         Subjective/ HPI: Chano Man seen and examined  Patient's overnight issues or events were reviewed with nursing or staff during rounds or morning huddle session  New or overnight issues include the following:     Pt without any overnight events or reported nursing issues  Slept well ready for dc      ROS:   A 10 point ROS was performed; negative except as noted above         Imaging:     XR shoulder right 1 view    (Results Pending)       *Labs /Radiology studies Reviewed  *Medications  reviewed and reconciled as needed  *Please refer to order section for additional ordered labs studies  *Case discussed with primary attending during morning huddle case rounds    Physical Examination:  Vitals:   Vitals:    04/26/22 1845 04/26/22 2312 04/27/22 0259 04/27/22 0759   BP: (!) 144/107 147/82 151/82 144/81   Pulse: (!) 117 99 101 (!) 114   Resp: 17 18 18 18   Temp: 97 9 °F (36 6 °C) 98 6 °F (37 °C) 99 1 °F (37 3 °C) 99 1 °F (37 3 °C)   TempSrc:       SpO2: 92% 94% 93% 93%   Weight:       Height:           General Appearance: no distress, conversive  HEENT: PERRLA, conjuctiva normal; oropharynx clear; mucous membranes moist;   Neck:  Supple, no lymphadenopathy or thyromegaly  Lungs: CTA, normal respiratory effort, no retractions, expiratory effort normal  CV: regular rate and rhythm , PMI normal   ABD: soft non tender, no masses , no hepatic or splenomegaly  EXT: DP pulses intact, no lymphadenopathy, no edema; right shoulder dressing in place  Skin: normal turgor, normal texture, no rash  Psych: affect normal, mood normal  Neuro: AAOx3      The above physical exam was reviewed and updated as determined by my evaluation of the patient on 4/27/2022  Invasive Devices  Report    Peripheral Intravenous Line            Peripheral IV 04/26/22 Left Forearm 1 day                   VTE Pharmacologic Prophylaxis: none required  Code Status: No Order  Current Length of Stay: 0 day(s)      Total floor / unit time spent today 30 minutes  Coordination of patient's care was performed in conjunction with primary service  Time invested included review of patient's labs, vitals, and management of their comorbidities with continued monitoring, examination of patient as well as answering patient questions, documenting her findings and creating progress note in electronic medical record,  ordering appropriate diagnostic testing  ** Please Note:  voice to text software may have been used in the creation of this document   Although proof errors in transcription or interpretation are a potential of such software**

## 2022-04-27 NOTE — PLAN OF CARE
Problem: PAIN - ADULT  Goal: Verbalizes/displays adequate comfort level or baseline comfort level  Description: Interventions:  - Encourage patient to monitor pain and request assistance  - Assess pain using appropriate pain scale  - Administer analgesics based on type and severity of pain and evaluate response  - Implement non-pharmacological measures as appropriate and evaluate response  - Consider cultural and social influences on pain and pain management  - Notify physician/advanced practitioner if interventions unsuccessful or patient reports new pain  4/27/2022 1007 by Radha Copeland RN  Outcome: Adequate for Discharge  4/27/2022 1007 by Radha Copeland RN  Outcome: Adequate for Discharge     Problem: INFECTION - ADULT  Goal: Absence or prevention of progression during hospitalization  Description: INTERVENTIONS:  - Assess and monitor for signs and symptoms of infection  - Monitor lab/diagnostic results  - Monitor all insertion sites, i e  indwelling lines, tubes, and drains  - Monitor endotracheal if appropriate and nasal secretions for changes in amount and color  - Finley appropriate cooling/warming therapies per order  - Administer medications as ordered  - Instruct and encourage patient and family to use good hand hygiene technique  - Identify and instruct in appropriate isolation precautions for identified infection/condition  4/27/2022 1007 by Radha Copeland RN  Outcome: Adequate for Discharge  4/27/2022 1007 by Radha Copeland RN  Outcome: Adequate for Discharge  Goal: Absence of fever/infection during neutropenic period  Description: INTERVENTIONS:  - Monitor WBC    4/27/2022 1007 by Radha Copeland RN  Outcome: Adequate for Discharge  4/27/2022 1007 by Radha Copeland RN  Outcome: Adequate for Discharge

## 2022-04-29 ENCOUNTER — TELEPHONE (OUTPATIENT)
Dept: OBGYN CLINIC | Facility: HOSPITAL | Age: 72
End: 2022-04-29

## 2022-04-29 NOTE — TELEPHONE ENCOUNTER
There is a DME fitter at [facility] AT [place] office today  They can stop in after lunch to have sling adjusted  It's too hard to explain on the phone  Strap on pillow attaches to the clip on side of pillow  Strap on the sling attaches to the clip on sling  Curved edge of pillow against belly

## 2022-04-29 NOTE — TELEPHONE ENCOUNTER
Pt and wife calling , they cannot get the brace back on, wasn't told how to put it back on      Please cb pt or spouse @ [phone]

## 2022-05-02 ENCOUNTER — OFFICE VISIT (OUTPATIENT)
Dept: PHYSICAL THERAPY | Facility: CLINIC | Age: 72
End: 2022-05-02
Payer: MEDICARE

## 2022-05-02 DIAGNOSIS — M25.511 ACUTE PAIN OF RIGHT SHOULDER: Primary | ICD-10-CM

## 2022-05-02 PROCEDURE — 97110 THERAPEUTIC EXERCISES: CPT | Performed by: PHYSICAL THERAPIST

## 2022-05-02 PROCEDURE — 97140 MANUAL THERAPY 1/> REGIONS: CPT | Performed by: PHYSICAL THERAPIST

## 2022-05-02 NOTE — PROGRESS NOTES
PT Evaluation     Today's date: 2022  Patient name: Brigette Berrios  : 1950  MRN: [id number]  Referring provider: Zulma Ramirez  Dx:   Encounter Diagnosis     ICD-10-CM    1  Rotator cuff tear arthropathy of right shoulder  M75 101 Ambulatory referral to Physical Therapy    M12 811                   Assessment  Assessment details: Brigette Berrios is a 66 y o  male presents for pre-op visit for R Reverse TSA  He presents with pain, decreased strength, decreased ROM, and postural  dysfunction  Due to these impairments, Patient has difficulty performing a/iadls, recreational activities and engaging in social activities  Patient's clinical presentation is consistent with their referring diagnosis  He will be returning on 22 for his first post-op visit  He has been given a home exercise program and is in agreement with the plan of care  Thank you for your referral   Impairments: abnormal or restricted ROM, activity intolerance, impaired physical strength, lacks appropriate home exercise program and pain with function    Symptom irritability: moderateUnderstanding of Dx/Px/POC: excellent  Goals  Return post-op post op TSA  1   Independent with HEP in 2 visits  2  Can don and doff sling independently in 2 visits - partially met  3  Improve ROM by 25% w/in protocol in 4 weeks  4  Increase strength by 25% in 4 weeks as per protocol  5    Reduce pain by 25% in 4 weeks       Plan  Patient would benefit from: skilled physical therapy  Referral necessary: No  Planned therapy interventions: manual therapy, muscle pump exercises, neuromuscular re-education, postural training, strengthening, stretching, therapeutic activities and therapeutic exercise  Frequency: 2x week  Duration in visits: 20  Duration in weeks: 20  Plan of Care beginning date: 2022  Plan of Care expiration date: 2022  Treatment plan discussed with: patient        Subjective Evaluation    History of Present Illness  Mechanism of injury: Patient reports that he has had a long history of B shoulder pain  He had a fall 5-6 years ago when he felt a pop in each shoulder  He notes that approx 1-2 months ago he was pouring a gallon of water and felt terrible pain  He went to the MD who referred him to PT  He states that he went for 1 visit and had more pain  Therefore he saw a second opinion with Dr Marielle Jarquin who found the tears in his shoulder and recommended the TSA  He did have one cortisone injection which made his pain worse  That has also delayed his surgery which is scheduled for 4/26/22  CC: He reports a burning sensation in the R shoulder as he is sitting  If he attempts to reach up wth the R he has moderate pain  He is RHD  He denies nighttime pain  He uses tylenol for pain  Function:  He is retired for 16 years  He worked for [employer]  He likes to exercise at the [facility]  He is unable to comb his hair or wash his back  He cannot wash his back  He is very active and splits wood at home also  5/2/22: Patient returns to PT s/p R Reverse TSA  He reports very little pain at this time  He is not using pain medication at this time  He is using medical marijuana for pain relief but he did not use that last night  He reports that he is sleeping well in a lounge chair  He reports that his fingers and UE near the biceps are swollen  He denies tingling sensation  He reports no discharge from the area of his incision  Recurrent probem    Quality of life: excellent    Pain  Quality: sharp  Relieving factors: medications  Progression: worsening    Social Support    Employment status: not working  Hand dominance: right      Diagnostic Tests  X-ray: abnormal  CT scan: abnormal        Objective     Postural Observations  Seated posture: fair  Standing posture: fair    Observation:  Mild edema noted in digits  Moderate bruising at the biceps region        Palpation     Right   Tenderness of the biceps muscle belly      Temperature:  wnl  Good cap refill    Active Range of Motion   Left Shoulder   Flexion: 135 degrees   Abduction: 123 degrees   External rotation BTH: C2   Internal rotation BTB: sacrum     Right Shoulder :                     5/2/22    Flexion: 130 degrees   /      90 degrees  Abduction: 78 degrees   Internal rotation BTB: L3   External Rotation:  /          28 degrees    Additional Active Range of Motion Details  Unable to reach top of head with R ER      Strength/Myotome Testing     Left Shoulder     Planes of Motion   Abduction: 4-   External rotation at 0°: 4-   External rotation at 45°: 3+     Right Shoulder                          5/2/22    Planes of Motion                         NT  Flexion: 4-   Extension: 3+   External rotation at 0°: 3-   Internal rotation at 0°: 4-              Precautions: L RTCTs, melanomas  R Reverse TSA protocol- NO IR;  NO EXT in supine                          Patient with B wrist fusion therefore is unable to perform wrist flexion/ ext      Manuals 5/2            PROM as per protocol flexion/ER            Protocol reviewed with patient 8'            Instruction in don/doff sling 10'                         Neuro Re-Ed                          SBS                                                                              Ther Ex                                       Pendulums             Putty             Elbow flexion/ext                                                    Ther Activity                                       Gait Training                                       Modalities

## 2022-05-03 ENCOUNTER — TELEPHONE (OUTPATIENT)
Dept: OBGYN CLINIC | Facility: HOSPITAL | Age: 72
End: 2022-05-03

## 2022-05-03 ENCOUNTER — HOSPITAL ENCOUNTER (EMERGENCY)
Facility: HOSPITAL | Age: 72
Discharge: HOME | End: 2022-05-04
Attending: EMERGENCY MEDICINE | Admitting: EMERGENCY MEDICINE
Payer: MEDICARE
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
## 2022-05-03 DIAGNOSIS — S49.91XA RIGHT SHOULDER INJURY, INITIAL ENCOUNTER: Primary | ICD-10-CM

## 2022-05-03 PROCEDURE — 99283 EMERGENCY DEPT VISIT LOW MDM: CPT

## 2022-05-03 PROCEDURE — 99285 EMERGENCY DEPT VISIT HI MDM: CPT | Performed by: EMERGENCY MEDICINE

## 2022-05-03 NOTE — TELEPHONE ENCOUNTER
Patient fell on his shoulder he just had surgery on  Patient advised it does not feel like he did anything to it , just a little sore  Patient would like a call at [phone]   Thank you

## 2022-05-03 NOTE — TELEPHONE ENCOUNTER
Wife and patient called back a few hours now after fall  Patient states the pain is worsening now  He does not see any deformity or worsening in swelling at this time  They will continue with above plan until they hear from DR/PA reply  Please advise  Patient is taking medical marijuana only for his pain control  Advised tylenol 1000mg TID, ice 30 min on 30 min off  Unable to take NSAIDS due to allergy

## 2022-05-03 NOTE — TELEPHONE ENCOUNTER
Patient given above information and will be evaluated tomorrow in PT  We will hear from patient tomorrow if office visit is needed for Thursday

## 2022-05-04 ENCOUNTER — APPOINTMENT (EMERGENCY)
Dept: RADIOLOGY | Facility: HOSPITAL | Age: 72
End: 2022-05-04
Payer: MEDICARE

## 2022-05-04 ENCOUNTER — OFFICE VISIT (OUTPATIENT)
Dept: PHYSICAL THERAPY | Facility: CLINIC | Age: 72
End: 2022-05-04
Payer: MEDICARE

## 2022-05-04 VITALS
TEMPERATURE: 98.7 F | SYSTOLIC BLOOD PRESSURE: 143 MMHG | OXYGEN SATURATION: 98 % | DIASTOLIC BLOOD PRESSURE: 62 MMHG | RESPIRATION RATE: 18 BRPM | HEART RATE: 90 BPM

## 2022-05-04 DIAGNOSIS — M25.511 ACUTE PAIN OF RIGHT SHOULDER: Primary | ICD-10-CM

## 2022-05-04 PROCEDURE — 73060 X-RAY EXAM OF HUMERUS: CPT

## 2022-05-04 PROCEDURE — 97140 MANUAL THERAPY 1/> REGIONS: CPT

## 2022-05-04 PROCEDURE — 73030 X-RAY EXAM OF SHOULDER: CPT

## 2022-05-04 RX ORDER — MORPHINE SULFATE 15 MG/1
7.5 TABLET ORAL ONCE
Status: COMPLETED | OUTPATIENT
Start: 2022-05-04 | End: 2022-05-04

## 2022-05-04 RX ADMIN — MORPHINE SULFATE 7.5 MG: 15 TABLET ORAL at 00:10
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
## 2022-05-04 NOTE — PROGRESS NOTES
Daily Note     Today's date: 2022  Patient name: Star Abel  : 1950  MRN: [id number]  Referring provider: Leroy Robb  Dx:   Encounter Diagnosis     ICD-10-CM    1  Acute pain of right shoulder  M25 511                   Subjective: Patient states that he fell off his bottom step at home yesterday  Patient was transported to the ED and x-rays were negative for acute fracture  Patient states that he was sore to begin treatment and will f/u with MD on Monday  Objective: See treatment diary below      Assessment: Tolerated treatment fair  Reviewed HEP   Moderate guarding with gentle PROM  Plan: Continue per plan of care        Precautions: L RTCTs, melanomas  R Reverse TSA protocol      Manuals 3/21 5/4           PROM as per protocol  15           Protocol reviewed with patient 10'                                      Neuro Re-Ed                          SBS                                                                              Ther Ex                          Pulley             Pendulums  10": x 10                                                                             Ther Activity                                       Gait Training                                       Modalities             CP  10

## 2022-05-09 ENCOUNTER — HOSPITAL ENCOUNTER (OUTPATIENT)
Dept: RADIOLOGY | Facility: HOSPITAL | Age: 72
Discharge: HOME | End: 2022-05-09
Payer: MEDICARE

## 2022-05-09 ENCOUNTER — APPOINTMENT (OUTPATIENT)
Dept: RADIOLOGY | Facility: OTHER | Age: 72
End: 2022-05-09
Payer: MEDICARE

## 2022-05-09 ENCOUNTER — OFFICE VISIT (OUTPATIENT)
Dept: OBGYN CLINIC | Facility: OTHER | Age: 72
End: 2022-05-09

## 2022-05-09 VITALS
BODY MASS INDEX: 29.55 KG/M2 | WEIGHT: 195 LBS | HEART RATE: 74 BPM | SYSTOLIC BLOOD PRESSURE: 126 MMHG | DIASTOLIC BLOOD PRESSURE: 73 MMHG | HEIGHT: 68 IN

## 2022-05-09 DIAGNOSIS — Z47.1 AFTERCARE FOLLOWING RIGHT SHOULDER JOINT REPLACEMENT SURGERY: Primary | ICD-10-CM

## 2022-05-09 DIAGNOSIS — M25.511 RIGHT SHOULDER PAIN, UNSPECIFIED CHRONICITY: ICD-10-CM

## 2022-05-09 DIAGNOSIS — Z96.611 AFTERCARE FOLLOWING RIGHT SHOULDER JOINT REPLACEMENT SURGERY: Primary | ICD-10-CM

## 2022-05-09 PROCEDURE — 99024 POSTOP FOLLOW-UP VISIT: CPT | Performed by: PHYSICIAN ASSISTANT

## 2022-05-09 PROCEDURE — 73030 X-RAY EXAM OF SHOULDER: CPT

## 2022-05-09 NOTE — PATIENT INSTRUCTIONS
1  Sling as per protocol, can discontinue on 5/22/2022  2  PT per protocol  3   Follow-up in 2 months

## 2022-05-09 NOTE — PROGRESS NOTES
Assessment:       1  Aftercare following right shoulder joint replacement surgery          Plan:        Patient is doing well postoperatively  Operative note, images, and reverse total shoulder arthroplasty rehab protocol were discussed  The patient should discontinue using hand attachment to sling and to keep an eye on the area of redness on his left thumb  We talked about discontinuing his oxycodone since he thinks his fall was related to his taking the medication  I encouraged him to actively move his right hand and wrist   All questions were addressed to the patient's satisfaction  Patient will continue with PT  Follow-up will be in 2 months to assess patient's progress  Subjective:     Patient ID: Viry Jacinto is a 66 y o  male  Chief Complaint:    HPI    The patient presents to the office for follow-up status post right reverse total shoulder arthroplasty on 04/26/2022  Patient was seen in the ED on 05/03/2022 after he had fallen  He reports his pain has improved since then  He reports his pain is controlled  Social History     Occupational History    Occupation: retired    Tobacco Use    Smoking status: Never Smoker    Smokeless tobacco: Never Used   Vaping Use    Vaping Use: Every day    Substances: THC   Substance and Sexual Activity    Alcohol use: No     Comment: former drinker    Drug use: Yes     Types: Marijuana     Comment: daily  - medical card    Sexual activity: Not on file      Review of Systems   Constitutional: Negative  Respiratory: Negative  Cardiovascular: Negative  Musculoskeletal: Positive for myalgias  Negative for arthralgias  Skin: Positive for wound  Neurological: Positive for weakness  Negative for numbness  Hematological: Bruises/bleeds easily  Psychiatric/Behavioral: Negative  Objective:     Ortho ExamPhysical Exam  HENT:      Head: Atraumatic  Cardiovascular:      Pulses: Normal pulses     Pulmonary:      Effort: Pulmonary effort is normal    Musculoskeletal:         General: Normal range of motion  Right lower leg: Edema present  Comments: Painless circumduction   Skin:     General: Skin is warm and dry  Capillary Refill: Capillary refill takes less than 2 seconds  Comments: Surgical incisions dry and clean  Staples removed, Steri-Strips applied  Neurological:      Mental Status: He is alert and oriented to person, place, and time  Sensory: No sensory deficit  Psychiatric:         Mood and Affect: Mood normal          Behavior: Behavior normal            I have personally reviewed pertinent films in PACS and my interpretation is Consistent with reverse total shoulder arthroplasty  Prosthesis in excellent position, glenohumeral joint preserved  Sammi Love

## 2022-05-10 ENCOUNTER — OFFICE VISIT (OUTPATIENT)
Dept: PHYSICAL THERAPY | Facility: CLINIC | Age: 72
End: 2022-05-10
Payer: MEDICARE

## 2022-05-10 DIAGNOSIS — M25.511 ACUTE PAIN OF RIGHT SHOULDER: Primary | ICD-10-CM

## 2022-05-10 PROCEDURE — 97140 MANUAL THERAPY 1/> REGIONS: CPT | Performed by: PHYSICAL THERAPIST

## 2022-05-10 PROCEDURE — 97110 THERAPEUTIC EXERCISES: CPT | Performed by: PHYSICAL THERAPIST

## 2022-05-10 NOTE — PROGRESS NOTES
Daily Note     Today's date: 5/10/2022  Patient name: Viry Jacinto  : 1950  MRN: [id number]  Referring provider: Philipp Lewis  Dx:   Encounter Diagnosis     ICD-10-CM    1  Acute pain of right shoulder  M25 511                   Subjective: Patient reports that he has had the cushion removed  He has felt less pain in the shoulder since then  Objective: See treatment diary below      Assessment: Tolerated treatment well  Patient would benefit from continued PT  Patient continues with moderate tightness of the bicep due to swelling and bruising  Just superior to the elbow  Plan: Continue per plan of care  Progress note during next visit        Precautions: L RTCTs, melanomas  R Reverse TSA protocol      Manuals 3/21 5/4 5/10          PROM as per protocol  15 10          Protocol reviewed with patient 10'                                      Neuro Re-Ed                          SBS                                                                              Ther Ex                          Pulley             Pendulums  10": x 10  30          putty   3'rd          Bicep stretch (hanging)   10 x :05          Deltoid isometrics   10 x :05                                    Ther Activity                                       Gait Training                                       Modalities             CP  10 10'

## 2022-05-12 ENCOUNTER — OFFICE VISIT (OUTPATIENT)
Dept: PHYSICAL THERAPY | Facility: CLINIC | Age: 72
End: 2022-05-12
Payer: MEDICARE

## 2022-05-12 DIAGNOSIS — M25.511 ACUTE PAIN OF RIGHT SHOULDER: Primary | ICD-10-CM

## 2022-05-12 PROCEDURE — 97110 THERAPEUTIC EXERCISES: CPT

## 2022-05-12 PROCEDURE — 97140 MANUAL THERAPY 1/> REGIONS: CPT

## 2022-05-12 NOTE — PROGRESS NOTES
Daily Note     Today's date: 2022  Patient name: Constanza Heller  : 1950  MRN: [id number]  Referring provider: Owen Dugan  Dx:   Encounter Diagnosis     ICD-10-CM    1  Acute pain of right shoulder  M25 511                   Subjective: Patient states that he has been maximally compliant with HEP  Objective: See treatment diary below      Assessment: Tolerated treatment well  Patient exhibited good technique with therapeutic exercises      Plan: Continue per plan of care        Precautions: L RTCTs, melanomas  R Reverse TSA protocol      Manuals 3/21 5/4 5/10 5/12         PROM as per protocol  15 10 10         Protocol reviewed with patient 10'                                      Neuro Re-Ed                          SBS                                                                              Ther Ex                          Pulley             Pendulums  10": x 10  30 10          putty   3'rd 3 min red         Bicep stretch (hanging)   10 x :05 10" x 10          Deltoid isometrics   10 x :05 5' x 10                                    Ther Activity                                       Gait Training                                       Modalities             CP  10 10' 10

## 2022-05-14 ENCOUNTER — HOSPITAL ENCOUNTER (EMERGENCY)
Facility: HOSPITAL | Age: 72
Discharge: HOME | End: 2022-05-14
Attending: EMERGENCY MEDICINE | Admitting: EMERGENCY MEDICINE
Payer: MEDICARE

## 2022-05-14 ENCOUNTER — APPOINTMENT (EMERGENCY)
Dept: RADIOLOGY | Facility: HOSPITAL | Age: 72
End: 2022-05-14
Payer: MEDICARE

## 2022-05-14 VITALS
RESPIRATION RATE: 18 BRPM | OXYGEN SATURATION: 98 % | SYSTOLIC BLOOD PRESSURE: 119 MMHG | HEART RATE: 80 BPM | DIASTOLIC BLOOD PRESSURE: 81 MMHG | TEMPERATURE: 97.9 F

## 2022-05-14 DIAGNOSIS — S63.601A SPRAIN OF RIGHT THUMB, UNSPECIFIED SITE OF DIGIT, INITIAL ENCOUNTER: Primary | ICD-10-CM

## 2022-05-14 DIAGNOSIS — S63.501A SPRAIN OF RIGHT WRIST, INITIAL ENCOUNTER: ICD-10-CM

## 2022-05-14 PROCEDURE — 99283 EMERGENCY DEPT VISIT LOW MDM: CPT

## 2022-05-14 PROCEDURE — 73120 X-RAY EXAM OF HAND: CPT

## 2022-05-14 PROCEDURE — 29125 APPL SHORT ARM SPLINT STATIC: CPT | Performed by: EMERGENCY MEDICINE

## 2022-05-14 PROCEDURE — 99284 EMERGENCY DEPT VISIT MOD MDM: CPT | Performed by: EMERGENCY MEDICINE
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
## 2022-05-16 ENCOUNTER — OFFICE VISIT (OUTPATIENT)
Dept: PHYSICAL THERAPY | Facility: CLINIC | Age: 72
End: 2022-05-16
Payer: MEDICARE

## 2022-05-16 DIAGNOSIS — M25.511 ACUTE PAIN OF RIGHT SHOULDER: Primary | ICD-10-CM

## 2022-05-16 PROCEDURE — 97110 THERAPEUTIC EXERCISES: CPT | Performed by: PHYSICAL THERAPIST

## 2022-05-16 PROCEDURE — 97140 MANUAL THERAPY 1/> REGIONS: CPT | Performed by: PHYSICAL THERAPIST

## 2022-05-16 NOTE — PROGRESS NOTES
Daily Note     Today's date: 2022  Patient name: Charo Andujar  : 1950  MRN: [id number]  Referring provider: Shantel Pina  Dx:   Encounter Diagnosis     ICD-10-CM    1  Acute pain of right shoulder  M25 511                   Subjective: Patient reports that he has had severe thumb pain  He went to the ER to have an x-ray taken which was (-)  He states that his shoulder has been feeling well  Objective: See treatment diary below      Assessment: Tolerated treatment well  Patient would benefit from continued PT  Observation of moderate edema of the R thumb  Patient also has moderate bruising persisting at the bicep      Plan: Continue per plan of care        Precautions: L RTCTs, melanomas  R Reverse TSA protocol      Manuals 3/21 5/4 5/10 5/12 5/16        PROM as per protocol  15 10 10 10        Protocol reviewed with patient 10'            Bicep extension st     5x                     Neuro Re-Ed                          SBS                                                                              Ther Ex                          Pulley             Pendulums  10": x 10  30 10          putty   3'rd 3 min red 3'        Bicep stretch (hanging)   10 x :05 10" x 10  10 x "10        Deltoid isometrics   10 x :05 5' x 10  10 x :05        Table Slides flex     nv                     Ther Activity                                       Gait Training                                       Modalities             CP  10 10' 10

## 2022-05-18 ENCOUNTER — OFFICE VISIT (OUTPATIENT)
Dept: PHYSICAL THERAPY | Facility: CLINIC | Age: 72
End: 2022-05-18
Payer: MEDICARE

## 2022-05-18 DIAGNOSIS — M25.511 ACUTE PAIN OF RIGHT SHOULDER: Primary | ICD-10-CM

## 2022-05-18 PROCEDURE — 97110 THERAPEUTIC EXERCISES: CPT

## 2022-05-18 PROCEDURE — 97140 MANUAL THERAPY 1/> REGIONS: CPT

## 2022-05-18 NOTE — PROGRESS NOTES
Daily Note     Today's date: 2022  Patient name: Chano aMn  : 1950  MRN: [id number]  Referring provider: Damir Gayle: No diagnosis found  Subjective: Patient states that his hand remains swollen   Remains maximally compliant with HEP      Objective: See treatment diary below      Assessment: Tolerated treatment well  Patient exhibited good technique with therapeutic exercises      Plan: Continue per plan of care        Precautions: L RTCTs, melanomas  R Reverse TSA protocol      Manuals 3/21 5/4 5/10 5/12 5/16 5/18       PROM as per protocol  15 10 10 10 10        Protocol reviewed with patient 10'            Bicep extension st     5x 5x                    Neuro Re-Ed                          SBS                                                                              Ther Ex                          Pulley             Pendulums  10": x 10  30 10          putty   3'rd 3 min red 3' oragne 3 min        Bicep stretch (hanging)   10 x :05 10" x 10  10 x "10 10" x 10       Deltoid isometrics   10 x :05 5' x 10  10 x :05 5" x10        Table Slides flex     nv Flex 10" x 10        Pulleys       10" x10        Ther Activity                                       Gait Training                                       Modalities             CP  10 10' 10   10

## 2022-05-19 ENCOUNTER — OFFICE VISIT (OUTPATIENT)
Dept: OBGYN CLINIC | Facility: CLINIC | Age: 72
End: 2022-05-19
Payer: MEDICARE

## 2022-05-19 ENCOUNTER — TELEPHONE (OUTPATIENT)
Dept: OBGYN CLINIC | Facility: HOSPITAL | Age: 72
End: 2022-05-19

## 2022-05-19 ENCOUNTER — OFFICE VISIT (OUTPATIENT)
Dept: OCCUPATIONAL THERAPY | Facility: CLINIC | Age: 72
End: 2022-05-19
Payer: MEDICARE

## 2022-05-19 VITALS
SYSTOLIC BLOOD PRESSURE: 128 MMHG | WEIGHT: 193 LBS | BODY MASS INDEX: 29.25 KG/M2 | HEIGHT: 68 IN | DIASTOLIC BLOOD PRESSURE: 76 MMHG

## 2022-05-19 DIAGNOSIS — M18.11 OSTEOARTHRITIS OF CARPOMETACARPAL (CMC) JOINT OF RIGHT THUMB, UNSPECIFIED OSTEOARTHRITIS TYPE: Primary | ICD-10-CM

## 2022-05-19 DIAGNOSIS — M19.131 POST-TRAUMATIC OSTEOARTHRITIS OF RIGHT WRIST: ICD-10-CM

## 2022-05-19 DIAGNOSIS — M18.31 POST-TRAUMATIC OSTEOARTHRITIS OF FIRST CARPOMETACARPAL JOINT OF RIGHT HAND: ICD-10-CM

## 2022-05-19 DIAGNOSIS — M79.644 PAIN OF RIGHT THUMB: Primary | ICD-10-CM

## 2022-05-19 DIAGNOSIS — Z98.1 STATUS POST FUSION OF WRIST: ICD-10-CM

## 2022-05-19 PROCEDURE — 97760 ORTHOTIC MGMT&TRAING 1ST ENC: CPT | Performed by: OCCUPATIONAL THERAPIST

## 2022-05-19 PROCEDURE — 99214 OFFICE O/P EST MOD 30 MIN: CPT | Performed by: PHYSICIAN ASSISTANT

## 2022-05-19 NOTE — PROGRESS NOTES
Orthosis    Diagnosis:   1  Osteoarthritis of carpometacarpal (CMC) joint of right thumb, unspecified osteoarthritis type       Indication: Arthritic Condition    Location: Right  thumb  Supplies: Custom Fit Orthotic  Orthosis type: Hand-Based SPICA  Wearing Schedule: night and as needed for acitivty  Describe Position: function    Precautions: Universal (skin contact/breakdown)    Patient or Caregiver expresses understanding of wearing Schedule and Precautions? Yes  Patient or Caregiver able to don/doff orthotic independently? Yes    Written orders provided to patient?  Yes  Orders Obtained: Written  Orders Obtained from: Daniela Siegel    Return for evaluation and treatment No

## 2022-05-19 NOTE — TELEPHONE ENCOUNTER
Patient called to advise his hand and  thumb on the right side are very swollen  He said the shoulder the surgery was performed on  is fine   He would like a call at [phone]
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
## 2022-05-19 NOTE — TELEPHONE ENCOUNTER
Pt should be coming out of sling multiple times a day to perform elbow, wrist and hand ROM  Not moving the arm will allow fluid to pool in his hand  Elevation helps

## 2022-05-23 ENCOUNTER — OFFICE VISIT (OUTPATIENT)
Dept: PHYSICAL THERAPY | Facility: CLINIC | Age: 72
End: 2022-05-23
Payer: MEDICARE

## 2022-05-23 DIAGNOSIS — M25.511 ACUTE PAIN OF RIGHT SHOULDER: Primary | ICD-10-CM

## 2022-05-23 PROCEDURE — 97110 THERAPEUTIC EXERCISES: CPT

## 2022-05-23 PROCEDURE — 97140 MANUAL THERAPY 1/> REGIONS: CPT

## 2022-05-23 NOTE — PROGRESS NOTES
Daily Note     Today's date: 2022  Patient name: Donovan Apgar  : 1950  MRN: [id number]  Referring provider: Mandie Kirkpatrick  Dx: No diagnosis found  Subjective: Patient states that he saw Ortho for pain in R thumb  Held putty per MD suggestion  Reviewed MD protocol and weaning from sling  Objective: See treatment diary below      Assessment: Tolerated treatment well  Patient exhibited good technique with therapeutic exercises      Plan: Continue per plan of care        Precautions: L RTCTs, melanomas  R Reverse TSA protocol      Manuals 3/21 5/4 5/10 5/12 5/16 5/18 5/23      PROM as per protocol  15 10 10 10 10  10       Protocol reviewed with patient 10'            Bicep extension st     5x 5x 5                   Neuro Re-Ed                          SBS                                                                              Ther Ex                                       Pendulums  10": x 10  30 10          putty   3'rd 3 min red 3' oragne 3 min  Do not perform per MD      Bicep stretch (hanging)   10 x :05 10" x 10  10 x "10 10" x 10 10" x 10       Deltoid isometrics   10 x :05 5' x 10  10 x :05 5" x10  5" x 1 0      Table Slides flex     nv Flex 10" x 10  Flex 10" x 10      Pulleys       10" x10  10" x 3 min       Ther Activity                                       Gait Training                                       Modalities             CP  10 10' 10   10 10

## 2022-05-25 ENCOUNTER — TELEPHONE (OUTPATIENT)
Dept: OBGYN CLINIC | Facility: HOSPITAL | Age: 72
End: 2022-05-25

## 2022-05-25 ENCOUNTER — OFFICE VISIT (OUTPATIENT)
Dept: PHYSICAL THERAPY | Facility: CLINIC | Age: 72
End: 2022-05-25
Payer: MEDICARE

## 2022-05-25 DIAGNOSIS — M25.511 ACUTE PAIN OF RIGHT SHOULDER: Primary | ICD-10-CM

## 2022-05-25 PROCEDURE — 97112 NEUROMUSCULAR REEDUCATION: CPT | Performed by: PHYSICAL THERAPIST

## 2022-05-25 PROCEDURE — 97110 THERAPEUTIC EXERCISES: CPT | Performed by: PHYSICAL THERAPIST

## 2022-05-25 NOTE — TELEPHONE ENCOUNTER
DR Blanquita Fish  RE shoulder injection    [phone]    Patient had Right shoulder replacement on 4/26 with Dr Blanquita Fish  Patient calls with complaints of a lot of pain in left shoulder from PT  Would he be able to get injection in left shoulder?      Please advise

## 2022-05-25 NOTE — PROGRESS NOTES
Daily Note     Today's date: 2022  Patient name: Gillian Yoder  : 1950  MRN: [id number]  Referring provider: Ziyad Browning  Dx:   Encounter Diagnosis     ICD-10-CM    1  Acute pain of right shoulder  M25 511                   Subjective: Patient report that he tried to sleep in bed but he had difficulty getting out of bed due to not being able to use his L UE/shoulder either  He states that his R shoulder is feeling well  Objective: See treatment diary below      Assessment: Tolerated treatment well  Patient would benefit from continued PT      Plan: Continue per plan of care        Precautions: L RTCTs, melanomas  R Reverse TSA protocol      Manuals 3/21 5/4 5/10 5/12 5/16 5/18 5/23 5/25     PROM as per protocol  15 10 10 10 10  10  [id number] degrees     Protocol reviewed with patient 10'      Week 4      Bicep extension st     5x 5x 5                   Neuro Re-Ed                          SBS        10 x :05                                                                      Ther Ex                          Supine flexion/             Pendulums  10": x 10  30 10          putty   3'rd 3 min red 3' oragne 3 min  Do not perform per MD shetty     Bicep stretch (hanging)   10 x :05 10" x 10  10 x "10 10" x 10 10" x 10  10 x :10     Deltoid isometrics   10 x :05 5' x 10  10 x :05 5" x10  5" x 1 0      Table Slides flex     nv Flex 10" x 10  Flex 10" x 10 10 x :10     Pulleys       10" x10  10" x 3 min  4'     Ther Activity                                       Gait Training                                       Modalities             CP  10 10' 10   10 10  10

## 2022-05-26 ENCOUNTER — OFFICE VISIT (OUTPATIENT)
Dept: OBGYN CLINIC | Facility: OTHER | Age: 72
End: 2022-05-26
Payer: MEDICARE

## 2022-05-26 VITALS
HEART RATE: 66 BPM | SYSTOLIC BLOOD PRESSURE: 142 MMHG | DIASTOLIC BLOOD PRESSURE: 85 MMHG | WEIGHT: 193.8 LBS | BODY MASS INDEX: 29.47 KG/M2

## 2022-05-26 DIAGNOSIS — M12.812 ROTATOR CUFF TEAR ARTHROPATHY OF LEFT SHOULDER: Primary | ICD-10-CM

## 2022-05-26 DIAGNOSIS — M18.31 POST-TRAUMATIC OSTEOARTHRITIS OF FIRST CARPOMETACARPAL JOINT OF RIGHT HAND: ICD-10-CM

## 2022-05-26 DIAGNOSIS — M18.31 POST-TRAUMATIC OSTEOARTHRITIS OF FIRST CARPOMETACARPAL JOINT OF RIGHT HAND: Primary | ICD-10-CM

## 2022-05-26 DIAGNOSIS — M75.102 ROTATOR CUFF TEAR ARTHROPATHY OF LEFT SHOULDER: Primary | ICD-10-CM

## 2022-05-26 PROCEDURE — 20600 DRAIN/INJ JOINT/BURSA W/O US: CPT | Performed by: ORTHOPAEDIC SURGERY

## 2022-05-26 PROCEDURE — 99214 OFFICE O/P EST MOD 30 MIN: CPT | Performed by: ORTHOPAEDIC SURGERY

## 2022-05-26 PROCEDURE — 20610 DRAIN/INJ JOINT/BURSA W/O US: CPT | Performed by: ORTHOPAEDIC SURGERY

## 2022-05-26 RX ORDER — BUPIVACAINE HYDROCHLORIDE 2.5 MG/ML
2 INJECTION, SOLUTION INFILTRATION; PERINEURAL
Status: COMPLETED | OUTPATIENT
Start: 2022-05-26 | End: 2022-05-26

## 2022-05-26 RX ORDER — LIDOCAINE HYDROCHLORIDE 10 MG/ML
0.5 INJECTION, SOLUTION EPIDURAL; INFILTRATION; INTRACAUDAL; PERINEURAL
Status: COMPLETED | OUTPATIENT
Start: 2022-05-26 | End: 2022-05-26

## 2022-05-26 RX ORDER — BETAMETHASONE SODIUM PHOSPHATE AND BETAMETHASONE ACETATE 3; 3 MG/ML; MG/ML
6 INJECTION, SUSPENSION INTRA-ARTICULAR; INTRALESIONAL; INTRAMUSCULAR; SOFT TISSUE
Status: COMPLETED | OUTPATIENT
Start: 2022-05-26 | End: 2022-05-26

## 2022-05-26 RX ADMIN — BETAMETHASONE SODIUM PHOSPHATE AND BETAMETHASONE ACETATE 6 MG: 3; 3 INJECTION, SUSPENSION INTRA-ARTICULAR; INTRALESIONAL; INTRAMUSCULAR; SOFT TISSUE at 08:52

## 2022-05-26 RX ADMIN — BUPIVACAINE HYDROCHLORIDE 2 ML: 2.5 INJECTION, SOLUTION INFILTRATION; PERINEURAL at 08:52

## 2022-05-26 RX ADMIN — LIDOCAINE HYDROCHLORIDE 0.5 ML: 10 INJECTION, SOLUTION EPIDURAL; INFILTRATION; INTRACAUDAL; PERINEURAL at 08:52

## 2022-05-26 NOTE — PATIENT INSTRUCTIONS

## 2022-05-26 NOTE — PROGRESS NOTES
Assessment  Diagnoses and all orders for this visit:    Rotator cuff tear arthropathy of left shoulder    Post-traumatic osteoarthritis of first carpometacarpal joint of right hand        Discussion and Plan:    The patient has an examination consistent with LEFT RTC arthropathy  I have discussed with the patient the pathophysiology of this diagnosis and reviewed how the examination correlates with this diagnosis  Surgical vs conservative treatment options were discussed at length and after discussing these treatment options, the patient elected for a CS injection today  Injection can be repeated in 4-6 mos if needed  Explained the definitive treatment would be total shoulder arthoplasty  Patient may discontinue the sling for the right shoulder  Patient was also provided with a Right CMC jt injection and comfort cool brace today  Subjective:   Patient ID: Valerio Harry is a 66 y o  male      HPI  The patient presents with a chief complaint of left shoulder pain  The pain began several year(s) ago and is not associated with an acute injury  The patient describes the pain as aching and dull in intensity,  intermittent in timing, and localizes the pain to the  left globally  The pain is worse with movement and relieved by rest   The pain is not associated with numbness and tingling  The pain is not associated with constitutional symptoms  The patient is awoken at night by the pain  Right reverse total shoulder arthroplasty 4/26/22      The following portions of the patient's history were reviewed and updated as appropriate: allergies, current medications, past family history, past medical history, past social history, past surgical history and problem list     Review of Systems   Constitutional: Negative for chills and fever  HENT: Negative for drooling and hearing loss  Eyes: Negative for visual disturbance  Respiratory: Negative for cough and shortness of breath      Cardiovascular: Negative for chest pain  Gastrointestinal: Negative for abdominal pain  Skin: Negative for rash  Psychiatric/Behavioral: Negative for agitation  Objective:  /85   Pulse 66   Wt 87 9 kg (193 lb 12 8 oz)   BMI 29 47 kg/m²       Left Shoulder Exam     Tenderness   The patient is experiencing no tenderness  Range of Motion   External rotation: abnormal   Forward flexion: abnormal     Other   Erythema: absent  Sensation: normal  Pulse: present     Comments:    right CMC Exam:  No adduction contracture  Positive localized tenderness over radial and dorsal aspect of thumb (CMC joint)  Grind test is Positive for pain                 Physical Exam  Vitals reviewed  Constitutional:       Appearance: He is well-developed  HENT:      Head: Normocephalic  Eyes:      Pupils: Pupils are equal, round, and reactive to light  Pulmonary:      Effort: Pulmonary effort is normal    Skin:     General: Skin is warm and dry           Large joint arthrocentesis: L glenohumeral  Universal Protocol:  Consent given by: patient  Patient understanding: patient states understanding of the procedure being performed  Site marked: the operative site was marked  Patient identity confirmed: verbally with patient    Supporting Documentation  Indications: pain   Procedure Details  Location: shoulder - L glenohumeral  Needle size: 22 G  Ultrasound guidance: no  Approach: lateral  Medications administered: 2 mL bupivacaine 0 25 %; 6 mg betamethasone acetate-betamethasone sodium phosphate 6 (3-3) mg/mL    Patient tolerance: patient tolerated the procedure well with no immediate complications  Dressing:  Sterile dressing applied    Small joint arthrocentesis: R thumb CMC  [place] Protocol:  Consent given by: patient  Patient understanding: patient states understanding of the procedure being performed  Site marked: the operative site was marked  Patient identity confirmed: verbally with patient    Supporting Documentation  Indications: pain   Procedure Details  Location: thumb - R thumb CMC  Preparation: Patient was prepped and draped in the usual sterile fashion  Needle size: 25 G  Ultrasound guidance: no  Approach: radial  Medications administered: 6 mg betamethasone acetate-betamethasone sodium phosphate 6 (3-3) mg/mL; 0 5 mL lidocaine (PF) 1 %    Patient tolerance: patient tolerated the procedure well with no immediate complications  Dressing:  Sterile dressing applied          I have personally reviewed pertinent films in PACS and my interpretation is as follows  Left shoulder x-rays 3/03/22 demonstrates: proximal migration of the humeral head, severe GH degenerative changes        Scribe Attestation    I,:  Madina Scott am acting as a scribe while in the presence of the attending physician :       I,:  Clyde Wilkins MD personally performed the services described in this documentation    as scribed in my presence :

## 2022-05-31 ENCOUNTER — TELEPHONE (OUTPATIENT)
Dept: OBGYN CLINIC | Facility: HOSPITAL | Age: 72
End: 2022-05-31

## 2022-05-31 ENCOUNTER — APPOINTMENT (OUTPATIENT)
Dept: PHYSICAL THERAPY | Facility: CLINIC | Age: 72
End: 2022-05-31
Payer: MEDICARE

## 2022-05-31 NOTE — TELEPHONE ENCOUNTER
Patient called to speak with someone regarding scheduling an appointment about a month before he would be due to have surgery  Patient having a lot of discomfort and would like to be able to talk with her prior to surgery        Please call patient at [phone]

## 2022-05-31 NOTE — TELEPHONE ENCOUNTER
I spoke to patient & an appt request was sent to scheduling team/Practice Admin for doctor  Patient was advised that our scheduling team/Practice Admin will reach out to patient directly

## 2022-06-01 ENCOUNTER — OFFICE VISIT (OUTPATIENT)
Dept: PHYSICAL THERAPY | Facility: CLINIC | Age: 72
End: 2022-06-01
Payer: MEDICARE

## 2022-06-01 DIAGNOSIS — M25.511 ACUTE PAIN OF RIGHT SHOULDER: Primary | ICD-10-CM

## 2022-06-01 PROCEDURE — 97110 THERAPEUTIC EXERCISES: CPT

## 2022-06-01 PROCEDURE — 97140 MANUAL THERAPY 1/> REGIONS: CPT

## 2022-06-01 NOTE — PROGRESS NOTES
Daily Note     Today's date: 2022  Patient name: Chad Quinn  : 1950  MRN: [id number]  Referring provider: Tao Daily  Dx:   Encounter Diagnosis     ICD-10-CM    1  Acute pain of right shoulder  M25 511                   Subjective: Patient states that he received cortisone injection in R thumb during MD f/u  MD is pleased with progress in R shoulder thus far  Objective: See treatment diary below      Assessment: Tolerated treatment well  Patient exhibited good technique with therapeutic exercises      Plan: Continue per plan of care  Precautions: L RTCTs, melanomas  R Reverse TSA protocol- R thumb pain  Unable to hold wand         Manuals 3/21 5/4 5/10 5/12 5/16 5/18 5/23 5/25 6/1    PROM as per protocol  15 10 10 10 10  10  [id number] degrees Per MD protocol x 10     Protocol reviewed with patient 10'      Week 4      Bicep extension st     5x 5x 5                   Neuro Re-Ed                          SBS        10 x :05                                                                      Ther Ex                          Supine flexion/         With opposite arm - 5" x 10     Pendulums  10": x 10  30 10          putty   3'rd 3 min red 3' oragne 3 min  Do not perform per MD dc DC    Bicep stretch (hanging)   10 x :05 10" x 10  10 x "10 10" x 10 10" x 10  10 x :10 pendulum position 10" x 10     Deltoid isometrics   10 x :05 5' x 10  10 x :05 5" x10  5" x 1 0  5" x 10     Table Slides flex     nv Flex 10" x 10  Flex 10" x 10 10 x :10 10" x 10     Pulleys       10" x10  10" x 3 min  5' 5    Ther Activity                                       Gait Training                                       Modalities             CP  10 10' 10   10 10  10 10

## 2022-06-02 ENCOUNTER — OFFICE VISIT (OUTPATIENT)
Dept: PHYSICAL THERAPY | Facility: CLINIC | Age: 72
End: 2022-06-02
Payer: MEDICARE

## 2022-06-02 DIAGNOSIS — M25.511 ACUTE PAIN OF RIGHT SHOULDER: Primary | ICD-10-CM

## 2022-06-02 PROCEDURE — 97110 THERAPEUTIC EXERCISES: CPT | Performed by: PHYSICAL THERAPIST

## 2022-06-02 PROCEDURE — 97140 MANUAL THERAPY 1/> REGIONS: CPT | Performed by: PHYSICAL THERAPIST

## 2022-06-02 NOTE — PROGRESS NOTES
Daily Note     Today's date: 2022  Patient name: Tracy Larson  : 1950  MRN: [id number]  Referring provider: Jesus Cuellar  Dx:   Encounter Diagnosis     ICD-10-CM    1  Acute pain of right shoulder  M25 511                   Subjective: Patient reports that he had some soreness with ER  Also notes that he is able to actively reached up over his head  Objective: See treatment diary below      Assessment: Tolerated treatment well  Patient would benefit from continued PT   ER = 28 degrees and Flexion:  120 degrees      Plan: Continue per plan of care  Precautions: L RTCTs, melanomas  R Reverse TSA protocol- R thumb pain  Unable to hold wand     Flexion:  120;  ER:  50;  IR to pocket      Manuals 3/21 5/4 5/10 5/12 5/16 5/18 5/23 5/25 6/1 6/2   PROM as per protocol  15 10 10 10 10  10  [id number] degrees Per MD protocol x 10  10   Protocol reviewed with patient 10'      Week 4      Bicep extension st     5x 5x 5                   Neuro Re-Ed                          SBS        10 x :05                                                                      Ther Ex                          Supine flexion/         With opposite arm - 5" x 10  10   Pendulums  10": x 10  30 10          putty   3'rd 3 min red 3' oragne 3 min  Do not perform per MD dc DC    Bicep stretch (hanging)   10 x :05 10" x 10  10 x "10 10" x 10 10" x 10  10 x :10 pendulum position 10" x 10     Deltoid isometrics   10 x :05 5' x 10  10 x :05 5" x10  5" x 1 0  5" x 10  10   Table Slides flex/ABD/ER     nv Flex 10" x 10  Flex 10" x 10 10 x :10 10" x 10  10 x :10   Pulleys       10" x10  10" x 3 min  5' 5 5'   Supine flexion AROM          x10   Supine ER AROM          nv                Gait Training                                       Modalities             CP  10 10' 10   10 10  10 10

## 2022-06-06 ENCOUNTER — OFFICE VISIT (OUTPATIENT)
Dept: PHYSICAL THERAPY | Facility: CLINIC | Age: 72
End: 2022-06-06
Payer: MEDICARE

## 2022-06-06 DIAGNOSIS — M25.511 ACUTE PAIN OF RIGHT SHOULDER: Primary | ICD-10-CM

## 2022-06-06 PROCEDURE — 97140 MANUAL THERAPY 1/> REGIONS: CPT

## 2022-06-06 PROCEDURE — 97110 THERAPEUTIC EXERCISES: CPT

## 2022-06-06 NOTE — PROGRESS NOTES
Daily Note     Today's date: 2022  Patient name: Gillian Yoder  : 1950  MRN: [id number]  Referring provider: Ziyad Browning  Dx:   Encounter Diagnosis     ICD-10-CM    1  Acute pain of right shoulder  M25 511                   Subjective: No issues over the weekend  Objective: See treatment diary below      Assessment: Tolerated treatment well  No issue with addition of supine AROM ER  Continue to progress as able per protocol  Patient would benefit from continued PT  Plan: Continue per plan of care  Precautions: L RTCTs, melanomas  R Reverse TSA protocol- R thumb pain  Unable to hold wand     Flexion:  120;  ER:  50;  IR to pocket      Manuals 3/21 5/4 5/10 5/12 5/16 5/18 5/23 5/25 6/1 6/2 6/6   PROM as per protocol  15 10 10 10 10  10  [id number] degrees Per MD protocol x 10  10 Per protocol  CM   Protocol reviewed with patient 10'      Week 4    Week 6   Bicep extension st     5x 5x 5                     Neuro Re-Ed                            SBS        10 x :05                                                                            Ther Ex                            Supine flexion/         With opposite arm - 5" x 10  10 W/ other UE-    5"x10   Pendulums  10": x 10  30 10           putty   3'rd 3 min red 3' oragne 3 min  Do not perform per MD dc DC     Bicep stretch (hanging)   10 x :05 10" x 10  10 x "10 10" x 10 10" x 10  10 x :10 pendulum position 10" x 10      Deltoid isometrics   10 x :05 5' x 10  10 x :05 5" x10  5" x 1 0  5" x 10  10 5"x10   Table Slides flex/ABD/ER     nv Flex 10" x 10  Flex 10" x 10 10 x :10 10" x 10  10 x :10 10"x10   Pulleys       10" x10  10" x 3 min  5' 5 5' 5 min   Supine flexion AROM          x10 1x10   Supine ER AROM          nv 1x10                 Gait Training                                          Modalities              CP  10 10' 10   10 10  10 10   10'   Supine

## 2022-06-08 ENCOUNTER — OFFICE VISIT (OUTPATIENT)
Dept: PHYSICAL THERAPY | Facility: CLINIC | Age: 72
End: 2022-06-08
Payer: MEDICARE

## 2022-06-08 DIAGNOSIS — M25.511 ACUTE PAIN OF RIGHT SHOULDER: Primary | ICD-10-CM

## 2022-06-08 PROCEDURE — 97140 MANUAL THERAPY 1/> REGIONS: CPT

## 2022-06-08 PROCEDURE — 97110 THERAPEUTIC EXERCISES: CPT

## 2022-06-08 PROCEDURE — 97112 NEUROMUSCULAR REEDUCATION: CPT

## 2022-06-08 NOTE — PROGRESS NOTES
Daily Note     Today's date: 2022  Patient name: Ramila Diggs  : 1950  MRN: [id number]  Referring provider: Jailyn Kang  Dx:   Encounter Diagnosis     ICD-10-CM    1  Acute pain of right shoulder  M25 511                   Subjective: Patient starting Week 6 of post op protocol  Shoulder pain rated 2/10 today  Objective: See treatment diary below      Assessment: Tolerated treatment well  Good technique with ROM exercises  Continue to progress as able per protocol  Patient would benefit from continued PT  Plan: Continue per plan of care  Precautions: L RTCTs, melanomas  R Reverse TSA protocol- R thumb pain  Unable to hold wand     Flexion:  120;  ER:  50;  IR to pocket      Manuals 3/21 5/4 5/10 5/12 5/16 5/18 5/23 5/25 6/1 6/2 6/6 6/8   PROM as per protocol  15 10 10 10 10  10  [id number] degrees Per MD protocol x 10  10 Per protocol  CM Per protocol KSG   Protocol reviewed with patient 10'      Week 4    Week 6    Bicep extension st     5x 5x 5                       Neuro Re-Ed                             SBS        10 x :05                                                                                  Ther Ex                             Supine flexion/         With opposite arm - 5" x 10  10 W/ other UE-    5"x10 w/other UE  5" x10   Pendulums  10": x 10  30 10            putty   3'rd 3 min red 3' oragne 3 min  Do not perform per MD dc DC      Bicep stretch (hanging)   10 x :05 10" x 10  10 x "10 10" x 10 10" x 10  10 x :10 pendulum position 10" x 10       Deltoid isometrics   10 x :05 5' x 10  10 x :05 5" x10  5" x 1 0  5" x 10  10 5"x10 5'x10   Table Slides flex/ABD/ER     nv Flex 10" x 10  Flex 10" x 10 10 x :10 10" x 10  10 x :10 10"x10 10"x10   Pulleys       10" x10  10" x 3 min  5' 5 5' 5 min 5 min   Supine flexion AROM          x10 1x10 x10   Supine ER AROM          nv 1x10 x10                  Gait Training            Modalities           6/6    CP  10 10' 10   10 10  10 10   10'   Supine 10' supine

## 2022-06-14 ENCOUNTER — OFFICE VISIT (OUTPATIENT)
Dept: PHYSICAL THERAPY | Facility: CLINIC | Age: 72
End: 2022-06-14
Payer: MEDICARE

## 2022-06-14 DIAGNOSIS — M25.511 ACUTE PAIN OF RIGHT SHOULDER: Primary | ICD-10-CM

## 2022-06-14 PROCEDURE — 97112 NEUROMUSCULAR REEDUCATION: CPT

## 2022-06-14 PROCEDURE — 97110 THERAPEUTIC EXERCISES: CPT

## 2022-06-14 PROCEDURE — 97140 MANUAL THERAPY 1/> REGIONS: CPT

## 2022-06-14 NOTE — PROGRESS NOTES
Daily Note     Today's date: 2022  Patient name: Moy Rossi  : 1950  MRN: [id number]  Referring provider: Thomas Dozier  Dx:   Encounter Diagnosis     ICD-10-CM    1  Acute pain of right shoulder  M25 511                   Subjective: Patient  ROM has improved significantly with minimal pain  Objective: See treatment diary below      Assessment: Tolerated treatment well  Patient will be able to progress at nv per MD protocol      Plan: Continue per plan of care  Precautions: L RTCTs, melanomas  R Reverse TSA protocol- R thumb pain  Unable to hold wand     Flexion:  120;  ER:  50;  IR to pocket      Manuals    PROM as per protocol 10 10   10 10  10  [id number] degrees Per MD protocol x 10  10 Per protocol  CM Per protocol KSG   Protocol reviewed with patient       Week 4    Week 6    Bicep extension st     5x 5x 5                       Neuro Re-Ed                             SBS        10 x :05                                                                                  Ther Ex              Supine flexion AAROM  X 15 supine             Supine flexion  AROM x 15        With opposite arm - 5" x 10  10 W/ other UE-    5"x10 w/other UE  5" x10   Standing flexion  X 10 AROM             Standing scaption  X 10 AROM             Bicep stretch (hanging)     10 x "10 10" x 10 10" x 10  10 x :10 pendulum position 10" x 10       Deltoid isometrics      10 x :05 5" x10  5" x 1 0  5" x 10  10 5"x10 5'x10   Table Slides flex/ABD/ER  10" x 10    nv Flex 10" x 10  Flex 10" x 10 10 x :10 10" x 10  10 x :10 10"x10 10"x10   Pulleys   5 min     10" x10  10" x 3 min  5' 5 5' 5 min 5 min             x10 1x10 x10   Supine ER AROM X 15 X  15         nv 1x10 x10   Strengthening   NV             Gait Training                                             Modalities               CP 10  10     10 10  10 10   10'   Supine 10' supine

## 2022-06-16 ENCOUNTER — APPOINTMENT (OUTPATIENT)
Dept: PHYSICAL THERAPY | Facility: CLINIC | Age: 72
End: 2022-06-16
Payer: MEDICARE

## 2022-06-21 ENCOUNTER — OFFICE VISIT (OUTPATIENT)
Dept: PHYSICAL THERAPY | Facility: CLINIC | Age: 72
End: 2022-06-21
Payer: MEDICARE

## 2022-06-21 DIAGNOSIS — M25.511 ACUTE PAIN OF RIGHT SHOULDER: Primary | ICD-10-CM

## 2022-06-21 PROCEDURE — 97110 THERAPEUTIC EXERCISES: CPT

## 2022-06-21 NOTE — PROGRESS NOTES
Daily Note     Today's date: 2022  Patient name: Corby Camacho  : 1950  MRN: [id number]  Referring provider: Romy Flanagan  Dx: No diagnosis found  Subjective: Patient states that he has noticed an improvement with pain and function  Advised to use arm as able to perform daily activities  Objective: See treatment diary below      Assessment: Tolerated treatment well  Patient exhibited good technique with therapeutic exercises      Plan: Continue per plan of care  Precautions: L RTCTs, melanomas  R Reverse TSA protocol- R thumb pain  Unable to hold wand     Flexion:  120;  ER:  50;  IR to pocket      Manuals    PROM as per protocol 10 10 10          Per protocol KSG   Protocol reviewed with patient               Bicep extension st                              Neuro Re-Ed                              SBS                                                                                          Ther Ex               Cabinet taps   1# x 5            Supine flexion  AROM x 15  1# x 10          w/other UE  5" x10   Standing flexion  X 10 AROM 1# x 10             Standing scaption  X 10 AROM 1# x 10             Theraband rows   NV            Theraband extension   NV         5'x10   Table Slides flex/ABD/ER  10" x 10           10"x10   Pulleys   5 min  5         5 min               x10   Supine ER AROM X 15 X  15           x10   Strengthening   NV             Gait Training                                             Modalities               CP 10  10  declined         10' supine

## 2022-06-23 ENCOUNTER — OFFICE VISIT (OUTPATIENT)
Dept: PHYSICAL THERAPY | Facility: CLINIC | Age: 72
End: 2022-06-23
Payer: MEDICARE

## 2022-06-23 DIAGNOSIS — M25.511 ACUTE PAIN OF RIGHT SHOULDER: Primary | ICD-10-CM

## 2022-06-23 PROCEDURE — 97110 THERAPEUTIC EXERCISES: CPT | Performed by: PHYSICAL THERAPIST

## 2022-06-23 PROCEDURE — 97140 MANUAL THERAPY 1/> REGIONS: CPT | Performed by: PHYSICAL THERAPIST

## 2022-06-23 PROCEDURE — 97112 NEUROMUSCULAR REEDUCATION: CPT | Performed by: PHYSICAL THERAPIST

## 2022-06-23 NOTE — PROGRESS NOTES
PT Evaluation     Today's date: 22  Patient name: Constanza Heller  : 1950  MRN: [id number]  Referring provider: Owen Dugan  Dx:   Encounter Diagnosis     ICD-10-CM    1  Rotator cuff tear arthropathy of right shoulder  M75 101 Ambulatory referral to Physical Therapy    M12 811                   Assessment:  Agatha Nassar has been attending physical therapy s/p R Reverse TSA  He has made excellent progress in decreasing pain and increasing strength, ROM and function  He would continue to benefit from skilled physical therapy at address noted limitations  Thank you for your referral   Assessment details: Constanza Heller is a 66 y o  male presents for pre-op visit for R Reverse TSA  He presents with pain, decreased strength, decreased ROM, and postural  dysfunction  Due to these impairments, Patient has difficulty performing a/iadls, recreational activities and engaging in social activities  Patient's clinical presentation is consistent with their referring diagnosis  He will be returning on 22 for his first post-op visit  He has been given a home exercise program and is in agreement with the plan of care  Thank you for your referral   Impairments: abnormal or restricted ROM, activity intolerance, impaired physical strength, lacks appropriate home exercise program and pain with function    Symptom irritability: moderateUnderstanding of Dx/Px/POC: excellent  Goals  Return post-op post op TSA  1   Independent with HEP in 2 visits - met  2  Can don and doff sling independently in 2 visits - partially met  3  Improve ROM by 25% w/in protocol in 4 weeks - partially met  4  Increase strength by 25% in 4 weeks as per protocol - partially met  5    Reduce pain by 25% in 4 weeks - met      Plan  Patient would benefit from: skilled physical therapy  Referral necessary: No  Planned therapy interventions: manual therapy, muscle pump exercises, neuromuscular re-education, postural training, strengthening, stretching, therapeutic activities and therapeutic exercise  Frequency: 2x week  Duration in visits: 20  Duration in weeks: 20  Plan of Care beginning date: 5/2/2022  Plan of Care expiration date: 8/2/2022  Treatment plan discussed with: patient        Subjective Evaluation    History of Present Illness  Mechanism of injury: Patient reports that he has had a long history of B shoulder pain  He had a fall 5-6 years ago when he felt a pop in each shoulder  He notes that approx 1-2 months ago he was pouring a gallon of water and felt terrible pain  He went to the MD who referred him to PT  He states that he went for 1 visit and had more pain  Therefore he saw a second opinion with Dr Gonzales Soriano who found the tears in his shoulder and recommended the TSA  He did have one cortisone injection which made his pain worse  That has also delayed his surgery which is scheduled for 4/26/22  CC: He reports a burning sensation in the R shoulder as he is sitting  If he attempts to reach up wth the R he has moderate pain  He is RHD  He denies nighttime pain  He uses tylenol for pain  Function:  He is retired for 16 years  He worked for [employer]  He likes to exercise at the [facility]  He is unable to comb his hair or wash his back  He cannot wash his back  He is very active and splits wood at home also  5/2/22: Patient returns to PT s/p R Reverse TSA  He reports very little pain at this time  He is not using pain medication at this time  He is using medical marijuana for pain relief but he did not use that last night  He reports that he is sleeping well in a lounge chair  He reports that his fingers and UE near the biceps are swollen  He denies tingling sensation  He reports no discharge from the area of his incision  6/23/22:  Patient reports that he is pleased with progress  He notes that he can touch the top of his head and tie his shoes  He has little pain            Recurrent probem    Quality of life: excellent    Pain  Quality: sharp  Relieving factors: medications  Progression: worsening    Social Support    Employment status: not working  Hand dominance: right      Diagnostic Tests  X-ray: abnormal  CT scan: abnormal        Objective     Postural Observations  Seated posture: fair  Standing posture: fair    Observation:  Mild edema noted in digits  Moderate bruising at the biceps region  Palpation     Right   Tenderness of the biceps muscle belly      Temperature:  wnl  Good cap refill    Active Range of Motion   Left Shoulder   Flexion: 135 degrees   Abduction: 123 degrees   External rotation BTH: C2   Internal rotation BTB: sacrum     Right Shoulder :                     5/2/22 6/23     Flexion: 130 degrees   /      90 degrees                      100 A   135 P  Abduction: 78 degrees                                                   87 A    115 P  Internal rotation BTB: L3   External Rotation:  /          28 degrees                          40 A     60 P      Additional Active Range of Motion Details  Unable to reach top of head with R ER      Strength/Myotome Testing     Left Shoulder     Planes of Motion   Abduction: 4-   External rotation at 0°: 4-   External rotation at 45°: 3+     Right Shoulder                          5/2/22 6/23    Planes of Motion                         NT                Within available range  Flexion: 4-                                                            4/5  Extension: 3+                                                       4-/5 scaption  External rotation at 0°: 3-                                     4-/5      Internal rotation at 0°: 4-                                       4/5

## 2022-06-23 NOTE — PROGRESS NOTES
Daily Note     Today's date: 2022  Patient name: Nam Patino  : 1950  MRN: [id number]  Referring provider: Rayshawn Sutherland  Dx:   Encounter Diagnosis     ICD-10-CM    1  Acute pain of right shoulder  M25 511                   Subjective: Patient reports that the thumb seems more problematic than the shoulder at this time      Objective: See treatment diary below      Assessment: Tolerated treatment well  Patient would benefit from continued PT      Plan: Continue per plan of care  Precautions: L RTCTs, melanomas  R Reverse TSA protocol- R thumb pain  Unable to hold wand     Flexion:  120;  ER:  50;  IR to pocket      Manuals    PROM as per protocol 10 10 10  10        Per protocol KSG   Protocol reviewed with patient               Bicep extension st                              Neuro Re-Ed                              SBS                                                                                          Ther Ex               Cabinet taps   1# x 5 1# x 10           Supine flexion  AROM x 15  1# x 10  1# x 10        w/other UE  5" x10   Standing flexion at wall  X 10 AROM 1# x 10  1# x 10 1 x 5           Standing scaption at wall  X 10 AROM 1# x 10  1# x 10 x 5           Theraband rows   NV RTB x 10           Theraband extension   NV RTB x 12        5'x10   Table Slides flex/ABD/ER  10" x 10   10 x :10        10"x10   Pulleys   5 min  5 5        5 min   S/l ABD            x10   Supine ER AROM X 15 X  15   x15 s/l        x10   Strengthening   NV             Gait Training                                             Modalities               CP 10  10  declined         10' supine

## 2022-06-27 ENCOUNTER — OFFICE VISIT (OUTPATIENT)
Dept: PHYSICAL THERAPY | Facility: CLINIC | Age: 72
End: 2022-06-27
Payer: MEDICARE

## 2022-06-27 DIAGNOSIS — M25.511 ACUTE PAIN OF RIGHT SHOULDER: Primary | ICD-10-CM

## 2022-06-27 PROCEDURE — 97140 MANUAL THERAPY 1/> REGIONS: CPT

## 2022-06-27 PROCEDURE — 97110 THERAPEUTIC EXERCISES: CPT

## 2022-06-27 NOTE — PROGRESS NOTES
Daily Note     Today's date: 2022  Patient name: Nam Patino  : 1950  MRN: [id number]  Referring provider: Rayshawn Sutherland  Dx: No diagnosis found  Subjective: Patient states that he has been more active at home with use of R arm to perform ADL's   Objective: See treatment diary below      Assessment: Tolerated treatment well  Patient exhibited good technique with therapeutic exercises      Plan: Continue per plan of care  Precautions: L RTCTs, melanomas  R Reverse TSA protocol- R thumb pain      Flexion:  120;  ER:  50;  IR to pocket      Manuals           PROM as per protocol 10 10 10  10 10           Protocol reviewed with patient               Bicep extension st                              Neuro Re-Ed                              SBS                                                                                          Ther Ex               Cabinet taps   1# x 5 1# x 10 1# 10          Supine flexion  AROM x 15  1# x 10  1# x 10 1# x 20          Standing flexion at wall  X 10 AROM 1# x 10  1# x 10 1 x 5 1# x 20           Standing scaption at wall  X 10 AROM 1# x 10  1# x 10 x 1 1# x 20           Theraband rows   NV RTB x 10 RTB x 20          Theraband extension   NV RTB x 12 RTB x 20           Table Slides flex/ABD/ER  10" x 10   10 x :10           Pulleys   5 min  5 5 5          S/l ABD     1# x 20           Supine ER AROM X 15 X  15   x15 s/l 1# X 20           Strengthening   NV             Gait Training                                             Modalities               CP 10  10  declined  declined

## 2022-06-29 ENCOUNTER — OFFICE VISIT (OUTPATIENT)
Dept: OTOLARYNGOLOGY | Facility: CLINIC | Age: 72
End: 2022-06-29
Payer: MEDICARE

## 2022-06-29 ENCOUNTER — OFFICE VISIT (OUTPATIENT)
Dept: PHYSICAL THERAPY | Facility: CLINIC | Age: 72
End: 2022-06-29
Payer: MEDICARE

## 2022-06-29 VITALS — HEIGHT: 68 IN | TEMPERATURE: 97.3 F | WEIGHT: 190 LBS | BODY MASS INDEX: 28.79 KG/M2

## 2022-06-29 DIAGNOSIS — H61.23 BILATERAL IMPACTED CERUMEN: Primary | ICD-10-CM

## 2022-06-29 DIAGNOSIS — H90.3 SENSORINEURAL HEARING LOSS (SNHL), BILATERAL: ICD-10-CM

## 2022-06-29 DIAGNOSIS — M25.511 ACUTE PAIN OF RIGHT SHOULDER: Primary | ICD-10-CM

## 2022-06-29 PROCEDURE — 97110 THERAPEUTIC EXERCISES: CPT

## 2022-06-29 PROCEDURE — 69210 REMOVE IMPACTED EAR WAX UNI: CPT | Performed by: NURSE PRACTITIONER

## 2022-06-29 PROCEDURE — 99213 OFFICE O/P EST LOW 20 MIN: CPT | Performed by: NURSE PRACTITIONER

## 2022-06-29 PROCEDURE — 97140 MANUAL THERAPY 1/> REGIONS: CPT

## 2022-06-29 NOTE — ASSESSMENT & PLAN NOTE
On exam noted bilateral cerumen impaction and unable to fully view tympanic membrane  Cerumen impaction removed bilateral eac with q-tip (outer auricle) and suction, pt tolerated procedure well  Upon removal, improved hearing and decreased clogged sensation of bilateral ears  Discussed routine cerumen care including avoidance of q-tips and cerumen softeners  Hydrocortisone cream to both ears at bedtime for 30 days then as needed for itching  Debrox (cleaning drops) ear drops - put drops in ears after hair and ear trimming  Encourage ongoing follow up in 3 months to monitor for cerumen and hearing  Audiogram if symptoms worsen    Continue binaural hearing aids

## 2022-06-29 NOTE — PROGRESS NOTES
Daily Note     Today's date: 2022  Patient name: Anthony Shea  : 1950  MRN: [id number]  Referring provider: Jonah Slater  Dx:   Encounter Diagnosis     ICD-10-CM    1  Acute pain of right shoulder  M25 511                   Subjective: Patient states that he has was able to drive a riding  yesterday without complaints of R shoulder pain  Objective: See treatment diary below      Assessment: Tolerated treatment well  Patient exhibited good technique with therapeutic exercises      Plan: Continue per plan of care  Precautions: L RTCTs, melanomas  R Reverse TSA protocol- R thumb pain      Flexion:  120;  ER:  50;  IR to pocket      Manuals          PROM as per protocol 10 10 10  10 10  10         Protocol reviewed with patient               Bicep extension st                              Neuro Re-Ed                              SBS                                                                                          Ther Ex               Cabinet taps   1# x 5 1# x 10 1# 10 1 5# x 10         Supine flexion  AROM x 15  1# x 10  1# x 10 1# x 20 1 5# x 20          Standing flexion at wall  X 10 AROM 1# x 10  1# x 10 1 x 5 1# x 20  1 5# x 20          Standing scaption at wall  X 10 AROM 1# x 10  1# x 10 x 1 1# x 20  1 5# x 20          Theraband rows   NV RTB x 10 RTB x 20 GTB x 20         Theraband extension   NV RTB x 12 RTB x 20  GTB x 20          Table Slides flex/ABD/ER  10" x 10   10 x :10           Pulleys   5 min  5 5 5 5         S/l ABD     1# x 20  1 5# x 20         Supine ER AROM X 15 X  15   x15 s/l 1# X 20  1# 20x         Strengthening   NV             Gait Training                                             Modalities               CP 10  10  declined  declined declined

## 2022-06-29 NOTE — PROGRESS NOTES
Assessment/Plan:    Bilateral impacted cerumen  On exam noted bilateral cerumen impaction and unable to fully view tympanic membrane  Cerumen impaction removed bilateral eac with q-tip (outer auricle) and suction, pt tolerated procedure well  Upon removal, improved hearing and decreased clogged sensation of bilateral ears  Discussed routine cerumen care including avoidance of q-tips and cerumen softeners  Hydrocortisone cream to both ears at bedtime for 30 days then as needed for itching  Debrox (cleaning drops) ear drops - put drops in ears after hair and ear trimming  Encourage ongoing follow up in 3 months to monitor for cerumen and hearing  Audiogram if symptoms worsen  Continue binaural hearing aids       Diagnoses and all orders for this visit:    Bilateral impacted cerumen    Sensorineural hearing loss (SNHL), bilateral    Other orders  -     Ear cerumen removal          Subjective:      Patient ID: Constanza Heller is a 66 y o  male  Presents today as a follow up due to hearing loss  Current hearing aids, about 3to 11years old  Hearing aid facility in [facility]  Difficulty hearing TV at times  Certain sounds difficulty hearing  During routine audiology care they did remove some cerumen  Both ears itch often  The following portions of the patient's history were reviewed and updated as appropriate: allergies, current medications, past family history, past medical history, past social history, past surgical history and problem list     Review of Systems   Constitutional: Negative  HENT: Positive for hearing loss  Negative for congestion, ear discharge, ear pain, nosebleeds, postnasal drip, rhinorrhea, sinus pressure, sinus pain, sore throat, tinnitus and voice change  Eyes: Negative  Respiratory: Negative for chest tightness and shortness of breath  Cardiovascular: Negative  Gastrointestinal: Negative  Endocrine: Negative      Musculoskeletal: Negative  Skin: Negative for color change  Neurological: Negative for dizziness, numbness and headaches  Psychiatric/Behavioral: Negative  Objective:      Temp (!) 97 3 °F (36 3 °C) (Temporal)   Ht 5' 8" (1 727 m)   Wt 86 2 kg (190 lb)   BMI 28 89 kg/m²          Physical Exam  Constitutional:       Appearance: He is well-developed  HENT:      Head: Normocephalic  Right Ear: Hearing, tympanic membrane, ear canal and external ear normal  No decreased hearing noted  No drainage or tenderness  There is impacted cerumen  Tympanic membrane is not perforated or erythematous  Left Ear: Hearing, tympanic membrane, ear canal and external ear normal  No decreased hearing noted  No drainage or tenderness  There is impacted cerumen  Tympanic membrane is not perforated or erythematous  Nose: Nose normal  No nasal deformity or septal deviation  Mouth/Throat:      Mouth: Mucous membranes are not pale and not dry  No oral lesions  Dentition: Normal dentition  Pharynx: Uvula midline  No oropharyngeal exudate  Neck:      Trachea: No tracheal deviation  Cardiovascular:      Rate and Rhythm: Normal rate  Pulmonary:      Effort: Pulmonary effort is normal  No accessory muscle usage or respiratory distress  Musculoskeletal:      Right shoulder: Normal range of motion  Cervical back: Full passive range of motion without pain, normal range of motion and neck supple  Lymphadenopathy:      Cervical: No cervical adenopathy  Skin:     General: Skin is warm and dry  Neurological:      Mental Status: He is alert and oriented to person, place, and time  Cranial Nerves: No cranial nerve deficit  Sensory: No sensory deficit  Psychiatric:         Behavior: Behavior is cooperative  Ear cerumen removal    Date/Time: 6/29/2022 3:07 PM  Performed by: CHANEL Shi  Authorized by: CHANEL Shi   Universal Protocol:  Consent: Verbal consent obtained    Risks and benefits: risks, benefits and alternatives were discussed  Consent given by: patient  Patient understanding: patient states understanding of the procedure being performed      Patient location:  Clinic  Procedure details:     Local anesthetic:  None    Location:  L ear and R ear    Approach:  External  Post-procedure details:     Complication:  None    Hearing quality:  Normal    Patient tolerance of procedure:   Tolerated well, no immediate complications

## 2022-07-07 ENCOUNTER — EVALUATION (OUTPATIENT)
Dept: PHYSICAL THERAPY | Facility: CLINIC | Age: 72
End: 2022-07-07
Payer: MEDICARE

## 2022-07-07 DIAGNOSIS — M25.511 ACUTE PAIN OF RIGHT SHOULDER: Primary | ICD-10-CM

## 2022-07-07 PROCEDURE — 97110 THERAPEUTIC EXERCISES: CPT | Performed by: PHYSICAL THERAPIST

## 2022-07-07 PROCEDURE — 97140 MANUAL THERAPY 1/> REGIONS: CPT | Performed by: PHYSICAL THERAPIST

## 2022-07-07 PROCEDURE — 97112 NEUROMUSCULAR REEDUCATION: CPT | Performed by: PHYSICAL THERAPIST

## 2022-07-07 NOTE — PROGRESS NOTES
PT Re-evaluation and Discharge    Today's date: 22  Patient name: King Coleman  : 1950  MRN: [id number]  Referring provider: Leopold Barker  Dx:   Encounter Diagnosis     ICD-10-CM    1  Rotator cuff tear arthropathy of right shoulder  M75 101 Ambulatory referral to Physical Therapy    M12 811            22:  Billy Dixon has discontinued his formal physical therapy as per MD   Thank you for your referral          Assessment:    22 Billy Dixon continues to make excellent progress in decreasing pain and increasing strength, ROM and function  He does continue to have a lag in AROM and strength and therefore would continue to benefit from skilled physical therapy  Thank you for your referral     Billy Dixon has been attending physical therapy s/p R Reverse TSA  He has made excellent progress in decreasing pain and increasing strength, ROM and function  He would continue to benefit from skilled physical therapy at address noted limitations  Thank you for your referral   Assessment details: King Coleman is a 66 y o  male presents for pre-op visit for R Reverse TSA  He presents with pain, decreased strength, decreased ROM, and postural  dysfunction  Due to these impairments, Patient has difficulty performing a/iadls, recreational activities and engaging in social activities  Patient's clinical presentation is consistent with their referring diagnosis  He will be returning on 22 for his first post-op visit  He has been given a home exercise program and is in agreement with the plan of care  Thank you for your referral   Impairments: abnormal or restricted ROM, activity intolerance, impaired physical strength, lacks appropriate home exercise program and pain with function    Symptom irritability: moderateUnderstanding of Dx/Px/POC: excellent  Goals  Return post-op post op TSA  1   Independent with HEP in 2 visits - met  2    Can don and doff sling independently in 2 visits - partially met  3  Improve ROM by 25% w/in protocol in 4 weeks - partially met  4  Increase strength by 25% in 4 weeks as per protocol - partially met  5  Reduce pain by 25% in 4 weeks - met      Plan  Patient would benefit from: skilled physical therapy  Referral necessary: No  Planned therapy interventions: manual therapy, muscle pump exercises, neuromuscular re-education, postural training, strengthening, stretching, therapeutic activities and therapeutic exercise  Frequency: 2x week  Duration in visits: 20  Duration in weeks: 20  Plan of Care beginning date: 5/2/2022  Plan of Care expiration date: 8/2/2022  Treatment plan discussed with: patient        Subjective Evaluation    History of Present Illness  Mechanism of injury: Patient reports that he has had a long history of B shoulder pain  He had a fall 5-6 years ago when he felt a pop in each shoulder  He notes that approx 1-2 months ago he was pouring a gallon of water and felt terrible pain  He went to the MD who referred him to PT  He states that he went for 1 visit and had more pain  Therefore he saw a second opinion with Dr Sonya Torres who found the tears in his shoulder and recommended the TSA  He did have one cortisone injection which made his pain worse  That has also delayed his surgery which is scheduled for 4/26/22  CC: He reports a burning sensation in the R shoulder as he is sitting  If he attempts to reach up wth the R he has moderate pain  He is RHD  He denies nighttime pain  He uses tylenol for pain  Function:  He is retired for 16 years  He worked for [employer]  He likes to exercise at the [facility]  He is unable to comb his hair or wash his back  He cannot wash his back  He is very active and splits wood at home also  5/2/22: Patient returns to PT s/p R Reverse TSA  He reports very little pain at this time  He is not using pain medication at this time  He is using medical marijuana for pain relief but he did not use that last night  He reports that he is sleeping well in a lounge chair  He reports that his fingers and UE near the biceps are swollen  He denies tingling sensation  He reports no discharge from the area of his incision  6/23/22:  Patient reports that he is pleased with progress  He notes that he can touch the top of his head and tie his shoes  He has little pain  7/7/22:  Patient reports that he is able to do more functional activities such as reaching to a shelf at shoulder height  He notes pain is worst with elevation  He is able to sleep without pain          Recurrent probem    Quality of life: excellent    Pain  Quality: sharp  Relieving factors: medications  Progression: worsening    Social Support    Employment status: not working  Hand dominance: right      Diagnostic Tests  X-ray: abnormal  CT scan: abnormal        Objective     Postural Observations  Seated posture: fair  Standing posture: fair    Observation:  Mild edema noted in digits  Moderate bruising at the biceps region  Palpation     Right   Tenderness of the biceps muscle belly      Temperature:  wnl  Good cap refill    Active Range of Motion                                              Left Shoulder   Flexion: 135 degrees   Abduction: 123 degrees   External rotation BTH: C2   Internal rotation BTB: sacrum     Right Shoulder :                     5/2/22 6/23 7/7                                                                                                                                        Active/Passive  Flexion: 130 degrees   /      90 degrees                      100 A   135 P                       110 A/ 146 degrees  Abduction: 78 degrees                                                   87 A    115 P                        95 A / 130 degrees  Internal rotation BTB: L3                                                                                             Pocket/   66 degrees  External Rotation:  /          28 degrees                          40 A     60 P                         Back of head  /  60 degrees                      Additional Active Range of Motion Details  Unable to reach top of head with R ER      Strength/Myotome Testing     Left Shoulder     Planes of Motion   Abduction: 4-   External rotation at 0°: 4-   External rotation at 45°: 3+     Right Shoulder                          5/2/22 6/23                                             7/7    Planes of Motion                         NT                Within available range  Flexion: 4-                                                            4/5                                           4/5  Extension: 3+                                                       4-/5 scaption                          4/5  External rotation at 0°: 3-                                     4-/5                                          4/5  Internal rotation at 0°: 4-                                       4/5                                            4/5+

## 2022-07-07 NOTE — PROGRESS NOTES
Daily Note     Today's date: 2022  Patient name: Guillermo Aguilar  : 1950  MRN: [id number]  Referring provider: Shruthi Jean  Dx:   Encounter Diagnosis     ICD-10-CM    1  Acute pain of right shoulder  M25 511                   Subjective: see REV      Objective: See treatment diary below      Assessment: Tolerated treatment well  Patient would benefit from continued PT      Plan: Continue per plan of care  Precautions: L RTCTs, melanomas  R Reverse TSA protocol- R thumb pain      Flexion:  120;  ER:  50;  IR to pocket      Manuals         PROM as per protocol 10 10 10  10 10  10 10        Protocol reviewed with patient               Bicep extension st               rev       5        Neuro Re-Ed                              SBS                                                                                          Ther Ex               Cabinet taps   1# x 5 1# x 10 1# 10 1 5# x 10 1 5# x 20        Supine flexion  AROM x 15  1# x 10  1# x 10 1# x 20 1 5# x 20  1 5# x 20        Standing flexion at wall  X 10 AROM 1# x 10  1# x 10 1 x 5 1# x 20  1 5# x 20  1 5# x 20        Standing scaption at wall  X 10 AROM 1# x 10  1# x 10 x 1 1# x 20  1 5# x 20  1 5# x 20        Theraband rows   NV RTB x 10 RTB x 20 GTB x 20 GTB x 20        Theraband extension   NV RTB x 12 RTB x 20  GTB x 20  GTB x 20        Table Slides flex/ABD/ER  10" x 10   10 x :10           Pulleys   5 min  5 5 5 5 5'        S/l ABD     1# x 20  1 5# x 20 1 5# x 20        Supine ER AROM X 15 X  15   x15 s/l 1# X 20  1# 20x 1 5# x 20        Strengthening   NV             Gait Training                                             Modalities               CP 10  10  declined  declined declined dc

## 2022-07-11 ENCOUNTER — OFFICE VISIT (OUTPATIENT)
Dept: OBGYN CLINIC | Facility: OTHER | Age: 72
End: 2022-07-11

## 2022-07-11 VITALS
WEIGHT: 204.4 LBS | DIASTOLIC BLOOD PRESSURE: 71 MMHG | HEART RATE: 69 BPM | HEIGHT: 68 IN | BODY MASS INDEX: 30.98 KG/M2 | SYSTOLIC BLOOD PRESSURE: 112 MMHG

## 2022-07-11 DIAGNOSIS — Z96.611 AFTERCARE FOLLOWING RIGHT SHOULDER JOINT REPLACEMENT SURGERY: ICD-10-CM

## 2022-07-11 DIAGNOSIS — M12.811 ROTATOR CUFF TEAR ARTHROPATHY OF RIGHT SHOULDER: Primary | ICD-10-CM

## 2022-07-11 DIAGNOSIS — Z47.1 AFTERCARE FOLLOWING RIGHT SHOULDER JOINT REPLACEMENT SURGERY: ICD-10-CM

## 2022-07-11 DIAGNOSIS — M75.101 ROTATOR CUFF TEAR ARTHROPATHY OF RIGHT SHOULDER: Primary | ICD-10-CM

## 2022-07-11 PROCEDURE — 99024 POSTOP FOLLOW-UP VISIT: CPT | Performed by: PHYSICIAN ASSISTANT

## 2022-07-11 NOTE — PROGRESS NOTES
Surgery: 4/26/22  Right reverse total shoulder    S: Patient is doing well 10 weeks post-operatively  Denies acute post-op events  Denies CP and SOB  Pt has completed a course of PT  He is able to do things around the house    /71 (BP Location: Right arm, Patient Position: Sitting, Cuff Size: Adult)   Pulse 69   Ht 5' 8" (1 727 m)   Wt 92 7 kg (204 lb 6 4 oz)   BMI 31 08 kg/m²     O: Right shoulder  FF: 140  Abd: 90  ER: 20  Skin - warm and dry  Good elbow wrist and hand range of motion without pain  SI  NVI    A/P: 2 weeks s/p reverse total shoulder arthoplasty - doing well  1  HEP per therapy  Stretching can help improve motion which is Jose's concern  He admits he had limited function prior to surgery and motion today is significant more than pre-op  2  Lifetime dental ppx required - patient to call office when needed  3  Lifetime lifting restriction of 25# isolated to operative arm  4  Follow up: sofya Medrano asked about surgery on the left shoulder  He has rotator cuff arthropathy on the left as well  He has good motion and function with minimal pain after the steroid injection  When symptoms begin to interfere with ADLs, he will give us a call

## 2022-07-14 ENCOUNTER — OFFICE VISIT (OUTPATIENT)
Dept: OBGYN CLINIC | Facility: CLINIC | Age: 72
End: 2022-07-14
Payer: MEDICARE

## 2022-07-14 VITALS
SYSTOLIC BLOOD PRESSURE: 133 MMHG | WEIGHT: 203 LBS | DIASTOLIC BLOOD PRESSURE: 79 MMHG | HEART RATE: 61 BPM | BODY MASS INDEX: 30.87 KG/M2

## 2022-07-14 DIAGNOSIS — M19.041 OSTEOARTHRITIS OF METACARPOPHALANGEAL (MCP) JOINT OF RIGHT THUMB: Primary | ICD-10-CM

## 2022-07-14 DIAGNOSIS — M18.11 PRIMARY OSTEOARTHRITIS OF FIRST CARPOMETACARPAL JOINT OF RIGHT HAND: ICD-10-CM

## 2022-07-14 PROCEDURE — 1124F ACP DISCUSS-NO DSCNMKR DOCD: CPT | Performed by: SURGERY

## 2022-07-14 PROCEDURE — 20600 DRAIN/INJ JOINT/BURSA W/O US: CPT | Performed by: SURGERY

## 2022-07-14 PROCEDURE — 99214 OFFICE O/P EST MOD 30 MIN: CPT | Performed by: SURGERY

## 2022-07-14 RX ORDER — TRIAMCINOLONE ACETONIDE 40 MG/ML
20 INJECTION, SUSPENSION INTRA-ARTICULAR; INTRAMUSCULAR
Status: COMPLETED | OUTPATIENT
Start: 2022-07-14 | End: 2022-07-14

## 2022-07-14 RX ORDER — BUPIVACAINE HYDROCHLORIDE 2.5 MG/ML
0.5 INJECTION, SOLUTION INFILTRATION; PERINEURAL
Status: COMPLETED | OUTPATIENT
Start: 2022-07-14 | End: 2022-07-14

## 2022-07-14 RX ADMIN — BUPIVACAINE HYDROCHLORIDE 0.5 ML: 2.5 INJECTION, SOLUTION INFILTRATION; PERINEURAL at 12:10

## 2022-07-14 RX ADMIN — TRIAMCINOLONE ACETONIDE 20 MG: 40 INJECTION, SUSPENSION INTRA-ARTICULAR; INTRAMUSCULAR at 12:10
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
## 2022-07-19 PROCEDURE — 88305 TISSUE EXAM BY PATHOLOGIST: CPT | Performed by: PATHOLOGY

## 2022-07-20 ENCOUNTER — LAB REQUISITION (OUTPATIENT)
Dept: LAB | Facility: HOSPITAL | Age: 72
End: 2022-07-20
Payer: MEDICARE

## 2022-07-20 DIAGNOSIS — K22.70 BARRETT'S ESOPHAGUS WITHOUT DYSPLASIA: ICD-10-CM

## 2022-07-20 DIAGNOSIS — K22.89 OTHER SPECIFIED DISEASE OF ESOPHAGUS: ICD-10-CM

## 2022-08-17 ENCOUNTER — HOSPITAL ENCOUNTER (EMERGENCY)
Facility: HOSPITAL | Age: 72
Discharge: HOME | End: 2022-08-18
Attending: EMERGENCY MEDICINE
Payer: MEDICARE

## 2022-08-17 ENCOUNTER — APPOINTMENT (OUTPATIENT)
Dept: RADIOLOGY | Facility: HOSPITAL | Age: 72
End: 2022-08-17
Payer: MEDICARE

## 2022-08-17 VITALS
SYSTOLIC BLOOD PRESSURE: 169 MMHG | OXYGEN SATURATION: 100 % | DIASTOLIC BLOOD PRESSURE: 85 MMHG | TEMPERATURE: 98.5 F | HEIGHT: 68 IN | HEART RATE: 63 BPM | BODY MASS INDEX: 30.31 KG/M2 | WEIGHT: 200 LBS | RESPIRATION RATE: 16 BRPM

## 2022-08-17 DIAGNOSIS — M25.511 RIGHT SHOULDER PAIN: Primary | ICD-10-CM

## 2022-08-17 PROCEDURE — 99282 EMERGENCY DEPT VISIT SF MDM: CPT | Performed by: STUDENT IN AN ORGANIZED HEALTH CARE EDUCATION/TRAINING PROGRAM

## 2022-08-17 PROCEDURE — 73030 X-RAY EXAM OF SHOULDER: CPT

## 2022-08-17 PROCEDURE — 99283 EMERGENCY DEPT VISIT LOW MDM: CPT

## 2022-08-18 NOTE — DISCHARGE INSTRUCTIONS
Continue Tylenol ibuprofen every 6 hours as needed for pain relief  Follow-up with primary care provider/orthopedic specialist who is caring for your shoulder replacement further evaluation if shoulder pain persists  Return emergency department any new or worsening symptoms including recurrence of injury to the shoulder, inability to move the shoulder, severe uncontrolled pain, severe swelling, loss of sensations in the fingers, blue or pale discoloration of the fingers
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
## 2022-08-22 ENCOUNTER — OFFICE VISIT (OUTPATIENT)
Dept: OBGYN CLINIC | Facility: OTHER | Age: 72
End: 2022-08-22
Payer: MEDICARE

## 2022-08-22 VITALS
WEIGHT: 202 LBS | DIASTOLIC BLOOD PRESSURE: 80 MMHG | HEART RATE: 91 BPM | BODY MASS INDEX: 30.62 KG/M2 | HEIGHT: 68 IN | SYSTOLIC BLOOD PRESSURE: 120 MMHG

## 2022-08-22 DIAGNOSIS — S46.911A MUSCLE STRAIN OF RIGHT SHOULDER, INITIAL ENCOUNTER: Primary | ICD-10-CM

## 2022-08-22 DIAGNOSIS — Z96.611 AFTERCARE FOLLOWING RIGHT SHOULDER JOINT REPLACEMENT SURGERY: ICD-10-CM

## 2022-08-22 DIAGNOSIS — Z47.1 AFTERCARE FOLLOWING RIGHT SHOULDER JOINT REPLACEMENT SURGERY: ICD-10-CM

## 2022-08-22 PROCEDURE — 99213 OFFICE O/P EST LOW 20 MIN: CPT | Performed by: PHYSICIAN ASSISTANT

## 2022-08-22 NOTE — PROGRESS NOTES
Assessment  Diagnoses and all orders for this visit:    Muscle strain of right shoulder, initial encounter    Aftercare following right shoulder joint replacement surgery      Discussion and Plan:    Analy Zurita has a muscle strain of the right shoulder  Implant looks good on x-ray  He did not fall or dislocate the shoulder  Recommend rest and avoiding any activities that cause pain  If pain persists over the next few weeks, recommend he go into a sling to limit activities  He will follow up in 4 weeks - sooner if problems arise  If symptoms persist, advanced imaging will be required  Pending recovery of the right shoulder, Analy Zurita would like to discuss treatment for his left shoulder  Subjective:   Patient ID: Bryson Rosa is a 66 y o  male      Analy Zurita is 4 months from reverse total shoulder  He went to the ER 4 days ago for pain after awkward movement of the right shoulder  He was taking photos and dropped lens  He suddenly reached down for the lens and hurt the shoulder  He did not fall  X-rays were obtained in the ER without any complication in hardware  He has good and bad moments with the right shoulder  He was able to easily touch his head prior to this incident, now he cannot lift his arm that high  He has pain with reaching, with putting on shoes  Denies dislocation and numbness/tingling  The following portions of the patient's history were reviewed and updated as appropriate: allergies, current medications, past family history, past medical history, past social history, past surgical history and problem list     Review of Systems   Constitutional: Negative for chills and fever  HENT: Negative for hearing loss  Eyes: Negative for visual disturbance  Respiratory: Negative for shortness of breath  Cardiovascular: Negative for chest pain  Gastrointestinal: Negative for abdominal pain  Musculoskeletal:        As reviewed in the HPI   Skin: Negative for rash     Neurological: As reviewed in the HPI   Psychiatric/Behavioral: Negative for agitation  Objective:  /80 (BP Location: Left arm, Patient Position: Sitting, Cuff Size: Adult)   Pulse 91   Ht 5' 8" (1 727 m)   Wt 91 6 kg (202 lb)   BMI 30 71 kg/m²       Right Shoulder Exam     Tenderness   Right shoulder tenderness location: deltoid  Range of Motion   Passive abduction: 90   External rotation: 20   Forward flexion: 110 (passive 145)   Internal rotation 0 degrees: Sacrum     Tests   Apprehension: negative    Other   Erythema: absent  Sensation: normal  Pulse: present            Physical Exam  Constitutional:       Appearance: He is well-developed  HENT:      Head: Normocephalic  Pulmonary:      Effort: No respiratory distress  Breath sounds: No wheezing  Musculoskeletal:      Cervical back: Normal range of motion  Skin:     General: Skin is warm and dry  Neurological:      Mental Status: He is alert and oriented to person, place, and time  Psychiatric:         Behavior: Behavior normal          Thought Content: Thought content normal          Judgment: Judgment normal            I have personally reviewed pertinent films in PACS and my interpretation is as follows      xrays from ER - no fx or dislocation

## 2022-09-01 ENCOUNTER — OFFICE VISIT (OUTPATIENT)
Dept: OBGYN CLINIC | Facility: CLINIC | Age: 72
End: 2022-09-01
Payer: MEDICARE

## 2022-09-01 VITALS
DIASTOLIC BLOOD PRESSURE: 82 MMHG | HEIGHT: 68 IN | RESPIRATION RATE: 17 BRPM | SYSTOLIC BLOOD PRESSURE: 137 MMHG | WEIGHT: 202 LBS | HEART RATE: 65 BPM | BODY MASS INDEX: 30.62 KG/M2

## 2022-09-01 DIAGNOSIS — M18.11 PRIMARY OSTEOARTHRITIS OF FIRST CARPOMETACARPAL JOINT OF RIGHT HAND: ICD-10-CM

## 2022-09-01 DIAGNOSIS — M19.041 OSTEOARTHRITIS OF METACARPOPHALANGEAL (MCP) JOINT OF RIGHT THUMB: Primary | ICD-10-CM

## 2022-09-01 PROCEDURE — 99213 OFFICE O/P EST LOW 20 MIN: CPT | Performed by: SURGERY
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
## 2022-09-08 ENCOUNTER — OFFICE VISIT (OUTPATIENT)
Dept: OBGYN CLINIC | Facility: OTHER | Age: 72
End: 2022-09-08
Payer: MEDICARE

## 2022-09-08 VITALS
DIASTOLIC BLOOD PRESSURE: 83 MMHG | WEIGHT: 202.8 LBS | BODY MASS INDEX: 30.74 KG/M2 | SYSTOLIC BLOOD PRESSURE: 154 MMHG | HEART RATE: 71 BPM | HEIGHT: 68 IN

## 2022-09-08 DIAGNOSIS — Z96.611 HX OF TOTAL SHOULDER REPLACEMENT, RIGHT: ICD-10-CM

## 2022-09-08 DIAGNOSIS — M25.511 ACUTE PAIN OF RIGHT SHOULDER: Primary | ICD-10-CM

## 2022-09-08 PROCEDURE — 99214 OFFICE O/P EST MOD 30 MIN: CPT | Performed by: ORTHOPAEDIC SURGERY

## 2022-09-08 NOTE — PROGRESS NOTES
Assessment  Diagnoses and all orders for this visit:    Acute pain of right shoulder    Hx of total shoulder replacement, right 4/26/22       Discussion and Plan:    · Recommend patient go back into sling  · Continue to rest the shoulder  · CT right shoulder with MARS to evaluation for structural issue  Plain radiographs do not reveal obvious abnormality of the prosthesis  · Patient will follow up once CT is complete to discuss further treatment options  If the patient continues to be symptomatic and no obvious abnormality is seen on the imaging then surgical exploration of the prosthesis would have to be considered    Subjective:   Patient ID: Willa Hayes is a 66 y o  male      HPI    Patient presents today for follow up of a right muscle strain following his Right reverse total shoulder arthroplasty 4/26/22  Pain started on 8/17/22  Patient states he moved quickly to catch his 15 lb camera lens that he dropped and experienced a sharp pain  Patient has been resting the shoulder as instructed  He reports he was doing well until this injury  The following portions of the patient's history were reviewed and updated as appropriate: allergies, current medications, past family history, past medical history, past social history, past surgical history and problem list     Review of Systems   Constitutional: Negative for chills and fever  HENT: Negative for drooling and hearing loss  Eyes: Negative for visual disturbance  Respiratory: Negative for cough and shortness of breath  Cardiovascular: Negative for chest pain  Gastrointestinal: Negative for abdominal pain  Skin: Negative for rash  Psychiatric/Behavioral: Negative for agitation         Objective:  /83 (BP Location: Left arm, Patient Position: Sitting, Cuff Size: Adult)   Pulse 71   Ht 5' 8" (1 727 m)   Wt 92 kg (202 lb 12 8 oz)   BMI 30 84 kg/m²       Right Shoulder Exam     Tenderness   The patient is experiencing no tenderness  Range of Motion   External rotation: 20   Forward flexion: 100   Right shoulder internal rotation 0 degrees: buttocks  Other   Erythema: absent  Sensation: normal  Pulse: present            Physical Exam  Vitals reviewed  Constitutional:       Appearance: He is well-developed  HENT:      Head: Normocephalic  Eyes:      Pupils: Pupils are equal, round, and reactive to light  Pulmonary:      Effort: Pulmonary effort is normal    Abdominal:      General: Abdomen is flat  There is no distension  Skin:     General: Skin is warm and dry  I have personally reviewed pertinent films in PACS and my interpretation is as follows  Right shoulder x-rays from 8/17/22 demonstrates no fracture or dislocation      Scribe Attestation    I,:  Andrez Short am acting as a scribe while in the presence of the attending physician :       I,:  Gil Washington MD personally performed the services described in this documentation    as scribed in my presence :

## 2022-09-14 ENCOUNTER — HOSPITAL ENCOUNTER (OUTPATIENT)
Dept: CT IMAGING | Facility: HOSPITAL | Age: 72
Discharge: HOME | End: 2022-09-14
Attending: ORTHOPAEDIC SURGERY
Payer: MEDICARE

## 2022-09-14 DIAGNOSIS — M25.511 ACUTE PAIN OF RIGHT SHOULDER: ICD-10-CM

## 2022-09-14 DIAGNOSIS — Z96.611 HX OF TOTAL SHOULDER REPLACEMENT, RIGHT: ICD-10-CM

## 2022-09-14 PROCEDURE — 73200 CT UPPER EXTREMITY W/O DYE: CPT

## 2022-09-14 PROCEDURE — G1004 CDSM NDSC: HCPCS

## 2022-09-19 ENCOUNTER — OFFICE VISIT (OUTPATIENT)
Dept: OBGYN CLINIC | Facility: OTHER | Age: 72
End: 2022-09-19
Payer: MEDICARE

## 2022-09-19 VITALS
HEIGHT: 68 IN | HEART RATE: 62 BPM | DIASTOLIC BLOOD PRESSURE: 80 MMHG | BODY MASS INDEX: 30.92 KG/M2 | SYSTOLIC BLOOD PRESSURE: 130 MMHG | WEIGHT: 204 LBS

## 2022-09-19 DIAGNOSIS — M25.511 ACUTE PAIN OF RIGHT SHOULDER: Primary | ICD-10-CM

## 2022-09-19 DIAGNOSIS — Z96.611 HX OF TOTAL SHOULDER REPLACEMENT, RIGHT: ICD-10-CM

## 2022-09-19 PROCEDURE — 99214 OFFICE O/P EST MOD 30 MIN: CPT | Performed by: ORTHOPAEDIC SURGERY

## 2022-09-19 NOTE — PROGRESS NOTES
Assessment  Diagnoses and all orders for this visit:    Acute pain of right shoulder    Hx of total shoulder replacement, right      Discussion and Plan:    Discussed with the patient that the CT scan does not show a structural issue that would indicated the need for surgery  Discuss this is likely a muscle strain with is a functional issue that will continue to improve on its own  Follow up as needed  Subjective:   Patient ID: Willa Hayes is a 66 y o  male      HPI  Patient presents today to discuss the findings of his CT scan  Patient reports overall he is feeling much better than his last appointment  Patient is s/p Right reverse total shoulder arthroplasty 4/26/22  He had been doing well until 8/17/22 when he moved quickly to catch his 15 lb camera lens  The following portions of the patient's history were reviewed and updated as appropriate: allergies, current medications, past family history, past medical history, past social history, past surgical history and problem list     Review of Systems   Constitutional: Negative for chills and fever  HENT: Negative for drooling and hearing loss  Eyes: Negative for visual disturbance  Respiratory: Negative for cough and shortness of breath  Cardiovascular: Negative for chest pain  Gastrointestinal: Negative for abdominal pain  Skin: Negative for rash  Psychiatric/Behavioral: Negative for agitation  Objective:  /80 (BP Location: Left arm, Patient Position: Sitting, Cuff Size: Standard)   Pulse 62   Ht 5' 8" (1 727 m)   Wt 92 5 kg (204 lb)   BMI 31 02 kg/m²       Right Shoulder Exam      Tenderness   The patient is experiencing no tenderness      Range of Motion   External rotation: 20   Forward flexion: 110   Right shoulder internal rotation 0 degrees: buttocks       Other   Erythema: absent  Sensation: normal  Pulse: present      Physical Exam  Vitals reviewed     Constitutional:       Appearance: He is well-developed  HENT:      Head: Normocephalic  Eyes:      Pupils: Pupils are equal, round, and reactive to light  Pulmonary:      Effort: Pulmonary effort is normal    Abdominal:      General: Abdomen is flat  There is no distension  Skin:     General: Skin is warm and dry  I have personally reviewed pertinent films in PACS and my interpretation is as follows  CT scan right shoulder demonstrates reverse total shoulder arthroplasty without complication      Scribe Attestation    I,:  Kem Kumar am acting as a scribe while in the presence of the attending physician :       I,:  Terry Monsalve MD personally performed the services described in this documentation    as scribed in my presence :

## 2022-10-11 PROBLEM — H61.23 BILATERAL IMPACTED CERUMEN: Status: RESOLVED | Noted: 2022-03-29 | Resolved: 2022-10-11

## 2022-10-12 PROBLEM — Z12.11 SCREENING FOR COLON CANCER: Status: RESOLVED | Noted: 2021-01-21 | Resolved: 2022-10-12

## 2022-10-12 PROBLEM — U07.1 PNEUMONIA DUE TO COVID-19 VIRUS: Status: RESOLVED | Noted: 2021-01-21 | Resolved: 2022-10-12

## 2022-10-12 PROBLEM — Z12.5 SCREENING FOR PROSTATE CANCER: Status: RESOLVED | Noted: 2021-01-21 | Resolved: 2022-10-12

## 2022-10-12 PROBLEM — J12.82 PNEUMONIA DUE TO COVID-19 VIRUS: Status: RESOLVED | Noted: 2021-01-21 | Resolved: 2022-10-12

## 2022-10-26 ENCOUNTER — OFFICE VISIT (OUTPATIENT)
Dept: OTOLARYNGOLOGY | Facility: CLINIC | Age: 72
End: 2022-10-26
Payer: MEDICARE

## 2022-10-26 VITALS — TEMPERATURE: 97.2 F | BODY MASS INDEX: 30.31 KG/M2 | WEIGHT: 200 LBS | HEIGHT: 68 IN

## 2022-10-26 DIAGNOSIS — H90.3 SENSORINEURAL HEARING LOSS (SNHL), BILATERAL: Primary | ICD-10-CM

## 2022-10-26 DIAGNOSIS — H61.23 BILATERAL IMPACTED CERUMEN: ICD-10-CM

## 2022-10-26 PROCEDURE — 69210 REMOVE IMPACTED EAR WAX UNI: CPT | Performed by: NURSE PRACTITIONER

## 2022-10-26 PROCEDURE — 99213 OFFICE O/P EST LOW 20 MIN: CPT | Performed by: NURSE PRACTITIONER

## 2022-10-26 NOTE — ASSESSMENT & PLAN NOTE
On exam noted bilateral cerumen impaction and unable to fully view tympanic membrane   Cerumen impaction removed bilateral eac with suction, pt tolerated procedure well   Upon removal, improved hearing and decreased clogged sensation of bilateral ears   Discussed routine cerumen care including avoidance of q-tips and cerumen softeners  Hydrocortisone cream to both ears at bedtime for 30 days then as needed for itching    Debrox (cleaning drops) ear drops - put drops in ears after hair and ear trimming    Encourage ongoing follow up in 3 to 4 months to monitor for cerumen and hearing   Audiogram if symptoms worsen   Continue binaural hearing aids

## 2022-10-26 NOTE — PROGRESS NOTES
Assessment/Plan:    Sensorineural hearing loss (SNHL), bilateral  On exam noted bilateral cerumen impaction and unable to fully view tympanic membrane   Cerumen impaction removed bilateral eac with suction, pt tolerated procedure well   Upon removal, improved hearing and decreased clogged sensation of bilateral ears   Discussed routine cerumen care including avoidance of q-tips and cerumen softeners  Hydrocortisone cream to both ears at bedtime for 30 days then as needed for itching  Debrox (cleaning drops) ear drops - put drops in ears after hair and ear trimming    Encourage ongoing follow up in 3 to 4 months to monitor for cerumen and hearing   Audiogram if symptoms worsen   Continue binaural hearing aids          Diagnoses and all orders for this visit:    Sensorineural hearing loss (SNHL), bilateral    Bilateral impacted cerumen  -     Ear cerumen removal          Subjective:      Patient ID: Karina Mcfarland is a 66 y o  male  Presents today as a follow up due to hearing loss  Current hearing aids, about 3to 11years old  Hearing aid facility in [employer] [facility]  Difficulty hearing TV at times  Certain sounds difficulty hearing  During routine audiology care they did remove some cerumen  Both ears itch often  Since last visit, obtained new hearing aids  Feels hearing is improved  The following portions of the patient's history were reviewed and updated as appropriate: allergies, current medications, past family history, past medical history, past social history, past surgical history and problem list     Review of Systems   Constitutional: Negative  HENT: Positive for hearing loss  Negative for congestion, ear discharge, ear pain, nosebleeds, postnasal drip, rhinorrhea, sinus pressure, sinus pain, sore throat, tinnitus and voice change  Eyes: Negative  Respiratory: Negative for chest tightness and shortness of breath  Cardiovascular: Negative      Gastrointestinal: Negative  Endocrine: Negative  Musculoskeletal: Negative  Skin: Negative for color change  Neurological: Negative for dizziness, numbness and headaches  Psychiatric/Behavioral: Negative  Objective:      Temp (!) 97 2 °F (36 2 °C) (Temporal)   Ht 5' 8" (1 727 m)   Wt 90 7 kg (200 lb)   BMI 30 41 kg/m²          Physical Exam  Constitutional:       Appearance: He is well-developed  HENT:      Head: Normocephalic  Right Ear: Hearing, tympanic membrane, ear canal and external ear normal  No decreased hearing noted  No drainage or tenderness  There is impacted cerumen  Tympanic membrane is not perforated or erythematous  Left Ear: Hearing, tympanic membrane, ear canal and external ear normal  No decreased hearing noted  No drainage or tenderness  There is impacted cerumen  Tympanic membrane is not perforated or erythematous  Nose: Nose normal  No nasal deformity or septal deviation  Mouth/Throat:      Mouth: Mucous membranes are not pale and not dry  No oral lesions  Dentition: Normal dentition  Pharynx: Uvula midline  No oropharyngeal exudate  Neck:      Trachea: No tracheal deviation  Cardiovascular:      Rate and Rhythm: Normal rate  Pulmonary:      Effort: Pulmonary effort is normal  No accessory muscle usage or respiratory distress  Musculoskeletal:      Right shoulder: Normal range of motion  Cervical back: Full passive range of motion without pain, normal range of motion and neck supple  Lymphadenopathy:      Cervical: No cervical adenopathy  Skin:     General: Skin is warm and dry  Neurological:      Mental Status: He is alert and oriented to person, place, and time  Cranial Nerves: No cranial nerve deficit  Sensory: No sensory deficit  Psychiatric:         Behavior: Behavior is cooperative         Ear cerumen removal    Date/Time: 10/26/2022 2:49 PM  Performed by: CHANEL Lopez  Authorized by: CHANEL Lopez   Universal Protocol:  Consent: Verbal consent obtained  Risks and benefits: risks, benefits and alternatives were discussed  Consent given by: patient  Patient understanding: patient states understanding of the procedure being performed      Patient location:  Clinic  Procedure details:     Local anesthetic:  None    Location:  L ear and R ear    Approach:  External  Post-procedure details:     Complication:  None    Hearing quality:  Normal    Patient tolerance of procedure:   Tolerated well, no immediate complications

## 2022-10-31 ENCOUNTER — HOSPITAL ENCOUNTER (EMERGENCY)
Facility: HOSPITAL | Age: 72
Discharge: HOME | End: 2022-10-31
Attending: EMERGENCY MEDICINE

## 2022-10-31 VITALS
RESPIRATION RATE: 18 BRPM | DIASTOLIC BLOOD PRESSURE: 75 MMHG | BODY MASS INDEX: 31.37 KG/M2 | SYSTOLIC BLOOD PRESSURE: 136 MMHG | HEART RATE: 76 BPM | WEIGHT: 207 LBS | OXYGEN SATURATION: 98 % | HEIGHT: 68 IN | TEMPERATURE: 97.9 F

## 2022-10-31 DIAGNOSIS — M54.50 ACUTE LEFT-SIDED LOW BACK PAIN WITHOUT SCIATICA: Primary | ICD-10-CM

## 2022-11-01 ENCOUNTER — NURSE TRIAGE (OUTPATIENT)
Dept: PHYSICAL THERAPY | Facility: OTHER | Age: 72
End: 2022-11-01

## 2022-11-01 ENCOUNTER — TELEPHONE (OUTPATIENT)
Dept: PHYSICAL THERAPY | Facility: OTHER | Age: 72
End: 2022-11-01

## 2022-11-01 DIAGNOSIS — M54.50 ACUTE BILATERAL LOW BACK PAIN, UNSPECIFIED WHETHER SCIATICA PRESENT: Primary | ICD-10-CM
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
## 2022-11-01 NOTE — TELEPHONE ENCOUNTER
Additional Information  • Negative: Has the patient had unexplained weight loss? • Negative: Does the patient have a fever? • Negative: Is the patient experiencing urine retention? • Negative: Is the patient experiencing acute drop foot or paralysis? • Negative: Has the patient experienced major trauma? (fall from height, high speed collision, direct blow to spine) and is also experiencing nausea, light-headedness, or loss of consciousness? • Negative: Is the patient experiencing blood in sputum? • Negative: Is this a chronic condition? Protocols used: SL AMB COMPREHENSIVE SPINE PROGRAM PROTOCOL    Nurse completed triage and NO RF s/s present  Referral entered for the [facility] site and contact info given to him as well  Patients information was sent to the preferred site and pt made aware clerical would be calling to schedule appointment  All information about patients intended care-plan reviewed  Patient appreciative of CB and is in agreement with care-plan discussed  Nurse wished him well and referral closed

## 2022-11-03 ENCOUNTER — EVALUATION (OUTPATIENT)
Dept: PHYSICAL THERAPY | Facility: CLINIC | Age: 72
End: 2022-11-03

## 2022-11-03 VITALS — SYSTOLIC BLOOD PRESSURE: 126 MMHG | DIASTOLIC BLOOD PRESSURE: 80 MMHG | HEART RATE: 98 BPM | TEMPERATURE: 97.1 F

## 2022-11-03 DIAGNOSIS — M54.6 ACUTE LEFT-SIDED THORACIC BACK PAIN: Primary | ICD-10-CM

## 2022-11-03 DIAGNOSIS — M54.50 ACUTE BILATERAL LOW BACK PAIN, UNSPECIFIED WHETHER SCIATICA PRESENT: ICD-10-CM
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
## 2022-11-09 ENCOUNTER — OFFICE VISIT (OUTPATIENT)
Dept: PHYSICAL THERAPY | Facility: CLINIC | Age: 72
End: 2022-11-09

## 2022-11-09 DIAGNOSIS — M54.6 ACUTE LEFT-SIDED THORACIC BACK PAIN: Primary | ICD-10-CM

## 2022-11-09 DIAGNOSIS — M54.50 ACUTE BILATERAL LOW BACK PAIN, UNSPECIFIED WHETHER SCIATICA PRESENT: ICD-10-CM

## 2022-11-09 NOTE — PROGRESS NOTES
Daily Note     Today's date: 2022  Patient name: Francis Hanson  : 1950  MRN: [id number]  Referring provider: Yovany Padgett PT  Dx:   Encounter Diagnosis     ICD-10-CM    1  Acute left-sided thoracic back pain  M54 6    2  Acute bilateral low back pain, unspecified whether sciatica present  M54 50        Start Time: 1700  Stop Time: 1738  Total time in clinic (min): 38 minutes    Subjective: Patient reports he got flexeril and takes one at night time and it has resolved his upper back pain  His low back is stiff  He feels he may not need therapy  Objective: See treatment diary below    Assessment: Patient tolerated gentle stretching very well  HEP provided and patient to perform these for 2 weeks to determine if symptoms completely resolve  Plan to DC at next visit if he continues to feel well  Plan: RE NV       Diagnosis: Acute muscle spasm and hypomobility   Precautions: hx of fall   Primary Goals: 1) thoracolumbar hypomobility  2) muscle spasm L paraspinals   *asterisks by exercise = given for HEP   Manuals 11/3 11/9      Prone PAs        UPAs        Breaking bread mob        Lumbar roll        IASTM/EPAT paraspinals        There Ex        Bike vs TM  5 min upright bike      LTR*  10 x 5 sec      Open books*  Arms in 10 x 5 sec      Thoracic AROM        FF in chair*  10 x 5 sec F and ea side      Doorway lat S        3 way ball rolls                        Neuro Re-Ed        Diaphragmatic breathing        TA        Kegel        Scap retractions                                                                         Re-evaluation    Education, discussion of POC          Ther Act                                         Modalities             Heat

## 2022-11-16 ENCOUNTER — APPOINTMENT (OUTPATIENT)
Dept: PHYSICAL THERAPY | Facility: CLINIC | Age: 72
End: 2022-11-16

## 2022-11-17 ENCOUNTER — APPOINTMENT (OUTPATIENT)
Dept: PHYSICAL THERAPY | Facility: CLINIC | Age: 72
End: 2022-11-17

## 2022-11-21 ENCOUNTER — APPOINTMENT (OUTPATIENT)
Dept: PHYSICAL THERAPY | Facility: CLINIC | Age: 72
End: 2022-11-21

## 2022-11-23 ENCOUNTER — APPOINTMENT (OUTPATIENT)
Dept: PHYSICAL THERAPY | Facility: CLINIC | Age: 72
End: 2022-11-23

## 2022-11-28 ENCOUNTER — APPOINTMENT (OUTPATIENT)
Dept: PHYSICAL THERAPY | Facility: CLINIC | Age: 72
End: 2022-11-28

## 2022-12-25 PROBLEM — H61.23 BILATERAL IMPACTED CERUMEN: Status: RESOLVED | Noted: 2022-03-29 | Resolved: 2022-12-25

## 2023-02-27 ENCOUNTER — OFFICE VISIT (OUTPATIENT)
Dept: OTOLARYNGOLOGY | Facility: CLINIC | Age: 73
End: 2023-02-27

## 2023-02-27 VITALS — HEIGHT: 68 IN | TEMPERATURE: 97.3 F | BODY MASS INDEX: 30.31 KG/M2 | WEIGHT: 200 LBS

## 2023-02-27 DIAGNOSIS — H61.23 BILATERAL IMPACTED CERUMEN: ICD-10-CM

## 2023-02-27 DIAGNOSIS — H90.3 SENSORINEURAL HEARING LOSS (SNHL), BILATERAL: Primary | ICD-10-CM

## 2023-02-27 NOTE — ASSESSMENT & PLAN NOTE
On exam noted bilateral cerumen impaction and unable to fully view tympanic membrane   Cerumen impaction removed bilateral eac with suction, pt tolerated procedure well   Upon removal, improved hearing and decreased clogged sensation of bilateral ears   Discussed routine cerumen care including avoidance of q-tips and cerumen softeners  Hydrocortisone cream to both ears at bedtime for 30 days then as needed for itching    Debrox (cleaning drops) ear drops - put drops in ears after hair and ear trimming    Encourage ongoing follow up in 4 to 6 months to monitor for cerumen and hearing   Audiogram if symptoms worsen   Continue binaural hearing aids

## 2023-02-27 NOTE — PROGRESS NOTES
Assessment/Plan:    Bilateral impacted cerumen  On exam noted bilateral cerumen impaction and unable to fully view tympanic membrane   Cerumen impaction removed bilateral eac with suction, pt tolerated procedure well   Upon removal, improved hearing and decreased clogged sensation of bilateral ears   Discussed routine cerumen care including avoidance of q-tips and cerumen softeners  Hydrocortisone cream to both ears at bedtime for 30 days then as needed for itching  Debrox (cleaning drops) ear drops - put drops in ears after hair and ear trimming    Encourage ongoing follow up in 4 to 6 months to monitor for cerumen and hearing   Audiogram if symptoms worsen   Continue binaural hearing aids       Diagnoses and all orders for this visit:    Sensorineural hearing loss (SNHL), bilateral    Bilateral impacted cerumen  -     Ear cerumen removal          Subjective:      Patient ID: Marvin Beebe is a 74 y o  male  Presents today as a follow up due to hearing loss  Current hearing aids, about 3to 11years old  Hearing aid facility in [facility]  Difficulty hearing TV at times  Certain sounds difficulty hearing  During routine audiology care they did remove some cerumen  Both ears itch often       Since last visit, obtained new hearing aids  Feels hearing is improved  Earache one week ago, resolved now            The following portions of the patient's history were reviewed and updated as appropriate: allergies, current medications, past family history, past medical history, past social history, past surgical history and problem list     Review of Systems   Constitutional: Negative  HENT: Positive for hearing loss  Negative for congestion, ear discharge, ear pain, nosebleeds, postnasal drip, rhinorrhea, sinus pressure, sinus pain, sore throat, tinnitus and voice change  Eyes: Negative  Respiratory: Negative for chest tightness and shortness of breath  Cardiovascular: Negative  Gastrointestinal: Negative  Endocrine: Negative  Musculoskeletal: Negative  Skin: Negative for color change  Neurological: Negative for dizziness, numbness and headaches  Psychiatric/Behavioral: Negative  Objective:      Temp (!) 97 3 °F (36 3 °C) (Temporal)   Ht 5' 8" (1 727 m)   Wt 90 7 kg (200 lb)   BMI 30 41 kg/m²          Physical Exam  Constitutional:       Appearance: He is well-developed  HENT:      Head: Normocephalic  Right Ear: Hearing, tympanic membrane, ear canal and external ear normal  No decreased hearing noted  No drainage or tenderness  There is impacted cerumen  Tympanic membrane is not perforated or erythematous  Left Ear: Hearing, tympanic membrane, ear canal and external ear normal  No decreased hearing noted  No drainage or tenderness  There is impacted cerumen  Tympanic membrane is not perforated or erythematous  Nose: Nose normal  No nasal deformity or septal deviation  Mouth/Throat:      Mouth: Mucous membranes are not pale and not dry  No oral lesions  Dentition: Normal dentition  Pharynx: Uvula midline  No oropharyngeal exudate  Neck:      Trachea: No tracheal deviation  Cardiovascular:      Rate and Rhythm: Normal rate  Pulmonary:      Effort: Pulmonary effort is normal  No accessory muscle usage or respiratory distress  Musculoskeletal:      Cervical back: Full passive range of motion without pain, normal range of motion and neck supple  Lymphadenopathy:      Cervical: No cervical adenopathy  Skin:     General: Skin is warm and dry  Neurological:      Mental Status: He is alert and oriented to person, place, and time  Cranial Nerves: No cranial nerve deficit  Sensory: No sensory deficit  Psychiatric:         Behavior: Behavior is cooperative         Ear cerumen removal    Date/Time: 2/27/2023 3:45 PM  Performed by: CHANEL See  Authorized by: CHANEL See   Universal Protocol:  Consent: Verbal consent obtained  Risks and benefits: risks, benefits and alternatives were discussed  Consent given by: patient  Patient understanding: patient states understanding of the procedure being performed      Patient location:  Clinic  Procedure details:     Local anesthetic:  None    Location:  L ear and R ear    Approach:  External  Post-procedure details:     Complication:  None    Hearing quality:  Normal    Patient tolerance of procedure:   Tolerated well, no immediate complications

## 2023-03-24 ENCOUNTER — HOSPITAL ENCOUNTER (EMERGENCY)
Facility: HOSPITAL | Age: 73
Discharge: HOME | End: 2023-03-24
Attending: EMERGENCY MEDICINE

## 2023-03-24 VITALS
WEIGHT: 207.01 LBS | HEIGHT: 68 IN | DIASTOLIC BLOOD PRESSURE: 39 MMHG | BODY MASS INDEX: 31.37 KG/M2 | SYSTOLIC BLOOD PRESSURE: 121 MMHG | RESPIRATION RATE: 18 BRPM | TEMPERATURE: 97.8 F | OXYGEN SATURATION: 100 % | HEART RATE: 66 BPM

## 2023-03-24 DIAGNOSIS — M10.9 GOUT ATTACK: Primary | ICD-10-CM

## 2023-03-24 RX ORDER — PREDNISONE 20 MG/1
60 TABLET ORAL ONCE
Status: COMPLETED | OUTPATIENT
Start: 2023-03-24 | End: 2023-03-24

## 2023-03-24 RX ORDER — PREDNISONE 10 MG/1
TABLET ORAL
Qty: 20 TABLET | Refills: 0 | Status: SHIPPED | OUTPATIENT
Start: 2023-03-24 | End: 2023-04-01

## 2023-03-24 RX ORDER — OXYCODONE HYDROCHLORIDE AND ACETAMINOPHEN 5; 325 MG/1; MG/1
1 TABLET ORAL ONCE
Status: COMPLETED | OUTPATIENT
Start: 2023-03-24 | End: 2023-03-24

## 2023-03-24 RX ORDER — OXYCODONE HYDROCHLORIDE 5 MG/1
5 TABLET ORAL ONCE
Status: DISCONTINUED | OUTPATIENT
Start: 2023-03-24 | End: 2023-03-24

## 2023-03-24 RX ORDER — PREDNISONE 10 MG/1
TABLET ORAL
Qty: 20 TABLET | Refills: 0 | Status: SHIPPED | OUTPATIENT
Start: 2023-03-24 | End: 2023-03-24 | Stop reason: SDUPTHER

## 2023-03-24 RX ADMIN — OXYCODONE HYDROCHLORIDE AND ACETAMINOPHEN 1 TABLET: 5; 325 TABLET ORAL at 00:28

## 2023-03-24 RX ADMIN — PREDNISONE 60 MG: 20 TABLET ORAL at 00:28

## 2023-03-24 RX ADMIN — DICLOFENAC SODIUM 2 G: 10 GEL TOPICAL at 00:29
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
## 2023-04-28 PROBLEM — H61.23 BILATERAL IMPACTED CERUMEN: Status: RESOLVED | Noted: 2022-03-29 | Resolved: 2023-04-28

## 2023-05-03 ENCOUNTER — HOSPITAL ENCOUNTER (EMERGENCY)
Facility: HOSPITAL | Age: 73
Discharge: HOME | End: 2023-05-04
Attending: EMERGENCY MEDICINE

## 2023-05-03 VITALS
TEMPERATURE: 98.7 F | SYSTOLIC BLOOD PRESSURE: 160 MMHG | DIASTOLIC BLOOD PRESSURE: 86 MMHG | HEART RATE: 103 BPM | RESPIRATION RATE: 20 BRPM | OXYGEN SATURATION: 98 %

## 2023-05-03 DIAGNOSIS — M54.50 ACUTE LEFT-SIDED LOW BACK PAIN WITHOUT SCIATICA: Primary | ICD-10-CM

## 2023-05-03 RX ORDER — LIDOCAINE 50 MG/G
1 PATCH TOPICAL ONCE
Status: DISCONTINUED | OUTPATIENT
Start: 2023-05-03 | End: 2023-05-04 | Stop reason: HOSPADM

## 2023-05-03 RX ORDER — ACETAMINOPHEN 325 MG/1
975 TABLET ORAL ONCE
Status: COMPLETED | OUTPATIENT
Start: 2023-05-03 | End: 2023-05-03

## 2023-05-03 RX ADMIN — LIDOCAINE 1 PATCH: 50 PATCH CUTANEOUS at 23:28

## 2023-05-03 RX ADMIN — ACETAMINOPHEN 975 MG: 325 TABLET ORAL at 23:27

## 2023-05-04 ENCOUNTER — TELEPHONE (OUTPATIENT)
Dept: PHYSICAL THERAPY | Facility: OTHER | Age: 73
End: 2023-05-04
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
## 2023-05-09 NOTE — TELEPHONE ENCOUNTER
Call placed to the patient per [facility] referral      V/m  left for patient to call back  Phone number and hours of business provided       This is the 2nd  attempt to reach the patient  Will defer per protocol

## 2023-08-28 ENCOUNTER — OFFICE VISIT (OUTPATIENT)
Dept: OTOLARYNGOLOGY | Facility: CLINIC | Age: 73
End: 2023-08-28
Payer: MEDICARE

## 2023-08-28 VITALS
HEIGHT: 68 IN | DIASTOLIC BLOOD PRESSURE: 74 MMHG | WEIGHT: 195 LBS | SYSTOLIC BLOOD PRESSURE: 126 MMHG | TEMPERATURE: 97.1 F | BODY MASS INDEX: 29.55 KG/M2

## 2023-08-28 DIAGNOSIS — H61.23 BILATERAL IMPACTED CERUMEN: ICD-10-CM

## 2023-08-28 DIAGNOSIS — H90.3 SENSORINEURAL HEARING LOSS (SNHL), BILATERAL: Primary | ICD-10-CM

## 2023-08-28 PROCEDURE — 99213 OFFICE O/P EST LOW 20 MIN: CPT | Performed by: NURSE PRACTITIONER

## 2023-08-28 PROCEDURE — 69210 REMOVE IMPACTED EAR WAX UNI: CPT | Performed by: NURSE PRACTITIONER

## 2023-08-28 NOTE — ASSESSMENT & PLAN NOTE
On exam noted bilateral cerumen impaction and unable to fully view tympanic membrane.  Cerumen impaction removed bilateral eac with suction, pt tolerated procedure well.  Upon removal, improved hearing and decreased clogged sensation of bilateral ears.  Discussed routine cerumen care including avoidance of q-tips and cerumen softeners. Hydrocortisone cream to both ears at bedtime for 30 days then as needed for itching.   Debrox (cleaning drops) ear drops - put drops in ears after hair and ear trimming.   Encourage ongoing follow up in 4 to 6 months to monitor for cerumen and hearing.  Audiogram if symptoms worsen.  Continue binaural hearing aids

## 2023-08-28 NOTE — PROGRESS NOTES
Assessment/Plan:    Bilateral impacted cerumen  On exam noted bilateral cerumen impaction and unable to fully view tympanic membrane.  Cerumen impaction removed bilateral eac with suction, pt tolerated procedure well.  Upon removal, improved hearing and decreased clogged sensation of bilateral ears.  Discussed routine cerumen care including avoidance of q-tips and cerumen softeners. Hydrocortisone cream to both ears at bedtime for 30 days then as needed for itching. Debrox (cleaning drops) ear drops - put drops in ears after hair and ear trimming.   Encourage ongoing follow up in 4 to 6 months to monitor for cerumen and hearing.  Audiogram if symptoms worsen.  Continue binaural hearing aids       Diagnoses and all orders for this visit:    Sensorineural hearing loss (SNHL), bilateral    Bilateral impacted cerumen    Other orders  -     Ear cerumen removal          Subjective:      Patient ID: Luiz Oneil is a 74 y.o. male. Presents today as a follow up due to hearing loss. Hearing aid facility in [place]. Difficulty hearing TV at times. Certain sounds difficulty hearing. During routine audiology care they did remove some cerumen. Both ears itch often.      Since last visit, obtained new hearing aids. Feels hearing is improved.         The following portions of the patient's history were reviewed and updated as appropriate: allergies, current medications, past family history, past medical history, past social history, past surgical history and problem list.    Review of Systems   Constitutional: Negative. HENT: Positive for hearing loss. Negative for congestion, ear discharge, ear pain, nosebleeds, postnasal drip, rhinorrhea, sinus pressure, sinus pain, sore throat, tinnitus and voice change. Respiratory: Negative for chest tightness and shortness of breath. Skin: Negative for color change. Neurological: Negative for dizziness, numbness and headaches. Psychiatric/Behavioral: Negative. Objective:      /74 (BP Location: Left arm, Patient Position: Sitting, Cuff Size: Adult)   Temp (!) 97.1 °F (36.2 °C) (Temporal)   Ht 5' 8" (1.727 m)   Wt 88.5 kg (195 lb)   BMI 29.65 kg/m²          Physical Exam  Constitutional:       Appearance: He is well-developed. HENT:      Head: Normocephalic. Right Ear: Hearing, tympanic membrane, ear canal and external ear normal. No decreased hearing noted. No drainage or tenderness. There is impacted cerumen. Tympanic membrane is not perforated or erythematous. Left Ear: Hearing, tympanic membrane, ear canal and external ear normal. No decreased hearing noted. No drainage or tenderness. There is impacted cerumen. Tympanic membrane is not perforated or erythematous. Nose: Nose normal. No nasal deformity or septal deviation. Mouth/Throat:      Mouth: Mucous membranes are not pale and not dry. No oral lesions. Dentition: Normal dentition. Pharynx: Uvula midline. No oropharyngeal exudate. Neck:      Trachea: No tracheal deviation. Pulmonary:      Effort: No accessory muscle usage or respiratory distress. Musculoskeletal:      Cervical back: Neck supple. Lymphadenopathy:      Cervical: No cervical adenopathy. Skin:     General: Skin is warm and dry. Neurological:      Mental Status: He is alert and oriented to person, place, and time. Cranial Nerves: No cranial nerve deficit. Sensory: No sensory deficit. Psychiatric:         Behavior: Behavior is cooperative. Ear cerumen removal    Date/Time: 8/28/2023 1:30 PM    Performed by: CHANEL Morris Ala  Authorized by: CHANEL Morris Ala  Universal Protocol:  Consent: Verbal consent obtained.   Risks and benefits: risks, benefits and alternatives were discussed  Consent given by: patient  Patient understanding: patient states understanding of the procedure being performed      Patient location:  Clinic  Procedure details: Local anesthetic:  None    Location:  L ear and R ear    Approach:  External  Post-procedure details:     Complication:  None    Hearing quality:  Normal    Patient tolerance of procedure:   Tolerated well, no immediate complications

## 2023-08-28 NOTE — PATIENT INSTRUCTIONS
Wax care at home - avoidance of q-tips, may use cerumen softeners every one to two months. Hydrocortisone cream pea sized amount on finger as needed for itching in ears.

## 2023-10-10 ENCOUNTER — APPOINTMENT (OUTPATIENT)
Dept: RADIOLOGY | Facility: HOSPITAL | Age: 73
DRG: 502 | End: 2023-10-10
Payer: MEDICARE

## 2023-10-10 ENCOUNTER — HOSPITAL ENCOUNTER (INPATIENT)
Facility: HOSPITAL | Age: 73
LOS: 3 days | DRG: 502 | End: 2023-10-14
Attending: EMERGENCY MEDICINE | Admitting: EMERGENCY MEDICINE
Payer: MEDICARE

## 2023-10-10 ENCOUNTER — APPOINTMENT (EMERGENCY)
Dept: RADIOLOGY | Facility: HOSPITAL | Age: 73
DRG: 502 | End: 2023-10-10
Payer: MEDICARE

## 2023-10-10 ENCOUNTER — APPOINTMENT (EMERGENCY)
Dept: CT IMAGING | Facility: HOSPITAL | Age: 73
DRG: 502 | End: 2023-10-10
Payer: MEDICARE

## 2023-10-10 DIAGNOSIS — S76.111A RUPTURE OF RIGHT QUADRICEPS TENDON, INITIAL ENCOUNTER: ICD-10-CM

## 2023-10-10 DIAGNOSIS — M25.562 ACUTE PAIN OF BOTH KNEES: ICD-10-CM

## 2023-10-10 DIAGNOSIS — S83.004A CLOSED DISLOCATION OF RIGHT PATELLA, INITIAL ENCOUNTER: ICD-10-CM

## 2023-10-10 DIAGNOSIS — R26.2 AMBULATORY DYSFUNCTION: ICD-10-CM

## 2023-10-10 DIAGNOSIS — M25.561 ACUTE PAIN OF BOTH KNEES: ICD-10-CM
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
## 2023-10-10 LAB
ALBUMIN SERPL BCP-MCNC: 4.5 G/DL (ref 3.5–5)
ALP SERPL-CCNC: 86 U/L (ref 34–104)
ALT SERPL W P-5'-P-CCNC: 25 U/L (ref 7–52)
ANION GAP SERPL CALCULATED.3IONS-SCNC: 7 MMOL/L
ANISOCYTOSIS BLD QL SMEAR: PRESENT
APTT PPP: 34 SECONDS (ref 23–37)
AST SERPL W P-5'-P-CCNC: 25 U/L (ref 13–39)
BASOPHILS # BLD MANUAL: 0 THOUSAND/UL (ref 0–0.1)
BASOPHILS NFR MAR MANUAL: 0 % (ref 0–1)
BILIRUB SERPL-MCNC: 0.6 MG/DL (ref 0.2–1)
BUN SERPL-MCNC: 17 MG/DL (ref 5–25)
CALCIUM SERPL-MCNC: 9.5 MG/DL (ref 8.4–10.2)
CHLORIDE SERPL-SCNC: 106 MMOL/L (ref 96–108)
CO2 SERPL-SCNC: 27 MMOL/L (ref 21–32)
CREAT SERPL-MCNC: 0.78 MG/DL (ref 0.6–1.3)
EOSINOPHIL # BLD MANUAL: 0 THOUSAND/UL (ref 0–0.4)
EOSINOPHIL NFR BLD MANUAL: 0 % (ref 0–6)
ERYTHROCYTE [DISTWIDTH] IN BLOOD BY AUTOMATED COUNT: 12.9 % (ref 11.6–15.1)
GFR SERPL CREATININE-BSD FRML MDRD: 88 ML/MIN/1.73SQ M
GLUCOSE SERPL-MCNC: 126 MG/DL (ref 65–140)
HCT VFR BLD AUTO: 45.3 % (ref 36.5–49.3)
HGB BLD-MCNC: 15.3 G/DL (ref 12–17)
INR PPP: 0.96 (ref 0.84–1.19)
LG PLATELETS BLD QL SMEAR: PRESENT
LYMPHOCYTES # BLD AUTO: 1.12 THOUSAND/UL (ref 0.6–4.47)
LYMPHOCYTES # BLD AUTO: 22 % (ref 14–44)
MCH RBC QN AUTO: 30.9 PG (ref 26.8–34.3)
MCHC RBC AUTO-ENTMCNC: 33.8 G/DL (ref 31.4–37.4)
MCV RBC AUTO: 92 FL (ref 82–98)
MONOCYTES # BLD AUTO: 0.44 THOUSAND/UL (ref 0–1.22)
MONOCYTES NFR BLD: 9 % (ref 4–12)
NEUTROPHILS # BLD MANUAL: 3.3 THOUSAND/UL (ref 1.85–7.62)
NEUTS BAND NFR BLD MANUAL: 4 % (ref 0–8)
NEUTS SEG NFR BLD AUTO: 64 % (ref 43–75)
PLATELET # BLD AUTO: 105 THOUSANDS/UL (ref 149–390)
PLATELET BLD QL SMEAR: ABNORMAL
PMV BLD AUTO: 12 FL (ref 8.9–12.7)
POTASSIUM SERPL-SCNC: 4.1 MMOL/L (ref 3.5–5.3)
PROT SERPL-MCNC: 6.8 G/DL (ref 6.4–8.4)
PROTHROMBIN TIME: 13.4 SECONDS (ref 11.6–14.5)
RBC # BLD AUTO: 4.95 MILLION/UL (ref 3.88–5.62)
RBC MORPH BLD: PRESENT
SODIUM SERPL-SCNC: 140 MMOL/L (ref 135–147)
VARIANT LYMPHS # BLD AUTO: 1 %
WBC # BLD AUTO: 4.85 THOUSAND/UL (ref 4.31–10.16)

## 2023-10-10 PROCEDURE — 73564 X-RAY EXAM KNEE 4 OR MORE: CPT

## 2023-10-10 PROCEDURE — 99284 EMERGENCY DEPT VISIT MOD MDM: CPT

## 2023-10-10 PROCEDURE — 85007 BL SMEAR W/DIFF WBC COUNT: CPT

## 2023-10-10 PROCEDURE — G1004 CDSM NDSC: HCPCS

## 2023-10-10 PROCEDURE — 73706 CT ANGIO LWR EXTR W/O&W/DYE: CPT

## 2023-10-10 PROCEDURE — 99223 1ST HOSP IP/OBS HIGH 75: CPT | Performed by: SURGERY

## 2023-10-10 PROCEDURE — 36415 COLL VENOUS BLD VENIPUNCTURE: CPT

## 2023-10-10 PROCEDURE — 80053 COMPREHEN METABOLIC PANEL: CPT

## 2023-10-10 PROCEDURE — 99285 EMERGENCY DEPT VISIT HI MDM: CPT | Performed by: EMERGENCY MEDICINE

## 2023-10-10 PROCEDURE — 85730 THROMBOPLASTIN TIME PARTIAL: CPT

## 2023-10-10 PROCEDURE — 73552 X-RAY EXAM OF FEMUR 2/>: CPT

## 2023-10-10 PROCEDURE — 85027 COMPLETE CBC AUTOMATED: CPT

## 2023-10-10 PROCEDURE — 85610 PROTHROMBIN TIME: CPT

## 2023-10-10 PROCEDURE — 73030 X-RAY EXAM OF SHOULDER: CPT

## 2023-10-10 RX ORDER — ACETAMINOPHEN 325 MG/1
975 TABLET ORAL EVERY 8 HOURS SCHEDULED
Status: DISCONTINUED | OUTPATIENT
Start: 2023-10-10 | End: 2023-10-14 | Stop reason: HOSPADM

## 2023-10-10 RX ORDER — OXYCODONE HYDROCHLORIDE 5 MG/1
5 TABLET ORAL ONCE
Status: COMPLETED | OUTPATIENT
Start: 2023-10-10 | End: 2023-10-10

## 2023-10-10 RX ORDER — ENOXAPARIN SODIUM 100 MG/ML
30 INJECTION SUBCUTANEOUS EVERY 12 HOURS
Status: DISCONTINUED | OUTPATIENT
Start: 2023-10-10 | End: 2023-10-14 | Stop reason: HOSPADM

## 2023-10-10 RX ORDER — OXYCODONE HYDROCHLORIDE 5 MG/1
5 TABLET ORAL EVERY 4 HOURS PRN
Status: DISCONTINUED | OUTPATIENT
Start: 2023-10-10 | End: 2023-10-12

## 2023-10-10 RX ORDER — ACETAMINOPHEN 325 MG/1
975 TABLET ORAL ONCE
Status: COMPLETED | OUTPATIENT
Start: 2023-10-10 | End: 2023-10-10

## 2023-10-10 RX ORDER — PANTOPRAZOLE SODIUM 40 MG/1
40 TABLET, DELAYED RELEASE ORAL
Status: DISCONTINUED | OUTPATIENT
Start: 2023-10-11 | End: 2023-10-12

## 2023-10-10 RX ORDER — SENNOSIDES 8.6 MG
2 TABLET ORAL DAILY
Status: DISCONTINUED | OUTPATIENT
Start: 2023-10-11 | End: 2023-10-14 | Stop reason: HOSPADM

## 2023-10-10 RX ORDER — LIDOCAINE 50 MG/G
1 PATCH TOPICAL DAILY
Status: DISCONTINUED | OUTPATIENT
Start: 2023-10-10 | End: 2023-10-14 | Stop reason: HOSPADM

## 2023-10-10 RX ADMIN — ACETAMINOPHEN 975 MG: 325 TABLET, FILM COATED ORAL at 22:35

## 2023-10-10 RX ADMIN — LIDOCAINE 5% 1 PATCH: 700 PATCH TOPICAL at 22:35

## 2023-10-10 RX ADMIN — OXYCODONE HYDROCHLORIDE 5 MG: 5 TABLET ORAL at 23:29

## 2023-10-10 RX ADMIN — OXYCODONE HYDROCHLORIDE 5 MG: 5 TABLET ORAL at 17:04

## 2023-10-10 RX ADMIN — ACETAMINOPHEN 975 MG: 325 TABLET, FILM COATED ORAL at 17:04

## 2023-10-10 RX ADMIN — ENOXAPARIN SODIUM 30 MG: 30 INJECTION SUBCUTANEOUS at 23:29

## 2023-10-10 RX ADMIN — IOHEXOL 120 ML: 350 INJECTION, SOLUTION INTRAVENOUS at 18:50

## 2023-10-10 NOTE — Clinical Note
Case was discussed with Resident and the patient's admission status was agreed to be Admission Status: observation status to the service of Dr. Katerin Alcocer .
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
## 2023-10-10 NOTE — ED ATTENDING ATTESTATION
10/10/2023  IDestini MD, saw and evaluated the patient. I have discussed the patient with the resident/non-physician practitioner and agree with the resident's/non-physician practitioner's findings, Plan of Care, and MDM as documented in the resident's/non-physician practitioner's note, except where noted. All available labs and Radiology studies were reviewed. I was present for key portions of any procedure(s) performed by the resident/non-physician practitioner and I was immediately available to provide assistance. At this point I agree with the current assessment done in the Emergency Department. I have conducted an independent evaluation of this patient a history and physical is as follows:    75-year-old male with a history of GERD presenting for evaluation after a fall. Patient states he was taking pictures when he tripped over a root and fell onto his bilateral knees. States he heard pops in both of his knees. Noted deformities to both knees with them both bent sideways. Was unable to ambulate at the scene. Patient did not strike his head or lose consciousness. He is not on anticoagulation. Patient also notes he has a slight increase in his chronic left shoulder pain, as well. Denies symptoms prior to his fall. Denies neck and back pain. Patient states in the ambulance, his right lower extremity straightened out when they went over a bump in his pain lessened. Tetanus vaccination is up-to-date. Please see resident documentation for histories and review of systems.     Exam: Vital signs and nursing notes reviewed  General: Awake, alert, no acute distress  HEENT: Normocephalic, atraumatic, mucous membranes moist  Neck: No midline cervical tenderness to palpation  Heart: Regular rate and rhythm, no chest wall tenderness to palpation  Abdomen: Soft, nontender, nondistended  Pelvis: Stable, nontender to compression  Extremities: Left lower extremity is shortened and externally rotated bent at the knee with tenderness to the distal femur. Good distal pulses and capillary refill in the left lower extremity. Right lower extremity is straight with a dimple formation superior to the right patella with good distal pulses and capillary refill. Varicose veins in the right lower extremity with bulging medially (family states these are chronic)  Skin: Warm, dry, small laceration to the left fourth digit  Neuro: No gross upper motor deficits, left lower extremity is limited by pain    ED Course  ED Course as of 10/10/23 2112   Tue Oct 10, 2023   174 Comprehensive metabolic panel  Grossly normal   174 POCT INR: 0.96   1744 PTT: 34   1744 Platelet Count(!): 153   1744 WBC: 4.85   174 Hemoglobin: 15.3   175 Platelet Estimate(!): Borderline   175 Segs Relative: 64   1757 Lymphocytes %: 1951 CTA lower extremity right w wo contrast  IMPRESSION:     No gross arterial contrast extravasation noting lack of unenhanced and delayed phase imaging for comparison.     There is irregularity and enlargement of a medial intramuscular artery originating off of the left popliteal artery supplying the vastus medialis muscle. The left medial muscle compartments of the distal left thigh appear to be mildly swollen as compared   to the right though no discrete hematoma is seen.     No acute fracture. No joint effusions.  Patient re-evaluated; legs both straight now. Can contract both quads. Will discuss with trauma; patient likely to need admission for inability to ambulate    Patient evaluated by trauma; will be admitted to their service for further care     ED course/medical decision m[age]-year-old male presenting for evaluation of bilateral knee pain after a fall. This fall is reported to be mechanical in nature and patient denies symptoms prior to his fall. However, suspect he will require admission for pain control and physical therapy, so laboratory studies were obtained.   The studies are notable for thrombocytopenia but are otherwise grossly normal.  Differential diagnosis for the patient's knee pain includes acute fracture, dislocation, ligamentous injury, quadriceps or patellar tendon rupture, intramuscular hematoma. Patient's right knee is concerning for dislocation. X-rays were obtained of the bilateral knees and the left femur, which per my interpretation, are negative for acute osseous abnormalities. X-ray of the left shoulder is negative for acute osseous abnormality per my interpretation. Given concern for dislocation of the right knee, CTA of the right lower extremity was obtained to evaluate for vascular injury. With persistent pain in the left lower extremity, CT was performed at the same time of that extremity, as well. There is no evidence of acute fracture or joint effusions. There is an irregularity and enlargement of the medial intramuscular artery originating off the left popliteal artery supplying the vastus medialis muscle with mild swelling to the left medial muscle compartments of the distal left thigh although there is no discrete hematoma. I suspect this is the cause of the patient's left lower extremity pain. His leg was straightened in CT, and patient states his pain has improved. He is able to contract both quadriceps tendon. Given concern for dislocation of the right knee with significant mobility of the patella on reexamination, trauma surgery was consulted for evaluation. Patient was treated supportively for his pain in the emergency department. Patient will be admitted to trauma for further management.     Diagnosis: Right knee injury, left knee injury, left thigh hematoma, fall, left shoulder pain  Disposition: Admission

## 2023-10-11 ENCOUNTER — ANESTHESIA EVENT (INPATIENT)
Dept: PERIOP | Facility: HOSPITAL | Age: 73
DRG: 502 | End: 2023-10-11
Payer: MEDICARE

## 2023-10-11 PROBLEM — S86.811A PATELLAR TENDON RUPTURE, RIGHT, INITIAL ENCOUNTER: Status: ACTIVE | Noted: 2023-10-11

## 2023-10-11 PROBLEM — G89.11 ACUTE PAIN DUE TO TRAUMA: Status: ACTIVE | Noted: 2023-10-11

## 2023-10-11 LAB
ABO GROUP BLD: NORMAL
BLD GP AB SCN SERPL QL: NEGATIVE
RH BLD: POSITIVE
SPECIMEN EXPIRATION DATE: NORMAL

## 2023-10-11 PROCEDURE — 99232 SBSQ HOSP IP/OBS MODERATE 35: CPT | Performed by: EMERGENCY MEDICINE

## 2023-10-11 PROCEDURE — 86850 RBC ANTIBODY SCREEN: CPT

## 2023-10-11 PROCEDURE — 99223 1ST HOSP IP/OBS HIGH 75: CPT | Performed by: STUDENT IN AN ORGANIZED HEALTH CARE EDUCATION/TRAINING PROGRAM

## 2023-10-11 PROCEDURE — 97760 ORTHOTIC MGMT&TRAING 1ST ENC: CPT

## 2023-10-11 PROCEDURE — 86901 BLOOD TYPING SEROLOGIC RH(D): CPT

## 2023-10-11 PROCEDURE — 86900 BLOOD TYPING SEROLOGIC ABO: CPT

## 2023-10-11 RX ORDER — SODIUM CHLORIDE, SODIUM GLUCONATE, SODIUM ACETATE, POTASSIUM CHLORIDE, MAGNESIUM CHLORIDE, SODIUM PHOSPHATE, DIBASIC, AND POTASSIUM PHOSPHATE .53; .5; .37; .037; .03; .012; .00082 G/100ML; G/100ML; G/100ML; G/100ML; G/100ML; G/100ML; G/100ML
50 INJECTION, SOLUTION INTRAVENOUS CONTINUOUS
Status: DISCONTINUED | OUTPATIENT
Start: 2023-10-11 | End: 2023-10-11

## 2023-10-11 RX ADMIN — ENOXAPARIN SODIUM 30 MG: 30 INJECTION SUBCUTANEOUS at 20:16

## 2023-10-11 RX ADMIN — LIDOCAINE 5% 1 PATCH: 700 PATCH TOPICAL at 20:16

## 2023-10-11 RX ADMIN — OXYCODONE HYDROCHLORIDE 5 MG: 5 TABLET ORAL at 13:16

## 2023-10-11 RX ADMIN — ACETAMINOPHEN 975 MG: 325 TABLET, FILM COATED ORAL at 05:49

## 2023-10-11 RX ADMIN — B-COMPLEX W/ C & FOLIC ACID TAB 1 TABLET: TAB at 08:28

## 2023-10-11 RX ADMIN — OXYCODONE HYDROCHLORIDE 5 MG: 5 TABLET ORAL at 07:52

## 2023-10-11 RX ADMIN — ACETAMINOPHEN 975 MG: 325 TABLET, FILM COATED ORAL at 14:05

## 2023-10-11 RX ADMIN — OXYCODONE HYDROCHLORIDE 5 MG: 5 TABLET ORAL at 03:38

## 2023-10-11 RX ADMIN — SENNOSIDES 17.2 MG: 8.6 TABLET, FILM COATED ORAL at 08:28

## 2023-10-11 RX ADMIN — OXYCODONE HYDROCHLORIDE 5 MG: 5 TABLET ORAL at 20:15

## 2023-10-11 RX ADMIN — ACETAMINOPHEN 975 MG: 325 TABLET, FILM COATED ORAL at 20:15

## 2023-10-11 RX ADMIN — PANTOPRAZOLE SODIUM 40 MG: 40 TABLET, DELAYED RELEASE ORAL at 05:49

## 2023-10-11 RX ADMIN — ENOXAPARIN SODIUM 30 MG: 30 INJECTION SUBCUTANEOUS at 11:05

## 2023-10-11 NOTE — PHYSICAL THERAPY NOTE
PHYSICAL THERAPY ORTHOTIC FITTING NOTE          Patient Name: Dany Ray  SRJDQ'D Date: 10/11/2023          Orthotic Fitting:   Time in: 9170  Time out: 1414  Total Time: 41 min    Orthotics Ordered: RLE TROM locked in extension; LLE sleeve/hinge knee brace unlocked  Orthotic Ordered by: Ortho PA Fauzia Rodriguez  Wearing Schedule: WBAT BLE      Pt educated on TROM brace fit, alignment, w/ pt agreeable to brace. Brace measured and fit for TROM locked in extension on RLE while pt supine in bed. Pt requires total assistance for donning/doffing brace at this time, as well as for adjustment of brace as needed. Pt verbalized understanding of donning/doffing brace, brace fit. Pt educated on knee sleeve/hinge brace fit, alignment, w/ pt agreeable to brace. Size L brace initially attempted; however, due to pt's swelling and c/o pain/tightness around thigh, pt re-fit for size XL brace. Pt requires total assistance for donning/doffing brace at this time, as well as for adjustment of brace as needed. Pt verbalized understanding of donning/doffing brace. Pt confirmed no further questions and/or concerns regarding brace(s) at this time. Additionally, PT eval orders received; however, will hold PT eval and mobilizing pt as pt is planned for OR intervention w/ Orthopedics. Please rec-consult PT post-op w/ updated activity orders and weight-bearing restrictions. At end of brace fittings, pt remained supine w/ bilateral braces donned, all needs w/in reach, and bed alarm on. Knee flexion feature of bed disabled to ensure pt's R knee remains in extension at this time w/ pt educated.          Vianney Rivera, PT, DPT  10/11/23

## 2023-10-11 NOTE — PROGRESS NOTES
[address] [place]  Progress Note  Name: Bandar Jolly  MRN: [id number]  Unit/Bed#: W -01 I Date of Admission: 10/10/2023   Date of Service: 10/11/2023 I Hospital Day: 0    Assessment/Plan   Acute pain due to trauma  Assessment & Plan  Pain in bilateral knee sustained from fall  Scheduled Tylenol  Lidoderm  As needed oxycodone  Bowel regimen while on narcotics    Patellar tendon rupture, right, initial encounter  Assessment & Plan  Sustained from fall  On physical exam high riding patella with void palpated inferiorly. Limited active flexion of right knee  Plan  Multimodal pain regimen as below  Ortho consult - apprec recs    Fall from standing  Assessment & Plan  Patient states he was hiking, tripped on room, landed on both his knees. Pain in both knees. Unable to flex either of them initially requiring prolonged rescue from hike. After being laid supine for CT scan, left knee did feel like it was better approximated he is now able to bend a bit but still having a lot of pain in both knees  Injuries as below  Multimodal pain regimen as below    Brady's esophagus with dysplasia  Assessment & Plan  Substitute Protonix for home prevacid    * Ambulatory dysfunction  Assessment & Plan  Bilateral knee pain, left greater than right sustained from fall.   Unable to ambulate due to pain  Plan  Multimodal pain regimen as below  Ortho consult - appreciate recs  PT/OT consult             TRAUMA TERTIARY SURVEY NOTE    VTE Prophylaxis:Lovenox     Disposition: rehab vs home    Code status:  Level 1 - Full Code    Consultants: IP CONSULT TO TRAUMA SURGERY  IP CONSULT TO CASE MANAGEMENT  IP CONSULT TO ORTHOPEDIC SURGERY    Subjective   Transfer from: site of injury    Mechanism of Injury:Fall     Chief Complaint: "I can't walk"    HPI/Last 24 hour events: Admitted to Trauma, Ortho consulted and seen patient, diet for now, pain control and therapy     Objective   Vitals:   Temp:  [97.6 °F (36.4 °C)-98.2 °F (36.8 °C)] 97.8 °F (36.6 °C)  HR:  [70-90] 70  Resp:  [17-18] 18  BP: (127-157)/(67-85) 127/67    I/O         10/09 0701  10/10 0700 10/10 0701  10/11 0700 10/11 0701  10/12 0700           Unmeasured Urine Occurrence   300 x             Physical Exam:   GENERAL APPEARANCE: pleasant and comfortable  NEURO: GCS - 15  HEENT: EO's intact  CV: RRR< no complaints of chest pain  LUNGS: CTA bilaterally, no shortness of breath  GI: tolerating a diet  : voiding  MSK: moving all extremities  SKIN: warm and dry    Invasive Devices       Peripheral Intravenous Line  Duration             Peripheral IV 10/10/23 Left;Ventral (anterior) Forearm <1 day                       1. Before the illness or injury that brought you to the Emergency, did you need someone to help you on a regular basis? 0=No   2. Since the illness or injury that brought you to the Emergency, have you needed more help than usual to take care of yourself? 1=Yes   3. Have you been hospitalized for one or more nights during the past 6 months (excluding a stay in the Emergency Department)? 0=No   4. In general, do you see well? 0=Yes   5. In general, do you have serious problems with your memory? 0=No   6. Do you take more than three different medications everyday? 1=Yes   TOTAL   2     Did you order a geriatric consult if the score was 2 or greater?: yes         Lab Results: none today    Imaging Results:   Chest Xray(s): none   FAST exam(s): N/A   CT Scan(s): See below   Additional Xray(s): Left shoulder - no abdnormality, evidence of rotator cutf arthropathy     Other Studies:  Right knee x-ray - Soft tissue swelling but no acute osseous abnormality is seen. CTA right lower extremity - No gross arterial contrast extravasation noting lack of unenhanced and delayed phase imaging for comparison. There is irregularity and enlargement of a medial intramuscular artery originating off of the left popliteal artery supplying the vastus medialis muscle. The left medial muscle compartments of the distal left thigh appear to be mildly swollen as compared   to the right though no discrete hematoma is seen. Other findings as above. If there is high or persistent clinical suspicion of injury, additional imaging such as MR may be obtained nonemergently as clinically warranted or at the discretion of the referring caregiver.       Left femur - neg    Left knee - neg  Right knee -  neg
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
## 2023-10-11 NOTE — ASSESSMENT & PLAN NOTE
· Sustained from fall  · On physical exam high riding patella with void palpated inferiorly.   Limited active flexion of right knee  · Plan  · Multimodal pain regimen as below  · Ortho consult

## 2023-10-11 NOTE — ASSESSMENT & PLAN NOTE
· Pain in bilateral knee sustained from fall  · Scheduled Tylenol  · Lidoderm  · As needed oxycodone  · Bowel regimen while on narcotics

## 2023-10-11 NOTE — PLAN OF CARE
Problem: MOBILITY - ADULT  Goal: Maintain or return to baseline ADL function  Description: INTERVENTIONS:  -  Assess patient's ability to carry out ADLs; assess patient's baseline for ADL function and identify physical deficits which impact ability to perform ADLs (bathing, care of mouth/teeth, toileting, grooming, dressing, etc.)  - Assess/evaluate cause of self-care deficits   - Assess range of motion  - Assess patient's mobility; develop plan if impaired  - Assess patient's need for assistive devices and provide as appropriate  - Encourage maximum independence but intervene and supervise when necessary  - Involve family in performance of ADLs  - Assess for home care needs following discharge   - Consider OT consult to assist with ADL evaluation and planning for discharge  - Provide patient education as appropriate  Outcome: Progressing  Goal: Maintains/Returns to pre admission functional level  Description: INTERVENTIONS:  - Perform BMAT or MOVE assessment daily.   - Set and communicate daily mobility goal to care team and patient/family/caregiver. - Collaborate with rehabilitation services on mobility goals if consulted  - Perform Range of Motion times a day. - Reposition patient every  hours.   - Dangle patient  times a day  - Stand patient  times a day  - Ambulate patient  times a day  - Out of bed to chair  times a day   - Out of bed for meals  times a day  - Out of bed for toileting  - Record patient progress and toleration of activity level   Outcome: Progressing     Problem: Prexisting or High Potential for Compromised Skin Integrity  Goal: Skin integrity is maintained or improved  Description: INTERVENTIONS:  - Identify patients at risk for skin breakdown  - Assess and monitor skin integrity  - Assess and monitor nutrition and hydration status  - Monitor labs   - Assess for incontinence   - Turn and reposition patient  - Assist with mobility/ambulation  - Relieve pressure over bony prominences  - Avoid friction and shearing  - Provide appropriate hygiene as needed including keeping skin clean and dry  - Evaluate need for skin moisturizer/barrier cream  - Collaborate with interdisciplinary team   - Patient/family teaching  - Consider wound care consult   Outcome: Progressing     Problem: PAIN - ADULT  Goal: Verbalizes/displays adequate comfort level or baseline comfort level  Description: Interventions:  - Encourage patient to monitor pain and request assistance  - Assess pain using appropriate pain scale  - Administer analgesics based on type and severity of pain and evaluate response  - Implement non-pharmacological measures as appropriate and evaluate response  - Consider cultural and social influences on pain and pain management  - Notify physician/advanced practitioner if interventions unsuccessful or patient reports new pain  Outcome: Progressing     Problem: INFECTION - ADULT  Goal: Absence or prevention of progression during hospitalization  Description: INTERVENTIONS:  - Assess and monitor for signs and symptoms of infection  - Monitor lab/diagnostic results  - Monitor all insertion sites, i.e. indwelling lines, tubes, and drains  - Monitor endotracheal if appropriate and nasal secretions for changes in amount and color  - Nikolai appropriate cooling/warming therapies per order  - Administer medications as ordered  - Instruct and encourage patient and family to use good hand hygiene technique  - Identify and instruct in appropriate isolation precautions for identified infection/condition  Outcome: Progressing  Goal: Absence of fever/infection during neutropenic period  Description: INTERVENTIONS:  - Monitor WBC    Outcome: Progressing     Problem: SAFETY ADULT  Goal: Maintain or return to baseline ADL function  Description: INTERVENTIONS:  -  Assess patient's ability to carry out ADLs; assess patient's baseline for ADL function and identify physical deficits which impact ability to perform ADLs (bathing, care of mouth/teeth, toileting, grooming, dressing, etc.)  - Assess/evaluate cause of self-care deficits   - Assess range of motion  - Assess patient's mobility; develop plan if impaired  - Assess patient's need for assistive devices and provide as appropriate  - Encourage maximum independence but intervene and supervise when necessary  - Involve family in performance of ADLs  - Assess for home care needs following discharge   - Consider OT consult to assist with ADL evaluation and planning for discharge  - Provide patient education as appropriate  Outcome: Progressing  Goal: Maintains/Returns to pre admission functional level  Description: INTERVENTIONS:  - Perform BMAT or MOVE assessment daily.   - Set and communicate daily mobility goal to care team and patient/family/caregiver. - Collaborate with rehabilitation services on mobility goals if consulted  - Perform Range of Motion times a day. - Reposition patient every  hours.   - Dangle patient  times a day  - Stand patient  times a day  - Ambulate patient  times a day  - Out of bed to chair  times a day   - Out of bed for meals  times a day  - Out of bed for toileting  - Record patient progress and toleration of activity level   Outcome: Progressing  Goal: Patient will remain free of falls  Description: INTERVENTIONS:  - Educate patient/family on patient safety including physical limitations  - Instruct patient to call for assistance with activity   - Consult OT/PT to assist with strengthening/mobility   - Keep Call bell within reach  - Keep bed low and locked with side rails adjusted as appropriate  - Keep care items and personal belongings within reach  - Initiate and maintain comfort rounds  - Make Fall Risk Sign visible to staff  - Offer Toileting every  Hours, in advance of need  - Initiate/Maintain alarm  - Obtain necessary fall risk management equipment  - Apply yellow socks and bracelet for high fall risk patients  - Consider moving patient to room near nurses station  Outcome: Progressing     Problem: DISCHARGE PLANNING  Goal: Discharge to home or other facility with appropriate resources  Description: INTERVENTIONS:  - Identify barriers to discharge w/patient and caregiver  - Arrange for needed discharge resources and transportation as appropriate  - Identify discharge learning needs (meds, wound care, etc.)  - Arrange for interpretive services to assist at discharge as needed  - Refer to Case Management Department for coordinating discharge planning if the patient needs post-hospital services based on physician/advanced practitioner order or complex needs related to functional status, cognitive ability, or social support system  Outcome: Progressing     Problem: Knowledge Deficit  Goal: Patient/family/caregiver demonstrates understanding of disease process, treatment plan, medications, and discharge instructions  Description: Complete learning assessment and assess knowledge base.   Interventions:  - Provide teaching at level of understanding  - Provide teaching via preferred learning methods  Outcome: Progressing

## 2023-10-11 NOTE — ASSESSMENT & PLAN NOTE
Bilateral knee pain, left greater than right sustained from fall.   Unable to ambulate due to pain  Plan  Multimodal pain regimen as below  Ortho consult - appreciate recs  PT/OT consult

## 2023-10-11 NOTE — ASSESSMENT & PLAN NOTE
Patient states he was hiking, tripped on room, landed on both his knees. Pain in both knees. Unable to flex either of them initially requiring prolonged rescue from hike.   After being laid supine for CT scan, left knee did feel like it was better approximated he is now able to bend a bit but still having a lot of pain in both knees  Injuries as below  Multimodal pain regimen as below

## 2023-10-11 NOTE — ASSESSMENT & PLAN NOTE
Sustained from fall  On physical exam high riding patella with void palpated inferiorly.   Limited active flexion of right knee  Plan  Multimodal pain regimen as below  Ortho consult - apprec recs

## 2023-10-11 NOTE — ASSESSMENT & PLAN NOTE
· Bilateral knee pain, left greater than right sustained from fall.   Unable to ambulate due to pain  · Plan  · Multimodal pain regimen as below  · Ortho consult  · PT/OT consult

## 2023-10-11 NOTE — OCCUPATIONAL THERAPY NOTE
Occupational Therapy Cancelled Session    Patient Name: Lesle Simmonds KEEAD'R Date: 10/11/2023     10/11/23 4362   Note Type   Note type Cancelled Session   Cancel Reasons Patient to operating room   Additional Comments OT orders received and chart review performed. Pt admitted s/p fall while hiking resulting in b/l knee injuries. Pt planned for OR tomorrow for repair tendon quadriceps. Will follow up post-op. Appreciate activity orders and WBS.      Jake Aldana, SHERI, OTR/L  [place] License [id number]  [address] [place], [place] [id number]
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
## 2023-10-11 NOTE — ASSESSMENT & PLAN NOTE
Pain in bilateral knee sustained from fall  Scheduled Tylenol  Lidoderm  As needed oxycodone  Bowel regimen while on narcotics

## 2023-10-12 ENCOUNTER — ANESTHESIA (INPATIENT)
Dept: PERIOP | Facility: HOSPITAL | Age: 73
DRG: 502 | End: 2023-10-12
Payer: MEDICARE

## 2023-10-12 LAB
ANION GAP SERPL CALCULATED.3IONS-SCNC: 6 MMOL/L
ANISOCYTOSIS BLD QL SMEAR: PRESENT
BASOPHILS # BLD AUTO: 0.01 THOUSANDS/ÂΜL (ref 0–0.1)
BASOPHILS NFR BLD AUTO: 0 % (ref 0–1)
BUN SERPL-MCNC: 16 MG/DL (ref 5–25)
CALCIUM SERPL-MCNC: 8.9 MG/DL (ref 8.4–10.2)
CHLORIDE SERPL-SCNC: 106 MMOL/L (ref 96–108)
CO2 SERPL-SCNC: 27 MMOL/L (ref 21–32)
CREAT SERPL-MCNC: 0.76 MG/DL (ref 0.6–1.3)
EOSINOPHIL # BLD AUTO: 0 THOUSAND/ÂΜL (ref 0–0.61)
EOSINOPHIL NFR BLD AUTO: 0 % (ref 0–6)
ERYTHROCYTE [DISTWIDTH] IN BLOOD BY AUTOMATED COUNT: 13.4 % (ref 11.6–15.1)
GFR SERPL CREATININE-BSD FRML MDRD: 89 ML/MIN/1.73SQ M
GLUCOSE SERPL-MCNC: 124 MG/DL (ref 65–140)
HCT VFR BLD AUTO: 45.5 % (ref 36.5–49.3)
HGB BLD-MCNC: 15.2 G/DL (ref 12–17)
IMM GRANULOCYTES # BLD AUTO: 0.29 THOUSAND/UL (ref 0–0.2)
IMM GRANULOCYTES NFR BLD AUTO: 5 % (ref 0–2)
LG PLATELETS BLD QL SMEAR: PRESENT
LYMPHOCYTES # BLD AUTO: 1.22 THOUSANDS/ÂΜL (ref 0.6–4.47)
LYMPHOCYTES NFR BLD AUTO: 19 % (ref 14–44)
MCH RBC QN AUTO: 30.8 PG (ref 26.8–34.3)
MCHC RBC AUTO-ENTMCNC: 33.4 G/DL (ref 31.4–37.4)
MCV RBC AUTO: 92 FL (ref 82–98)
MONOCYTES # BLD AUTO: 2.11 THOUSAND/ÂΜL (ref 0.17–1.22)
MONOCYTES NFR BLD AUTO: 33 % (ref 4–12)
NEUTROPHILS # BLD AUTO: 2.79 THOUSANDS/ÂΜL (ref 1.85–7.62)
NEUTS SEG NFR BLD AUTO: 43 % (ref 43–75)
NRBC BLD AUTO-RTO: 0 /100 WBCS
PLATELET # BLD AUTO: 104 THOUSANDS/UL (ref 149–390)
PLATELET BLD QL SMEAR: ABNORMAL
PMV BLD AUTO: 11.7 FL (ref 8.9–12.7)
POTASSIUM SERPL-SCNC: 4.2 MMOL/L (ref 3.5–5.3)
RBC # BLD AUTO: 4.94 MILLION/UL (ref 3.88–5.62)
RBC MORPH BLD: PRESENT
SODIUM SERPL-SCNC: 139 MMOL/L (ref 135–147)
WBC # BLD AUTO: 6.42 THOUSAND/UL (ref 4.31–10.16)

## 2023-10-12 PROCEDURE — 27385 REPAIR OF THIGH MUSCLE: CPT | Performed by: STUDENT IN AN ORGANIZED HEALTH CARE EDUCATION/TRAINING PROGRAM

## 2023-10-12 PROCEDURE — 0LQL0ZZ REPAIR RIGHT UPPER LEG TENDON, OPEN APPROACH: ICD-10-PCS | Performed by: STUDENT IN AN ORGANIZED HEALTH CARE EDUCATION/TRAINING PROGRAM

## 2023-10-12 PROCEDURE — 27385 REPAIR OF THIGH MUSCLE: CPT

## 2023-10-12 PROCEDURE — NC001 PR NO CHARGE: Performed by: NURSE PRACTITIONER

## 2023-10-12 PROCEDURE — 99232 SBSQ HOSP IP/OBS MODERATE 35: CPT | Performed by: EMERGENCY MEDICINE

## 2023-10-12 PROCEDURE — 80048 BASIC METABOLIC PNL TOTAL CA: CPT | Performed by: NURSE PRACTITIONER

## 2023-10-12 PROCEDURE — 85027 COMPLETE CBC AUTOMATED: CPT | Performed by: NURSE PRACTITIONER

## 2023-10-12 PROCEDURE — NC001 PR NO CHARGE: Performed by: STUDENT IN AN ORGANIZED HEALTH CARE EDUCATION/TRAINING PROGRAM

## 2023-10-12 RX ORDER — LANSOPRAZOLE 30 MG/1
30 CAPSULE, DELAYED RELEASE ORAL DAILY
Status: DISCONTINUED | OUTPATIENT
Start: 2023-10-13 | End: 2023-10-12

## 2023-10-12 RX ORDER — BISACODYL 10 MG
10 SUPPOSITORY, RECTAL RECTAL DAILY PRN
Status: DISCONTINUED | OUTPATIENT
Start: 2023-10-13 | End: 2023-10-14 | Stop reason: HOSPADM

## 2023-10-12 RX ORDER — FENTANYL CITRATE/PF 50 MCG/ML
25 SYRINGE (ML) INJECTION
Status: DISCONTINUED | OUTPATIENT
Start: 2023-10-12 | End: 2023-10-12 | Stop reason: HOSPADM

## 2023-10-12 RX ORDER — PROPOFOL 10 MG/ML
INJECTION, EMULSION INTRAVENOUS AS NEEDED
Status: DISCONTINUED | OUTPATIENT
Start: 2023-10-12 | End: 2023-10-12

## 2023-10-12 RX ORDER — DEXAMETHASONE SODIUM PHOSPHATE 10 MG/ML
INJECTION, SOLUTION INTRAMUSCULAR; INTRAVENOUS AS NEEDED
Status: DISCONTINUED | OUTPATIENT
Start: 2023-10-12 | End: 2023-10-12

## 2023-10-12 RX ORDER — HYDROMORPHONE HYDROCHLORIDE 2 MG/1
2 TABLET ORAL EVERY 4 HOURS PRN
Status: DISCONTINUED | OUTPATIENT
Start: 2023-10-12 | End: 2023-10-13

## 2023-10-12 RX ORDER — ONDANSETRON 2 MG/ML
INJECTION INTRAMUSCULAR; INTRAVENOUS AS NEEDED
Status: DISCONTINUED | OUTPATIENT
Start: 2023-10-12 | End: 2023-10-12

## 2023-10-12 RX ORDER — VANCOMYCIN HYDROCHLORIDE 1 G/20ML
INJECTION, POWDER, LYOPHILIZED, FOR SOLUTION INTRAVENOUS AS NEEDED
Status: DISCONTINUED | OUTPATIENT
Start: 2023-10-12 | End: 2023-10-12 | Stop reason: HOSPADM

## 2023-10-12 RX ORDER — SODIUM CHLORIDE, SODIUM GLUCONATE, SODIUM ACETATE, POTASSIUM CHLORIDE, MAGNESIUM CHLORIDE, SODIUM PHOSPHATE, DIBASIC, AND POTASSIUM PHOSPHATE .53; .5; .37; .037; .03; .012; .00082 G/100ML; G/100ML; G/100ML; G/100ML; G/100ML; G/100ML; G/100ML
50 INJECTION, SOLUTION INTRAVENOUS CONTINUOUS
Status: DISCONTINUED | OUTPATIENT
Start: 2023-10-12 | End: 2023-10-12

## 2023-10-12 RX ORDER — FENTANYL CITRATE 50 UG/ML
INJECTION, SOLUTION INTRAMUSCULAR; INTRAVENOUS AS NEEDED
Status: DISCONTINUED | OUTPATIENT
Start: 2023-10-12 | End: 2023-10-12

## 2023-10-12 RX ORDER — MAGNESIUM HYDROXIDE 1200 MG/15ML
LIQUID ORAL AS NEEDED
Status: DISCONTINUED | OUTPATIENT
Start: 2023-10-12 | End: 2023-10-12 | Stop reason: HOSPADM

## 2023-10-12 RX ORDER — CEFAZOLIN SODIUM 2 G/50ML
2000 SOLUTION INTRAVENOUS EVERY 8 HOURS
Status: COMPLETED | OUTPATIENT
Start: 2023-10-12 | End: 2023-10-13

## 2023-10-12 RX ORDER — ONDANSETRON 2 MG/ML
4 INJECTION INTRAMUSCULAR; INTRAVENOUS ONCE AS NEEDED
Status: DISCONTINUED | OUTPATIENT
Start: 2023-10-12 | End: 2023-10-12 | Stop reason: HOSPADM

## 2023-10-12 RX ORDER — ROCURONIUM BROMIDE 10 MG/ML
INJECTION, SOLUTION INTRAVENOUS AS NEEDED
Status: DISCONTINUED | OUTPATIENT
Start: 2023-10-12 | End: 2023-10-12

## 2023-10-12 RX ORDER — PHENYLEPHRINE HCL IN 0.9% NACL 1 MG/10 ML
SYRINGE (ML) INTRAVENOUS AS NEEDED
Status: DISCONTINUED | OUTPATIENT
Start: 2023-10-12 | End: 2023-10-12

## 2023-10-12 RX ORDER — HYDROMORPHONE HCL IN WATER/PF 6 MG/30 ML
0.2 PATIENT CONTROLLED ANALGESIA SYRINGE INTRAVENOUS
Status: DISCONTINUED | OUTPATIENT
Start: 2023-10-12 | End: 2023-10-12 | Stop reason: HOSPADM

## 2023-10-12 RX ORDER — MIDAZOLAM HYDROCHLORIDE 2 MG/2ML
INJECTION, SOLUTION INTRAMUSCULAR; INTRAVENOUS AS NEEDED
Status: DISCONTINUED | OUTPATIENT
Start: 2023-10-12 | End: 2023-10-12

## 2023-10-12 RX ORDER — CEFAZOLIN SODIUM 2 G/50ML
2000 SOLUTION INTRAVENOUS
Status: COMPLETED | OUTPATIENT
Start: 2023-10-12 | End: 2023-10-12

## 2023-10-12 RX ORDER — LIDOCAINE HYDROCHLORIDE 10 MG/ML
INJECTION, SOLUTION EPIDURAL; INFILTRATION; INTRACAUDAL; PERINEURAL AS NEEDED
Status: DISCONTINUED | OUTPATIENT
Start: 2023-10-12 | End: 2023-10-12

## 2023-10-12 RX ORDER — HYDROMORPHONE HYDROCHLORIDE 2 MG/1
4 TABLET ORAL EVERY 4 HOURS PRN
Status: DISCONTINUED | OUTPATIENT
Start: 2023-10-12 | End: 2023-10-13

## 2023-10-12 RX ORDER — LANSOPRAZOLE 30 MG/1
30 CAPSULE, DELAYED RELEASE ORAL DAILY
Status: DISCONTINUED | OUTPATIENT
Start: 2023-10-12 | End: 2023-10-14 | Stop reason: HOSPADM

## 2023-10-12 RX ORDER — HYDROMORPHONE HCL IN WATER/PF 6 MG/30 ML
0.2 PATIENT CONTROLLED ANALGESIA SYRINGE INTRAVENOUS
Status: DISCONTINUED | OUTPATIENT
Start: 2023-10-12 | End: 2023-10-14 | Stop reason: HOSPADM

## 2023-10-12 RX ADMIN — OXYCODONE HYDROCHLORIDE 5 MG: 5 TABLET ORAL at 13:13

## 2023-10-12 RX ADMIN — ACETAMINOPHEN 975 MG: 325 TABLET, FILM COATED ORAL at 05:09

## 2023-10-12 RX ADMIN — ACETAMINOPHEN 975 MG: 325 TABLET, FILM COATED ORAL at 13:12

## 2023-10-12 RX ADMIN — LIDOCAINE HYDROCHLORIDE 50 MG: 10 INJECTION, SOLUTION EPIDURAL; INFILTRATION; INTRACAUDAL; PERINEURAL at 10:36

## 2023-10-12 RX ADMIN — HYDROMORPHONE HYDROCHLORIDE 4 MG: 2 TABLET ORAL at 22:01

## 2023-10-12 RX ADMIN — SODIUM CHLORIDE, SODIUM GLUCONATE, SODIUM ACETATE, POTASSIUM CHLORIDE, MAGNESIUM CHLORIDE, SODIUM PHOSPHATE, DIBASIC, AND POTASSIUM PHOSPHATE 50 ML/HR: .53; .5; .37; .037; .03; .012; .00082 INJECTION, SOLUTION INTRAVENOUS at 07:58

## 2023-10-12 RX ADMIN — OXYCODONE HYDROCHLORIDE 5 MG: 5 TABLET ORAL at 01:37

## 2023-10-12 RX ADMIN — ACETAMINOPHEN 975 MG: 325 TABLET, FILM COATED ORAL at 22:01

## 2023-10-12 RX ADMIN — HYDROMORPHONE HYDROCHLORIDE 4 MG: 2 TABLET ORAL at 16:47

## 2023-10-12 RX ADMIN — DEXAMETHASONE SODIUM PHOSPHATE 10 MG: 10 INJECTION, SOLUTION INTRAMUSCULAR; INTRAVENOUS at 10:36

## 2023-10-12 RX ADMIN — PROPOFOL 200 MG: 10 INJECTION, EMULSION INTRAVENOUS at 10:35

## 2023-10-12 RX ADMIN — MAGNESIUM HYDROXIDE 30 ML: 2400 SUSPENSION ORAL at 22:02

## 2023-10-12 RX ADMIN — FENTANYL CITRATE 50 MCG: 50 INJECTION INTRAMUSCULAR; INTRAVENOUS at 10:38

## 2023-10-12 RX ADMIN — ROCURONIUM BROMIDE 50 MG: 10 INJECTION, SOLUTION INTRAVENOUS at 10:36

## 2023-10-12 RX ADMIN — ONDANSETRON 4 MG: 2 INJECTION INTRAMUSCULAR; INTRAVENOUS at 11:43

## 2023-10-12 RX ADMIN — CEFAZOLIN SODIUM 2000 MG: 2 SOLUTION INTRAVENOUS at 18:17

## 2023-10-12 RX ADMIN — Medication 100 MCG: at 10:44

## 2023-10-12 RX ADMIN — MIDAZOLAM 1 MG: 1 INJECTION INTRAMUSCULAR; INTRAVENOUS at 10:33

## 2023-10-12 RX ADMIN — PANTOPRAZOLE SODIUM 40 MG: 40 TABLET, DELAYED RELEASE ORAL at 05:09

## 2023-10-12 RX ADMIN — HYDROMORPHONE HYDROCHLORIDE 0.2 MG: 0.2 INJECTION, SOLUTION INTRAMUSCULAR; INTRAVENOUS; SUBCUTANEOUS at 20:18

## 2023-10-12 RX ADMIN — HYDROMORPHONE HYDROCHLORIDE 0.2 MG: 0.2 INJECTION, SOLUTION INTRAMUSCULAR; INTRAVENOUS; SUBCUTANEOUS at 15:21

## 2023-10-12 RX ADMIN — ENOXAPARIN SODIUM 30 MG: 30 INJECTION SUBCUTANEOUS at 22:01

## 2023-10-12 RX ADMIN — CEFAZOLIN SODIUM 2000 MG: 2 SOLUTION INTRAVENOUS at 10:33

## 2023-10-12 RX ADMIN — HYDROMORPHONE HYDROCHLORIDE 0.2 MG: 0.2 INJECTION, SOLUTION INTRAMUSCULAR; INTRAVENOUS; SUBCUTANEOUS at 23:49

## 2023-10-12 RX ADMIN — FENTANYL CITRATE 50 MCG: 50 INJECTION INTRAMUSCULAR; INTRAVENOUS at 10:35

## 2023-10-12 RX ADMIN — SUGAMMADEX 200 MG: 100 INJECTION, SOLUTION INTRAVENOUS at 11:43

## 2023-10-12 RX ADMIN — LANSOPRAZOLE 30 MG: 30 CAPSULE, DELAYED RELEASE ORAL at 22:20

## 2023-10-12 NOTE — PROGRESS NOTES
Assessment/Plan   Assessment & Plan  S/P Fixation of right Quadraceps Tendon  Subjective     Subjective:  I have pain"    Temp:  [97 °F (36.1 °C)-98.4 °F (36.9 °C)] 98.3 °F (36.8 °C)  HR:  [] 72  Resp:  [12-18] 16  BP: (128-163)/(62-91) 163/83  I/O last 3 completed shifts: In: 240 [P.O.:240]  Out: 200 [Urine:200]  I/O this shift: In: 800 [I.V.:800]  Out: -     Objective   Objective:  Vital signs (most recent): Blood pressure 163/83, pulse 72, temperature 98.3 °F (36.8 °C), resp. rate 16, height 5' 8" (1.727 m), weight 93.5 kg (206 lb 2.1 oz), SpO2 94 %. Principal Problem:    Ambulatory dysfunction  Active Problems:    Brady's esophagus with dysplasia    Fall from standing    Patellar tendon rupture, right, initial encounter    Acute pain due to trauma      Post op from Orthopedic surgery  Awake and alert  GCS - 15 and neuro intact  RRR< no complaints of chest pain  Lungs CTA b/l, no shortness of breath  Abdomen soft with bowel sounds present  Moving UE's bilaterally  Pain meds adjusted  Doing well.

## 2023-10-12 NOTE — PROGRESS NOTES
[address] [facility] [employer]  Progress Note  Name: Michael Beverly  MRN: [id number]  Unit/Bed#: W -01 I Date of Admission: 10/10/2023   Date of Service: 10/12/2023 I Hospital Day: 1    Assessment/Plan   Acute pain due to trauma  Assessment & Plan  Pain in bilateral knee sustained from fall  Scheduled Tylenol  Lidoderm  As needed oxycodone  Bowel regimen while on narcotics    Patellar tendon rupture, right, initial encounter  Assessment & Plan  Sustained from fall  On physical exam high riding patella with void palpated inferiorly. Limited active flexion of right knee  Plan  Multimodal pain regimen as below  Ortho consult - OR today for RLE, patellar tendon rupture    Fall from standing  Assessment & Plan  Patient states he was hiking, tripped on room, landed on both his knees. Pain in both knees. Unable to flex either of them initially requiring prolonged rescue from hike. After being laid supine for CT scan, left knee did feel like it was better approximated he is now able to bend a bit but still having a lot of pain in both knees  Injuries as below  Multimodal pain regimen as below  For OR today 10/12/23    Brady's esophagus with dysplasia  Assessment & Plan  Substitute Protonix for home prevacid    * Ambulatory dysfunction  Assessment & Plan  Bilateral knee pain, left greater than right sustained from fall. Unable to ambulate due to pain  Plan  Multimodal pain regimen as below  Ortho consult - appreciate recs  PT/OT consult             Bowel Regimen:  Senna  VTE Prophylaxis:Enoxaparin (Lovenox)     Disposition: home    Subjective   Chief Complaint: mild discomfort    Subjective: " Good morning. I'm ready to go now"     Objective   Vitals:   Temp:  [97.8 °F (36.6 °C)-98.4 °F (36.9 °C)] 98.4 °F (36.9 °C)  HR:  [] 93  Resp:  [12-18] 12  BP: (127-143)/(67-91) 140/91    I/O         10/10 0701  10/11 0700 10/11 0701  10/12 0700 10/12 0701  10/13 0700    P. O.  240     Total Intake(mL/kg) 240 (2.6)     Urine (mL/kg/hr)  200 (0.1)     Total Output  200     Net  +40            Unmeasured Urine Occurrence  300 x              Physical Exam:   GENERAL APPEARANCE comfortable, pain controlled  NEURO:GCS - 15  HEENT: EOMs intact  CV: RRR, no complaints of chest pain  LUNGS: CTA bilaterally  no shortness of breath, IS ordered  GI:NPO for OR this morning, will start fluids  : voiding  MSK: Right LE T-rom brace, short brace on LLE  SKIN: warm and dry    Invasive Devices       Peripheral Intravenous Line  Duration             Peripheral IV 10/10/23 Left;Ventral (anterior) Forearm 1 day                          Lab Results:    Latest Reference Range & Units 10/12/23 06:32   Sodium 135 - 147 mmol/L 139   Potassium 3.5 - 5.3 mmol/L 4.2   Chloride 96 - 108 mmol/L 106   CO2 21 - 32 mmol/L 27   Anion Gap mmol/L 6   BUN 5 - 25 mg/dL 16   Creatinine 0.60 - 1.30 mg/dL 0.76   Glucose, Random 65 - 140 mg/dL 124   Calcium 8.4 - 10.2 mg/dL 8.9   eGFR ml/min/1.73sq m 89     Imaging:  none  Other Studies: none

## 2023-10-12 NOTE — PROGRESS NOTES
Progress Note - Orthopedics   Penelope Mireles 74 y.o. male MRN: [id number]  Unit/Bed#: APU 3      Subjective:    72 y.o.male seen and evaluated in pre op holding. States that he continues to have bilateral knee pain, right worse than left. Braces have been applied. No other complaints. [facility] for OR. NPO status confirmed.      Labs:  0   Lab Value Date/Time    HCT 45.3 10/10/2023 1713    HCT 47.1 04/27/2022 0640    HCT 44.1 04/26/2022 0559    HCT 47.4 02/26/2015 0334    HGB 15.3 10/10/2023 1713    HGB 16.0 04/27/2022 0640    HGB 15.1 04/26/2022 0559    HGB 15.8 02/26/2015 0334    INR 0.96 10/10/2023 1713    WBC 4.85 10/10/2023 1713    WBC 9.83 04/27/2022 0640    WBC 3.81 (L) 04/26/2022 0559    WBC 5.69 02/26/2015 0334    .5 (H) 12/19/2020 1504       Meds:    Current Facility-Administered Medications:     [MAR Hold] acetaminophen (TYLENOL) tablet 975 mg, 975 mg, Oral, Q8H [facility], Cesar Barroso MD, 975 mg at 10/12/23 7815    ceFAZolin (ANCEF) IVPB (premix in dextrose) 2,000 mg 50 mL, 2,000 mg, Intravenous, On Call To OR, Anabel Bower PA-C    [facility] Hold] enoxaparin (LOVENOX) subcutaneous injection 30 mg, 30 mg, Subcutaneous, Q12H, Cesar Barroso MD, 30 mg at 10/11/23 2016    [MAR Hold] lidocaine (LIDODERM) 5 % patch 1 patch, 1 patch, Topical, Daily, Cesar Barroso MD, 1 patch at 10/11/23 2016    multi-electrolyte (PLASMALYTE-A/ISOLYTE-S PH 7.4) IV solution, 50 mL/hr, Intravenous, Continuous, Gabrielle Moncada PA-C, Last Rate: 50 mL/hr at 10/12/23 0758, 50 mL/hr at 10/12/23 0758    [MAR Hold] multivitamin stress formula tablet 1 tablet, 1 tablet, Oral, QAM, Cesar Barroso MD, 1 tablet at 10/11/23 0828    [MAR Hold] oxyCODONE (ROXICODONE) IR tablet 5 mg, 5 mg, Oral, Q4H PRN, 5 mg at 10/12/23 0137 **OR** [MAR Hold] oxyCODONE (ROXICODONE) split tablet 2.5 mg, 2.5 mg, Oral, Q4H PRN, Cesar Barroso MD    [MAR Hold] pantoprazole (PROTONIX) EC tablet 40 mg, 40 mg, Oral, Early Morning, [facility] [address] MD, 40 mg at 10/12/23 0509    [MAR Hold] senna (SENOKOT) tablet 17.2 mg, 2 tablet, Oral, Daily, Miah Alvarez MD, 17.2 mg at 10/11/23 6457    Blood Culture:   Lab Results   Component Value Date    BLOODCX No Growth After 5 Days. 12/19/2020       Wound Culture:   No results found for: "WOUNDCULT"    Ins and Outs:  I/O last 24 hours: In: 240 [P.O.:240]  Out: 200 [Urine:200]          Physical:  Vitals:    10/12/23 0748   BP:    Pulse:    Resp:    Temp:    SpO2: 95%     Musculoskeletal: bilateral upper extremity  Skin intact. Well healed surgical incisions over right shoulder. No significant ttp over the bilateral clavicles, shoulders, upper arms, elbows, forearms or wrists. ROM full. SILT m/r/u. Motor intact ain/pin/m/r/u  2+ radial and ulnar pulse  Musculature is soft and compressible, no pain with passive stretch     Musculoskeletal: bilateral lower extremity  Skin intact. TTP over the bilateral knees, right worse than left. Palpable gap over the superior patella on the right. SILT s/s/sp/dp/t. Able to perform straight leg raise on the left. Negative varus/valgus testing. Unable to perform straight leg raise on the right. Motor intact ankle dorsi/plantar flexion, EHL/FHL bilaterally. 2+ DP/PT pulse bilaterally. Musculature is soft and compressible, no pain with passive stretch bilaterally. Leg lengths equal    Assessment:    72 y.o.male s/p mechanical fall onto bilateral knees. CT and physical exam findings consistent with a right sided quadriceps tendon injury. Plan:  WBAT to the bilateral lower extremities. Right to be in TROM locked in extension  Left with sleeve/hinged knee brace, may be unlocked. Will plan for operative intervention to the right knee today. NPO   Consent obtained. Pre operative abx ordered. Clear for OR. Body mass index is 31.34 kg/m². mildly obese. Recommend behavior modifications and nutrition.   Dispo: Ortho will follow  Case reviewed and discussed with Dr. Rodrigo Cruz.      Fauzia Whitman PA-C

## 2023-10-12 NOTE — ANESTHESIA PREPROCEDURE EVALUATION
Procedure:  REPAIR TENDON QUADRICEPS, and all associated procedures (Right: Thigh)    Relevant Problems   ANESTHESIA (within normal limits)   (-) History of anesthesia complications      CARDIO   (-) Chest pain   (-) DANG (dyspnea on exertion)      GI/HEPATIC   (+) Gastroesophageal reflux disease without esophagitis      PULMONARY   (-) Shortness of breath      Musculoskeletal and Integument   (+) Patellar tendon rupture, right, initial encounter        Physical Exam    Airway    Mallampati score: II  TM Distance: >3 FB  Neck ROM: full     Dental    lower dentures and upper dentures    Cardiovascular      Pulmonary      Other Findings        Anesthesia Plan  ASA Score- 2     Anesthesia Type- general with ASA Monitors. Additional Monitors:     Airway Plan: ETT. Plan Factors-Exercise tolerance (METS): >4 METS. Chart reviewed. EKG reviewed. Existing labs reviewed. Patient summary reviewed. Induction- intravenous. Postoperative Plan-     Informed Consent- Anesthetic plan and risks discussed with patient. I personally reviewed this patient with the CRNA. Discussed and agreed on the Anesthesia Plan with the CRNA. Estephania Candelaria

## 2023-10-12 NOTE — DISCHARGE INSTR - AVS FIRST PAGE
Discharge Instructions - Orthopedics  Fawn Alvarenga 74 y.o. male MRN: [id number]  Unit/Bed#: AN OR MAIN    Weight Bearing Status:                                           Weightbearing as tolerated to right lower extremity  Remain locked in extension in T ROM brace for 2 weeks    DVT prophylaxis:  Lovenox while admitted, aspirin 325 mg twice daily x6 weeks    Pain:  Continue analgesics as directed    Dressing Instructions:   Please keep clean, dry and intact until follow up     Appt Instructions: If you do not have your appointment, please call the clinic at [phone] to schedule with Dr. Donny Pena  Otherwise follow up as scheduled. Contact the office sooner if you experience any increased numbness/tingling in the extremities.

## 2023-10-12 NOTE — ASSESSMENT & PLAN NOTE
Sustained from fall  On physical exam high riding patella with void palpated inferiorly.   Limited active flexion of right knee  Plan  Multimodal pain regimen as below  Ortho consult - OR today for RLE, patellar tendon rupture

## 2023-10-12 NOTE — PHYSICAL THERAPY NOTE
PHYSICAL THERAPY CANCELLATION NOTE          Patient Name: Zackary Zurita  CGEHF'Q Date: 10/12/2023             10/12/23 0920   Note Type   Note type Cancelled Session   Cancel Reasons Patient to operating room   Additional Comments PT eval orders received, chart review performed. Pt currently off floor w/ Ortho for quad tendon repair. Will hold PT eval at this time. Please re-consult PT post-op w/ updated activity orders and weight-bearing status. PT will continue to follow as appropriate and as schedule allows.          Sandrine Pittman, PT, DPT  10/12/23

## 2023-10-12 NOTE — OCCUPATIONAL THERAPY NOTE
Occupational Therapy Cancelled Session    Patient Name: Ata Nair  KTCIO'X Date: 10/12/2023     10/12/23 4732   Note Type   Note type Cancelled Session   Cancel Reasons Patient to operating room   Additional Comments Pt to OR today for quadriceps tendon repair. Will follow up post-op for OT evaluation. Appreciate activity orders and WBS.      SHERI Eaton, OTR/L  [place] License [id number]  [address] [place], [place] [id number]

## 2023-10-12 NOTE — ANESTHESIA POSTPROCEDURE EVALUATION
Post-Op Assessment Note    CV Status:  Stable  Pain Score: 0    Pain management: adequate     Mental Status:  Alert and awake   Hydration Status:  Euvolemic   PONV Controlled:  Controlled   Airway Patency:  Patent      Post Op Vitals Reviewed: Yes      Staff: Anesthesiologist         No notable events documented.   VSTANISHA TALLEY

## 2023-10-12 NOTE — ASSESSMENT & PLAN NOTE
Patient states he was hiking, tripped on room, landed on both his knees. Pain in both knees. Unable to flex either of them initially requiring prolonged rescue from hike.   After being laid supine for CT scan, left knee did feel like it was better approximated he is now able to bend a bit but still having a lot of pain in both knees  Injuries as below  Multimodal pain regimen as below  For OR today 10/12/23

## 2023-10-12 NOTE — OP NOTE
OPERATIVE REPORT  PATIENT NAME: Benedict Mosquera    :  1950  MRN: [id number]  Pt Location: AN OR ROOM 01    SURGERY DATE: 10/12/2023    Surgeon(s) and Role:     * Noa Soto, DO - Primary     ** Felicitas Mohs, PA-C - Assisting   I was present for the entire procedure., I was present for all critical portions of the procedure., A qualified resident physician was not available. , and A physician assistant was required during the procedure for retraction, tissue handling, dissection and suturing. Preop Diagnosis:  #1 right quadriceps tendon rupture    Post-Op Diagnosis:  #1 right quadriceps tendon rupture    Procedures:  1 surgical repair of right quadriceps tendon rupture      Specimen(s):  None    Estimated Blood Loss:   10 cc    Drains:  None    Anesthesia Type:   General endotracheal    Operative Indications:  Patient is a 68-year-old male that sustained a ground-level fall while hiking that sustained a right quadriceps tendon tear. The patient had disruption of his extensor mechanism with an inability to straight leg raise. In order to get the patient back to an active status and restore continuity of the extensor mechanism for ambulation patient was sent in for the above surgical procedures after discussion of operative versus nonoperative management      Implants:   #5 FiberWire suture    Tourniquet time:   Tourniquet was inflated 250 mmHg for 50 minutes      Complications:   No acute complications were encountered. Patient was transferred to PACU in stable condition    Operative findings:  Patient had a complete disruption of his right quadriceps tendon at its attachment to the patella. This extended medially down the extensor retinaculum distally to the level of the patellar tendon and laterally through the lateral retinaculum. There was no osteochondral injury with the patella everted and over the femoral condyles.   The tendon edges were debrided and cleaned and using drill holes through the patella the quadriceps tendon was repaired using #5 FiberWire in the retinaculum on the medial and lateral sides were repaired using 5 Ethibond. The quadriceps tendon was stable to 60 degrees of flexion before tension was applied to the sutures    Procedure and Technique:  Patient is a 55-year-old male that was seen and examined in the preoperative holding area. The operative extremities marked. All patient's questions were answered. Patient was then transferred to the operating room and general endotracheal anesthesia was administered by the point of anesthesia. The patient was then transferred over the operating table in CTL spine precautions. All bony prominences were well-padded. A well-padded nonsterile tourniquet was applied to the right upper thigh. The patient was then prepped and draped in a standard sterile orthopedic fashion. A timeout was performed confirm correct site, correct patient, correct procedure. All were in agreement procedure started. The right lower extremity was exsanguinated and the tourniquet was inflated to 250 mmHg. A midline incision was made using a #10 blade through skin subcutaneous tissues. Electrocautery was used to obtain hemostasis. Dissection was carried down to the level of quadriceps tendon. The disruption at the superior pole of the patella was clearly evident. It was complete in through the medial and lateral retinaculum. The medial retinacular tissue sustained higher disruption than the lateral side. There was very little quadriceps tendon remaining attached to the patella. The wound was copiously irrigated with sterile normal saline. The knee joint was inspected for any osteochondral injuries. No osteochondral injuries were found on the patellofemoral joint. The remaining tendon on the patella was debrided sharply with a #15 blade and roughened for eventual repair.   3 drill holes were made into the patella evenly spaced and small slits were made in the patellar tendon to allow for eventual passing of the sutures through these tunnels. Care was taken not to ensure that the drill holes did not enter the articular surface. Attention was then turned to placement of the suture. Using #5 FiberWire 2 sutures were utilized to make 4 rows of [place] sutures up along the medial and lateral sides of the tendon and into the middle aspect of the tendon. The sutures were then passed through 3 bone tunnels using a Haley suture passer. Once this was performed the knee was hyperextended using a sterile bump and while maintaining appropriate apposition of the lateral sutures the medial sutures were tied down and using the 2 strands of the FiberWire. After which the lateral sutures were then tied in a similar fashion. The repair allowed for good apposition of the tendon to the superior pole of the patella. After which the 2 figure-of-eight #5 Ethibond sutures were used on the medial and lateral retinacular tissues to repair the medial lateral retinaculum. The knee was then brought into flexion and sutures were tension free until approximately 60 degrees. The wound was then once again copiously irrigated with sterile normal saline. 1 g of vancomycin was placed deep in the wound. The deep tissues were repaired using 0 PDS suture. The subcutaneous tissues repaired with a 2-0 Monocryl suture. The skin was closed with staples. The wound was then dressed in a Mepilex dressing. The patient was then wrapped in an Ace wrap from the toes up to the upper thigh. The tourniquet was let down. A patient was placed into a T ROM with locked in extension with the settings at 0 and 40 degrees. The patient was then transferred to PACU in stable condition and extubated        Postoperative plan:  The patient will be weightbearing as tolerated to the right lower extremity with the knee locked in extension. He is weightbearing as tolerated to the left lower extremity.   The patient will maintain the knee in a locked position for the first 2 weeks to allow for incision healing. The patient will then begin his rehabilitation protocol to regain motion. The patient will receive 24 hours of postoperative antibiotics for infection prophylaxis. The patient will be started on aspirin 325 mg twice daily for DVT prophylaxis.   The patient will need to follow-up in 2 weeks for removal of staples and wound check    SIGNATURE: Harmeet Jacome DO  DATE: October 12, 2023  TIME: 12:22 PM

## 2023-10-13 PROBLEM — S80.12XA CONTUSION OF LEFT LEG, INITIAL ENCOUNTER: Status: ACTIVE | Noted: 2023-10-10

## 2023-10-13 LAB
ANION GAP SERPL CALCULATED.3IONS-SCNC: 6 MMOL/L
BUN SERPL-MCNC: 18 MG/DL (ref 5–25)
CALCIUM SERPL-MCNC: 8.8 MG/DL (ref 8.4–10.2)
CHLORIDE SERPL-SCNC: 105 MMOL/L (ref 96–108)
CO2 SERPL-SCNC: 29 MMOL/L (ref 21–32)
CREAT SERPL-MCNC: 0.75 MG/DL (ref 0.6–1.3)
ERYTHROCYTE [DISTWIDTH] IN BLOOD BY AUTOMATED COUNT: 13.2 % (ref 11.6–15.1)
GFR SERPL CREATININE-BSD FRML MDRD: 90 ML/MIN/1.73SQ M
GLUCOSE SERPL-MCNC: 145 MG/DL (ref 65–140)
HCT VFR BLD AUTO: 44.8 % (ref 36.5–49.3)
HGB BLD-MCNC: 15.2 G/DL (ref 12–17)
MCH RBC QN AUTO: 31.3 PG (ref 26.8–34.3)
MCHC RBC AUTO-ENTMCNC: 33.9 G/DL (ref 31.4–37.4)
MCV RBC AUTO: 92 FL (ref 82–98)
PLATELET # BLD AUTO: 104 THOUSANDS/UL (ref 149–390)
PMV BLD AUTO: 11.9 FL (ref 8.9–12.7)
POTASSIUM SERPL-SCNC: 4.2 MMOL/L (ref 3.5–5.3)
RBC # BLD AUTO: 4.86 MILLION/UL (ref 3.88–5.62)
SODIUM SERPL-SCNC: 140 MMOL/L (ref 135–147)
WBC # BLD AUTO: 10.8 THOUSAND/UL (ref 4.31–10.16)

## 2023-10-13 PROCEDURE — 85007 BL SMEAR W/DIFF WBC COUNT: CPT

## 2023-10-13 PROCEDURE — 97530 THERAPEUTIC ACTIVITIES: CPT

## 2023-10-13 PROCEDURE — 99232 SBSQ HOSP IP/OBS MODERATE 35: CPT | Performed by: EMERGENCY MEDICINE

## 2023-10-13 PROCEDURE — 97163 PT EVAL HIGH COMPLEX 45 MIN: CPT

## 2023-10-13 PROCEDURE — 99024 POSTOP FOLLOW-UP VISIT: CPT | Performed by: STUDENT IN AN ORGANIZED HEALTH CARE EDUCATION/TRAINING PROGRAM

## 2023-10-13 PROCEDURE — 80048 BASIC METABOLIC PNL TOTAL CA: CPT

## 2023-10-13 PROCEDURE — 97167 OT EVAL HIGH COMPLEX 60 MIN: CPT

## 2023-10-13 PROCEDURE — 85027 COMPLETE CBC AUTOMATED: CPT

## 2023-10-13 RX ORDER — OXYCODONE HYDROCHLORIDE 5 MG/1
5 TABLET ORAL EVERY 4 HOURS PRN
Status: DISCONTINUED | OUTPATIENT
Start: 2023-10-13 | End: 2023-10-14 | Stop reason: HOSPADM

## 2023-10-13 RX ADMIN — SENNOSIDES 17.2 MG: 8.6 TABLET, FILM COATED ORAL at 07:58

## 2023-10-13 RX ADMIN — ACETAMINOPHEN 975 MG: 325 TABLET, FILM COATED ORAL at 21:17

## 2023-10-13 RX ADMIN — OXYCODONE HYDROCHLORIDE 5 MG: 5 TABLET ORAL at 18:08

## 2023-10-13 RX ADMIN — ACETAMINOPHEN 975 MG: 325 TABLET, FILM COATED ORAL at 05:39

## 2023-10-13 RX ADMIN — ACETAMINOPHEN 975 MG: 325 TABLET, FILM COATED ORAL at 14:41

## 2023-10-13 RX ADMIN — OXYCODONE HYDROCHLORIDE 5 MG: 5 TABLET ORAL at 11:36

## 2023-10-13 RX ADMIN — LANSOPRAZOLE 30 MG: 30 CAPSULE, DELAYED RELEASE ORAL at 07:56

## 2023-10-13 RX ADMIN — ENOXAPARIN SODIUM 30 MG: 30 INJECTION SUBCUTANEOUS at 10:44

## 2023-10-13 RX ADMIN — ENOXAPARIN SODIUM 30 MG: 30 INJECTION SUBCUTANEOUS at 21:17

## 2023-10-13 RX ADMIN — BISACODYL 10 MG: 10 SUPPOSITORY RECTAL at 18:09

## 2023-10-13 RX ADMIN — B-COMPLEX W/ C & FOLIC ACID TAB 1 TABLET: TAB at 07:56

## 2023-10-13 RX ADMIN — OXYCODONE HYDROCHLORIDE 5 MG: 5 TABLET ORAL at 06:26

## 2023-10-13 RX ADMIN — CEFAZOLIN SODIUM 2000 MG: 2 SOLUTION INTRAVENOUS at 01:55

## 2023-10-13 RX ADMIN — OXYCODONE HYDROCHLORIDE 5 MG: 5 TABLET ORAL at 01:55
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
## 2023-10-13 NOTE — ASSESSMENT & PLAN NOTE
Patient states he was hiking, tripped on room, landed on both his knees. Pain in both knees. Unable to flex either of them initially requiring prolonged rescue from hike.   After being laid supine for CT scan, left knee did feel like it was better approximated he is now able to bend a bit but still having a lot of pain in both knees  Injuries as below  Multimodal pain regimen as below  S/p or on 10/12 for right quad tendon repair as below

## 2023-10-13 NOTE — PLAN OF CARE
Problem: OCCUPATIONAL THERAPY ADULT  Goal: Performs self-care activities at highest level of function for planned discharge setting. See evaluation for individualized goals. Description: Treatment Interventions: ADL retraining, Functional transfer training, Endurance training, Cognitive reorientation, Patient/family training, Equipment evaluation/education, Compensatory technique education, Energy conservation, Activityengagement  Equipment Recommended: Bedside commode, Shower transfer bench ($$), Hip Kit ($)       See flowsheet documentation for full assessment, interventions and recommendations. 10/13/2023 1341 by CLAY Marina  Note: Limitation: Decreased ADL status, Decreased UE strength, Decreased Safe judgement during ADL, Decreased cognition, Decreased endurance, Decreased self-care trans, Decreased high-level ADLs (impaired pain, balance, fxnl mobility, act delonte, fxnl reach, stand delonte, strength, fxnl sitting delonte, attention to task, direction following, safety awareness, insight, sequencing, problem solving, learning new tasks, initiation/termination of conversation)  Prognosis: Good  Assessment: Pt is a 74 y.o. male seen for OT evaluation s/p [facility] on 10/10/2023 due to fall. Diagnosed with Ambulatory dysfunction. Personal and env factors supporting pt at time of IE include (I) PLOF, supportive wife, and FFSU at home possible. Personal and env factors inhibiting engagement in occupations include advanced age, current habits and behavioral patterns, lifestyle patterns, and limited social support (is home alone during the day), inaccessible home environment (GEO, full bathroom on 2nd floor). Performance deficits that affect the pt’s occupational performance can be seen above. Due to pt's current functional limitations and medical complications pt is functioning below baseline.  Pt would benefit from continued skilled OT treatment in order to maximize safety, independence and overall performance with ADLs, functional mobility, functional transfers, and cognition in order to achieve highest level of function. OT Discharge Recommendation: Post acute rehabilitation services       10/13/2023 1341 by CLAY De La Rosa  Note: Limitation: Decreased ADL status, Decreased UE strength, Decreased Safe judgement during ADL, Decreased cognition, Decreased endurance, Decreased self-care trans, Decreased high-level ADLs (impaired pain, balance, fxnl mobility, act delonte, fxnl reach, stand delonte, strength, fxnl sitting delonte, attention to task, direction following, safety awareness, insight, sequencing, problem solving, learning new tasks, initiation/termination of conversation)  Prognosis: Good  Assessment: Pt is a 74 y.o. male seen for OT evaluation s/p admission to [facility] on 10/10/2023 due to fall. Diagnosed with Ambulatory dysfunction. Personal and env factors supporting pt at time of IE include (I) PLOF, supportive wife, and FFSU at home possible. Personal and env factors inhibiting engagement in occupations include advanced age, current habits and behavioral patterns, lifestyle patterns, and limited social support (is home alone during the day), inaccessible home environment (GEO, full bathroom on 2nd floor). Performance deficits that affect the pt’s occupational performance can be seen above. Due to pt's current functional limitations and medical complications pt is functioning below baseline. Pt would benefit from continued skilled OT treatment in order to maximize safety, independence and overall performance with ADLs, functional mobility, functional transfers, and cognition in order to achieve highest level of function.      OT Discharge Recommendation: Post acute rehabilitation services

## 2023-10-13 NOTE — ASSESSMENT & PLAN NOTE
Bilateral knee pain, left greater than right sustained from fall.   Unable to ambulate due to pain  Plan  Multimodal pain regimen as below  S/p right quad tendon repair 10/12  PT/OT consult

## 2023-10-13 NOTE — PLAN OF CARE
Problem: MOBILITY - ADULT  Goal: Maintain or return to baseline ADL function  Description: INTERVENTIONS:  -  Assess patient's ability to carry out ADLs; assess patient's baseline for ADL function and identify physical deficits which impact ability to perform ADLs (bathing, care of mouth/teeth, toileting, grooming, dressing, etc.)  - Assess/evaluate cause of self-care deficits   - Assess range of motion  - Assess patient's mobility; develop plan if impaired  - Assess patient's need for assistive devices and provide as appropriate  - Encourage maximum independence but intervene and supervise when necessary  - Involve family in performance of ADLs  - Assess for home care needs following discharge   - Consider OT consult to assist with ADL evaluation and planning for discharge  - Provide patient education as appropriate  Outcome: Not Progressing  Goal: Maintains/Returns to pre admission functional level  Description: INTERVENTIONS:  - Perform BMAT or MOVE assessment daily.   - Set and communicate daily mobility goal to care team and patient/family/caregiver. - Collaborate with rehabilitation services on mobility goals if consulted  - Perform Range of Motion times a day. - Reposition patient every  hours.   - Dangle patient  times a day  - Stand patient  times a day  - Ambulate patient  times a day  - Out of bed to chair  times a day   - Out of bed for meals  times a day  - Out of bed for toileting  - Record patient progress and toleration of activity level   Outcome: Not Progressing     Problem: Prexisting or High Potential for Compromised Skin Integrity  Goal: Skin integrity is maintained or improved  Description: INTERVENTIONS:  - Identify patients at risk for skin breakdown  - Assess and monitor skin integrity  - Assess and monitor nutrition and hydration status  - Monitor labs   - Assess for incontinence   - Turn and reposition patient  - Assist with mobility/ambulation  - Relieve pressure over bony prominences  - Avoid friction and shearing  - Provide appropriate hygiene as needed including keeping skin clean and dry  - Evaluate need for skin moisturizer/barrier cream  - Collaborate with interdisciplinary team   - Patient/family teaching  - Consider wound care consult   Outcome: Progressing     Problem: PAIN - ADULT  Goal: Verbalizes/displays adequate comfort level or baseline comfort level  Description: Interventions:  - Encourage patient to monitor pain and request assistance  - Assess pain using appropriate pain scale  - Administer analgesics based on type and severity of pain and evaluate response  - Implement non-pharmacological measures as appropriate and evaluate response  - Consider cultural and social influences on pain and pain management  - Notify physician/advanced practitioner if interventions unsuccessful or patient reports new pain  Outcome: Progressing     Problem: INFECTION - ADULT  Goal: Absence or prevention of progression during hospitalization  Description: INTERVENTIONS:  - Assess and monitor for signs and symptoms of infection  - Monitor lab/diagnostic results  - Monitor all insertion sites, i.e. indwelling lines, tubes, and drains  - Monitor endotracheal if appropriate and nasal secretions for changes in amount and color  - [place] appropriate cooling/warming therapies per order  - Administer medications as ordered  - Instruct and encourage patient and family to use good hand hygiene technique  - Identify and instruct in appropriate isolation precautions for identified infection/condition  Outcome: Progressing  Goal: Absence of fever/infection during neutropenic period  Description: INTERVENTIONS:  - Monitor WBC    Outcome: Progressing     Problem: SAFETY ADULT  Goal: Maintain or return to baseline ADL function  Description: INTERVENTIONS:  -  Assess patient's ability to carry out ADLs; assess patient's baseline for ADL function and identify physical deficits which impact ability to perform ADLs (bathing, care of mouth/teeth, toileting, grooming, dressing, etc.)  - Assess/evaluate cause of self-care deficits   - Assess range of motion  - Assess patient's mobility; develop plan if impaired  - Assess patient's need for assistive devices and provide as appropriate  - Encourage maximum independence but intervene and supervise when necessary  - Involve family in performance of ADLs  - Assess for home care needs following discharge   - Consider OT consult to assist with ADL evaluation and planning for discharge  - Provide patient education as appropriate  Outcome: Not Progressing  Goal: Maintains/Returns to pre admission functional level  Description: INTERVENTIONS:  - Perform BMAT or MOVE assessment daily.   - Set and communicate daily mobility goal to care team and patient/family/caregiver. - Collaborate with rehabilitation services on mobility goals if consulted  - Perform Range of Motion times a day. - Reposition patient every  hours.   - Dangle patient  times a day  - Stand patient  times a day  - Ambulate patient  times a day  - Out of bed to chair  times a day   - Out of bed for meals  times a day  - Out of bed for toileting  - Record patient progress and toleration of activity level   Outcome: Not Progressing  Goal: Patient will remain free of falls  Description: INTERVENTIONS:  - Educate patient/family on patient safety including physical limitations  - Instruct patient to call for assistance with activity   - Consult OT/PT to assist with strengthening/mobility   - Keep Call bell within reach  - Keep bed low and locked with side rails adjusted as appropriate  - Keep care items and personal belongings within reach  - Initiate and maintain comfort rounds  - Make Fall Risk Sign visible to staff  - Offer Toileting every  Hours, in advance of need  - Initiate/Maintain alarm  - Obtain necessary fall risk management equipment  - Apply yellow socks and bracelet for high fall risk patients  - Consider moving patient to room near nurses station  Outcome: Progressing     Problem: DISCHARGE PLANNING  Goal: Discharge to home or other facility with appropriate resources  Description: INTERVENTIONS:  - Identify barriers to discharge w/patient and caregiver  - Arrange for needed discharge resources and transportation as appropriate  - Identify discharge learning needs (meds, wound care, etc.)  - Arrange for interpretive services to assist at discharge as needed  - Refer to Case Management Department for coordinating discharge planning if the patient needs post-hospital services based on physician/advanced practitioner order or complex needs related to functional status, cognitive ability, or social support system  Outcome: Progressing     Problem: Knowledge Deficit  Goal: Patient/family/caregiver demonstrates understanding of disease process, treatment plan, medications, and discharge instructions  Description: Complete learning assessment and assess knowledge base. Interventions:  - Provide teaching at level of understanding  - Provide teaching via preferred learning methods  Outcome: Progressing     Problem: Nutrition/Hydration-ADULT  Goal: Nutrient/Hydration intake appropriate for improving, restoring or maintaining nutritional needs  Description: Monitor and assess patient's nutrition/hydration status for malnutrition. Collaborate with interdisciplinary team and initiate plan and interventions as ordered. Monitor patient's weight and dietary intake as ordered or per policy. Utilize nutrition screening tool and intervene as necessary. Determine patient's food preferences and provide high-protein, high-caloric foods as appropriate.      INTERVENTIONS:  - Monitor oral intake, urinary output, labs, and treatment plans  - Assess nutrition and hydration status and recommend course of action  - Evaluate amount of meals eaten  - Assist patient with eating if necessary   - Allow adequate time for meals  - Recommend/ encourage appropriate diets, oral nutritional supplements, and vitamin/mineral supplements  - Order, calculate, and assess calorie counts as needed  - Recommend, monitor, and adjust tube feedings and TPN/PPN based on assessed needs  - Assess need for intravenous fluids  - Provide specific nutrition/hydration education as appropriate  - Include patient/family/caregiver in decisions related to nutrition  Outcome: Progressing

## 2023-10-13 NOTE — ASSESSMENT & PLAN NOTE
Sustaining left upper leg contusion, no fractures, dislocation, muscle tear  Seen by orthopedics, appreciate recommendations  Placed in hinged knee brace.   Can be WBAT  Will be seen by ot/pt  Pain treatment as below

## 2023-10-13 NOTE — ARC ADMISSION
Referral received for consideration of patient for inpatient acute rehab. Will review patient's case with [facility] physician and update CM as able.

## 2023-10-13 NOTE — PROGRESS NOTES
Progress Note - Orthopedics   Randee West 74 y.o. male MRN: [id number]  Unit/Bed#: W -01      Subjective:    72 y.o.male seen and evaluated at bedside. States that he is doing ok today. Pain controlled. In a "better frame of mind". No major complaints.      Labs:  0   Lab Value Date/Time    HCT 44.8 10/13/2023 0638    HCT 45.5 10/12/2023 0632    HCT 45.3 10/10/2023 1713    HCT 47.4 02/26/2015 0334    HGB 15.2 10/13/2023 0638    HGB 15.2 10/12/2023 0632    HGB 15.3 10/10/2023 1713    HGB 15.8 02/26/2015 0334    INR 0.96 10/10/2023 1713    WBC 10.80 (H) 10/13/2023 0638    WBC 6.42 10/12/2023 0632    WBC 4.85 10/10/2023 1713    WBC 5.69 02/26/2015 0334    .5 (H) 12/19/2020 1504       Meds:    Current Facility-Administered Medications:     acetaminophen (TYLENOL) tablet 975 mg, 975 mg, Oral, Q8H [address] PA-C, 975 mg at 10/13/23 0539    bisacodyl (DULCOLAX) rectal suppository 10 mg, 10 mg, Rectal, Daily PRN, CHANEL Parrish    enoxaparin (LOVENOX) subcutaneous injection 30 mg, 30 mg, Subcutaneous, Q12H, Chilo Castro PA-C, 30 mg at 10/12/23 2201    HYDROmorphone HCl (DILAUDID) injection 0.2 mg, 0.2 mg, Intravenous, Q3H PRN, CHANEL Parrish, 0.2 mg at 10/12/23 2349    lansoprazole (PREVACID) capsule 30 mg, 30 mg, Oral, Daily, Alexandr Melendez PA-C, 30 mg at 10/13/23 0756    lidocaine (LIDODERM) 5 % patch 1 patch, 1 patch, Topical, Daily, Chilo Castro PA-C, 1 patch at 10/11/23 2016    magnesium hydroxide (MILK OF MAGNESIA) oral suspension 30 mL, 30 mL, Oral, Daily PRN, CHANEL Parrish, 30 mL at 10/12/23 2202    multivitamin stress formula tablet 1 tablet, 1 tablet, Oral, Chilo ROMERO PA-C, 1 tablet at 10/13/23 0756    oxyCODONE (ROXICODONE) IR tablet 5 mg, 5 mg, Oral, Q4H PRN, Fior Melendez PA-C, 5 mg at 10/13/23 5633    oxyCODONE (ROXICODONE) split tablet 2.5 mg, 2.5 mg, Oral, Q4H PRN, Fior Melendez PA-C    senna (SENOKOT) tablet 17.2 mg, 2 tablet, Oral, Daily, Swapnil Salas PA-C, 17.2 mg at 10/13/23 1566    Blood Culture:   Lab Results   Component Value Date    BLOODCX No Growth After 5 Days. 12/19/2020       Wound Culture:   No results found for: "WOUNDCULT"    Ins and Outs:  I/O last 24 hours: In: 1629 [P.O.:820; I.V.:800]  Out: 650 [Urine:650]          Physical:  Vitals:    10/13/23 0754   BP: 150/80   Pulse: 83   Resp:    Temp: 98 °F (36.7 °C)   SpO2: 96%     Musculoskeletal: bilateral Lower Extremity  Surgical dressings to right lower extremity c/d/I  Bilateral knee braces intact. Thigh and calves soft and compressible. Sensation intact to saphenous, sural, tibial, superficial peroneal nerve, and deep peroneal bilaterally. Motor intact to +FHL/EHL, +ankle dorsi/plantar flexion bilaterally. 2+ DP pulse bilaterally. Digits warm and well perfused bilaterally. Capillary refill < 2 seconds bilaterally    Assessment:    72 y.o.male s/p mechanical fall with bilateral knee pain. S/p surgical repair of right quadriceps rupture with Dr. Alfie Landers 10/12/2023 (POD1), doing well. Plan:  WBAT to the right lower extremity in TROM locked in extension  WBAT to the left lower extremity in hinged knee brace, unlocked   Hgb 15.2. Will monitor for ABLA and administer IVF/prbc as indicated for Greater than 2 gram drop or Hgb < 7  PT/OT  Pain control per primary team.   DVT ppx: ASA 325mg BID x 6 weeks. Medical management per primary team.   Dispo: Ortho will follow  Will need post op follow up with Dr. Alfie Landers upon discharge.      Fauzia Braden PA-C

## 2023-10-13 NOTE — CASE MANAGEMENT
Case Management Assessment & Discharge Planning Note    Patient name Emilio Gomez  Location W /W -80 MRN [id number]  : 1950 Date 10/13/2023       Current Admission Date: 10/10/2023  Current Admission Diagnosis:Ambulatory dysfunction   Patient Active Problem List    Diagnosis Date Noted    Patellar tendon rupture, right, initial encounter 10/11/2023    Acute pain due to trauma 10/11/2023    Ambulatory dysfunction 10/10/2023    Fall from standing 10/10/2023    Contusion of left leg, initial encounter 10/10/2023    Acute pain of right shoulder 2022    Hx of total shoulder replacement, right 2022    Muscle strain of right shoulder 2022    Post-traumatic osteoarthritis of first carpometacarpal joint of right hand 2022    Aftercare following right shoulder joint replacement surgery 2022    Bilateral impacted cerumen 2022    Sensorineural hearing loss (SNHL), bilateral 2022    Rotator cuff tear arthropathy of left shoulder 2022    Rotator cuff tear arthropathy of right shoulder 2022    History of colon polyps 2021    Elevated LFTs 2021    Brady's esophagus with dysplasia 2021    Gastroesophageal reflux disease without esophagitis 2015      LOS (days): 2  Geometric Mean LOS (GMLOS) (days): 2.50  Days to GMLOS:0.5     OBJECTIVE:    Risk of Unplanned Readmission Score: 7.49         Current admission status: Inpatient       Preferred Pharmacy:   [address]  Phone: [phone] Fax: [phone] [facility] [address] [address]  Phone: [phone] Fax: [phone]    Primary Care Provider: Kelsi Savage MD    Primary Insurance: MEDICARE  Secondary Insurance: AARP    ASSESSMENT:  [address] [facility], [address] [facility] Representative - Spouse   Primary Phone: [phone] (Mobile)                           Readmission Root Cause  30 Day Readmission: No    Patient Information  Admitted from[de-identified] Home  Mental Status: Alert  During Assessment patient was accompanied by: Spouse  Assessment information provided by[de-identified] Patient  Primary Caregiver: Self  Support Systems: Spouse/significant other, Family members, [address] of Residence: [facility] [address] [place] [address] do you live in?: [facility] entry access options.  Select all that apply.: Stairs  Type of Current Residence: 2 story home  Upon entering residence, is there a bedroom on the main floor (no further steps)?: Yes  Upon entering residence, is there a bathroom on the main floor (no further steps)?: Yes  In the last 12 months, was there a time when you were not able to pay the mortgage or rent on time?: No  In the last 12 months, was there a time when you did not have a steady place to sleep or slept in a shelter (including now)?: No  Living Arrangements: Lives w/ Spouse/significant other    Activities of Daily Living Prior to Admission  Functional Status: Independent  Completes ADLs independently?: Yes  Ambulates independently?: Yes  Does patient use assisted devices?: No  Does patient currently own DME?: Yes  What DME does the patient currently own?: Hospital Bed, [place]  Does patient have a history of Outpatient Therapy (PT/OT)?: Yes  Does the patient have a history of Short-Term Rehab?: No  Does patient have a history of HHC?: Yes  Does patient currently have [facility]?: No         Patient Information Continued  Income Source: Pension/longterm  Does patient have prescription coverage?: Yes  Within the past 12 months, you worried that your food would run out before you got the money to buy more.: Never true  Within the past 12 months, the food you bought just didn't last and you didn't have money to get more.: Never true  Does patient receive dialysis treatments?: No  Does patient have a history of substance abuse?: No  Does patient have a history of Mental Health Diagnosis?: No         Means of Transportation  Means of Transport to Appts[de-identified] Drives Self  In the past 12 months, has lack of transportation kept you from medical appointments or from getting medications?: No  In the past 12 months, has lack of transportation kept you from meetings, work, or from getting things needed for daily living?: No        DISCHARGE DETAILS:    Discharge planning discussed with[de-identified] pt and wife  Freedom of Choice: Yes  Comments - Freedom of Choice: CM met with pt and wife to review the recommendations of the care team for rehab. Wife is hoping pt is able to go to [facility]. CM reviewed with her that the facility has moved and she would be ok with the travel distance. CM explained the difference between acute and subacute rehab. CM contacted family/caregiver?: Yes  Were Treatment Team discharge recommendations reviewed with patient/caregiver?: Yes  Did patient/caregiver verbalize understanding of patient care needs?: Yes  Were patient/caregiver advised of the risks associated with not following Treatment Team discharge recommendations?: Yes    Contacts  Patient Contacts: wife  Relationship to Patient[de-identified] Family  Contact Method: In Person  Reason/Outcome: Continuity of Care, Discharge Planning, Referral    Requested [address]         Is the patient interested in [facility] at discharge?: No    DME Referral Provided  Referral made for DME?: No    Other Referral/Resources/Interventions Provided:  Interventions: Acute Rehab, Short Term Rehab  Referral Comments: Wife and pt requested referrals be made to acute rehab.  they also requested referrals be made to subacute rehab in the event he does not get accepted to acute. Referrals have been made. CM to follow up as pt is medically stable for DC tomorrow.          Treatment Team Recommendation: Acute Rehab, Short Term Rehab  Discharge Destination Plan[de-identified] Short Term Rehab, Acute Rehab
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
## 2023-10-13 NOTE — ASSESSMENT & PLAN NOTE
Sustained from fall  On physical exam high riding patella with void palpated inferiorly.   Limited active flexion of right knee  Plan  Multimodal pain regimen as below  S/p right quad tendon repair, placed in TROM in full extension  WBAT

## 2023-10-13 NOTE — PROGRESS NOTES
[facility]  Progress Note  Name: Bandar Leger  MRN: [id number]  Unit/Bed#: W -18 I Date of Admission: 10/10/2023   Date of Service: 10/13/2023 I Hospital Day: 2    Assessment/Plan   Fall from standing  Assessment & Plan  Patient states he was hiking, tripped on room, landed on both his knees. Pain in both knees. Unable to flex either of them initially requiring prolonged rescue from hike. After being laid supine for CT scan, left knee did feel like it was better approximated he is now able to bend a bit but still having a lot of pain in both knees  Injuries as below  Multimodal pain regimen as below  S/p or on 10/12 for right quad tendon repair as below    Contusion of left leg, initial encounter  Assessment & Plan  Sustaining left upper leg contusion, no fractures, dislocation, muscle tear  Seen by orthopedics, appreciate recommendations  Placed in hinged knee brace. Can be WBAT  Will be seen by ot/pt  Pain treatment as below    * Ambulatory dysfunction  Assessment & Plan  Bilateral knee pain, left greater than right sustained from fall. Unable to ambulate due to pain  Plan  Multimodal pain regimen as below  S/p right quad tendon repair 10/12  PT/OT consult    Acute pain due to trauma  Assessment & Plan  Pain in bilateral knee sustained from fall  Scheduled Tylenol  Lidoderm  As needed oxycodone  Bowel regimen while on narcotics    Patellar tendon rupture, right, initial encounter  Assessment & Plan  Sustained from fall  On physical exam high riding patella with void palpated inferiorly.   Limited active flexion of right knee  Plan  Multimodal pain regimen as below  S/p right quad tendon repair, placed in TROM in full extension  WBAT    Brady's esophagus with dysplasia  Assessment & Plan  Substitute Protonix for home prevacid             Bowel Regimen: Milk of magnesium, senokot  VTE Prophylaxis:Sequential compression device (Venodyne)  and Enoxaparin (Lovenox) Disposition: Disposition pending OT/PT    Subjective   Chief Complaint: Bilateral leg pain    Subjective: Patient overall feeling very well. Has been trying to exercise in bed. Pain better controlled by p.o. Oxycodone rather than IV Dilaudid. Looking forward to working with PT today since he is very active prior to injury. Objective   Vitals:   Temp:  [97 °F (36.1 °C)-98.4 °F (36.9 °C)] 98 °F (36.7 °C)  HR:  [] 83  Resp:  [12-20] 16  BP: (128-188)/() 150/80    I/O         10/11 0701  10/12 0700 10/12 0701  10/13 0700 10/13 0701  10/14 0700    P. O. 240 820     I.V. (mL/kg)  800 (8.5)     Total Intake(mL/kg) 240 (2.6) 1620 (17.3)     Urine (mL/kg/hr) 200 (0.1) 650 (0.3)     Total Output 200 650     Net +40 +970            Unmeasured Urine Occurrence 300 x             Physical exam:  GENERAL APPEARANCE: No acute distress, nontoxic appearing  NEURO: CN II through XII grossly intact. Sensation and motor function intact in all 4 extremities  HEENT: Normocephalic. No obvious traumatic injuries  CV: RRR, pulses 2+ in all 4 extremities  LUNGS: Lung sounds clear to auscultation bilaterally  GI: No abdominal tenderness. Bowel sounds normal  : Deferred  MSK: Left leg in hinged knee brace. Right leg in T ROM locked in full extension with Ace wrap in place. Appropriate mild tenderness over proximal legs. Good distal pulse/motor/sensation. SKIN: Skin warm and dry. No rashes. No active bleeding.     Invasive Devices       Peripheral Intravenous Line  Duration             Peripheral IV 10/10/23 Left;Ventral (anterior) Forearm 2 days                          Lab Results: Results: I have personally reviewed all pertinent laboratory/tests results, BMP/CMP:   Lab Results   Component Value Date    SODIUM 140 10/13/2023    K 4.2 10/13/2023     10/13/2023    CO2 29 10/13/2023    BUN 18 10/13/2023    CREATININE 0.75 10/13/2023    CALCIUM 8.8 10/13/2023    EGFR 90 10/13/2023   , and CBC:   Lab Results Component Value Date    WBC 10.80 (H) 10/13/2023    HGB 15.2 10/13/2023    HCT 44.8 10/13/2023    MCV 92 10/13/2023     (L) 10/13/2023    RBC 4.86 10/13/2023    MCH 31.3 10/13/2023    MCHC 33.9 10/13/2023    RDW 13.2 10/13/2023    MPV 11.9 10/13/2023     Imaging: I have personally reviewed pertinent reports.    and I have personally reviewed pertinent films in PACS   Other Studies: N/A

## 2023-10-13 NOTE — PLAN OF CARE
Problem: PHYSICAL THERAPY ADULT  Goal: Performs mobility at highest level of function for planned discharge setting. See evaluation for individualized goals. Description: Treatment/Interventions: Functional transfer training, LE strengthening/ROM, Therapeutic exercise, Endurance training, Bed mobility, Equipment eval/education, Patient/family training, Cognitive reorientation (PT to see when ambulation training is appropriate.)          See flowsheet documentation for full assessment, interventions and recommendations. Note:    Problem List: Decreased strength, Decreased range of motion, Decreased endurance, Impaired balance, Decreased mobility, Decreased cognition, Decreased safety awareness, Pain, Orthopedic restrictions, Impaired hearing  Assessment: Pt was hiking in woods, tripped, fell landing on both his knees. Unable to ambulate, bear weight, flex either knee. Pt required rescue out of the woods which took several hours. Dx: right quad tendon rupture, left upper leg contusion, and ambulatory dysfunction. 10/12/23 right quad tendon rupture surgical repair. order placed for PT eval and tx, w/ activity order of WBAT R LE w/ TROM brace locked in extension and WBAT L LE w/ sleeve/hinge knee. pt presents w/ comorbidities of arthritis, OA, right shoulder reverse arthroplasty, bilateral wrist fusions, and pneumonia and personal factors of living in 2 story house, positive fall history, and hearing impairments. pt presents w/ pain, weakness, decreased ROM, decreased endurance, impaired cognition, impaired balance, impaired hearing, decreased safety awareness, orthopedic restrictions, and fall risk. these impairments are evident in findings from physical examination (weakness and decreased ROM), mobility assessment (need for assist x 1 to 2 w/ bed mobility and transfers, inability to mobilize to bedside chair, need for input for mobility technique and safety), and Barthel Index: 50/100.  pt needed input for task focus and mobility technique. pt is at risk for falls due to physical and safety awareness deficits. pt's clinical presentation is unstable/unpredictable (evident in need for assist w/ all phases of mobility when usually mobilizing independently, pain impacting overall mobility status, and need for input for task focus and mobility technique). pt needs inpatient PT tx to improve mobility deficits and progress mobility training as appropriate. discharge recommendation is for inpatient rehab to reduce fall risk and maximize level of functional independence. PT Discharge Recommendation: Post acute rehabilitation services    See flowsheet documentation for full assessment.

## 2023-10-14 ENCOUNTER — HOSPITAL ENCOUNTER (INPATIENT)
Facility: HOSPITAL | Age: 73
LOS: 11 days | Discharge: HOME HEALTH | DRG: 949 | End: 2023-10-25
Attending: STUDENT IN AN ORGANIZED HEALTH CARE EDUCATION/TRAINING PROGRAM | Admitting: STUDENT IN AN ORGANIZED HEALTH CARE EDUCATION/TRAINING PROGRAM
Payer: MEDICARE

## 2023-10-14 VITALS
HEART RATE: 109 BPM | RESPIRATION RATE: 18 BRPM | DIASTOLIC BLOOD PRESSURE: 85 MMHG | OXYGEN SATURATION: 96 % | SYSTOLIC BLOOD PRESSURE: 154 MMHG | HEIGHT: 68 IN | TEMPERATURE: 99.9 F | WEIGHT: 209 LBS | BODY MASS INDEX: 31.67 KG/M2

## 2023-10-14 DIAGNOSIS — B35.1 ONYCHOMYCOSIS: ICD-10-CM

## 2023-10-14 DIAGNOSIS — S76.111A RUPTURE OF RIGHT QUADRICEPS TENDON, INITIAL ENCOUNTER: ICD-10-CM

## 2023-10-14 DIAGNOSIS — Z74.09 IMPAIRED MOBILITY AND ENDURANCE: ICD-10-CM

## 2023-10-14 DIAGNOSIS — L60.8 DEFORMITY OF TOENAIL: ICD-10-CM

## 2023-10-14 DIAGNOSIS — S86.811A PATELLAR TENDON RUPTURE, RIGHT, INITIAL ENCOUNTER: Primary | ICD-10-CM
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
## 2023-10-14 DIAGNOSIS — R50.9 FEVER, UNSPECIFIED FEVER CAUSE: ICD-10-CM

## 2023-10-14 LAB
BASOPHILS # BLD MANUAL: 0 THOUSAND/UL (ref 0–0.1)
BASOPHILS NFR MAR MANUAL: 0 % (ref 0–1)
DIFFERENTIAL COMMENT: ABNORMAL
EOSINOPHIL # BLD MANUAL: 0 THOUSAND/UL (ref 0–0.4)
EOSINOPHIL NFR BLD MANUAL: 0 % (ref 0–6)
LG PLATELETS BLD QL SMEAR: PRESENT
LYMPHOCYTES # BLD AUTO: 1.62 THOUSAND/UL (ref 0.6–4.47)
LYMPHOCYTES # BLD AUTO: 7 % (ref 14–44)
MONOCYTES # BLD AUTO: 2.7 THOUSAND/UL (ref 0–1.22)
MONOCYTES NFR BLD: 25 % (ref 4–12)
NEUTROPHILS # BLD MANUAL: 6.48 THOUSAND/UL (ref 1.85–7.62)
NEUTS BAND NFR BLD MANUAL: 3 % (ref 0–8)
NEUTS SEG NFR BLD AUTO: 57 % (ref 43–75)
PATHOLOGY REVIEW: YES
PLATELET BLD QL SMEAR: ABNORMAL
RBC MORPH BLD: NORMAL
SARS-COV-2 RNA RESP QL NAA+PROBE: NEGATIVE
VARIANT LYMPHS # BLD AUTO: 8 %

## 2023-10-14 PROCEDURE — NC001 PR NO CHARGE

## 2023-10-14 PROCEDURE — 99238 HOSP IP/OBS DSCHRG MGMT 30/<: CPT | Performed by: SURGERY

## 2023-10-14 PROCEDURE — 99024 POSTOP FOLLOW-UP VISIT: CPT

## 2023-10-14 PROCEDURE — 1124F ACP DISCUSS-NO DSCNMKR DOCD: CPT | Performed by: INTERNAL MEDICINE

## 2023-10-14 PROCEDURE — 97530 THERAPEUTIC ACTIVITIES: CPT

## 2023-10-14 PROCEDURE — 97110 THERAPEUTIC EXERCISES: CPT

## 2023-10-14 PROCEDURE — 87635 SARS-COV-2 COVID-19 AMP PRB: CPT

## 2023-10-14 RX ORDER — OXYCODONE HYDROCHLORIDE 5 MG/1
5 TABLET ORAL EVERY 4 HOURS PRN
Qty: 5 TABLET | Refills: 0 | Status: ON HOLD | OUTPATIENT
Start: 2023-10-14 | End: 2023-10-24

## 2023-10-14 RX ORDER — LANSOPRAZOLE 30 MG/1
30 CAPSULE, DELAYED RELEASE ORAL DAILY
Status: DISCONTINUED | OUTPATIENT
Start: 2023-10-15 | End: 2023-10-25 | Stop reason: HOSPADM

## 2023-10-14 RX ORDER — SENNOSIDES 8.6 MG
2 TABLET ORAL DAILY
Status: DISCONTINUED | OUTPATIENT
Start: 2023-10-15 | End: 2023-10-25 | Stop reason: HOSPADM

## 2023-10-14 RX ORDER — ACETAMINOPHEN 325 MG/1
975 TABLET ORAL EVERY 8 HOURS SCHEDULED
Refills: 0 | Status: ON HOLD
Start: 2023-10-14

## 2023-10-14 RX ORDER — SENNOSIDES 8.6 MG
17.2 TABLET ORAL DAILY
Refills: 0 | Status: ON HOLD
Start: 2023-10-15

## 2023-10-14 RX ORDER — LIDOCAINE 50 MG/G
1 PATCH TOPICAL DAILY
Refills: 0 | Status: ON HOLD
Start: 2023-10-14

## 2023-10-14 RX ORDER — OXYCODONE HYDROCHLORIDE 5 MG/1
5 TABLET ORAL EVERY 4 HOURS PRN
Status: DISCONTINUED | OUTPATIENT
Start: 2023-10-14 | End: 2023-10-25 | Stop reason: HOSPADM

## 2023-10-14 RX ORDER — ASPIRIN 325 MG
325 TABLET, DELAYED RELEASE (ENTERIC COATED) ORAL 2 TIMES DAILY
Qty: 84 TABLET | Refills: 0 | Status: ON HOLD
Start: 2023-10-14 | End: 2023-11-25

## 2023-10-14 RX ORDER — ENOXAPARIN SODIUM 100 MG/ML
30 INJECTION SUBCUTANEOUS EVERY 12 HOURS
Status: DISCONTINUED | OUTPATIENT
Start: 2023-10-14 | End: 2023-10-15

## 2023-10-14 RX ORDER — LIDOCAINE 50 MG/G
1 PATCH TOPICAL DAILY
Status: DISCONTINUED | OUTPATIENT
Start: 2023-10-14 | End: 2023-10-15

## 2023-10-14 RX ORDER — ACETAMINOPHEN 325 MG/1
975 TABLET ORAL EVERY 8 HOURS SCHEDULED
Status: DISCONTINUED | OUTPATIENT
Start: 2023-10-14 | End: 2023-10-15

## 2023-10-14 RX ORDER — BISACODYL 10 MG
10 SUPPOSITORY, RECTAL RECTAL DAILY PRN
Status: DISCONTINUED | OUTPATIENT
Start: 2023-10-14 | End: 2023-10-15

## 2023-10-14 RX ADMIN — B-COMPLEX W/ C & FOLIC ACID TAB 1 TABLET: TAB at 08:31

## 2023-10-14 RX ADMIN — OXYCODONE HYDROCHLORIDE 5 MG: 5 TABLET ORAL at 08:31

## 2023-10-14 RX ADMIN — ACETAMINOPHEN 975 MG: 325 TABLET, FILM COATED ORAL at 05:35

## 2023-10-14 RX ADMIN — ENOXAPARIN SODIUM 30 MG: 30 INJECTION SUBCUTANEOUS at 21:45

## 2023-10-14 RX ADMIN — LANSOPRAZOLE 30 MG: 30 CAPSULE, DELAYED RELEASE ORAL at 08:31

## 2023-10-14 RX ADMIN — ENOXAPARIN SODIUM 30 MG: 30 INJECTION SUBCUTANEOUS at 10:41

## 2023-10-14 RX ADMIN — OXYCODONE HYDROCHLORIDE 5 MG: 5 TABLET ORAL at 14:18

## 2023-10-14 RX ADMIN — LIDOCAINE 1 PATCH: 50 PATCH CUTANEOUS at 21:28

## 2023-10-14 RX ADMIN — ACETAMINOPHEN 975 MG: 325 TABLET ORAL at 21:28

## 2023-10-14 RX ADMIN — SENNOSIDES 17.2 MG: 8.6 TABLET, FILM COATED ORAL at 08:31

## 2023-10-14 NOTE — ASSESSMENT & PLAN NOTE
Sustained from fall  On physical exam high riding patella with void palpated inferiorly.   Limited active flexion of right knee  Plan  Surgery consult, appreciate recommendations  Multimodal pain regimen as below  S/p right quad tendon repair on 10/12  WBAT in TROM brace, locked in extension  Monitor RLE neurovascular status  DVT prophylaxis with Lovenox  PT/OT evaluation and treatment as indicated  Outpatient follow-up with orthopedic surgery

## 2023-10-14 NOTE — ASSESSMENT & PLAN NOTE
Bilateral knee pain, left greater than right sustained from fall.   Unable to ambulate due to pain  Plan  Multimodal pain regimen as below  S/p right quad tendon repair 10/12  WBAT bilateral lower extremities  PT/OT evaluation and treatment as indicated

## 2023-10-14 NOTE — ASSESSMENT & PLAN NOTE
- Patient states he was hiking, tripped on room, landed on both his knees. Pain in both knees. Unable to flex either of them initially requiring prolonged rescue from hike.   After being laid supine for CT scan, left knee did feel like it was better approximated he is now able to bend a bit but still having a lot of pain in both knees  - Injuries as below  - Multimodal pain regimen as below  - S/p or on 10/12 for right quad tendon repair as below  - PT/OT evaluation and treatment as indicated

## 2023-10-14 NOTE — ASSESSMENT & PLAN NOTE
Sustaining left upper leg contusion, no fractures, dislocation, muscle tear  Seen by orthopedics, appreciate recommendations  Placed in hinged knee brace.   Can be WBAT  Pain treatment as below  PT/OT evaluation and treatment as indicated  Outpatient follow-up with orthopedic surgery

## 2023-10-14 NOTE — CASE MANAGEMENT
Case Management Discharge Planning Note    Patient name Jacqueline Merida  Location W /W -20 MRN [id number]  : 1950 Date 10/14/2023       Current Admission Date: 10/10/2023  Current Admission Diagnosis:Ambulatory dysfunction   Patient Active Problem List    Diagnosis Date Noted    Patellar tendon rupture, right, initial encounter 10/11/2023    Acute pain due to trauma 10/11/2023    Ambulatory dysfunction 10/10/2023    Fall from standing 10/10/2023    Contusion of left leg, initial encounter 10/10/2023    Acute pain of right shoulder 2022    Hx of total shoulder replacement, right 2022    Muscle strain of right shoulder 2022    Post-traumatic osteoarthritis of first carpometacarpal joint of right hand 2022    Aftercare following right shoulder joint replacement surgery 2022    Bilateral impacted cerumen 2022    Sensorineural hearing loss (SNHL), bilateral 2022    Rotator cuff tear arthropathy of left shoulder 2022    Rotator cuff tear arthropathy of right shoulder 2022    History of colon polyps 2021    Elevated LFTs 2021    Brady's esophagus with dysplasia 2021    Gastroesophageal reflux disease without esophagitis 2015      LOS (days): 3  Geometric Mean LOS (GMLOS) (days): 2.50  Days to GMLOS:-0.4     OBJECTIVE:  Risk of Unplanned Readmission Score: 7.53         Current admission status: Inpatient   Preferred Pharmacy:   [address]  Phone: [phone] Fax: [address] [place] [address]  Phone: [phone] Fax: [phone]    Primary Care Provider: Bronwyn Morgan MD    Primary Insurance: MEDICARE  Secondary Insurance: AARP    DISCHARGE DETAILS:    Discharge planning discussed with[de-identified] Patient at bedside;  Left spouse VM via phone  Freedom of Choice: Yes  Comments - Freedom of Choice: CM provided patient with list of accepting acute rehab facilities -- Pt has been approved by the [facility] at [facility] or [facility]. [facility] as there was no response from [facility] and his friends also verbalized positive reviews about [facility] at bedside. Referral reserved via [facility]. Pt in agreement with transfer today. CM left VM for spouse to provide her with an update, per patient's request. Covid swab ordered and pending. Transportation arrived for 3:30pm today via BLS ([facility]). Other Referral/Resources/Interventions Provided:  Interventions: Acute Rehab         Treatment Team Recommendation: Acute Rehab  Discharge Destination Plan[de-identified] Acute Rehab  Transport at Discharge : [facility]  Dispatcher Contacted: Yes     Transported by [employer] and Unit #):  [facility]  ETA of Transport (Date): 10/14/23  ETA of Transport (Time): 1530              IMM Given (Date):: 10/14/23  IMM Given to[de-identified] Patient          Accepting Facility Name, [address] [place] [address] : [address] [place] [address]  Receiving Facility/Agency Phone Number: [phone]  Facility/Agency Fax Number: [phone]

## 2023-10-14 NOTE — PLAN OF CARE
Problem: PHYSICAL THERAPY ADULT  Goal: Performs mobility at highest level of function for planned discharge setting. See evaluation for individualized goals. Description: Treatment/Interventions: Functional transfer training, LE strengthening/ROM, Therapeutic exercise, Endurance training, Bed mobility, Equipment eval/education, Patient/family training, Cognitive reorientation (PT to see when ambulation training is appropriate.)          See flowsheet documentation for full assessment, interventions and recommendations. Outcome: Progressing  Note:    Problem List: Decreased strength, Decreased range of motion, Decreased endurance, Impaired balance, Decreased mobility, Decreased cognition, Decreased safety awareness, Impaired hearing, Orthopedic restrictions, Pain  Assessment: pt ebgan tx session lying supine in [facility] bed and was agreeable to participate in PT intervention. PT intervention to focus on bed mobility, transfer training and increasing activity tolerance. Since previous tx brennan pt has progressed in terms of activity tolerance, bed mobility and assistance required fro functional transfers to and from RW. pt required min Ax1 to complete a supine<>sit EOB transfer w/ RLE management. pt was able to sit EOB with /s and no LOb while being educated on functional transfers to and from [facility]. Prior to initiating STS from EOB to RW height of bed elevated in order to attempt STS transfer. pt required less assistance then previous tx session but continues to require a significant amount of assistance max Ax1 w/ VC's for hand placement while ascending to RW and descending into recliner. pt was able to complete a SPT from EOB to recliner w/ mod Ax1 , RW management, no LOB but required VC's for lateral stepping. pt would benefit from continued skilled PT intervention in order to reduce amount of assistance required fro bed mobility and functional transfers.  Continue to recommend post acute rehab services at the time of D/C in order to maximize pt functional independence and safety with all OOB mobility Post tx pt in recliner with call bell, chair alarm activated and all pt needs met. PT Discharge Recommendation: Post acute rehabilitation services    See flowsheet documentation for full assessment.

## 2023-10-14 NOTE — PLAN OF CARE
Problem: MOBILITY - ADULT  Goal: Maintain or return to baseline ADL function  Description: INTERVENTIONS:  -  Assess patient's ability to carry out ADLs; assess patient's baseline for ADL function and identify physical deficits which impact ability to perform ADLs (bathing, care of mouth/teeth, toileting, grooming, dressing, etc.)  - Assess/evaluate cause of self-care deficits   - Assess range of motion  - Assess patient's mobility; develop plan if impaired  - Assess patient's need for assistive devices and provide as appropriate  - Encourage maximum independence but intervene and supervise when necessary  - Involve family in performance of ADLs  - Assess for home care needs following discharge   - Consider OT consult to assist with ADL evaluation and planning for discharge  - Provide patient education as appropriate  Outcome: Progressing  Goal: Maintains/Returns to pre admission functional level  Description: INTERVENTIONS:  - Perform BMAT or MOVE assessment daily.   - Set and communicate daily mobility goal to care team and patient/family/caregiver. - Collaborate with rehabilitation services on mobility goals if consulted  - Perform Range of Motion times a day. - Reposition patient every  hours.   - Dangle patient  times a day  - Stand patient  times a day  - Ambulate patient  times a day  - Out of bed to chair  times a day   - Out of bed for meals  times a day  - Out of bed for toileting  - Record patient progress and toleration of activity level   Outcome: Progressing     Problem: Prexisting or High Potential for Compromised Skin Integrity  Goal: Skin integrity is maintained or improved  Description: INTERVENTIONS:  - Identify patients at risk for skin breakdown  - Assess and monitor skin integrity  - Assess and monitor nutrition and hydration status  - Monitor labs   - Assess for incontinence   - Turn and reposition patient  - Assist with mobility/ambulation  - Relieve pressure over bony prominences  - Avoid friction and shearing  - Provide appropriate hygiene as needed including keeping skin clean and dry  - Evaluate need for skin moisturizer/barrier cream  - Collaborate with interdisciplinary team   - Patient/family teaching  - Consider wound care consult   Outcome: Progressing     Problem: PAIN - ADULT  Goal: Verbalizes/displays adequate comfort level or baseline comfort level  Description: Interventions:  - Encourage patient to monitor pain and request assistance  - Assess pain using appropriate pain scale  - Administer analgesics based on type and severity of pain and evaluate response  - Implement non-pharmacological measures as appropriate and evaluate response  - Consider cultural and social influences on pain and pain management  - Notify physician/advanced practitioner if interventions unsuccessful or patient reports new pain  Outcome: Progressing     Problem: INFECTION - ADULT  Goal: Absence or prevention of progression during hospitalization  Description: INTERVENTIONS:  - Assess and monitor for signs and symptoms of infection  - Monitor lab/diagnostic results  - Monitor all insertion sites, i.e. indwelling lines, tubes, and drains  - Monitor endotracheal if appropriate and nasal secretions for changes in amount and color  - [place] appropriate cooling/warming therapies per order  - Administer medications as ordered  - Instruct and encourage patient and family to use good hand hygiene technique  - Identify and instruct in appropriate isolation precautions for identified infection/condition  Outcome: Progressing  Goal: Absence of fever/infection during neutropenic period  Description: INTERVENTIONS:  - Monitor WBC    Outcome: Progressing     Problem: SAFETY ADULT  Goal: Maintain or return to baseline ADL function  Description: INTERVENTIONS:  -  Assess patient's ability to carry out ADLs; assess patient's baseline for ADL function and identify physical deficits which impact ability to perform ADLs (bathing, care of mouth/teeth, toileting, grooming, dressing, etc.)  - Assess/evaluate cause of self-care deficits   - Assess range of motion  - Assess patient's mobility; develop plan if impaired  - Assess patient's need for assistive devices and provide as appropriate  - Encourage maximum independence but intervene and supervise when necessary  - Involve family in performance of ADLs  - Assess for home care needs following discharge   - Consider OT consult to assist with ADL evaluation and planning for discharge  - Provide patient education as appropriate  Outcome: Progressing  Goal: Maintains/Returns to pre admission functional level  Description: INTERVENTIONS:  - Perform BMAT or MOVE assessment daily.   - Set and communicate daily mobility goal to care team and patient/family/caregiver. - Collaborate with rehabilitation services on mobility goals if consulted  - Perform Range of Motion times a day. - Reposition patient every  hours.   - Dangle patient  times a day  - Stand patient  times a day  - Ambulate patient  times a day  - Out of bed to chair  times a day   - Out of bed for meals  times a day  - Out of bed for toileting  - Record patient progress and toleration of activity level   Outcome: Progressing  Goal: Patient will remain free of falls  Description: INTERVENTIONS:  - Educate patient/family on patient safety including physical limitations  - Instruct patient to call for assistance with activity   - Consult OT/PT to assist with strengthening/mobility   - Keep Call bell within reach  - Keep bed low and locked with side rails adjusted as appropriate  - Keep care items and personal belongings within reach  - Initiate and maintain comfort rounds  - Make Fall Risk Sign visible to staff  - Offer Toileting every  Hours, in advance of need  - Initiate/Maintain alarm  - Obtain necessary fall risk management equipment  - Apply yellow socks and bracelet for high fall risk patients  - Consider moving patient to room near nurses station  Outcome: Progressing     Problem: DISCHARGE PLANNING  Goal: Discharge to home or other facility with appropriate resources  Description: INTERVENTIONS:  - Identify barriers to discharge w/patient and caregiver  - Arrange for needed discharge resources and transportation as appropriate  - Identify discharge learning needs (meds, wound care, etc.)  - Arrange for interpretive services to assist at discharge as needed  - Refer to Case Management Department for coordinating discharge planning if the patient needs post-hospital services based on physician/advanced practitioner order or complex needs related to functional status, cognitive ability, or social support system  Outcome: Progressing     Problem: Knowledge Deficit  Goal: Patient/family/caregiver demonstrates understanding of disease process, treatment plan, medications, and discharge instructions  Description: Complete learning assessment and assess knowledge base. Interventions:  - Provide teaching at level of understanding  - Provide teaching via preferred learning methods  Outcome: Progressing     Problem: Nutrition/Hydration-ADULT  Goal: Nutrient/Hydration intake appropriate for improving, restoring or maintaining nutritional needs  Description: Monitor and assess patient's nutrition/hydration status for malnutrition. Collaborate with interdisciplinary team and initiate plan and interventions as ordered. Monitor patient's weight and dietary intake as ordered or per policy. Utilize nutrition screening tool and intervene as necessary. Determine patient's food preferences and provide high-protein, high-caloric foods as appropriate.      INTERVENTIONS:  - Monitor oral intake, urinary output, labs, and treatment plans  - Assess nutrition and hydration status and recommend course of action  - Evaluate amount of meals eaten  - Assist patient with eating if necessary   - Allow adequate time for meals  - Recommend/ encourage appropriate diets, oral nutritional supplements, and vitamin/mineral supplements  - Order, calculate, and assess calorie counts as needed  - Recommend, monitor, and adjust tube feedings and TPN/PPN based on assessed needs  - Assess need for intravenous fluids  - Provide specific nutrition/hydration education as appropriate  - Include patient/family/caregiver in decisions related to nutrition  Outcome: Progressing

## 2023-10-14 NOTE — DISCHARGE SUMMARY
Discharge Summary - Rossi Ward 74 y.o. male MRN: [id number]    Unit/Bed#: W -69 Encounter: [id number]    Admission Date:   Admission Orders (From admission, onward)       Ordered        10/11/23 1443  Inpatient Admission  Once            10/10/23 2104  Place in Observation  Once                            Admitting Diagnosis: Closed dislocation of right patella, initial encounter [S83.004A]  Ambulatory dysfunction [R26.2]  Acute pain of both knees [M25.561, M25.562]  Unspecified multiple injuries, initial encounter [T07. XXXA]    HPI: Documented by Domingo Dawn MD on 10/10/2023, "Rossi Ward is a 74 y.o. male who presents with bilateral knee pain. Patient states that he was hiking in woods, tripped, fell landing on both his knees. Unable to ambulate, bear weight, flex either knee. Required rescue out of the woods which took several hours. Being transferred to CT scanner, left knee did pop into place and feeling better and now able to flex at least a bit. Still having limitation to flexion in both knees. On ambulation attempt, too much pain in both knees, left greater than right. Patient requires inpatient evaluation for amatory dysfunction and possible right patellar dislocation. Denies hitting head/neck/back. Denies loss of consciousness."    Procedures Performed: No orders of the defined types were placed in this encounter. Summary of Hospital Course: Patient was seen and evaluated by the trauma team and admitted with findings of a right patellar tendon rupture and a left leg contusion. Orthopedic surgery was consulted who managed patient operatively. Patient was taken to the OR on 10/12/2023 for surgical repair of the right quadriceps tendon. Postoperatively patient's pain was well managed. Patient was placed in a TROM brace, locked in extension, and made weightbearing as tolerated. PT/OT evaluated patient and recommended discharge to acute rehab.   Patient was medically stable for discharge on 10/14/2023. He will follow-up outpatient with orthopedic surgery. Significant Findings, Care, Treatment and Services Provided: Right patellar tendon rupture and a left leg contusion    Complications: None      Medical Problems       Resolved Problems  Date Reviewed: 10/14/2023   None         Condition at Discharge: good         Discharge instructions/Information to patient and family:   See after visit summary for information provided to patient and family. Provisions for Follow-Up Care:  See after visit summary for information related to follow-up care and any pertinent home health orders. PCP: Zulma Gao MD    Disposition: Short-term rehab at [facility]    Planned Readmission: No      Discharge Statement   I spent 24 minutes discharging the patient. This time was spent on the day of discharge. I had direct contact with the patient on the day of discharge. Additional documentation is required if more than 30 minutes were spent on discharge. Discharge Medications:  See after visit summary for reconciled discharge medications provided to patient and family.

## 2023-10-14 NOTE — PROGRESS NOTES
Progress Note - Orthopedics   [facility] 74 [facility] [id number]  Unit/Bed#: W -01      Subjective:    72 y.o.male seen and evaluated at bedside. Patient states he is doing well. He states he tried to work with with PT yesterday but had difficulty ambulating due to pain. He states his pain is better today. No acute or worsening complaints. Patient is eager to get back up and walk again. Patient denies numbness, tingling or paresthesias in the right lower extremity.     Labs:  0   Lab Value Date/Time    HCT 44.8 10/13/2023 0638    HCT 45.5 10/12/2023 0632    HCT 45.3 10/10/2023 1713    HCT 47.4 02/26/2015 0334    HGB 15.2 10/13/2023 0638    HGB 15.2 10/12/2023 0632    HGB 15.3 10/10/2023 1713    HGB 15.8 02/26/2015 0334    INR 0.96 10/10/2023 1713    WBC 10.80 (H) 10/13/2023 0638    WBC 6.42 10/12/2023 0632    WBC 4.85 10/10/2023 1713    WBC 5.69 02/26/2015 0334    .5 (H) 12/19/2020 1504       Meds:    Current Facility-Administered Medications:     acetaminophen (TYLENOL) tablet 975 mg, 975 mg, Oral, Q8H [address] [facility] Donell Santana PA-C, 975 mg at 10/14/23 0535    bisacodyl (DULCOLAX) rectal suppository 10 mg, 10 mg, Rectal, Daily PRN, CHANEL Parrish, 10 mg at 10/13/23 1809    enoxaparin (LOVENOX) subcutaneous injection 30 mg, 30 mg, Subcutaneous, Q12H, Adamaris Montalvo PA-C, 30 mg at 10/13/23 2117    HYDROmorphone HCl (DILAUDID) injection 0.2 mg, 0.2 mg, Intravenous, Q3H PRN, CHANEL Parrish, 0.2 mg at 10/12/23 3059    lansoprazole (PREVACID) capsule 30 mg, 30 mg, Oral, Daily, Alexandr L Lukievics, PA-C, 30 mg at 10/13/23 0756    lidocaine (LIDODERM) 5 % patch 1 patch, 1 patch, Topical, Daily, Adamaris Montalvo PA-C, 1 patch at 10/11/23 2016    magnesium hydroxide (MILK OF MAGNESIA) oral suspension 30 mL, 30 mL, Oral, Daily PRN, CHANEL Parrish, 30 mL at 10/12/23 2202    multivitamin stress formula tablet 1 tablet, 1 tablet, Oral, QAM, Adamaris Montalvo PA-C, 1 tablet at 10/13/23 0756    oxyCODONE (ROXICODONE) IR tablet 5 mg, 5 mg, Oral, Q4H PRN, Boston Melendez PA-C, 5 mg at 10/13/23 1808    oxyCODONE (ROXICODONE) split tablet 2.5 mg, 2.5 mg, Oral, Q4H PRN, Boston Melendez PA-C    senna (SENOKOT) tablet 17.2 mg, 2 tablet, Oral, Daily, Cassie Santo PA-C, 17.2 mg at 10/13/23 0758    Blood Culture:   Lab Results   Component Value Date    BLOODCX No Growth After 5 Days. 12/19/2020       Wound Culture:   No results found for: "WOUNDCULT"    Ins and Outs:  I/O last 24 hours: In: 480 [P.O.:480]  Out: 430 [Urine:430]          Physical:  Vitals:    10/13/23 2215   BP: 135/73   Pulse: 99   Resp: 12   Temp: 98.1 °F (36.7 °C)   SpO2: 95%     Musculoskeletal: bilateral Lower Extremity  Surgical dressings to right lower extremity c/d/I without strike through noted   Bilateral knee braces intact. Right knee brace locked in extension   Thigh and calves soft and compressible. Sensation intact to saphenous, sural, tibial, superficial peroneal nerve, and deep peroneal bilaterally. Motor intact to +FHL/EHL, +ankle dorsi/plantar flexion bilaterally. 2+ DP pulse bilaterally. Digits warm and well perfused bilaterally. Capillary refill < 2 seconds bilaterally    Assessment:    72 y.o.male s/p mechanical fall with bilateral knee pain. S/p surgical repair of right quadriceps rupture with Dr. Les Paniagua 10/12/2023, overall doing well. Plan:  WBAT to the right lower extremity in TROM locked in extension  WBAT to the left lower extremity in hinged knee brace, unlocked   Hgb 15.2. Will monitor for ABLA and administer IVF/prbc as indicated for Greater than 2 gram drop or Hgb < 7  PT/OT  Pain control per primary team.   DVT ppx: ASA 325mg BID x 6 weeks. Medical management per primary team.   Dispo: Ortho will follow  Will need post op follow up with Dr. Les Paniagua upon discharge.      Cassie Santo PA-C
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
## 2023-10-14 NOTE — PROGRESS NOTES
[facility]  Progress Note  Name: Jaclyn Garrett  MRN: [id number]  Unit/Bed#: W -01 I Date of Admission: 10/10/2023   Date of Service: 10/14/2023 I Hospital Day: 3    Assessment/Plan   Contusion of left leg, initial encounter  Assessment & Plan  Sustaining left upper leg contusion, no fractures, dislocation, muscle tear  Seen by orthopedics, appreciate recommendations  Placed in hinged knee brace. Can be WBAT  Pain treatment as below  PT/OT evaluation and treatment as indicated  Outpatient follow-up with orthopedic surgery    Acute pain due to trauma  Assessment & Plan  Pain in bilateral knee sustained from fall  Scheduled Tylenol  Lidoderm  As needed oxycodone  Bowel regimen while on narcotics    Patellar tendon rupture, right, initial encounter  Assessment & Plan  Sustained from fall  On physical exam high riding patella with void palpated inferiorly. Limited active flexion of right knee  Plan  Surgery consult, appreciate recommendations  Multimodal pain regimen as below  S/p right quad tendon repair on 10/12  WBAT in TROM brace, locked in extension  Monitor RLE neurovascular status  DVT prophylaxis with Lovenox  PT/OT evaluation and treatment as indicated  Outpatient follow-up with orthopedic surgery    Fall from standing  Assessment & Plan  - Patient states he was hiking, tripped on room, landed on both his knees. Pain in both knees. Unable to flex either of them initially requiring prolonged rescue from hike.   After being laid supine for CT scan, left knee did feel like it was better approximated he is now able to bend a bit but still having a lot of pain in both knees  - Injuries as below  - Multimodal pain regimen as below  - S/p or on 10/12 for right quad tendon repair as below  - PT/OT evaluation and treatment as indicated    Brady's esophagus with dysplasia  Assessment & Plan  - Substitute Protonix for home prevacid  - Outpatient follow up with PCP    * Ambulatory dysfunction  Assessment & Plan  Bilateral knee pain, left greater than right sustained from fall. Unable to ambulate due to pain  Plan  Multimodal pain regimen as below  S/p right quad tendon repair 10/12  WBAT bilateral lower extremities  PT/OT evaluation and treatment as indicated             Bowel Regimen: Senna, Dulcolax, milk of mag  VTE Prophylaxis:Enoxaparin (Lovenox)     Disposition: Continue current level of care, pending rehab placement    Subjective   Chief Complaint: Right knee pain    Subjective: Patient reports he is doing well this morning, endorses continued mild right knee pain. States pain is tolerable with current pain regimen. Patient states he was depressed this morning because he wanted to get up and walk, but is feeling much better after being able to walk with physical therapy. He denies other complaints, tolerating a diet. Denies abdominal pain, nausea, vomiting. Objective   Vitals:   Temp:  [98 °F (36.7 °C)-98.3 °F (36.8 °C)] 98.2 °F (36.8 °C)  HR:  [] 101  Resp:  [12-20] 18  BP: (104-148)/(71-93) 148/80    I/O         10/12 0701  10/13 0700 10/13 0701  10/14 0700 10/14 0701  10/15 0700    P. O. 820 480 480    I.V. (mL/kg) 800 (8.5)      Total Intake(mL/kg) 1620 (17.3) 480 (5.1) 480 (5.1)    Urine (mL/kg/hr) 650 (0.3) 430 (0.2)     Total Output 650 430     Net +970 +50 +480           Unmeasured Stool Occurrence  1 x              Physical Exam:   GENERAL APPEARANCE: Patient in no acute distress. HEENT: NCAT; PERRL, EOMs intact; Mucous membranes moist  NECK / BACK: ROM normal  CV: Regular rate and rhythm; no murmur/gallops/rubs appreciated. CHEST / LUNGS: Clear to auscultation; no wheezes/rales/rhonci. ABD: NABS; soft; non-distended; non-tender. : voiding  EXT: RLE in TROM brace, locked in extension; LLE and hinged knee brace; +2 pulses bilaterally upper & lower extremities; no edema. NEURO: GCS 15; no focal neurologic deficits; neurovascularly intact.   SKIN: Warm, dry and well perfused; no rash; no jaundice. Invasive Devices       Peripheral Intravenous Line  Duration             Peripheral IV 10/14/23 Right;Ventral (anterior) Forearm <1 day                          Lab Results: Results: I have personally reviewed all pertinent laboratory/tests results  Imaging: I have personally reviewed pertinent reports.      Other Studies: None

## 2023-10-14 NOTE — PLAN OF CARE
Problem: MOBILITY - ADULT  Goal: Maintain or return to baseline ADL function  Description: INTERVENTIONS:  -  Assess patient's ability to carry out ADLs; assess patient's baseline for ADL function and identify physical deficits which impact ability to perform ADLs (bathing, care of mouth/teeth, toileting, grooming, dressing, etc.)  - Assess/evaluate cause of self-care deficits   - Assess range of motion  - Assess patient's mobility; develop plan if impaired  - Assess patient's need for assistive devices and provide as appropriate  - Encourage maximum independence but intervene and supervise when necessary  - Involve family in performance of ADLs  - Assess for home care needs following discharge   - Consider OT consult to assist with ADL evaluation and planning for discharge  - Provide patient education as appropriate  Outcome: Progressing  Goal: Maintains/Returns to pre admission functional level  Description: INTERVENTIONS:  - Perform BMAT or MOVE assessment daily.   - Set and communicate daily mobility goal to care team and patient/family/caregiver.    - Collaborate with rehabilitation services on mobility goals if consulted  Problem: Prexisting or High Potential for Compromised Skin Integrity  Goal: Skin integrity is maintained or improved  Description: INTERVENTIONS:  - Identify patients at risk for skin breakdown  - Assess and monitor skin integrity  - Assess and monitor nutrition and hydration status  - Monitor labs   - Assess for incontinence   - Turn and reposition patient  - Assist with mobility/ambulation  - Relieve pressure over bony prominences  - Avoid friction and shearing  - Provide appropriate hygiene as needed including keeping skin clean and dry  - Evaluate need for skin moisturizer/barrier cream  - Collaborate with interdisciplinary team   - Patient/family teaching  - Consider wound care consult   Outcome: Progressing     Problem: PAIN - ADULT  Goal: Verbalizes/displays adequate comfort level or baseline comfort level  Description: Interventions:  - Encourage patient to monitor pain and request assistance  - Assess pain using appropriate pain scale  - Administer analgesics based on type and severity of pain and evaluate response  - Implement non-pharmacological measures as appropriate and evaluate response  - Consider cultural and social influences on pain and pain management  - Notify physician/advanced practitioner if interventions unsuccessful or patient reports new pain  Outcome: Progressing     Problem: INFECTION - ADULT  Goal: Absence or prevention of progression during hospitalization  Description: INTERVENTIONS:  - Assess and monitor for signs and symptoms of infection  - Monitor lab/diagnostic results  - Monitor all insertion sites, i.e. indwelling lines, tubes, and drains  - Monitor endotracheal if appropriate and nasal secretions for changes in amount and color  - [place] appropriate cooling/warming therapies per order  - Administer medications as ordered  - Instruct and encourage patient and family to use good hand hygiene technique  - Identify and instruct in appropriate isolation precautions for identified infection/condition  Outcome: Progressing  Goal: Absence of fever/infection during neutropenic period  Description: INTERVENTIONS:  - Monitor WBC    Outcome: Progressing     - Out of bed for toileting  - Record patient progress and toleration of activity level   Outcome: Progressing

## 2023-10-14 NOTE — [facility] ADMISSION
Patient's case reviewed with [facility] physician, patient is pre-approved for [facility] pending medical stability, LOF at discharge, bed availability. [facility] admissions will continue to follow patient for progression of care and discharge readiness.

## 2023-10-14 NOTE — PHYSICAL THERAPY NOTE
PHYSICAL THERAPY NOTE          Patient Name: Flaca MOYA Date: 10/14/2023           10/14/23 1025   PT Last Visit   PT Visit Date 10/14/23   Note Type   Note Type Treatment   Pain Assessment   Pain Assessment Tool 0-10   Pain Location/Orientation Orientation: Right;Location: Knee   Pain Onset/Description Onset: Gradual   Effect of Pain on Daily Activities limits comfort and functioanl mobility   Patient's Stated Pain Goal No pain   Hospital Pain Intervention(s) Repositioned; Ambulation/increased activity; Emotional support;Rest;Elevated   Multiple Pain Sites No   Restrictions/Precautions   Weight Bearing Precautions Per Order Yes   RLE Weight Bearing Per Order WBAT  (TROM locked in extension)   LLE Weight Bearing Per Order WBAT  (sleeved hinged knee brace unlocked)   Other Precautions Cognitive; Chair Alarm; Bed Alarm;WBS;Fall Risk;Pain;Hard of hearing   General   Chart Reviewed Yes   Response to Previous Treatment Patient with no complaints from previous session. Family/Caregiver Present No   Cognition   Overall Cognitive Status Impaired   Arousal/Participation Alert; Responsive; Cooperative   Attention Attends with cues to redirect   Orientation Level Oriented X4   Memory Decreased short term memory;Decreased recall of recent events   Following Commands Follows one step commands with increased time or repetition   Comments pt was pleasant, cooperative and motivated once he saw progress being made   Subjective   Subjective pt was agreeable to participate in PT intervention   Bed Mobility   Supine to Sit 4  Minimal assistance   Additional items Assist x 1;HOB elevated; Bedrails; Increased time required;Verbal cues;LE management  (RLE management)   Sit to Supine Unable to assess   Additional Comments pt seated OOB in the recliner post tx session with call bell, chair alarm activated and all pt needs met   Transfers   Sit to Stand 2  Maximal assistance   Additional items (S)  Assist x 1;HOB elevated; Bedrails; Increased time required;Verbal cues; Other  (height of bed elevated)   Stand to Sit 2  Maximal assistance   Additional items Assist x 1; Armrests; Increased time required;Verbal cues  (VC's for foot/hand placement)   Stand pivot 3  Moderate assistance   Additional items Assist x 1; Armrests; Verbal cues; Other  (RW mangement)   Additional Comments pt was able to increase standing tolerance compared to previous tx session as pt stood 45 seconds prior to complete transfer to recliner   Ambulation/Elevation   Gait pattern Not appropriate   Balance   Static Sitting Fair   Dynamic Sitting Poor +   Static Standing Poor +   Ambulatory Zero   Endurance Deficit   Endurance Deficit No   Endurance Deficit Description limited standing tolerance   Activity Tolerance   Activity Tolerance Patient tolerated treatment well   Nurse Made Aware Spoke to RN [employer]   Exercises   Quad Sets Supine;10 reps;AROM; Bilateral   Hip Abduction Supine;10 reps;AAROM; Bilateral   Hip Adduction Supine;10 reps;AAROM; Bilateral   Assessment   Problem List Decreased strength;Decreased range of motion;Decreased endurance; Impaired balance;Decreased mobility; Decreased cognition;Decreased safety awareness; Impaired hearing;Orthopedic restrictions;Pain   Assessment pt ebgan tx session lying supine in [facility] bed and was agreeable to participate in PT intervention. PT intervention to focus on bed mobility, transfer training and increasing activity tolerance. Since previous tx brennan pt has progressed in terms of activity tolerance, bed mobility and assistance required fro functional transfers to and from RW. pt required min Ax1 to complete a supine<>sit EOB transfer w/ RLE management. pt was able to sit EOB with /s and no LOb while being educated on functional transfers to and from [address] [place] [address]. Prior to initiating STS from EOB to RW height of bed elevated in order to attempt STS transfer.  pt required less assistance then previous tx session but continues to require a significant amount of assistance max Ax1 w/ VC's for hand placement while ascending to RW and descending into recliner. pt was able to complete a SPT from EOB to recliner w/ mod Ax1 , RW management, no LOB but required VC's for lateral stepping. pt would benefit from continued skilled PT intervention in order to reduce amount of assistance required fro bed mobility and functional transfers. Continue to recommend post acute rehab services at the time of D/C in order to maximize pt functional independence and safety with all OOB mobility Post tx pt in recliner with call bell, chair alarm activated and all pt needs met. Goals   Patient Goals to go home and get back to taking pictures   STG Expiration Date 10/27/23   PT Treatment Day 2   Plan   Treatment/Interventions Functional transfer training;LE strengthening/ROM; Endurance training; Therapeutic exercise;Patient/family training;Equipment eval/education; Bed mobility;Cognitive reorientation  (PT to see when ambulations trials are appropriate)   Progress Slow progress, decreased activity tolerance   PT Frequency 4-6x/wk   Discharge Recommendation   PT Discharge Recommendation Post acute rehabilitation services   AM-PAC Basic Mobility Inpatient   Turning in Flat Bed Without Bedrails 2   Lying on Back to Sitting on Edge of Flat Bed Without Bedrails 3   Moving Bed to Chair 2   Standing Up From Chair Using Arms 2   Walk in Room 1   Climb 3-5 Stairs With Railing 1   Basic Mobility Inpatient Raw Score 11   Basic Mobility Standardized Score 30.25   Highest Level Of Mobility   -HL Goal 4: Move to chair/commode   JH-HLM Achieved 4: Move to [employer] Provided Other  (bed mobility and functional transfers)   Patient Demonstrates acceptance/verbal understanding   End of Consult   Patient Position at End of Consult Bedside chair;Bed/Chair alarm activated; All needs within reach The patient's AM-PAC Basic Mobility Inpatient Short Form Raw Score is 11. A Raw score of less than or equal to 16 suggests the patient may benefit from discharge to post-acute rehabilitation services. Please also refer to the recommendation of the Physical Therapist for safe discharge planning.     Ricarda Pardo

## 2023-10-15 PROBLEM — D69.6 THROMBOCYTOPENIA (HCC): Status: ACTIVE | Noted: 2023-10-15

## 2023-10-15 PROBLEM — S76.111A: Status: ACTIVE | Noted: 2023-10-11

## 2023-10-15 PROBLEM — C43.9 MELANOMA (HCC): Status: ACTIVE | Noted: 2023-10-15

## 2023-10-15 PROCEDURE — 97530 THERAPEUTIC ACTIVITIES: CPT

## 2023-10-15 PROCEDURE — 97167 OT EVAL HIGH COMPLEX 60 MIN: CPT

## 2023-10-15 PROCEDURE — 97162 PT EVAL MOD COMPLEX 30 MIN: CPT

## 2023-10-15 PROCEDURE — 97116 GAIT TRAINING THERAPY: CPT

## 2023-10-15 PROCEDURE — 97535 SELF CARE MNGMENT TRAINING: CPT

## 2023-10-15 PROCEDURE — 99223 1ST HOSP IP/OBS HIGH 75: CPT | Performed by: STUDENT IN AN ORGANIZED HEALTH CARE EDUCATION/TRAINING PROGRAM

## 2023-10-15 RX ORDER — ACETAMINOPHEN 325 MG/1
975 TABLET ORAL EVERY 8 HOURS SCHEDULED
Status: DISCONTINUED | OUTPATIENT
Start: 2023-10-15 | End: 2023-10-25 | Stop reason: HOSPADM

## 2023-10-15 RX ORDER — LIDOCAINE 50 MG/G
1 PATCH TOPICAL DAILY
Status: DISCONTINUED | OUTPATIENT
Start: 2023-10-15 | End: 2023-10-25 | Stop reason: HOSPADM

## 2023-10-15 RX ORDER — ASPIRIN 325 MG
325 TABLET ORAL 2 TIMES DAILY
Status: DISCONTINUED | OUTPATIENT
Start: 2023-10-15 | End: 2023-10-25 | Stop reason: HOSPADM

## 2023-10-15 RX ORDER — BISACODYL 10 MG
10 SUPPOSITORY, RECTAL RECTAL DAILY PRN
Status: DISCONTINUED | OUTPATIENT
Start: 2023-10-15 | End: 2023-10-25 | Stop reason: HOSPADM

## 2023-10-15 RX ADMIN — HYDROCORTISONE 1 APPLICATION: 25 CREAM TOPICAL at 20:09

## 2023-10-15 RX ADMIN — ASPIRIN 325 MG ORAL TABLET 325 MG: 325 PILL ORAL at 17:06

## 2023-10-15 RX ADMIN — OXYCODONE HYDROCHLORIDE 5 MG: 5 TABLET ORAL at 12:22

## 2023-10-15 RX ADMIN — LIDOCAINE 1 PATCH: 50 PATCH CUTANEOUS at 08:41

## 2023-10-15 RX ADMIN — OXYCODONE HYDROCHLORIDE 5 MG: 5 TABLET ORAL at 20:40

## 2023-10-15 RX ADMIN — SENNOSIDES 17.2 MG: 8.6 TABLET, FILM COATED ORAL at 08:35

## 2023-10-15 RX ADMIN — HYDROCORTISONE: 25 CREAM TOPICAL at 05:57

## 2023-10-15 RX ADMIN — ACETAMINOPHEN 975 MG: 325 TABLET ORAL at 05:59

## 2023-10-15 RX ADMIN — ACETAMINOPHEN 975 MG: 325 TABLET ORAL at 22:04

## 2023-10-15 RX ADMIN — ASPIRIN 325 MG ORAL TABLET 325 MG: 325 PILL ORAL at 08:36

## 2023-10-15 RX ADMIN — ACETAMINOPHEN 975 MG: 325 TABLET ORAL at 14:33

## 2023-10-15 RX ADMIN — B-COMPLEX W/ C & FOLIC ACID TAB 1 TABLET: TAB at 08:35

## 2023-10-15 NOTE — ASSESSMENT & PLAN NOTE
Follows with ENT for routine cerumen cleaning  Felt to be related to cerumen impaction  Follow-up as an outpatient

## 2023-10-15 NOTE — PROGRESS NOTES
10/15/23 0830   Rehab Team Goals   ADL Team Goal Patient will require assist with ADLs with least restrictive device upon completion of rehab program   Rehab Team Interventions   OT Interventions Self Care; Therapeutic Exercise;Cognitive Retraining;Energy Conservation;Patient/Family Education;Group Therapy   Eating Goal   Eating Goal 06. Independent - Patient completes the activity by him/herself with no assistance from a helper. Meal Complete All meals   Status Ongoing; Target goal - two weeks   Interventions Optimal Position   Grooming Goal   Oral Hygiene Goal 06. Independent - Patient completes the activity by him/herself with no assistance from a helper. Task Wash/Dry Face;Wash/Dry Hands;Brush Teeth;Comb Hair   Environment Seated in Chair   Safety Precautions WBAT   Status Ongoing; Target goal - two weeks   Intervention Therapeutic Exercise; Tolerance Work   Tub/Shower Transfer Goal   Method   (n/a recommend SB at DC)   Bathing Goal   Shower/bathe self Goal 03. Partial/moderate assistance - [place] does less than half the effort. [place] lifts or holds trunk or limbs and provides more than half the effort. Environment Seated;Sponge Bath;Standing   Adaptive Equipment Long Handle Sponge;Seat with back   Safety Precautions WBAT   Status Ongoing; Target goal - two weeks   Intervention ADL Training; Therapeutic Exercise   Upper Body Dressing Goal   Upper body dressing Goal 06. Independent - Patient completes the activity by him/herself with no assistance from a helper.  (button down shirts)   Task Button Down   Environment Seated   Status Ongoing; Target goal - two weeks   Intervention Tolerance Work; Therapeutic Exercise   Lower Body Dressing Goal   Lower body dressing Goal 03. Partial/moderate assistance - [place] does less than half the effort. [place] lifts or holds trunk or limbs and provides more than half the effort.  (including HKB)   Putting on/taking off footwear Goal 03.  Partial/moderate assistance - [place] does less than half the effort. [place] lifts or holds trunk or limbs and provides more than half the effort. Task Socks;Pants; Undergarment   Adaptive Equipment Sock Aide;Dressing Stick; Reacher   Environment Seated;Standing   Safety Precautions WBAT   Status Ongoing; Target goal - two weeks   Intervention Assistive Device; Therapeutic Exercise; Tolerance Work   Toileting Transfer Goal   Toilet transfer Goal 04. Supervision or touching assistance- [place] provides VERBAL CUES or supervision throughout activity. Assistive Device Bedside Commode;Roller Walker   Safety WBAT   Status Ongoing; Target goal - two weeks   Intervention ADL Training;Balance Work;Assistive Device   Toileting Goal   Toileting hygiene Goal 04. Supervision or touching assistance- [place] provides VERBAL CUES or supervision throughout activity. Task Pants Up;Pants Down;Hygiene   Status Ongoing; Target goal - two weeks   Intervention ADL Training;Balance Work   Comprehension Goal   Comprehension Assist Level Supervision   Assist Device Hearing Aide;Glasses   Status Ongoing; Target goal - two weeks   Expression Goal   Expression Assist Level Supervision   Status Ongoing; Target goal - two weeks   Meal Prep and Kitchen Mobility   Assist Level   (wife to complete at DC)   Medication Management   Assist Level Supervision   Status Ongoing; Target goal - two weeks   Laundry   Assist Level   (wife to complete at DC)   Finance Management   Assist Level Supervision   Status Ongoing; Target goal - two weeks

## 2023-10-15 NOTE — PLAN OF CARE
Problem: PAIN - ADULT  Goal: Verbalizes/displays adequate comfort level or baseline comfort level  Description: Interventions:  - Encourage patient to monitor pain and request assistance  - Assess pain using appropriate pain scale  - Administer analgesics based on type and severity of pain and evaluate response  - Implement non-pharmacological measures as appropriate and evaluate response  - Consider cultural and social influences on pain and pain management  - Notify physician/advanced practitioner if interventions unsuccessful or patient reports new pain  Outcome: Progressing     Problem: SAFETY ADULT  Goal: Patient will remain free of falls  Description: INTERVENTIONS:  - Educate patient/family on patient safety including physical limitations  - Instruct patient to call for assistance with activity   - Consult OT/PT to assist with strengthening/mobility   - Keep Call bell within reach  - Keep bed low and locked with side rails adjusted as appropriate  - Keep care items and personal belongings within reach  - Initiate and maintain comfort rounds  - Make Fall Risk Sign visible to staff  - Apply yellow socks and bracelet for high fall risk patients  - Consider moving patient to room near nurses station  Outcome: Progressing     Problem: Knowledge Deficit  Goal: Patient/family/caregiver demonstrates understanding of disease process, treatment plan, medications, and discharge instructions  Description: Complete learning assessment and assess knowledge base.   Interventions:  - Provide teaching at level of understanding  - Provide teaching via preferred learning methods  Outcome: Progressing

## 2023-10-15 NOTE — PROGRESS NOTES
10/15/23 1230   Rehab Team Goals   Transfer Team Goal Patient will require supervision with transfers with least restrictive device upon completion of rehab program   Locomotion Team Goal Patient will require supervision with locomotion with least restrictive device upon completion of rehab program   Rehab Team Interventions   PT Interventions Gait Training; Therapeutic Exercise;Neuromuscualr Reeducation;Transfer Training;Community Reintegration;Bed Mobility; Wheelchair Mobility;Modalities; Patient/Family Education   PT Transfer Goal   Roll left and right Goal 06. Independent - Patient completes the activity by him/herself with no assistance from a helper. Sit to lying Goal 06. Independent - Patient completes the activity by him/herself with no assistance from a helper. Lying to sitting on side of bed Goal 06. Independent - Patient completes the activity by him/herself with no assistance from a helper. Sit to stand Goal 04. Supervision or touching assistance- [place] provides VERBAL CUES or supervision throughout activity. Chair/bed-to-chair transfer Goal 04. Supervision or touching assistance- [place] provides VERBAL CUES or supervision throughout activity. Car Transfer Goal 04. Supervision or touching assistance- [place] provides VERBAL CUES or supervision throughout activity. Assistive Device Roller Walker   Safety Precautions WBAT   Environment Level Surface; Uneven Surface;Tile Floor; Well Lit;Carpet; Distractions   Status Target goal - two weeks   Locomotion Goal   Primary discharge mode of locomotion Walking   Target Walk Distance 150 ft  (Target for household distances but may be able to tolerate increased distances with RW)   Assist Device Roller Walker  (with R knee brace)   Gait Pattern Improvement WNL   Walk 10 feet Goal 04. Supervision or touching assistance- [place] provides VERBAL CUES or supervision throughout activity. Walk 50 feet with 2 turns Goal 04.  Supervision or touching assistance- [place] provides VERBAL CUES or supervision throughout activity. Walk 150 feet Goal 04. Supervision or touching assistance- [place] provides VERBAL CUES or supervision throughout activity. Walking 10 feet on uneven surface 04. Supervision or touching assistance- [place] provides VERBAL CUES or supervision throughout activity. (may need to ambulate ramp to enter home)   Walking Goal Status Target goal - two weeks   Type of Wheelchair Used 1. Manual   Stairs Goal   1 step or curb goal 04. Supervision or touching assistance- [place] provides VERBAL CUES or supervision throughout activity. (may be able to negotiate steps by circumducting R LE or may be able to sit on chair on top step to negotiate stairs or wife reports can put in ramp)   12 steps Goal   (will have first floor set up although does have full flight of steps to second floor where he slept prior)   Assist Level Supervision   Number of Stairs 1  (does have 13 steps to second floor but plan is to sleep on first floor)   Technique Non-reciprocal;Curb Step;Ramp   Hand Rail Right   Status Target goal - two weeks   Object Retrieval Goal   Picking up object Goal 04. Supervision or touching assistance- [place] provides VERBAL CUES or supervision throughout activity.    Assistive Device  Reacher   Small Object Picked Up marker

## 2023-10-15 NOTE — ASSESSMENT & PLAN NOTE
History of melanoma follows with dermatology at [facility]  Has had several melanoma, basal cell and squamous cell carcinomas removed  Follow-up as an outpatient
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
## 2023-10-15 NOTE — TREATMENT PLAN
Individualized Plan of 30 [facility] 74 y.o. male MRN: [id number]  Unit/Bed#: -98 Encounter: [id number]     PATIENT INFORMATION  ADMISSION DATE: 10/14/2023  4:37 PM ANNA MARIE CATEGORY:Orthopedic Disorders:  08.9  Other Orthopedic right quadriceps tendon rupture status post repair   ADMISSION DIAGNOSIS: S/P tendon repair [Z98.890]  EXPECTED LOS: 2 weeks     MEDICAL/FUNCTIONAL PROGNOSIS  Based on my assessment of the patient's medical conditions and current functional status, the prognosis for attaining medical and functional goals or the IRF stay is:  Good    Medical Goals: Patient will be medically stable for discharge to [facility] upon completion of rehab program and Patient will be able to manage medical conditions and comorbid conditions with medications and follow up upon completion of rehab program    Cardiopulmonary function: Ensure cardiopulmonary stability and optimize cardiopulmonary function not only at rest but with activity as patient's activity level significantly increases in acute rehab compared with prior to transfer in preparation for safe discharge from [facility]. Must closely and frequently monitor blood pressure and HR to ensure adequate cardiac output during ADLs and ambulation as patient is at increased risk for orthostatic hypotension/syncope and potential injury if not monitored for and managed adequately. Hypoxia prevention: Ensure appropriate level of oxygenation at rest and with activity to avoid symptomatic hypoxia, maximize functional performance, and decrease risk of atelectasis/pneumonia through close and frequent monitoring, providing appropriate respiratory treatments (such as incentive spirometry), and when necessary provide/adjust respiratory medications.     Pain management:  Pain will improve with frequent evaluation of pain, careful adjustments in medications, frequent re-evaluation of patient's pain and medical/neurologic status to ensure optimal pain control, avoidance of potential serious and even life-threatening side-effects and drug interactions, as well as weaning pain medications as soon as possible to decrease risk of short and long-term use. Orthopedic Disorder: Right quadriceps tendon rupture status postrepair and left leg contusion causing impaired mobility, ADLs, and gait:  intensive skilled therapies with physical therapy and occupational therapy with close oversight and management by rehab specialized physician in acute rehabilitation setting to most expeditiously and effectively improve functional mobility, transfers, upper and lower body strengthening, conditioning, balance, and gait training with appropriate assistive device. Patient will have optimal supervision and management of patient's underlying orthopedic disorder with specialized rehabilitation physician during this period of recovery to ensure most expeditious and optimal recovery with decreased risks of fall/injury and other complications including acute worsening of ortho disorder, decrease risk of VTE, PNA, and skin ulceration. Inpatient rehabilitation education/teaching: To be provided to patient and typically family/caregiver (if able to be identified) by all skilled therapists, rehab nursing, case management, and rehab specialized physician to ensure optimal recovery and decrease risks of complications in both acute rehabilitation setting as well as after discharge. Bladder dysfunction:  Appropriate bladder management with appropriate toileting program from rehab nursing and staff with oversight management by rehabilitation physicians which include appropriate monitoring and possible adjustments in medications to with goals to optimize bladder function and decrease risk of bladder retention, incontinence, and urinary tract infection.    Bowel dysfunction: Appropriate bowel management with appropriate toileting program from rehab nursing and staff with oversight management by rehabilitation physicians which include adjustments in medications to optimize appropriate bowel function and prevent/decrease risk of constipation and bowel obstruction. Obesity:  Close monitoring of nutrition status, nutrition specialist with adjustments in diet.  on appropriate short and long term nutrition and activity. Obesity increases complexity of patient's overall condition and causes unique challenges during this part of patient's recovery process. Supervise and if necessary make adjustments in rehab nursing care and skilled therapy care to ensure appropriate toileting, bed mobility, other ADLs, and ambulation to decrease risk of falls/injuries, VTE, skin breakdown/ulceration and optimize functional recovery. Skin wounds/incisions: Appropriate skin checks for wound/skin evaluation including evaluation of healing, worsening of wounds, or signs of infection. Wound care management from rehab nursing, wound care nursing, physicians. Ensure frequent appropriate turning, positioning in bed, in chair, when mobilizing, and when appropriate with use of appropriate devices to optimize healing and decrease risk of worsening or new skin breakdown. ANTICIPATED DISCHARGE DISPOSITION AND SERVICES  COMMUNITY SETTING: Home - independent/modified independent    ANTICIPATED FOLLOW-UP SERVICE:   Home Health Services: PT, OT, and Nursing    DISCIPLINE SPECIFIC PLANS:  Required Disciplines & Services: Rehabillitation Nursing and Case Management    REQUIRED THERAPY:  Therapy Hours per Day Days per Week Total Days   Physical Therapy 1.5 5 14   Occupational Therapy 1.5 5 14   NOTE: Additional therapy time(s) or changes to allocation of therapies as appropriate to meet patient needs and to achieve functional goals.         Patient will participate in above therapy regimen consisting of PT and OT due to the following medical procedure/condition:Orthopedic Disorders:  08.9 Other Orthopedic right quadriceps tendon rupture status post repair    ANTICIPATED FUNCTIONAL OUTCOMES:  ADL: Patient will require assist with ADLs with least restrictive device upon completion of rehab program   Bladder/Bowel: Patient will return to premorbid level for bladder/bowel management upon completion of rehab program   Transfers:  Modified independent   Locomotion:  Modified independent   Cognitive:   Independent     DISCHARGE PLANNING NEEDS  Equipment needs: Discharge needs to be reviewed with team      REHAB ANTICIPATED PARTICIPATION RESTRICTIONS:  Amubulate Short Distances Only, Assist with Bathing in Tub, Assist with Mobility, Assist with Tub Shower Transfer, Rquires Assist with ADLS, Requires Assit with Homemaking, Requires Assit with Steps, and Weight Bearing weightbearing as tolerated wearing a full extension locked T ROM on the right    [place], DO  Physical Medicine and [place]
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
## 2023-10-15 NOTE — ASSESSMENT & PLAN NOTE
Follows with hematology as an outpatient has a chronically low platelet count  Generally has been between 100 K-130 K  Plt = 212K.   Previously 158K, 128 K   No acute issues at this time but continue to monitor on biweekly CBC or sooner if clinically indicated
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
## 2023-10-15 NOTE — NURSING NOTE
Patient reports he does not consume pork products in his diet. Orders noted for Lovenox injections. Discussed and educated patient that Lovenox is derived from pork. Patient expressed wanting to change to alternative anticoagulant if possible. Dr. Joan Iyer aware. New orders for  mg BID. Patient aware. 1630, Patient used urinal, 350 ml void of dark evan urine. Patient denies burning, frequency, urgency of discomfort while urinating. Dr. Joan Iyer made aware. Encouraged patient to drink more fluids. Will continue to monitor.
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
## 2023-10-15 NOTE — ASSESSMENT & PLAN NOTE
Sustained from fall in the woods landing on his knees  On exam in acute care with high riding right patella and avoid inferior that was palpated and quadriceps tendon rupture noted on imaging  Taken to the OR on 10/12/23 with Dr. Sudeep Nicholson for right quadriceps tendon rupture repair  Weightbearing as tolerated RLE in a locked T ROM brace in full extension  Patient initially on Lovenox for DVT prophylaxis however does not take pork products and based on operative note switch to aspirin 325 mg twice daily for the remainder of his course x 6 weeks total.  Started on Prevacid from home  H&H stable  Monitor incision  Redness near right tibial plateau - likely from trauma and cut of skin. Betadine to skin   Starting Keflex 500 mg Q 6 hours with Florastor - discussed with IM consultants additionally. Course almost completing 10/23/23  Much improved 10/19/23   Monitor pain  Follow-up with orthopedics at 2 and 6 weeks.   POD 14 = 10/26/23  Will consult Ortho - hopeful he can get his staples removed tomorrow     10/23/23 photo

## 2023-10-15 NOTE — PLAN OF CARE
Problem: PAIN - ADULT  Goal: Verbalizes/displays adequate comfort level or baseline comfort level  Description: Interventions:  - Encourage patient to monitor pain and request assistance  - Assess pain using appropriate pain scale  - Administer analgesics based on type and severity of pain and evaluate response  - Implement non-pharmacological measures as appropriate and evaluate response  - Consider cultural and social influences on pain and pain management  - Notify physician/advanced practitioner if interventions unsuccessful or patient reports new pain  Outcome: Progressing     Problem: MOBILITY - ADULT  Goal: Maintains/Returns to pre admission functional level  Description: INTERVENTIONS:  - Perform BMAT or MOVE assessment daily.   - Set and communicate daily mobility goal to care team and patient/family/caregiver. - Collaborate with rehabilitation services on mobility goals if consulted  - Perform Range of Motion 4 times a day. - Reposition patient every 2 hours.   - Dangle patient 4 times a day  - Stand patient 4 times a day  - Ambulate patient 4 times a day  - Out of bed to chair 4 times a day   - Out of bed for meals 3 times a day  - Out of bed for toileting  - Record patient progress and toleration of activity level   Outcome: Progressing

## 2023-10-16 PROBLEM — R73.01 IMPAIRED FASTING GLUCOSE: Status: ACTIVE | Noted: 2023-10-16

## 2023-10-16 PROBLEM — R82.998: Status: ACTIVE | Noted: 2023-10-16

## 2023-10-16 LAB
ANION GAP SERPL CALCULATED.3IONS-SCNC: 8 MMOL/L
BUN SERPL-MCNC: 16 MG/DL (ref 5–25)
CALCIUM SERPL-MCNC: 9.2 MG/DL (ref 8.4–10.2)
CHLORIDE SERPL-SCNC: 103 MMOL/L (ref 96–108)
CO2 SERPL-SCNC: 28 MMOL/L (ref 21–32)
CREAT SERPL-MCNC: 0.66 MG/DL (ref 0.6–1.3)
ERYTHROCYTE [DISTWIDTH] IN BLOOD BY AUTOMATED COUNT: 13.4 % (ref 11.6–15.1)
GFR SERPL CREATININE-BSD FRML MDRD: 95 ML/MIN/1.73SQ M
GLUCOSE P FAST SERPL-MCNC: 114 MG/DL (ref 65–99)
GLUCOSE SERPL-MCNC: 114 MG/DL (ref 65–140)
HCT VFR BLD AUTO: 43 % (ref 36.5–49.3)
HGB BLD-MCNC: 14.4 G/DL (ref 12–17)
MCH RBC QN AUTO: 31.3 PG (ref 26.8–34.3)
MCHC RBC AUTO-ENTMCNC: 33.5 G/DL (ref 31.4–37.4)
MCV RBC AUTO: 94 FL (ref 82–98)
PLATELET # BLD AUTO: 128 THOUSANDS/UL (ref 149–390)
PMV BLD AUTO: 11.7 FL (ref 8.9–12.7)
POTASSIUM SERPL-SCNC: 3.9 MMOL/L (ref 3.5–5.3)
RBC # BLD AUTO: 4.6 MILLION/UL (ref 3.88–5.62)
SODIUM SERPL-SCNC: 139 MMOL/L (ref 135–147)
WBC # BLD AUTO: 3.65 THOUSAND/UL (ref 4.31–10.16)

## 2023-10-16 PROCEDURE — 97535 SELF CARE MNGMENT TRAINING: CPT

## 2023-10-16 PROCEDURE — 85025 COMPLETE CBC W/AUTO DIFF WBC: CPT | Performed by: STUDENT IN AN ORGANIZED HEALTH CARE EDUCATION/TRAINING PROGRAM

## 2023-10-16 PROCEDURE — 97110 THERAPEUTIC EXERCISES: CPT

## 2023-10-16 PROCEDURE — 99232 SBSQ HOSP IP/OBS MODERATE 35: CPT | Performed by: PHYSICAL MEDICINE & REHABILITATION

## 2023-10-16 PROCEDURE — 97530 THERAPEUTIC ACTIVITIES: CPT

## 2023-10-16 PROCEDURE — 99222 1ST HOSP IP/OBS MODERATE 55: CPT | Performed by: INTERNAL MEDICINE

## 2023-10-16 PROCEDURE — 80048 BASIC METABOLIC PNL TOTAL CA: CPT | Performed by: STUDENT IN AN ORGANIZED HEALTH CARE EDUCATION/TRAINING PROGRAM

## 2023-10-16 PROCEDURE — 97116 GAIT TRAINING THERAPY: CPT

## 2023-10-16 RX ORDER — LIDOCAINE 50 MG/G
1 PATCH TOPICAL DAILY
Status: DISCONTINUED | OUTPATIENT
Start: 2023-10-16 | End: 2023-10-25 | Stop reason: HOSPADM

## 2023-10-16 RX ADMIN — ASPIRIN 325 MG ORAL TABLET 325 MG: 325 PILL ORAL at 08:18

## 2023-10-16 RX ADMIN — LIDOCAINE 1 PATCH: 50 PATCH CUTANEOUS at 08:19

## 2023-10-16 RX ADMIN — OXYCODONE HYDROCHLORIDE 5 MG: 5 TABLET ORAL at 20:39

## 2023-10-16 RX ADMIN — LIDOCAINE 1 PATCH: 50 PATCH CUTANEOUS at 13:03

## 2023-10-16 RX ADMIN — SENNOSIDES 17.2 MG: 8.6 TABLET, FILM COATED ORAL at 08:18

## 2023-10-16 RX ADMIN — ASPIRIN 325 MG ORAL TABLET 325 MG: 325 PILL ORAL at 17:07

## 2023-10-16 RX ADMIN — HYDROCORTISONE 1 APPLICATION: 25 CREAM TOPICAL at 21:15

## 2023-10-16 RX ADMIN — ACETAMINOPHEN 975 MG: 325 TABLET ORAL at 13:03

## 2023-10-16 RX ADMIN — OXYCODONE HYDROCHLORIDE 5 MG: 5 TABLET ORAL at 13:05

## 2023-10-16 RX ADMIN — B-COMPLEX W/ C & FOLIC ACID TAB 1 TABLET: TAB at 08:18

## 2023-10-16 RX ADMIN — ACETAMINOPHEN 975 MG: 325 TABLET ORAL at 06:38

## 2023-10-16 RX ADMIN — LANSOPRAZOLE 30 MG: 30 CAPSULE, DELAYED RELEASE ORAL at 08:23

## 2023-10-16 RX ADMIN — ACETAMINOPHEN 975 MG: 325 TABLET ORAL at 21:15

## 2023-10-16 RX ADMIN — OXYCODONE HYDROCHLORIDE 5 MG: 5 TABLET ORAL at 08:25

## 2023-10-16 NOTE — PROGRESS NOTES
ARC PT EVAL- (completed by avni lyons PT Aminta Castro)   10/15/23 1230   Patient Data   Rehab Impairment Impairment of mobility, safety, Activities of Daily Living (ADLs), and cognitive/communication skills due to Orthopedic Disorders: 08.9 Other Orthopedic right quadriceps tendon rupture s/p repair   Etiologic Diagnosis right quadriceps tendon rupture s/p repair   Date of Onset 10/10/23  (DOS 10/12/23: repair of R quad tendon rupture)   Support System   Name Dena Morgan   Relationship wife   Health Status wife uses quad cane   Able to provide 24 hour supervision Yes   Able to provide physical help? No   Multiple Support Systems   (Pt also has friends and family that can assist with care as the wife does work)   Home Setup   Type of Home Multi Level   Method of Entry Stairs  (no HR on GEO but b/l porch posts to hold onto)   Number of Stairs 1   Number of Stairs in Home 13   In [address] Right   First Floor Bathroom Half   Second [address] Available Yes  (hospital bed on first floor)   Home Modifications Necessary?   (pending progress; wife reports could install ramp to enter home)   Available Equipment   (none)   Baseline Information   Vocation   (retired)   Transportation   (during the day)   Prior Device(s) Used   (none)   Prior Level of Function   Self-Care 3. Independent - Patient completed the activities by him/herself, with or without an assistive device, with no assistance from a helper. Indoor-Mobility (Ambulation) 3. Independent - Patient completed the activities by him/herself, with or without an assistive device, with no assistance from a helper. Stairs 3. Independent - Patient completed the activities by him/herself, with or without an assistive device, with no assistance from a helper. Functional Cognition 3. Independent - Patient completed the activities by him/herself, with or without an assistive device, with no assistance from a helper.    Prior Assistance Needed for   (none)   Prior Device Used Z. None of the above   Falls in the Last Year   Number of falls in the past 12 months 1   Type of Injury Associated with Fall Major injury  (quad tendon rupture)   Patient Preference   Doreen (Patient Preference) Milana Yanes   Psychosocial   Psychosocial (WDL) WDL   Restrictions/Precautions   Precautions Bed/chair alarms; Fall Risk;Cognitive;Pain;Visual deficit;Hard of hearing  (catarct sx, b/l hearing aides)   Weight Bearing Restrictions Yes   RLE Weight Bearing Per Order WBAT   LLE Weight Bearing Per Order WBAT   RLE ROM Restriction   (TROM brace locked in extension)   Braces or Orthoses   (RLE TROM locked in extension, L sleeve hinged brace for comfort, per physician)   Pain Assessment   Pain Assessment Tool 0-10   Pain Score 8   Pain Location/Orientation Orientation: Right;Orientation: Left; Location: Knee   Pain Onset/Description Onset: Ongoing;Frequency: Constant/Continuous; Descriptor: Aching   Effect of Pain on Daily Activities limits mobility   Patient's Stated Pain Goal No pain   Hospital Pain Intervention(s) Cold applied;Elevated;Repositioned; Rest   Toilet Transfer   Transfer Technique Stand Pivot   Adaptive Equipment Walker   Type of Assistance Needed Physical assistance   Physical Assistance Level 76% or more   Comment SPT to [facility]   Toilet Transfer CARE Score 2   Transfer Bed/Chair/Wheelchair   Adaptive Equipment Roller Walker   Type of Assistance Needed Physical assistance   Physical Assistance Level 76% or more   Comment maxAx1 with RW SPT from recliner > EOB, EOB > WC, WC > recliner   Chair/Bed-to-Chair Transfer CARE Score 2   Roll Left and Right   Type of Assistance Needed Physical assistance   Physical Assistance Level 26%-50%   Comment mod A rolling without HR   Roll Left and Right CARE Score 3   Sit to Lying   Type of Assistance Needed Physical assistance   Physical Assistance Level 26%-50%   Comment modA at LE   Sit to Lying CARE Score 3   Lying to Sitting on Side of Bed   Type of Assistance Needed Physical assistance   Physical Assistance Level 25% or less   Comment Noah at LE with bed flat towards L side of bed but wife reports getting out on R side at home   Lying to Sitting on Side of Bed CARE Score 3   Sit to Stand   Type of Assistance Needed Physical assistance   Physical Assistance Level 76% or more   Comment maxAx1 from recliner chair, WC, and EOB. Pt reports being able to stand better without the L knee sleeve; MD reporting sleeve can be worn just for comfort   Sit to Stand CARE Score 2   Picking Up Object   Type of Assistance Needed Physical assistance   Physical Assistance Level 25% or less   Comment used reacher to p/u marker with RW   Picking Up Object CARE Score 3   Car Transfer   Type of Assistance Needed Physical assistance   Physical Assistance Level 76% or more   Comment Simulated car tx with nu-step with maxA for management of LE into "car" and standing from surface at [employer] CARE Score 2   Ambulation   Primary Mode of Locomotion Prior to Admission Walk   Distance Walked (feet) 6 ft   Assist Device Roller Walker   Gait Pattern Antalgic; Slow [place]; Wide [place];Step through   Limitations Noted In Balance;Device Management; Endurance; Safety; Sequencing;Speed;Strength;Swing   Provided Assistance with: Balance   Walk Assist Level Moderate Assist;Chair Follow   Findings Pt able to ambulate 6ft with mod A slight L knee buckling without L knee sleeve donned.    Walk 10 Feet   Comment 6ft only with RW Ax1 + chair follow   Reason if not Attempted Safety concerns   Walk 10 Feet CARE Score 88   Walk 50 Feet with Two Turns   Reason if not Attempted Safety concerns   Walk 50 Feet with Two Turns CARE Score 88   Walk 150 Feet   Reason if not Attempted Safety concerns   Walk 150 Feet CARE Score 88   Walking 10 Feet on Uneven Surfaces   Reason if not Attempted Safety concerns   Walking 10 Feet on Uneven Surfaces CARE Score 88   Wheelchair mobility Type of Wheelchair Used 1. Manual   Method Right upper extremity; Left upper extremity   Assistance Provided For Locking Brakes; Remove Leg Rest;Replace Leg Rest   Distance Level Surface (feet) 50 ft   Wheel 50 Feet with Two Turns   Type of Assistance Needed Supervision   Comment propelled b/l UE support for UE strengthening and to mobilize in this facility   Wheel 50 Feet with Two Turns CARE Score 4   Wheel 150 Feet   Type of Assistance Needed Physical assistance   Physical Assistance Level 26%-50%   Wheel 150 Feet CARE Score 3   Curb or Single Stair   Reason if not Attempted Safety concerns   1 Step (Curb) CARE Score 88   4 Steps   Reason if not Attempted Safety concerns   4 Steps CARE Score 88   12 Steps   Reason if not Attempted Safety concerns   12 Steps CARE Score 88   Comprehension   QI: Comprehension 3. Usually Understands: Understands most conversations, but misses some part/intent of message. Requires cues at times to understand. Expression   QI: Expression 4. Express complex messages without difficulty and with speech that is clear and easy to [place]   RLE Assessment   RLE Assessment X  (DNT knee, hip/ankle WFL)   LLE Assessment   LLE Assessment WFL  (Hip 4-/5, Knee 3-/5, ankle 4+/5)   Sensation   Light Touch No apparent deficits   Cognition   Overall Cognitive Status Impaired   Attention Difficulty dividing attention   Orientation Level Oriented X4   Memory Decreased short term memory   Following Commands Follows one step commands without difficulty   Discharge Information   Vocational Plan Retired/not working   Patient's Discharge Plan D/C home with wife/family and friends   Patient's Rehab Expectations Supervision for mobility with RW   Barriers to Discharge Home Decreased Strength;Decreased Endurance;Pain; Safety Considerations   Impressions Pt is a 75 yo male who [facility] with b/l knee pain after hiking in woods, tripping, and falling on his kness.  He was unable to ambulate, bear weight, or flex his knees. He was rescued from the woods after 2.5 hours and transported to the ER. Pt diagnosed with R quad tendon rupture and contusion of L LE. Pt had a surgical repair of the R quad tendon 10/12. He is WBAT in TROM brace locked in extension of the R LE. He is WBAT on the L LE with knee sleeve for comfort. Pt is limited by pain and decreased strength and ROM. He required maxA for STS from all surfaces at [facility]. He required min-modA for bed mobility to manage b/l LE. He was able to pivot with mod-maxAx1 with RW but reocommending Ax2 with staff at this time. He was able to ambulate 6ft with RW with modA + CF with cues for safety and step through gait pattern. Unable to safely negotiate steps. He required maxA x1 for a simulated car transfer with the RW and increased time to complete task. Pt has good family and friend supported but inaccessible home environment. Wife reports she can install ramp instead of 1 GEO home and he can have a first floor set up with hospital bed. Pt is very pleasant and motivated to participate. ELOS is 2 weeks as pt shows good potential to meet all goals.    PT Therapy Minutes   PT Time In 1230   PT Time Out 1400   PT Total Time (minutes) 90   PT Mode of treatment - Individual (minutes) 90   PT Mode of treatment - Concurrent (minutes) 0   PT Mode of treatment - Group (minutes) 0   PT Mode of treatment - Co-treat (minutes) 0   PT Mode of Treatment - Total time(minutes) 90 minutes   PT Cumulative Minutes 90   Cumulative Minutes   Cumulative therapy minutes 180

## 2023-10-16 NOTE — PLAN OF CARE
Problem: PAIN - ADULT  Goal: Verbalizes/displays adequate comfort level or baseline comfort level  Description: Interventions:  - Encourage patient to monitor pain and request assistance  - Assess pain using appropriate pain scale  - Administer analgesics based on type and severity of pain and evaluate response  - Implement non-pharmacological measures as appropriate and evaluate response  - Consider cultural and social influences on pain and pain management  - Notify physician/advanced practitioner if interventions unsuccessful or patient reports new pain  Outcome: Progressing     Problem: DISCHARGE PLANNING  Goal: Discharge to home or other facility with appropriate resources  Description: INTERVENTIONS:  - Identify barriers to discharge w/patient and caregiver  - Arrange for needed discharge resources and transportation as appropriate  - Identify discharge learning needs (meds, wound care, etc.)  - Arrange for interpretive services to assist at discharge as needed  - Refer to Case Management Department for coordinating discharge planning if the patient needs post-hospital services based on physician/advanced practitioner order or complex needs related to functional status, cognitive ability, or social support system  Outcome: Progressing     Problem: Potential for Falls  Goal: Patient will remain free of falls  Description: INTERVENTIONS:  - Educate patient/family on patient safety including physical limitations  - Instruct patient to call for assistance with activity   - Consult OT/PT to assist with strengthening/mobility   - Keep Call bell within reach  - Keep bed low and locked with side rails adjusted as appropriate  - Keep care items and personal belongings within reach  - Initiate and maintain comfort rounds  - Make Fall Risk Sign visible to staff  - Offer Toileting every 2 Hours, in advance of need  - Initiate/Maintain bed/chair alarm  - Obtain necessary fall risk management equipment: RW  - Apply yellow socks and bracelet for high fall risk patients  - Consider moving patient to room near nurses station  Outcome: Progressing     Problem: MOBILITY - ADULT  Goal: Maintain or return to baseline ADL function  Description: INTERVENTIONS:  -  Assess patient's ability to carry out ADLs; assess patient's baseline for ADL function and identify physical deficits which impact ability to perform ADLs (bathing, care of mouth/teeth, toileting, grooming, dressing, etc.)  - Assess/evaluate cause of self-care deficits   - Assess range of motion  - Assess patient's mobility; develop plan if impaired  - Assess patient's need for assistive devices and provide as appropriate  - Encourage maximum independence but intervene and supervise when necessary  - Involve family in performance of ADLs  - Assess for home care needs following discharge   - Consider OT consult to assist with ADL evaluation and planning for discharge  - Provide patient education as appropriate  Outcome: Progressing

## 2023-10-16 NOTE — PROGRESS NOTES
10/16/23 1410   Pain Assessment   Pain Assessment Tool 0-10   Pain Score 8   Pain Location/Orientation Orientation: Right;Location: Leg   Effect of Pain on Daily Activities initially reports 10/10 pain, however, sitting comfortably in w/c and smiling. PT educated pt on pain scale, pt reducing pain score to 8/10, however, pt's behavior does not reflect high pain score. Pt does report when upright pain is improved, worst during STS transfer   Restrictions/Precautions   Precautions Bed/chair alarms;Cognitive; Fall Risk;Hard of hearing;Pain;Supervision on toilet/commode;Visual deficit  (b/l hearing aides)   Weight Bearing Restrictions Yes   RLE Weight Bearing Per Order WBAT   LLE Weight Bearing Per Order WBAT   RLE ROM Restriction Range Limitation  (R HKB locked in extension)   Subjective   Subjective "I'm doing worse this afternoon than I did this morning"   Roll Left and Right   Type of Assistance Needed Supervision   Comment on mat table   Roll Left and Right CARE Score 4   Sit to Lying   Type of Assistance Needed Supervision   Comment CS with vc's to hold onto straps of HKB to help bring RLE into bed, leading with LLE into bed   Sit to Lying CARE Score 4   Lying to Sitting on Side of Bed   Type of Assistance Needed Supervision   Comment CS, pt utilized top strap on HKB to maneuver RLE   Lying to Sitting on Side of Bed CARE Score 4   Sit to Stand   Type of Assistance Needed Physical assistance   Physical Assistance Level Total assistance   Comment modAx1 at best, primarily maxAx1 with cues for hand placement with RW; to simulate PLOF w/o AD would require Ax2.  Assist to help control landing into chair, max vc's to kick RLE out prior to sitting   Sit to Stand CARE Score 1   Bed-Chair Transfer   Type of Assistance Needed Physical assistance   Physical Assistance Level Total assistance   Comment modAx1 SPT with RW, however, to simulate PLOF would require Ax2 w/o AD   Chair/Bed-to-Chair Transfer CARE Score 1   Walk 10 Feet   Type of Assistance Needed Physical assistance   Physical Assistance Level Total assistance   Walk 10 Feet CARE Score 1   Walk 50 Feet with Two Turns   Type of Assistance Needed Physical assistance   Physical Assistance Level Total assistance   Walk 50 Feet with Two Turns CARE Score 1   Walking 10 Feet on Uneven Surfaces   Type of Assistance Needed Physical assistance   Physical Assistance Level Total assistance   Comment minAx1 with RW, 2nd person CF for safety on indoor ramp   Walking 10 Feet on Uneven Surfaces CARE Score 1   Ambulation   Primary Mode of Locomotion Prior to Admission Walk   Distance Walked (feet) 125 ft  (x1, 15'x1, 50'x1)   Assist Device Roller Walker   Gait Pattern Inconsistant [place]   Limitations Noted In Balance;Device Management; Endurance; Heel Strike;Speed;Strength;Swing   Provided Assistance with: Balance   Walk Assist Level Minimum Assist;Contact Guard; Chair Follow   Findings with RW Noah/CGA; to simulate PLOF w/o AD would require Ax2. Able to greatly increase ambulation distances this session   Does the patient walk? 2.  Yes   Wheel 50 Feet with Two Turns   Reason if not Attempted Activity not applicable   Wheel 50 Feet with Two Turns CARE Score 9   Wheel 150 Feet   Reason if not Attempted Activity not applicable   Wheel 016 Feet CARE Score 9   Curb or Single Stair   Reason if not Attempted Safety concerns   1 Step (Curb) CARE Score 88   4 Steps   Reason if not Attempted Safety concerns   4 Steps CARE Score 88   12 Steps   Reason if not Attempted Safety concerns   12 Steps CARE Score 88   Picking Up Object   Type of Assistance Needed Physical assistance   Physical Assistance Level Total assistance   Comment w/o reacher to simulate PLOF would require total A   Picking Up Object CARE Score 1   Therapeutic Interventions   Strengthening supine b/l SLRs to fatigue 2 sets, modified bridges (soft bolster under R ankle) 2 sets to fatigue, R quad sets 2x25, s/l hip abd 1x15 each side Assessment   Treatment Assessment Pt engaged in 90 min skilled PT session with focus on gait and transfer trng with RW, LE strengthening and conditioning, and bed mobility. Pain rated as 8/10 during session, however, pt with good tolerance to all interventions. Does require increased A for STS transfers due to decreased UE strength (pt reports need to get L shoulder replaced) as well as HKB locked in extension on RLE, will continue to work on xfers with pt. Did not initiate stair trng this session, however, can begin to trial next session as pt with 1 GEO from back entrance which pt commonly uses. At this time barriers to d/c home include limited family support as pts spouse unable to provide physical A (hx of CVA, uses [facility], recently had heart surgery), 1 GEO home, and need for external assistance for all mobilities due to high risk for falls. Cont with POC focusing on LE and core strengthening, gait and transfer trng with RW, stair trng, and improving pt's balance to reduce risk for falls. Family/Caregiver Present no   Problem List Decreased strength;Decreased range of motion;Decreased endurance; Impaired balance;Decreased mobility; Decreased cognition; Impaired hearing;Orthopedic restrictions;Pain   Barriers to Discharge Inaccessible home environment;Decreased caregiver support   PT Barriers   Functional Limitation Car transfers; Ramp negotiation;Stair negotiation;Standing;Transfers; Walking   Plan   Treatment/Interventions Functional transfer training;LE strengthening/ROM; Therapeutic exercise;Elevations; Endurance training;Bed mobility;Gait training   Progress Progressing toward goals   Discharge Recommendation   PT Discharge Recommendation Home with outpatient rehabilitation   Equipment Recommended Walker   PT Therapy Minutes   PT Time In 1410   PT Time Out 1540   PT Total Time (minutes) 90   PT Mode of treatment - Individual (minutes) 90   PT Mode of treatment - Concurrent (minutes) 0   PT Mode of treatment - Group (minutes) 0   PT Mode of treatment - Co-treat (minutes) 0   PT Mode of Treatment - Total time(minutes) 90 minutes   PT Cumulative Minutes 180   Therapy Time missed   Time missed?  No

## 2023-10-16 NOTE — ASSESSMENT & PLAN NOTE
Pt reports history of left shoulder pain  May have been worsened by laying on left side in woods  Lidocaine patch to left shoulder   Consideration for PT/OT

## 2023-10-16 NOTE — ASSESSMENT & PLAN NOTE
-- DO NOT REPLY / DO NOT REPLY ALL --  -- Message is from the [facility]--    COVID-19 Universal Screening: Negative    General Patient Message      Reason for Call: Pt stated she have 2 missed calls from the office and she stated to leave a message if she misses the call.     Caller Information       Type Contact Phone    04/14/2020 12:31 PM Phone (Incoming) Margaret Driver (Self) [phone] (M)          Alternative phone number: n/a    Turnaround time given to caller:   \"This message will be sent to [state Provider's name]. The clinical team will fulfill your request as soon as they review your message.\"     Pt was unable to move following fall and laying in woods for 2-3 hours on 10/10/23.    Encouraged increasing fluid intake  Pt denies urinary complaints   Creatinine stable   10/13: 0.75  10/16: 0.66   Will continue to follow labs

## 2023-10-16 NOTE — PLAN OF CARE
Problem: PAIN - ADULT  Goal: Verbalizes/displays adequate comfort level or baseline comfort level  Description: Interventions:  - Encourage patient to monitor pain and request assistance  - Assess pain using appropriate pain scale  - Administer analgesics based on type and severity of pain and evaluate response  - Implement non-pharmacological measures as appropriate and evaluate response  - Consider cultural and social influences on pain and pain management  - Notify physician/advanced practitioner if interventions unsuccessful or patient reports new pain  Outcome: Progressing     Problem: MUSCULOSKELETAL - ADULT  Goal: Maintain proper alignment of affected body part  Description: INTERVENTIONS:  - Support, maintain and protect limb and body alignment  - Provide patient/ family with appropriate education  Outcome: Progressing

## 2023-10-16 NOTE — ASSESSMENT & PLAN NOTE
Xray left knee showed mild arthritis, no acute abnl  Continue lidoderm patch  Consideration with therapies

## 2023-10-16 NOTE — PROGRESS NOTES
10/16/23 0930   Pain Assessment   Pain Assessment Tool 0-10   Pain Score 6   Pain Location/Orientation Orientation: Left; Location: Knee   Pain Onset/Description Onset: Gradual   Effect of Pain on Daily Activities Tolerates   Patient's Stated Pain Goal No pain   Hospital Pain Intervention(s) Repositioned;Elevated; Rest   Multiple Pain Sites No   Restrictions/Precautions   Precautions Bed/chair alarms;Cognitive; Fall Risk;Hard of hearing;Pain;Supervision on toilet/commode;Visual deficit  (B/L hearing aides)   Weight Bearing Restrictions Yes   RLE Weight Bearing Per Order WBAT   LLE Weight Bearing Per Order WBAT   RLE ROM Restriction Range Limitation  (HKB brace locked in extension)   Braces or Orthoses   (RLE HKB locked in extension; L knee sleeve for comfort)   Lifestyle   Autonomy Pt agreeable to OT Tx session. Lower Body Dressing   Comment EDU provided on RLE HKB management with dmeonstration on how to properly manuever velcro straps and clasps in order to reposition brace. Pt with hands on management to manuever 4/4 velcro straps with cues for technique. RLE positioned on 8" foot stool for task, pt reports interest in purchasing foot stool for home, as he verbalized inc comfort when RLE is positioned on stool for [facility] management task. Sit to Stand   Type of Assistance Needed Physical assistance; Adaptive equipment;Verbal cues   Physical Assistance Level 76% or more   Comment Initial maxA x1 STS to RW with cues for proper body mechanics; Performed 5 x's, modA by end of session. Sit to Stand CARE Score 2   Bed-Chair Transfer   Type of Assistance Needed Physical assistance; Adaptive equipment;Verbal cues   Physical Assistance Level 76% or more   Comment maxA x1 SPT with RW with cues for RLE placement when descending to chair   Chair/Bed-to-Chair Transfer CARE Score 2   Functional Standing Tolerance   Time 1 min x3   Comments Performed multiple STS's to RW with pt dmeonstrating ability to stand x 1 min each attempt with focus on improving fxnl standing tolerance, as well as offloading pt's buttocks to prevent skin breakdown. Therapeutic Excerise-Strength   UE Strength Yes   Right Upper Extremity- Strength   R Shoulder Flexion; Extension;Horizontal ABduction; External rotation; Internal rotation  (Limited LUE shoulder flexion, pt reports old injury)   R Elbow Elbow flexion;Elbow extension   R Position Seated   Equipment Dumbbell  (2#)   R Weight/Reps/Sets 2 sets of 15 reps   RUE Strength Comment Engaged in seated UE TE utilizing 2# dumbbell to perform 2 sets of 15 reps of indicated planes with EDU provided on focus of TE to maximize UE strength for fxnl transfer's and ADL performance. Pt tolerated well, with modifications in place for LUE 2* to dec shoulder ROM from an old shoulder injury. Left Upper Extremity-Strength   LUE Strength Comment See above   Cognition   Overall Cognitive Status Impaired   Arousal/Participation Alert; Cooperative   Attention Attends with cues to redirect   Orientation Level Oriented X4   Memory Decreased short term memory   Following Commands Follows one step commands without difficulty   Activity Tolerance   Activity Tolerance Patient tolerated treatment well   Medical Staff Made Aware /77   Other Comments   Assessment Plan to introduce LHAE during upcoming Tx session. Assessment   Treatment Assessment Pt participated in 90 min skilled OT Tx session with focus on improving fxnl STS's to RW, RLE HKB management, and UE TE. See above for further Tx details. Pt is demonstrating slight improvement with overall STS's, req mod/maxA at times with cues for proper body mechanics. Initiated RLE HKB management with pt demo ability to manuever 4/4 velcro straps/clasps. Plan to assess for carryover during an upcoming session. No c/o of pain in RLE throughout session, however, c/o of pain in L knee. L knee sleeve remained doffed for session per pt preference.  Pt continues to be limited by dec standing balance/tolerance, dec strength, dec ROM, and pain, all in which impact pt's fxnl performance. Pt would continue to benefit from skilled OT services to continue addressing above noted barriers in order to progress towards OT goals and dec caregiver burden upon D/C. Prognosis Good   Problem List Decreased strength;Decreased range of motion;Decreased endurance; Impaired balance;Decreased mobility; Decreased cognition; Impaired hearing;Orthopedic restrictions;Pain   Barriers to Discharge Inaccessible home environment;Decreased caregiver support   Plan   Treatment/Interventions ADL retraining;Functional transfer training; Therapeutic exercise; Endurance training;Patient/family training   Progress Progressing toward goals   Discharge Recommendation   OT Discharge Recommendation   (Pending progress)   Equipment Recommended   (TBD)   OT Therapy Minutes   OT Time In 0930   OT Time Out 1100   OT Total Time (minutes) 90   OT Mode of treatment - Individual (minutes) 90   OT Mode of treatment - Concurrent (minutes) 0   OT Mode of treatment - Group (minutes) 0   OT Mode of treatment - Co-treat (minutes) 0   OT Mode of Treatment - Total time(minutes) 90 minutes   OT Cumulative Minutes 180   Therapy Time missed   Time missed?  No
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
## 2023-10-16 NOTE — ASSESSMENT & PLAN NOTE
Pt reports hx of impaired fasting glucose  Discussed diet recommendations  FBS: 114 (10/16)  Hga1c: 6 (4/6/23)  Will monitor

## 2023-10-16 NOTE — ASSESSMENT & PLAN NOTE
Traumatic injury following fall when hiking   S/p OR 10/12/23 repair of quad tendon rupture with Dr Adan Zimmerman to the right lower extremity in TROM locked in extension  WBAT to the left lower extremity in hinged knee brace, unlocked   PT/OT  Pain control per primary team.   DVT ppx: ASA 325mg BID x 6 weeks    Follow up with Dr. Hyun Earl upon discharge.

## 2023-10-16 NOTE — PROGRESS NOTES
Physical Medicine and Rehabilitation Progress Note  Guillermina Medrano 74 y.o. male MRN: [id number]  Unit/Bed#: [facility] 269-01 Encounter: [id number]    Chief Complaint:  No new issues, some pain in R leg, dark urine. Interval History: No acute events over the weekend, and overall feeling well. Pain is improved from initial injury, but still present. Has some tingling in his toes on occasion, but no numbness/weakness in his ankle. He does buckle in his L knee on occasion and wears a brace from home - but prefers not wearing the brace if at all possible. He is POD 4 and mepilex/surgical dressing remains in place. He is continent of bowel/bladder. Denies any new CP, Sob, fevers, chills, N/V, abdominal pain. His urine is dark, but he is working on his hydration, and denies any dysuria, frequency, urgency, incontinence. Last BM 10/16. Assessment/Plan - Continue plan of care as per primary attending, unless otherwise stated below:      Traumatic Rupture of R Quad: S/P 10/12 tendon rupture repair with Dr. Carmelina Wolfe. Locked in knee extension with TROM. WBAT. Hold patient shower order for now. Continue ASA 325mg BID for DVT Ppx. PT/OT 3-5 hours/day, 5-7 day/s week. F/u with Orthopedics at 2 weeks and 6 weeks. Keep mepilex in place until POD 7. Melanoma: Outpatient f/u with derm at [facility]. Thrombocytopenia: Stable/Improved 10/16 at 128  L leg contusion: Also michael in his L knee. Has a brace ordered for support. Work on quad strengthening. 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 Particularly while on ASA BID for DVT PPx  Bilateral hearing loss: Has hearing aides  R rotator cuff arthropathy: s/p R TSA. Pain: Scheduled Tylenol, Oxycodone 2.5-5mg PRN   DVT Ppx: ASA 325mg BID for 6 weeks per Ortho. Total visit time: 35 minutes, with more than 50% spent counseling/coordinating care.  Counseling includes discussion with patient re: progress in therapies, functional issues observed by therapy staff, and discussion with patient regarding their current medical state and wellbeing. Coordination of patient's care was performed in conjunction with Internal Medicine service to monitor patient's labs, vitals, and management of their comorbidities. Mahesh Lynne MD  Physical Medicine and Rehabilitation      Review of Systems: A 10 point review of systems was negative except for what is noted in the HPI. Current Facility-Administered Medications:     acetaminophen (TYLENOL) tablet 975 mg, 975 mg, Oral, Q8H [facility], Amy Youssef DO, 975 mg at 10/16/23 4738    aspirin tablet 325 mg, 325 mg, Oral, BID, Amy Youssef DO, 325 mg at 10/16/23 0818    bisacodyl (DULCOLAX) rectal suppository 10 mg, 10 mg, Rectal, Daily PRN, Amy Youssef DO    hydrocortisone 2.5 % cream, , Topical, 4x Daily PRN, Amy Youssef DO, 1 Application at 61/15/93 2009    lansoprazole (PREVACID) capsule 30 mg, 30 mg, Oral, Daily, Amy Youssef DO, 30 mg at 10/16/23 0823    lidocaine (LIDODERM) 5 % patch 1 patch, 1 patch, Topical, Daily, Amy Youssef DO, 1 patch at 10/16/23 0765    magnesium hydroxide (MILK OF MAGNESIA) oral suspension 30 mL, 30 mL, Oral, Daily PRN, Amy Youssef DO    multivitamin stress formula tablet 1 tablet, 1 tablet, Oral, QAM, Amy Youssef DO, 1 tablet at 10/16/23 0818    oxyCODONE (ROXICODONE) IR tablet 5 mg, 5 mg, Oral, Q4H PRN, Amy Youssef DO, 5 mg at 10/16/23 0825    oxyCODONE (ROXICODONE) split tablet 2.5 mg, 2.5 mg, Oral, Q4H PRN, Amy Youssef DO    senna (SENOKOT) tablet 17.2 mg, 2 tablet, Oral, Daily, Amy Youssef DO, 17.2 mg at 10/16/23 0818    Physical Exam:  Temp:  [98.1 °F (36.7 °C)-99 °F (37.2 °C)] 98.1 °F (36.7 °C)  HR:  [67-92] 67  Resp:  [18] 18  BP: (135-145)/(69-74) 143/73  SpO2:  [94 %-97 %] 95 %      Gen: No acute distress, Well-nourished, well-appearing.   HEENT: Moist mucus membranes, Normocephalic/Atraumatic  Cardiovascular: Regular rate, rhythm, S1/S2. Distal pulses palpable  Heme/Extr: No edema  Pulmonary: Non-labored breathing. Lungs CTAB  : No frank  GI: Soft, non-tender, non-distended. MSK: R TROM locked in extension. Integumentary: Skin is warm, dry. Surgical mepilex in place. Neuro: AAOx3, Speech is intact. Appropriate to questioning. Sensation intact to light touch in R tibial, DP, SP distributions distally. Strength 5/5 in DF/PF/EHL on the R.   Psych: Normal mood and affect. Laboratory:  Labs reviewed  Results from last 7 days   Lab Units 10/16/23  0649 10/13/23  0638 10/12/23  0632   HEMOGLOBIN g/dL 14.4 15.2 15.2   HEMATOCRIT % 43.0 44.8 45.5   WBC Thousand/uL 3.65* 10.80* 6.42     Results from last 7 days   Lab Units 10/16/23  0649 10/13/23  0638 10/12/23  0632 10/10/23  1713   BUN mg/dL 16 18 16 17   SODIUM mmol/L 139 140 139 140   POTASSIUM mmol/L 3.9 4.2 4.2 4.1   CHLORIDE mmol/L 103 105 106 106   CREATININE mg/dL 0.66 0.75 0.76 0.78   AST U/L  --   --   --  25   ALT U/L  --   --   --  25     Results from last 7 days   Lab Units 10/10/23  1713   PROTIME seconds 13.4   INR  0.96        Diagnostic Studies: Labs reviewed   No orders to display         ** Please Note: Fluency Direct voice to text software may have been used in the creation of this document.  **

## 2023-10-16 NOTE — ASSESSMENT & PLAN NOTE
Follows with Dr Adelita Singletary ([facility]) - last consult note reviewed 9/11/23  Per Dr Rehana Quevedo note following fluctuating leukopenia and thrombocytopenia with significant monocytosis. Recommendations were for f/u labs in 3 months. 10/10: plt 105  10/13: plt 104  10/16: plt 128     Pathology slide 10/13:  "Peripheral blood smear shows normochromic normocytic RBCs with no schistocytes, mild thrombocytopenia, mild leukocytosis with monocytosis and dyspoietic changes.  If monocytosis persists, HEM/ONC consultation with bone marrow biopsy might be considered as clinically indicated."

## 2023-10-16 NOTE — NURSING NOTE
Pt had dark evan urine this shift , pt no c/o pain when urinating, encouraged pt to drink fluids. Dr. Efe Carlisle made aware by previous shift. Blood works send to lab. Will monitor.

## 2023-10-17 PROBLEM — L03.90 CELLULITIS: Status: ACTIVE | Noted: 2023-10-17

## 2023-10-17 PROBLEM — R52 ACUTE PAIN: Status: ACTIVE | Noted: 2023-10-11

## 2023-10-17 PROCEDURE — 99232 SBSQ HOSP IP/OBS MODERATE 35: CPT | Performed by: INTERNAL MEDICINE

## 2023-10-17 PROCEDURE — 97530 THERAPEUTIC ACTIVITIES: CPT

## 2023-10-17 PROCEDURE — 0HBNXZZ EXCISION OF LEFT FOOT SKIN, EXTERNAL APPROACH: ICD-10-PCS | Performed by: PODIATRIST

## 2023-10-17 PROCEDURE — 0HBMXZZ EXCISION OF RIGHT FOOT SKIN, EXTERNAL APPROACH: ICD-10-PCS | Performed by: PODIATRIST

## 2023-10-17 PROCEDURE — 97116 GAIT TRAINING THERAPY: CPT

## 2023-10-17 PROCEDURE — 97535 SELF CARE MNGMENT TRAINING: CPT

## 2023-10-17 PROCEDURE — 0HBRXZZ EXCISION OF TOE NAIL, EXTERNAL APPROACH: ICD-10-PCS | Performed by: PODIATRIST

## 2023-10-17 PROCEDURE — 97110 THERAPEUTIC EXERCISES: CPT

## 2023-10-17 PROCEDURE — 99232 SBSQ HOSP IP/OBS MODERATE 35: CPT | Performed by: PHYSICAL MEDICINE & REHABILITATION

## 2023-10-17 RX ORDER — CEPHALEXIN 500 MG/1
500 CAPSULE ORAL EVERY 6 HOURS SCHEDULED
Status: COMPLETED | OUTPATIENT
Start: 2023-10-17 | End: 2023-10-24

## 2023-10-17 RX ORDER — SACCHAROMYCES BOULARDII 250 MG
250 CAPSULE ORAL 2 TIMES DAILY
Status: COMPLETED | OUTPATIENT
Start: 2023-10-17 | End: 2023-10-23

## 2023-10-17 RX ADMIN — ACETAMINOPHEN 975 MG: 325 TABLET ORAL at 06:12

## 2023-10-17 RX ADMIN — Medication 250 MG: at 11:57

## 2023-10-17 RX ADMIN — SENNOSIDES 17.2 MG: 8.6 TABLET, FILM COATED ORAL at 08:01

## 2023-10-17 RX ADMIN — OXYCODONE HYDROCHLORIDE 5 MG: 5 TABLET ORAL at 05:23

## 2023-10-17 RX ADMIN — LANSOPRAZOLE 30 MG: 30 CAPSULE, DELAYED RELEASE ORAL at 08:03

## 2023-10-17 RX ADMIN — Medication 250 MG: at 17:09

## 2023-10-17 RX ADMIN — HYDROCORTISONE 1 APPLICATION: 25 CREAM TOPICAL at 20:43

## 2023-10-17 RX ADMIN — ACETAMINOPHEN 975 MG: 325 TABLET ORAL at 22:59

## 2023-10-17 RX ADMIN — ACETAMINOPHEN 975 MG: 325 TABLET ORAL at 12:59

## 2023-10-17 RX ADMIN — LIDOCAINE 1 PATCH: 50 PATCH CUTANEOUS at 08:02

## 2023-10-17 RX ADMIN — B-COMPLEX W/ C & FOLIC ACID TAB 1 TABLET: TAB at 08:01

## 2023-10-17 RX ADMIN — ASPIRIN 325 MG ORAL TABLET 325 MG: 325 PILL ORAL at 08:01

## 2023-10-17 RX ADMIN — OXYCODONE HYDROCHLORIDE 5 MG: 5 TABLET ORAL at 20:42

## 2023-10-17 RX ADMIN — CEPHALEXIN 500 MG: 500 CAPSULE ORAL at 23:00

## 2023-10-17 RX ADMIN — OXYCODONE HYDROCHLORIDE 5 MG: 5 TABLET ORAL at 15:11

## 2023-10-17 RX ADMIN — CEPHALEXIN 500 MG: 500 CAPSULE ORAL at 11:57

## 2023-10-17 RX ADMIN — CEPHALEXIN 500 MG: 500 CAPSULE ORAL at 17:09

## 2023-10-17 RX ADMIN — ASPIRIN 325 MG ORAL TABLET 325 MG: 325 PILL ORAL at 17:09

## 2023-10-17 NOTE — PROGRESS NOTES
10/17/23 1230   Pain Assessment   Pain Assessment Tool 0-10   Pain Score 6   Pain Location/Orientation Orientation: Right;Location: Leg;Orientation: Left  (Minimal pain in L knee)   Pain Onset/Description Onset: Ongoing; Descriptor: Aching   Effect of Pain on Daily Activities Pt experiences increased and most pain when performing STS transfers, when standing, pt has improved & tolerable pain, when resting supine pt reports 6/10 pain. Patient's Stated Pain Goal No pain   Hospital Pain Intervention(s) Repositioned;Cold applied;Elevated  (Pain Meds provided by RN during session (tylenol))   Multiple Pain Sites No   Restrictions/Precautions   Precautions Bed/chair alarms;Cognitive; Fall Risk;Hard of hearing;Supervision on toilet/commode;Pain   Weight Bearing Restrictions Yes   RLE Weight Bearing Per Order WBAT   LLE Weight Bearing Per Order WBAT   ROM Restrictions Yes   RLE ROM Restriction Range Limitation  (R HKB Locked in 0* extension)   Braces or Orthoses Knee brace  (HKB on RLE)   Cognition   Overall Cognitive Status Impaired   Arousal/Participation Alert; Responsive; Cooperative   Attention Attends with cues to redirect   Orientation Level Oriented X4   Memory Decreased short term memory   Following Commands Follows one step commands without difficulty   Subjective   Subjective Pt stated, "I am feeling sore in my RLE but I am happy with how I am doing movement wise."  Pt was willing and happy to participate in skilled PT.    Roll Left and Right   Type of Assistance Needed Supervision   Physical Assistance Level No physical assistance   Comment on mat table, educated on reaching with opposite UE to assist in rolling to either side   Roll Left and Right CARE Score 4   Sit to Lying   Type of Assistance Needed Supervision   Physical Assistance Level No physical assistance   Comment CS, pt cognizant to hold onto HKB strap to raise RLE   Sit to Lying CARE Score 4   Lying to Sitting on Side of Bed   Type of Assistance Needed Supervision   Physical Assistance Level No physical assistance   Comment CS, pt swinging legs off EOB to allow UEs and Core to get into sitting position   Lying to Sitting on Side of Bed CARE Score 4   Sit to Stand   Type of Assistance Needed Physical assistance   Physical Assistance Level 51%-75%   Comment Mod-MaxAx1 depending on pt's fatigue level and height of seated surface. ModAx1 when transfering from recliner to [address] [place] [address], needs MaxAx1 from mat table to RW after performing supine [place]. Sit to Stand CARE Score 2   Bed-Chair Transfer   Type of Assistance Needed Physical assistance   Physical Assistance Level 25% or less   Comment MinAx1 - CG, pt able to perform SPTs from recliner<>RW, RW<>recliner, RW<>mat,   Chair/Bed-to-Chair Transfer CARE Score 3   Walk 10 Feet   Type of Assistance Needed Incidental touching   Physical Assistance Level No physical assistance   Walk 10 Feet CARE Score 4   Walk 50 Feet with Two Turns   Type of Assistance Needed Incidental touching   Physical Assistance Level No physical assistance   Comment RW w/ CG, clears foot when performing SPTs and doesn't pivot on RLE   Walk 50 Feet with Two Turns CARE Score 4   Ambulation   Primary Mode of Locomotion Prior to Admission Walk   Distance Walked (feet) 70 ft  (70x4)   Assist Device Roller Walker   Gait Pattern Inconsistant [place]; Step through   Limitations Noted In Balance;Device Management; Endurance;Speed;Swing;Strength; Heel Strike   Provided Assistance with: Balance   Walk Assist Level Contact Guard   Findings pt improving ambulatory ability w/ RW. pt's pain has decreased while ambulating, still w/ decreased speed   Does the patient walk? 2.  Yes   Wheel 50 Feet with Two Turns   Reason if not Attempted Activity not applicable   Wheel 50 Feet with Two Turns CARE Score 9   Wheel 150 Feet   Reason if not Attempted Activity not applicable   Wheel 504 Feet CARE Score 9   Curb or Single Stair   Style negotiated Single stair   Type of Assistance Needed Physical assistance   Physical Assistance Level 25% or less   Comment Performed 6"  step w/ B/L HRs and 4" curb step w/ RW. Pt educated and understood precautions for ambulating stairs (up=good, down=bad)   1 Step (Curb) CARE Score 3   4 Steps   Reason if not Attempted Safety concerns   4 Steps CARE Score 88   12 Steps   Reason if not Attempted Safety concerns   12 Steps CARE Score 88   Therapeutic Interventions   Strengthening Supine Leg Raises B/L 10x1 & 5x1 (to fatigue), Quad Sets w/ soft bolster under ankle and towel roll under R knee 25x2" x2 , S/L hip ABD 15x2 B/L, Modified Bridges w/ soft bolster under RLE 15x2   Flexibility B/L gastroc stretch 5mins total   Balance Standing in RW w/ toileting for 4mins   Modalities CP on R knee 20mins   Assessment   Treatment Assessment Pt particpated in 90 minute skilled PT session focusing on ambulatory capacity, LE strengthening, transfer training, and stair navigation. Pt received pain medication in the beginning of session due to soreness in RLE from previous day, pain did not limit pt from participating in skilled PT. Pt experiences most pain when performing STS transfers from any surface, STS transfers still Mod-MaxAx1 (depending on fatigue level). Pt stated that he has a mild infection in the lower part of his incision on RLE, per MD note pt with cellulitis with new addition of abx. Pt ambulated to and from room to PT gym (70ft 1-way) four times, needed to use urinal in the middle of PT session, pt brought back to room. PTs educated pt on how to properly and safely navigate stairs w/ R HKB locked in extension. Pt understood and was able to ambulate 6" step and 4" curb step w/ minimal difficulty, plan to trial 8" curb step tomorrow. Stairs simulate pt's entry to backdoor, no HRs currently on pt's steps at home but pt is willing to have them installed to make it easier to enter home.   Did discuss ramp vs HRs, pt would like to pursue HRs at this time. Pt's barriers to d/c include need for increased external support for STS transfers due to potential decreased support at home (wife works during the day, [place] can potentially assist, son cannot provide any A, did review HHAs and can provide pt with list), need for AX2 for toileting due to need for A with STS transfer. Per Medicare ELOS pt allotted 15 days in [facility], pt educated on this and the potential therapy that can be offered after stay in [address] [place] [address] (SNF, home therapy) to give pt and family adequate time to plan for d/c and further rehab. Pt wants most cost effective option that still gives him the best therapy, at this time pt resistive to SNF, will continue to discuss d/c with pt. Did ask pt to have wife take pictures of power recliner at home for PT to examine. Pt to continue w/ POC to further improve on pt's recovery in [facility]. Would recommend setting up FT as soon as pt talks to [facility] and spouse to discuss A pt would need upon d/c. Family/Caregiver Present no   Problem List Decreased strength;Decreased range of motion;Decreased endurance; Impaired balance;Decreased mobility; Decreased cognition; Impaired hearing;Orthopedic restrictions;Pain   Barriers to Discharge Inaccessible home environment;Decreased caregiver support   PT Barriers   Physical Impairment Decreased strength;Decreased range of motion;Decreased endurance; Impaired balance;Decreased mobility; Decreased cognition; Impaired hearing;Orthopedic restrictions;Pain   Functional Limitation Car transfers;Transfers   Plan   Treatment/Interventions Functional transfer training;LE strengthening/ROM; Elevations; Therapeutic exercise; Endurance training;Patient/family training;Bed mobility;Gait training   Progress Progressing toward goals   Discharge Recommendation   PT Discharge Recommendation Home with home health rehabilitation   Equipment Recommended Walker  (RW)   PT Therapy Minutes   PT Time In 1230   PT Time Out 1400   PT Total Time (minutes) 90   PT Mode of treatment - Individual (minutes) 90   PT Mode of treatment - Concurrent (minutes) 0   PT Mode of treatment - Group (minutes) 0   PT Mode of treatment - Co-treat (minutes) 0   PT Mode of Treatment - Total time(minutes) 90 minutes   PT Cumulative Minutes 270   Therapy Time missed   Time missed?  No
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
## 2023-10-17 NOTE — ASSESSMENT & PLAN NOTE
Redness near right tibial plateau - likely from trauma and cut of skin. Betadine to skin   Starting Keflex 500 mg Q 6 hours with Florastor started 10/17/23 - discussed with IM consultants additionally.   Course to completed 10/23/23  WBC WNL 4.14K

## 2023-10-17 NOTE — PROGRESS NOTES
10/17/23 1839   Pain Assessment   Pain Assessment Tool 0-10   Pain Score 7   Pain Location/Orientation Orientation: Left; Location: Knee   Pain Radiating Towards none   Pain Onset/Description Onset: Ongoing; Descriptor: Sore   Effect of Pain on Daily Activities limited STS   Patient's Stated Pain Goal No pain   Hospital Pain Intervention(s) Repositioned;Elevated; Rest   Restrictions/Precautions   Precautions Bed/chair alarms;Cognitive; Fall Risk;Hard of hearing;Pain;Supervision on toilet/commode   Weight Bearing Restrictions Yes   RLE Weight Bearing Per Order WBAT   LLE Weight Bearing Per Order WBAT   ROM Restrictions Yes   RLE ROM Restriction Range Limitation  (R HKB locked in extension)   Braces or Orthoses Knee brace  (HKB on RLE)   Lifestyle   Autonomy Pt reported he really wants to take a shower, but agreeable to SB due to knee brace. Eating   Type of Assistance Needed Independent   Comment pt finishing up breakfast beginning of session. Eating CARE Score 6   Oral Hygiene   Type of Assistance Needed Supervision   Comment pt completed in stance w/ rw at sink. Rinse with mouthwash only. Oral Hygiene CARE Score 4   Grooming   Able To Comb/Brush Hair;Wash/Dry Hands;Brush/Clean Teeth;Wash/Dry Face   Limitation Noted In Strength   Findings pt completed grooming routine in stance w/ rw at sink   Shower/Bathe Self   Type of Assistance Needed Physical assistance   Physical Assistance Level 25% or less   Comment pt completed sponge bath seated in recliner using LHR to wash lower leg and feet. A required for feet thoroughness. pt req A to wash back/buttock while in stance. pt washed 9/10 parts.    Shower/Bathe Self CARE Score 3   Bathing   Assessed Bath Style Sponge Bath   Anticipated D/C Bath Style Sponge Bath   Able to Gather/Transport No   Able to [employer] Temperature No   Able to Wash/Rinse/Dry (body part) Left Arm;Right Arm;L Upper Leg;R Upper Leg;L Lower Leg/Foot;R Lower Leg/Foot;Chest;Abdomen;Perineal Area Limitations Noted in Balance; Coordination; Endurance;Timeliness;Strength   Positioning Seated;Standing   Adaptive Equipment Longhand Reacher   Tub/Shower Transfer   Reason Not Assessed Sponge Bath;Safety  (RLE HKB)   Upper Body Dressing   Type of Assistance Needed Supervision   Comment pt able to don button down shirt with increased time to complete. Upper Body Dressing CARE Score 4   Lower Body Dressing   Type of Assistance Needed Physical assistance   Physical Assistance Level 26%-50%   Comment edu pt on use of LHR to thread RLE into pants first. pt able to thread both LE into pants and stand for CM. supervision in stance for balance. A required for [facility] management. Lower Body Dressing CARE Score 3   Putting On/Taking Off Footwear   Type of Assistance Needed Physical assistance   Physical Assistance Level 26%-50%   Comment pt educated on use of dressing stick to doff socks and sock aid to don socks. pt able to doff both socks with dressing stick. pt able to don both socks with sock aid with vc for instruction. pt able to don L shoe. pt req A to don R shoe. Putting On/Taking Off Footwear CARE Score 3   Sit to Stand   Type of Assistance Needed Physical assistance   Physical Assistance Level 51%-75%   Comment maxA for STS to rw. pt reports STS is easier without L knee sleeve on. L knee sleeve for comfort, pt not wearing knee sleeve this session. vc to remind pt to lean forward to stand. Pt w/ increased difficulty with sneakers on due to heel of sneaker getting stuck on R side when bringing RLE underneath him after he boosts up. Sit to Stand CARE Score 2   Bed-Chair Transfer   Type of Assistance Needed Physical assistance   Physical Assistance Level 25% or less   Comment Noah SPT with rw, recliner<>WC   Chair/Bed-to-Chair Transfer CARE Score 3   Cognition   Overall Cognitive Status Impaired   Arousal/Participation Alert; Cooperative   Attention Attends with cues to redirect   Orientation Level Oriented X4 Memory Decreased short term memory   Following Commands Follows one step commands with increased time or repetition   Comments tangential, req cues to redirect   Activity Tolerance   Activity Tolerance Patient tolerated treatment well   Assessment   Treatment Assessment pt engaged in 90 minute skilled OT session focusing on washing, dressing, and LHAE. Pt reporting moderate pain in L knee beginning of session , however tolerated therapy session well. Pain patches applied following SB. Pt was educated on use of LHAE throughout session, demo good carryover. Pt will benefit from skilled OT focusing on use of LHAE, activity tolerance, standing, and UE strengthening to improve fx transfers and mobility and decrease CG burden. Prognosis Good   Problem List Decreased endurance;Decreased strength;Decreased range of motion; Impaired balance;Decreased mobility; Decreased cognition; Impaired hearing; Impaired vision;Orthopedic restrictions;Pain   Barriers to Discharge Inaccessible home environment;Decreased caregiver support   Plan   Treatment/Interventions ADL retraining;Functional transfer training;LE strengthening/ROM; Therapeutic exercise; Endurance training;Cognitive reorientation;Equipment eval/education   Progress Progressing toward goals   Discharge Recommendation   Equipment Recommended Hip Kit ($);Bedside commode   Commode Type Standard   Additional Comments  handout for hip kit given to pt. pt to order on [employer]   OT Therapy Minutes   OT Time In 0830   OT Time Out 1000   OT Total Time (minutes) 90   OT Mode of treatment - Individual (minutes) 90   OT Mode of treatment - Concurrent (minutes) 0   OT Mode of treatment - Group (minutes) 0   OT Mode of treatment - Co-treat (minutes) 0   OT Mode of Treatment - Total time(minutes) 90 minutes   OT Cumulative Minutes 270   Therapy Time missed   Time missed?  No

## 2023-10-17 NOTE — ASSESSMENT & PLAN NOTE
Follows with Dr Trinity Galvez ([facility]) - last consult note reviewed 9/11/23  Per Dr Yanira Roque note following fluctuating leukopenia and thrombocytopenia with significant monocytosis. Recommendations were for f/u labs in 3 months. 10/10: plt 105  10/13: plt 104  10/16: plt 128   10/19: plt 158    Pathology slide 10/13:  "Peripheral blood smear shows normochromic normocytic RBCs with no schistocytes, mild thrombocytopenia, mild leukocytosis with monocytosis and dyspoietic changes.  If monocytosis persists, HEM/ONC consultation with bone marrow biopsy might be considered as clinically indicated."

## 2023-10-17 NOTE — PLAN OF CARE
Problem: PAIN - ADULT  Goal: Verbalizes/displays adequate comfort level or baseline comfort level  Description: Interventions:  - Encourage patient to monitor pain and request assistance  - Assess pain using appropriate pain scale  - Administer analgesics based on type and severity of pain and evaluate response  - Implement non-pharmacological measures as appropriate and evaluate response  - Consider cultural and social influences on pain and pain management  - Notify physician/advanced practitioner if interventions unsuccessful or patient reports new pain  Outcome: Progressing     Problem: DISCHARGE PLANNING  Goal: Discharge to home or other facility with appropriate resources  Description: INTERVENTIONS:  - Identify barriers to discharge w/patient and caregiver  - Arrange for needed discharge resources and transportation as appropriate  - Identify discharge learning needs (meds, wound care, etc.)  - Arrange for interpretive services to assist at discharge as needed  - Refer to Case Management Department for coordinating discharge planning if the patient needs post-hospital services based on physician/advanced practitioner order or complex needs related to functional status, cognitive ability, or social support system  Outcome: Progressing     Problem: Potential for Falls  Goal: Patient will remain free of falls  Description: INTERVENTIONS:  - Educate patient/family on patient safety including physical limitations  - Instruct patient to call for assistance with activity   - Consult OT/PT to assist with strengthening/mobility   - Keep Call bell within reach  - Keep bed low and locked with side rails adjusted as appropriate  - Keep care items and personal belongings within reach  - Initiate and maintain comfort rounds  - Make Fall Risk Sign visible to staff  - Offer Toileting every 2 Hours, in advance of need  - Initiate/Maintain bed/chair alarm  - Obtain necessary fall risk management equipment: RW  - Apply yellow socks and bracelet for high fall risk patients  - Consider moving patient to room near nurses station  Outcome: Progressing     Problem: Prexisting or High Potential for Compromised Skin Integrity  Goal: Skin integrity is maintained or improved  Description: INTERVENTIONS:  - Identify patients at risk for skin breakdown  - Assess and monitor skin integrity  - Assess and monitor nutrition and hydration status  - Monitor labs   - Assess for incontinence   - Turn and reposition patient  - Assist with mobility/ambulation  - Relieve pressure over bony prominences  - Avoid friction and shearing  - Provide appropriate hygiene as needed including keeping skin clean and dry  - Evaluate need for skin moisturizer/barrier cream  - Collaborate with interdisciplinary team   - Patient/family teaching  - Consider wound care consult   Outcome: Progressing

## 2023-10-17 NOTE — ASSESSMENT & PLAN NOTE
Has resolved   Pt was unable to move following fall and laying in woods for 2-3 hours on 10/10/23.    Encouraged increasing fluid intake  Pt denies urinary complaints   Creatinine stable   10/13: 0.75  10/16: 0.66   10/19: 0.68  Will continue to follow labs

## 2023-10-17 NOTE — ASSESSMENT & PLAN NOTE
Traumatic injury following fall when hiking   S/p OR 10/12/23 repair of quad tendon rupture with Dr Jatinder Fernandez to the right lower extremity in TROM locked in extension  WBAT to the left lower extremity in hinged knee brace, unlocked   PT/OT  Pain control per primary team.   DVT ppx: ASA 325mg BID x 6 weeks  10/17:Keflex started (noted allergies)     Follow up with Dr. Sudeep Nicholson upon discharge.

## 2023-10-17 NOTE — ASSESSMENT & PLAN NOTE
Nociceptive pain located in RLE and knee  Managed on scheduled Tylenol 975 mg Q 8 hours  Managed on Oxycdone IR 2.5 - 5 mg Q 4 hours prn   Using Oxycodone IR 5 mg 1-2 x per day

## 2023-10-17 NOTE — ASSESSMENT & PLAN NOTE
Pt reports history of left shoulder pain  May have been worsened by laying on left side in woods  Lidocaine patch to left shoulder, noted improvement   Consideration for PT/OT
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
## 2023-10-17 NOTE — PCC PHYSICAL THERAPY
Date of admission:10/14/23  ELOS:2 weeks  Goals: Leonel bed mobility, S for transfers, ambulation, stair navigation  Date:10/18/23  Barriers to d/c home at this time include brace management, inaccessible home environment, decreased family support, requires external assistance for all mobilities, positive fall history, and increased fall risk due to impairments. Pt requires external assistance at this time due to the following impairments: decreased strength, decreased ROM, imbalance, decreased endurance, fatigue, pain, and cognitive deficits. POC has been focusing on gait training, transfer training, stair training, improving pt's activity tolerance and endurance, LE strengthening, balance interventions, and assessment for appropriate AD in order to address barriers to d/c. Over the past week pt has made good progress towards LTGs, however, STS transfers continue to be difficult for pt to perform as at times pt has required 2 person assist to safely complete. Once upright, pt Noah/CGA for other mobilities with use of RW. Began discussing compensatory methods for pt upon d/c (sleeping in hospital bed as height can elevate if needed which pt has at home, sitting in power recliner to increase ease of standing), however, anticipate pt may require A for STS from commode/toilet. Did begin discussion with pt of asking family members if they can physically assist pt upon d/c as pt reports wife cannot assist as she works and has medical limitations. Also plan to provide pt with list of HHAs should pt require A for this upon d/c. Over the next week plan to focus heavily on transfer trng from various surfaces as well as improve LE strength, continue with gait trng, transfer trng, and stair navigation so pt can safely d/c home. Anticipating d/c home late next week vs early week after pending pt's progress with recommendations for home PT . DME needs include RW.  Plan for FT early next week to determine support pt will have upon d/c or if alternative d/c plan needs to be put in place as well as car transfer training.

## 2023-10-18 LAB
DME PARACHUTE DELIVERY DATE REQUESTED: NORMAL
DME PARACHUTE ITEM DESCRIPTION: NORMAL
DME PARACHUTE ITEM DESCRIPTION: NORMAL
DME PARACHUTE ORDER STATUS: NORMAL
DME PARACHUTE SUPPLIER NAME: NORMAL
DME PARACHUTE SUPPLIER PHONE: NORMAL

## 2023-10-18 PROCEDURE — 97129 THER IVNTJ 1ST 15 MIN: CPT

## 2023-10-18 PROCEDURE — 99232 SBSQ HOSP IP/OBS MODERATE 35: CPT | Performed by: PHYSICAL MEDICINE & REHABILITATION

## 2023-10-18 PROCEDURE — 97110 THERAPEUTIC EXERCISES: CPT

## 2023-10-18 PROCEDURE — 99232 SBSQ HOSP IP/OBS MODERATE 35: CPT | Performed by: INTERNAL MEDICINE

## 2023-10-18 PROCEDURE — 97530 THERAPEUTIC ACTIVITIES: CPT

## 2023-10-18 PROCEDURE — 97535 SELF CARE MNGMENT TRAINING: CPT

## 2023-10-18 PROCEDURE — 97116 GAIT TRAINING THERAPY: CPT

## 2023-10-18 RX ADMIN — OXYCODONE HYDROCHLORIDE 5 MG: 5 TABLET ORAL at 08:00

## 2023-10-18 RX ADMIN — ACETAMINOPHEN 975 MG: 325 TABLET ORAL at 14:23

## 2023-10-18 RX ADMIN — B-COMPLEX W/ C & FOLIC ACID TAB 1 TABLET: TAB at 08:00

## 2023-10-18 RX ADMIN — CEPHALEXIN 500 MG: 500 CAPSULE ORAL at 17:21

## 2023-10-18 RX ADMIN — LIDOCAINE 1 PATCH: 50 PATCH CUTANEOUS at 08:07

## 2023-10-18 RX ADMIN — OXYCODONE HYDROCHLORIDE 5 MG: 5 TABLET ORAL at 14:22

## 2023-10-18 RX ADMIN — LANSOPRAZOLE 30 MG: 30 CAPSULE, DELAYED RELEASE ORAL at 08:00

## 2023-10-18 RX ADMIN — CEPHALEXIN 500 MG: 500 CAPSULE ORAL at 23:16

## 2023-10-18 RX ADMIN — CEPHALEXIN 500 MG: 500 CAPSULE ORAL at 11:54

## 2023-10-18 RX ADMIN — Medication 250 MG: at 17:21

## 2023-10-18 RX ADMIN — SENNOSIDES 17.2 MG: 8.6 TABLET, FILM COATED ORAL at 08:00

## 2023-10-18 RX ADMIN — OXYCODONE HYDROCHLORIDE 5 MG: 5 TABLET ORAL at 21:00

## 2023-10-18 RX ADMIN — LIDOCAINE 1 PATCH: 50 PATCH CUTANEOUS at 08:08

## 2023-10-18 RX ADMIN — ACETAMINOPHEN 975 MG: 325 TABLET ORAL at 21:00

## 2023-10-18 RX ADMIN — ASPIRIN 325 MG ORAL TABLET 325 MG: 325 PILL ORAL at 17:21

## 2023-10-18 RX ADMIN — CEPHALEXIN 500 MG: 500 CAPSULE ORAL at 05:55

## 2023-10-18 RX ADMIN — ACETAMINOPHEN 975 MG: 325 TABLET ORAL at 05:55

## 2023-10-18 RX ADMIN — ASPIRIN 325 MG ORAL TABLET 325 MG: 325 PILL ORAL at 08:00

## 2023-10-18 RX ADMIN — Medication 250 MG: at 08:00

## 2023-10-18 NOTE — NURSING NOTE
Pt's urine this am is clear yellow from dark evan color. Encouraged fluids. Pt no c/o of adversed reaction fr Keflex. Will monitor.

## 2023-10-18 NOTE — PCC OCCUPATIONAL THERAPY
10/18/23  ADL: Supervision UB, Solange LB, Noah toileting  TRANSFER: Noah SPT with RWSolange STS  D/C PLAN: Home with family/friend support and home therapy. Family training scheduled for 10/25 10am. Anticipate DC late next week. Pt barriers include UE strength, ROM, positioning for STS, balance, endurance impacting participation in ADL/IADL's. In order to address fx deficits, skilled OT services have worked on self-care, therapeutic exercise, therapeutic activity, modalities PRN in order to inc fx performance and independence. Therapist has discussed POC with pt and areas of focus with pt verbalizing understanding. Barriers Intervention   UE strength and ROM TE   Positioning for STS Edu, repetition   Balance TA, balance training   Endurance Activity tolerance     MOCA 8.1 completed. Pt scored 20/30 indicating a mild cognitive deficit. Anticipate supervision for med mgmt.

## 2023-10-18 NOTE — OCCUPATIONAL THERAPY NOTE
With patients permission contacted Demarcus Espinoza (wife [phone]), friend Armaan Christopher ([phone]) and Xander Leon ([phone]) to discuss current LOF and DC recommendations. All in agreement to attend FT on Wed 10/25 10am. Wife reporting that family to instal kj HR for 1 GEO, hospital bed on first floor. Wife in agreement for therapist to order RW and BSC.  DME sheet provided to A[address], OTR/L, CSRS
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
## 2023-10-18 NOTE — PROGRESS NOTES
10/18/23 1400   Pain Assessment   Pain Assessment Tool 0-10   Pain Score 5   Pain Location/Orientation Orientation: Right;Location: Knee   Pain Onset/Description Onset: Ongoing;Frequency: Intermittent   Effect of Pain on Daily Activities worse with activity/wt bearing. Restrictions/Precautions   Precautions Bed/chair alarms;Cognitive; Fall Risk;Hard of hearing;Pain;Supervision on toilet/commode   RLE Weight Bearing Per Order WBAT   LLE Weight Bearing Per Order WBAT   RLE ROM Restriction Range Limitation  (HKB locked in ext.)   Braces or Orthoses Knee brace   Cognition   Overall Cognitive Status Impaired   Arousal/Participation Alert; Cooperative   Attention Attends with cues to redirect   Orientation Level Oriented X4   Memory Decreased short term memory;Decreased recall of recent events   Following Commands Follows one step commands without difficulty   Subjective   Subjective "it hurt yesterday but its better today"   Roll Left and Right   Type of Assistance Needed Supervision   Physical Assistance Level No physical assistance   Roll Left and Right CARE Score 4   Sit to Lying   Type of Assistance Needed Supervision   Physical Assistance Level No physical assistance   Comment use of hands for slef lifting R LE   Sit to Lying CARE Score 4   Lying to Sitting on Side of Bed   Type of Assistance Needed Supervision   Physical Assistance Level No physical assistance   Lying to Sitting on Side of Bed CARE Score 4   Sit to Stand   Type of Assistance Needed Physical assistance   Physical Assistance Level 25% or less   Comment min A with Rw. Reports self corrected hand placement eariler today.    Sit to Stand CARE Score 3   Bed-Chair Transfer   Type of Assistance Needed Physical assistance   Physical Assistance Level 25% or less   Comment cg/min A with Rw for stand pivot   Chair/Bed-to-Chair Transfer CARE Score 3   Car Transfer   Comment to have family come in for FT wed 10/25 has Mathew 4 will need car trial due to no R knee flexion. Walk 10 Feet   Type of Assistance Needed Incidental touching   Physical Assistance Level No physical assistance   Comment progressing independence with Rw   Walk 10 Feet CARE Score 4   Walk 50 Feet with Two Turns   Type of Assistance Needed Incidental touching   Physical Assistance Level No physical assistance   Comment progressing independence with Rw   Walk 50 Feet with Two Turns CARE Score 4   Ambulation   Primary Mode of Locomotion Prior to Admission Walk   Distance Walked (feet) 70 ft  (240)   Assist Device Roller Walker   Gait Pattern Inconsistant Therese; Slow Therese; Improper weight shift; Step through   Limitations Noted In Balance; Endurance;Speed;Strength;Swing   Walk Assist Level Contact Guard   Findings dec reliance on UE for balance and support. Does the patient walk? 2. Yes   Wheelchair mobility   Does the patient use a wheelchair? 0. No   Curb or Single Stair   Style negotiated Single stair   Type of Assistance Needed Physical assistance   Physical Assistance Level 25% or less   Comment B HR.  cues to hip hike and abd R LE with L wt shift for R foot step clearance. 1 Step (Curb) CARE Score 3   4 Steps   Type of Assistance Needed Physical assistance   Physical Assistance Level 25% or less   Comment bottom step x4 trials, stepping down backwards. 4 Steps CARE Score 3   Stairs   Type Stairs   # of Steps 5  (single step x5 trials.)   Weight Bearing Precautions WBAT   Assist Devices Bilateral Rail   Findings non recip B HR. Therapeutic Interventions   Strengthening Supine R LE: APs, quad sets, glute sets, SLR, s/l abd (both sides)   Modalities cold pack to R knee in room at end of session. Other Time spent for R LE re-wrapping and brace adjustment for fit and comfort. SHALOM Malone) present for insicional inspection, reports dressing to be removed next day.    Assessment   Treatment Assessment Pt engaged in 90 min skilled PT intervention for ongoing mobility and strengthening to improve functional performance. Use of HKB locked in ext per orders, adjusted due to sliding down. Pt demonstrating progress with amb. Showed pictures of entry with small incline (uneven) walk to single step up. Step has 2 posts, unable to install railing, however pt could trial 8inch curb step to mimic GEO, as pt believe his GEO is 7inches. FT planned for wed 8/25, with anticipated DC home friday 8/27 with home PT recommended to follow. Also at end of session, showed pics of narrow bathroom, will notify OT. Problem List Decreased strength;Decreased range of motion;Decreased endurance; Impaired balance;Decreased mobility; Decreased coordination;Decreased cognition; Impaired hearing;Orthopedic restrictions;Pain   Barriers to Discharge Decreased caregiver support; Inaccessible home environment   Barriers to Discharge Comments GEO, questionable ability to install HR. Plan   Progress Progressing toward goals   Discharge Recommendation   PT Discharge Recommendation Home with home health rehabilitation   Equipment Recommended Walker   PT Equipment ordered (S)  reports spouse ordered RW, will find out what kind and from where   PT Therapy Minutes   PT Time In 1400   PT Time Out 1530   PT Total Time (minutes) 90   PT Mode of treatment - Individual (minutes) 90   PT Mode of treatment - Concurrent (minutes) 0   PT Mode of treatment - Group (minutes) 0   PT Mode of treatment - Co-treat (minutes) 0   PT Mode of Treatment - Total time(minutes) 90 minutes   PT Cumulative Minutes 360   Therapy Time missed   Time missed?  No

## 2023-10-18 NOTE — PROGRESS NOTES
10/18/23 0830   Pain Assessment   Pain Assessment Tool 0-10   Pain Score 4   Pain Location/Orientation Location: Knee;Orientation: Left   Pain Radiating Towards none   Pain Onset/Description Onset: Ongoing; Descriptor: Sore   Patient's Stated Pain Goal No pain   Hospital Pain Intervention(s) Rest   Restrictions/Precautions   Precautions Bed/chair alarms;Cognitive; Fall Risk;Hard of hearing;Pain;Supervision on toilet/commode   Weight Bearing Restrictions Yes   RLE Weight Bearing Per Order WBAT   LLE Weight Bearing Per Order WBAT   ROM Restrictions Yes   RLE ROM Restriction Range Limitation  (R HKB locked in extension)   Braces or Orthoses Knee brace   Sit to Stand   Type of Assistance Needed Physical assistance   Physical Assistance Level 25% or less   Comment Noah STS to RW. vc for positioning. Sit to Stand CARE Score 3   Bed-Chair Transfer   Type of Assistance Needed Physical assistance   Physical Assistance Level 25% or less   Comment SPT from recliner <>WC with RW. Noah for balance   Chair/Bed-to-Chair Transfer CARE Score 3   Toileting Hygiene   Type of Assistance Needed Physical assistance   Physical Assistance Level 25% or less   Comment Pt able to reach w/ R arm. vc for positioning. Noah in stance w/ RW for balance   Toileting Hygiene CARE Score 3   Toileting   Able to Pull Clothing down yes, up yes. Limitations Noted In Coordination;Balance;Problem Solving;ROM   Toilet Transfer   Type of Assistance Needed Physical assistance   Physical Assistance Level 25% or less   Comment Noah SPT w/ rw. vc for positioning   Toilet Transfer CARE Score 3   Therapeutic Excerise-Strength   UE Strength Yes   Right Upper Extremity- Strength   R Shoulder Flexion; Extension   R Elbow Elbow flexion;Elbow extension   Equipment Dumbbell   R Weight/Reps/Sets 2x15   RUE Strength Comment engaged in seated UE TE w/ 3# dumbbell. bicep curls, punches, and shoulder flexion.    Left Upper Extremity-Strength   L Shoulder Extension;Flexion L Elbow Elbow flexion;Elbow extension   L Weights/Reps/Sets 2x15   LUE Strength Comment seated, 2# weight used for bicep curls, however downgraded to bodyweight for punches and shoulder flexion   Cognition   Overall Cognitive Status Impaired   Arousal/Participation Alert; Cooperative   Attention Attends with cues to redirect   Orientation Level Oriented X4   Memory Decreased short term memory;Decreased recall of recent events   Following Commands Follows one step commands without difficulty   Comments Completed MOCA 8.1 this session. Pt scored 20/30, mild cog impairement. Activity Tolerance   Activity Tolerance Patient tolerated treatment well   Assessment   Treatment Assessment pt participated in 90 minute OT session  focusing on toileting, and UE strengthening. Completed the MOCA 8.1 this session, pt scored 20/30, mild cog impairement. pt overall fx at [facility] for STS and transfers this session. pt req verbal cues for positioning, specificically for LB management during STS and transfers. Continue OT POC with focus on UE ROM and strengthening, and positioning during transfers to increase independence and decrease CG burden. OT Family training done with: Ft scheduled for wed 10/25 @ 10am, with wife ANDRIA portillo and friend Dewey Silverio   Prognosis Good   Problem List Decreased strength;Decreased range of motion;Decreased endurance; Impaired balance;Decreased mobility; Decreased coordination;Decreased cognition; Impaired hearing;Orthopedic restrictions;Pain   Barriers to Discharge Decreased caregiver support; Inaccessible home environment   Plan   Treatment/Interventions ADL retraining;Functional transfer training; Therapeutic exercise; Endurance training;Cognitive reorientation   Progress Progressing toward goals   Discharge Recommendation   Equipment Recommended Bedside commode; Hip Kit ($)   Commode Type Standard   OT Therapy Minutes   OT Time In 0830   OT Time Out 1000   OT Total Time (minutes) 90   OT Mode of treatment - Individual (minutes) 90   OT Mode of treatment - Concurrent (minutes) 0   OT Mode of treatment - Group (minutes) 0   OT Mode of treatment - Co-treat (minutes) 0   OT Mode of Treatment - Total time(minutes) 90 minutes   OT Cumulative Minutes 360   Therapy Time missed   Time missed? No     Pt completed [place] Cognitive Assessment (MOCA) version 8.1. Pt scored overall 20 / 30  indicating a Mild cognitive deficit. Visuospatial/executive: 5/5   Naming: 3/3   Memory:    (worth no points)   Attention:  5/ 6   Language: 1 / 3   Abstraction: 1 / 2   Delayed recall: 1 / 5   Orientation: 4 / 6   Completed 12 year edu. Total score 20/30, indicating a mild cognitive deficit.

## 2023-10-18 NOTE — PLAN OF CARE
Problem: PAIN - ADULT  Goal: Verbalizes/displays adequate comfort level or baseline comfort level  Description: Interventions:  - Encourage patient to monitor pain and request assistance  - Assess pain using appropriate pain scale  - Administer analgesics based on type and severity of pain and evaluate response  - Implement non-pharmacological measures as appropriate and evaluate response  - Consider cultural and social influences on pain and pain management  - Notify physician/advanced practitioner if interventions unsuccessful or patient reports new pain  Outcome: Progressing     Problem: DISCHARGE PLANNING  Goal: Discharge to home or other facility with appropriate resources  Description: INTERVENTIONS:  - Identify barriers to discharge w/patient and caregiver  - Arrange for needed discharge resources and transportation as appropriate  - Identify discharge learning needs (meds, wound care, etc.)  - Arrange for interpretive services to assist at discharge as needed  - Refer to Case Management Department for coordinating discharge planning if the patient needs post-hospital services based on physician/advanced practitioner order or complex needs related to functional status, cognitive ability, or social support system  Outcome: Progressing     Problem: Potential for Falls  Goal: Patient will remain free of falls  Description: INTERVENTIONS:  - Educate patient/family on patient safety including physical limitations  - Instruct patient to call for assistance with activity   - Consult OT/PT to assist with strengthening/mobility   - Keep Call bell within reach  - Keep bed low and locked with side rails adjusted as appropriate  - Keep care items and personal belongings within reach  - Initiate and maintain comfort rounds  - Make Fall Risk Sign visible to staff  - Offer Toileting every 2 Hours, in advance of need  - Initiate/Maintain bed/chair alarm  - Obtain necessary fall risk management equipment: RW  - Apply yellow socks and bracelet for high fall risk patients  - Consider moving patient to room near nurses station  Outcome: Progressing     Problem: MOBILITY - ADULT  Goal: Maintain or return to baseline ADL function  Description: INTERVENTIONS:  -  Assess patient's ability to carry out ADLs; assess patient's baseline for ADL function and identify physical deficits which impact ability to perform ADLs (bathing, care of mouth/teeth, toileting, grooming, dressing, etc.)  - Assess/evaluate cause of self-care deficits   - Assess range of motion  - Assess patient's mobility; develop plan if impaired  - Assess patient's need for assistive devices and provide as appropriate  - Encourage maximum independence but intervene and supervise when necessary  - Involve family in performance of ADLs  - Assess for home care needs following discharge   - Consider OT consult to assist with ADL evaluation and planning for discharge  - Provide patient education as appropriate  Outcome: Progressing     Problem: SKIN/TISSUE INTEGRITY - ADULT  Goal: Incision(s), wounds(s) or drain site(s) healing without S/S of infection  Description: INTERVENTIONS  - Assess and document dressing, incision, wound bed, drain sites and surrounding tissue  - Provide patient and family education  - Perform skin care/dressing changes everyday  Outcome: Progressing

## 2023-10-18 NOTE — CASE MANAGEMENT
Case Management Update:  Cm met with pt during therapy session to introduce role and complete cm open:    Pt lives in 2 story home with spouse Yumiko Copeland, 1 GEO. Prior to admission, pt was independent with ADLs IADLs and ambulation. Pt does have hospital bed and walker in home. Cm received DME order from [employer] for bsc, submitted to [employer] via [employer]. Requesting Thursday 10/26 delivery prior to Friday 10/27 dc. Pt reports hx of hhc and op therapy, no str. PCP is Cheko Moran, preferred pharmacy is [facility]. The patient was educated on the rehabilitation process including therapy program, the interdisciplinary team, and weekly team meetings. Estimated length of stay was reviewed with the patient as well as expectations of discussions of discharge planning. The role of the  was reviewed including providing care coordination, discharge planning and discharge facilitation. IMM was reviewed with the patient and a copy was provided for their reference. The patient verbalized understanding of the information provided and denied any further questions at this time. CM will continue to follow and assist the patient throughout their rehabilitation stay.

## 2023-10-19 LAB
ANION GAP SERPL CALCULATED.3IONS-SCNC: 6 MMOL/L
ANISOCYTOSIS BLD QL SMEAR: PRESENT
BASOPHILS # BLD MANUAL: 0 THOUSAND/UL (ref 0–0.1)
BASOPHILS NFR MAR MANUAL: 0 % (ref 0–1)
BUN SERPL-MCNC: 12 MG/DL (ref 5–25)
CALCIUM SERPL-MCNC: 8.8 MG/DL (ref 8.4–10.2)
CHLORIDE SERPL-SCNC: 104 MMOL/L (ref 96–108)
CO2 SERPL-SCNC: 30 MMOL/L (ref 21–32)
CREAT SERPL-MCNC: 0.68 MG/DL (ref 0.6–1.3)
EOSINOPHIL # BLD MANUAL: 0.04 THOUSAND/UL (ref 0–0.4)
EOSINOPHIL NFR BLD MANUAL: 1 % (ref 0–6)
ERYTHROCYTE [DISTWIDTH] IN BLOOD BY AUTOMATED COUNT: 13.5 % (ref 11.6–15.1)
GFR SERPL CREATININE-BSD FRML MDRD: 94 ML/MIN/1.73SQ M
GLUCOSE SERPL-MCNC: 119 MG/DL (ref 65–140)
HCT VFR BLD AUTO: 39.6 % (ref 36.5–49.3)
HGB BLD-MCNC: 13.2 G/DL (ref 12–17)
LYMPHOCYTES # BLD AUTO: 1.82 THOUSAND/UL (ref 0.6–4.47)
LYMPHOCYTES # BLD AUTO: 38 % (ref 14–44)
MACROCYTES BLD QL AUTO: PRESENT
MCH RBC QN AUTO: 31.4 PG (ref 26.8–34.3)
MCHC RBC AUTO-ENTMCNC: 33.3 G/DL (ref 31.4–37.4)
MCV RBC AUTO: 94 FL (ref 82–98)
MONOCYTES # BLD AUTO: 0.52 THOUSAND/UL (ref 0–1.22)
MONOCYTES NFR BLD: 12 % (ref 4–12)
NEUTROPHILS # BLD MANUAL: 1.95 THOUSAND/UL (ref 1.85–7.62)
NEUTS BAND NFR BLD MANUAL: 1 % (ref 0–8)
NEUTS SEG NFR BLD AUTO: 44 % (ref 43–75)
PLATELET # BLD AUTO: 158 THOUSANDS/UL (ref 149–390)
PLATELET BLD QL SMEAR: ADEQUATE
PLATELET CLUMP BLD QL SMEAR: PRESENT
PMV BLD AUTO: 11 FL (ref 8.9–12.7)
POLYCHROMASIA BLD QL SMEAR: PRESENT
POTASSIUM SERPL-SCNC: 4.1 MMOL/L (ref 3.5–5.3)
RBC # BLD AUTO: 4.2 MILLION/UL (ref 3.88–5.62)
RBC MORPH BLD: PRESENT
SODIUM SERPL-SCNC: 140 MMOL/L (ref 135–147)
VARIANT LYMPHS # BLD AUTO: 4 %
WBC # BLD AUTO: 4.34 THOUSAND/UL (ref 4.31–10.16)

## 2023-10-19 PROCEDURE — 97110 THERAPEUTIC EXERCISES: CPT

## 2023-10-19 PROCEDURE — 99233 SBSQ HOSP IP/OBS HIGH 50: CPT | Performed by: PHYSICAL MEDICINE & REHABILITATION

## 2023-10-19 PROCEDURE — 97535 SELF CARE MNGMENT TRAINING: CPT

## 2023-10-19 PROCEDURE — 97116 GAIT TRAINING THERAPY: CPT

## 2023-10-19 PROCEDURE — 97530 THERAPEUTIC ACTIVITIES: CPT

## 2023-10-19 PROCEDURE — 99232 SBSQ HOSP IP/OBS MODERATE 35: CPT | Performed by: INTERNAL MEDICINE

## 2023-10-19 PROCEDURE — 80048 BASIC METABOLIC PNL TOTAL CA: CPT | Performed by: PHYSICIAN ASSISTANT

## 2023-10-19 PROCEDURE — 85027 COMPLETE CBC AUTOMATED: CPT | Performed by: PHYSICIAN ASSISTANT

## 2023-10-19 PROCEDURE — 85007 BL SMEAR W/DIFF WBC COUNT: CPT | Performed by: PHYSICIAN ASSISTANT

## 2023-10-19 RX ADMIN — HYDROCORTISONE: 25 CREAM TOPICAL at 22:38

## 2023-10-19 RX ADMIN — CEPHALEXIN 500 MG: 500 CAPSULE ORAL at 17:17

## 2023-10-19 RX ADMIN — ASPIRIN 325 MG ORAL TABLET 325 MG: 325 PILL ORAL at 17:17

## 2023-10-19 RX ADMIN — Medication 250 MG: at 08:37

## 2023-10-19 RX ADMIN — B-COMPLEX W/ C & FOLIC ACID TAB 1 TABLET: TAB at 08:37

## 2023-10-19 RX ADMIN — LIDOCAINE 1 PATCH: 50 PATCH CUTANEOUS at 08:44

## 2023-10-19 RX ADMIN — OXYCODONE HYDROCHLORIDE 5 MG: 5 TABLET ORAL at 01:18

## 2023-10-19 RX ADMIN — Medication 250 MG: at 17:17

## 2023-10-19 RX ADMIN — OXYCODONE HYDROCHLORIDE 5 MG: 5 TABLET ORAL at 10:54

## 2023-10-19 RX ADMIN — CEPHALEXIN 500 MG: 500 CAPSULE ORAL at 23:35

## 2023-10-19 RX ADMIN — SENNOSIDES 17.2 MG: 8.6 TABLET, FILM COATED ORAL at 08:37

## 2023-10-19 RX ADMIN — OXYCODONE HYDROCHLORIDE 5 MG: 5 TABLET ORAL at 23:35

## 2023-10-19 RX ADMIN — CEPHALEXIN 500 MG: 500 CAPSULE ORAL at 05:09

## 2023-10-19 RX ADMIN — CEPHALEXIN 500 MG: 500 CAPSULE ORAL at 12:21

## 2023-10-19 RX ADMIN — ACETAMINOPHEN 975 MG: 325 TABLET ORAL at 21:54

## 2023-10-19 RX ADMIN — ACETAMINOPHEN 975 MG: 325 TABLET ORAL at 13:43

## 2023-10-19 RX ADMIN — LIDOCAINE 1 PATCH: 50 PATCH CUTANEOUS at 08:38

## 2023-10-19 RX ADMIN — OXYCODONE HYDROCHLORIDE 5 MG: 5 TABLET ORAL at 15:55

## 2023-10-19 RX ADMIN — LANSOPRAZOLE 30 MG: 30 CAPSULE, DELAYED RELEASE ORAL at 08:38

## 2023-10-19 RX ADMIN — ACETAMINOPHEN 975 MG: 325 TABLET ORAL at 05:09

## 2023-10-19 RX ADMIN — ASPIRIN 325 MG ORAL TABLET 325 MG: 325 PILL ORAL at 08:37
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
## 2023-10-19 NOTE — PROGRESS NOTES
10/19/23 1230   Pain Assessment   Pain Assessment Tool 0-10   Pain Score 4   Pain Location/Orientation Orientation: Bilateral;Location: Leg   Restrictions/Precautions   Precautions Cognitive; Fall Risk;Hard of hearing;Pain   Weight Bearing Restrictions Yes   RLE Weight Bearing Per Order WBAT   LLE Weight Bearing Per Order WBAT   ROM Restrictions Yes   RLE ROM Restriction Range Limitation  (HKB RLE locked in 0* EXT)   Braces or Orthoses Knee brace   Cognition   Overall Cognitive Status Impaired   Arousal/Participation Responsive; Alert; Cooperative   Attention Attends with cues to redirect   Orientation Level Oriented X4   Memory Decreased short term memory   Following Commands Follows one step commands with increased time or repetition   Subjective   Subjective Pt was seated in recliner after eating lunch, pt was happy to start skilled PT earlier then the schedule stated. Sit to Stand   Type of Assistance Needed Incidental touching; Adaptive equipment   Physical Assistance Level No physical assistance   Comment CG w/ RW from recliner and w/c. Increased WB on LLE. Sit to Stand CARE Score 4   Bed-Chair Transfer   Type of Assistance Needed Incidental touching; Adaptive equipment   Physical Assistance Level No physical assistance   Comment CG SPT w/ RW from recliner to RW   Chair/Bed-to-Chair Transfer CARE Score 4   Walk 10 Feet   Type of Assistance Needed Supervision   Physical Assistance Level No physical assistance   Comment CS w/ RW   Walk 10 Feet CARE Score 4   Walk 50 Feet with Two Turns   Type of Assistance Needed Supervision   Physical Assistance Level No physical assistance   Comment CS w/ RW   Walk 50 Feet with Two Turns CARE Score 4   Walk 150 Feet   Type of Assistance Needed Supervision   Physical Assistance Level No physical assistance   Comment CS w/ RW.  WC follow due to extended distance   Walk 150 Feet CARE Score 4   Walking 10 Feet on Uneven Surfaces   Type of Assistance Needed Incidental touching Physical Assistance Level No physical assistance   Comment CG w/ RW and WC follow outdoors at [address] on sidewalks, macadam, and slanted surfaces   Walking 10 Feet on Uneven Surfaces CARE Score 4   Ambulation   Primary Mode of Locomotion Prior to Admission Walk   Distance Walked (feet) 250 ft  (AMB from pt's room to passed [employer])   Assist Device Roller Walker   Gait Pattern Antalgic; Inconsistant Evans; Slow Evans; Step through; Improper weight shift;Decreased R stance   Limitations Noted In Balance; Endurance;Speed;Strength;Swing   Provided Assistance with: Balance   Walk Assist Level Close Supervision   Findings Pt increased ambulation w/ RW. Decreasd speed and evans. Does the patient walk? 2. Yes   Wheel 50 Feet with Two Turns   Reason if not Attempted Activity not applicable   Wheel 50 Feet with Two Turns CARE Score 9   Wheel 150 Feet   Reason if not Attempted Activity not applicable   Wheel 461 Feet CARE Score 9   Wheelchair mobility   Does the patient use a wheelchair? 1. Yes   Type of Wheelchair Used 1. Manual   Method Right upper extremity; Left upper extremity   Distance Level Surface (feet) 25 ft   Findings Pt wanted to propel himself in [place] after ambulating in RW in [facility], pt propelled 25ft and then had PTs push him the rest of the way. Curb or Single Stair   Style negotiated Curb   Type of Assistance Needed Incidental touching   Physical Assistance Level No physical assistance   Comment CG outside on 4" curb, inside on 8" curb step to simulate entry. Pt hip hikes w/ RLE. 1 Step (Curb) CARE Score 4   4 Steps   Reason if not Attempted Activity not applicable   4 Steps CARE Score 9   12 Steps   Reason if not Attempted Activity not applicable   12 Steps CARE Score 9   Toilet Transfer   Type of Assistance Needed Incidental touching   Physical Assistance Level No physical assistance   Comment CG w/ RW from standing to BSC to standing.   BSC height adjusted to allow pt to have contact w/ LEs on floor   Toilet Transfer CARE Score 4   Therapeutic Interventions   Strengthening WC propulsion for 25ft w/ BUEs to perform UE strengthening and endurance training   Modalities Cold pack to R knee in room at end of session for 20 mins   Assessment   Treatment Assessment Pt engaged in 60min skilled PT session that focused on outdoor ambulation w/ RW to simulate entry to home environment that consists of macadam ramp and 7" step to enter home. Pt performed outdoor ramp walking on both concrete and paved surfaces w/ an increased slope and distance than the ramp at home. Pt was able to perform outdoor ambulation w/ RW w/ CG. Pt was cautious and understood that being slow w/ walking on uneven surfaces is the appropriate and safe method of ambulating. Pt did experience a few LOB on uneven surfaces although did not need A from PTs, will practice more uneven surface ambulation in future sessions. Pt also performed WC mobility (see comments) to trial the use of a WC, although pt will be ambulatory upon d/c w/ RW, performed for UE conditioning. Pt performed 8" curb step in PT gym that was performed w/ RW and CG by PT. Pt was hesitant to perform and slow when performing. Pt needs to increase LLE WB and RLE hip hike to safely navigate elevated curb. Pt to continue trialling 8" curb step and increased ambulatory distance to reach community levels w/ RW. FT planned for next Wednesday w/ pt's wife and 2 friends. Pt has improved greatly since being admitted to [address] [place] [address] with POC to meet set LTGs. Family/Caregiver Present no   Problem List Decreased strength;Decreased range of motion;Decreased endurance; Impaired balance;Decreased mobility; Decreased cognition; Impaired hearing;Orthopedic restrictions;Pain   Barriers to Discharge Decreased caregiver support; Inaccessible home environment   Barriers to Discharge Comments GEO & macadam ramp   PT Barriers   Physical Impairment Decreased strength;Decreased range of motion;Decreased endurance; Impaired balance;Decreased mobility; Decreased cognition; Impaired hearing;Orthopedic restrictions;Pain   Functional Limitation Car transfers;Transfers   Plan   Treatment/Interventions ADL retraining;Functional transfer training;LE strengthening/ROM; Elevations; Therapeutic exercise;Patient/family training; Endurance training;Gait training   Progress Progressing toward goals   Discharge Recommendation   PT Discharge Recommendation Home with home health rehabilitation   Equipment Recommended Walker   PT Therapy Minutes   PT Time In 1230   PT Time Out 1330   PT Total Time (minutes) 60   PT Mode of treatment - Individual (minutes) 60   PT Mode of treatment - Concurrent (minutes) 0   PT Mode of treatment - Group (minutes) 0   PT Mode of treatment - Co-treat (minutes) 0   PT Mode of Treatment - Total time(minutes) 60 minutes   PT Cumulative Minutes 450   Therapy Time missed   Time missed?  No

## 2023-10-19 NOTE — TEAM CONFERENCE
Acute RehabilitationTeam Conference Note  Date: 10/19/2023   Time: 10:46 AM       Patient Name:  Ebony Steiner       Medical Record Number: [id number]   YOB: 1950  Sex:  Male          Room/Bed:  [facility] 269/[facility] 269-01  Payor Info:  Payor: Mitch Venegas / Plan: MEDICARE A AND B / Product Type: Medicare A & B Fee for Service /      Admitting Diagnosis: S/P tendon repair [Z98.890]   Admit Date/Time:  10/14/2023  4:37 PM  Admission Comments: No comment available     Primary Diagnosis:  Traumatic rupture of quadriceps tendon, right, initial encounter  Principal Problem: Traumatic rupture of quadriceps tendon, right, initial encounter    Patient Active Problem List    Diagnosis Date Noted    Cellulitis 10/17/2023    Impaired fasting glucose 10/16/2023    Nelly-colored urine 10/16/2023    Thrombocytopenia ([address]) 10/15/2023    Melanoma ([address]) 10/15/2023    Traumatic rupture of quadriceps tendon, right, initial encounter 10/11/2023    Acute pain 10/11/2023    Ambulatory dysfunction 10/10/2023    Fall from standing 10/10/2023    Contusion of left leg, initial encounter 10/10/2023    Acute pain of right shoulder 09/08/2022    Hx of total shoulder replacement, right 09/08/2022    Muscle strain of right shoulder 08/22/2022    Post-traumatic osteoarthritis of first carpometacarpal joint of right hand 05/26/2022    Aftercare following right shoulder joint replacement surgery 05/09/2022    Bilateral impacted cerumen 03/29/2022    Sensorineural hearing loss (SNHL), bilateral 03/29/2022    Rotator cuff tear arthropathy of left shoulder 03/03/2022    Rotator cuff tear arthropathy of right shoulder 03/03/2022    History of colon polyps 08/20/2021    Elevated LFTs 08/20/2021    Brady's esophagus with dysplasia 05/13/2021    Gastroesophageal reflux disease without esophagitis 12/31/2015       Physical Therapy:    Weight Bearing Status: Weight Bearing as Tolerated (RLE locked in extension in HKB)  Transfers: Maximum Assistance  Bed Mobility: Supervision  Amulation Distance (ft): 125 feet  Ambulation: Minimal Assistance  Assistive Device for Ambulation: Roller Walker  Wheelchair Mobility Distance:  (n/a)  Number of Stairs: 1  Assistive Device for Stairs: Bilateral Hand Rails  Stair Assistance: Minimal Assistance  Ramp: Total Assistance  Assistive Device for Ramp: Roller Walker  Discharge Recommendations: Home with:  [address] [facility] with[de-identified] Family Support, First Floor Setup, Home Physical Therapy    Date of admission:10/14/23  ELOS:2 weeks  Goals: Leonel bed mobility, S for transfers, ambulation, stair navigation  Date:10/18/23  Barriers to d/c home at this time include brace management, inaccessible home environment, decreased family support, requires external assistance for all mobilities, positive fall history, and increased fall risk due to impairments. Pt requires external assistance at this time due to the following impairments: decreased strength, decreased ROM, imbalance, decreased endurance, fatigue, pain, and cognitive deficits. POC has been focusing on gait training, transfer training, stair training, improving pt's activity tolerance and endurance, LE strengthening, balance interventions, and assessment for appropriate AD in order to address barriers to d/c. Over the past week pt has made good progress towards LTGs, however, STS transfers continue to be difficult for pt to perform as at times pt has required 2 person assist to safely complete. Once upright, pt Noah/CGA for other mobilities with use of RW. Began discussing compensatory methods for pt upon d/c (sleeping in hospital bed as height can elevate if needed which pt has at home, sitting in power recliner to increase ease of standing), however, anticipate pt may require A for STS from commode/toilet. Did begin discussion with pt of asking family members if they can physically assist pt upon d/c as pt reports wife cannot assist as she works and has medical limitations. Also plan to provide pt with list of HHAs should pt require A for this upon d/c. Over the next week plan to focus heavily on transfer trng from various surfaces as well as improve LE strength, continue with gait trng, transfer trng, and stair navigation so pt can safely d/c home. Anticipating d/c home late next week vs early week after pending pt's progress with recommendations for home PT . DME needs include RW. Plan for FT early next week to determine support pt will have upon d/c or if alternative d/c plan needs to be put in place as well as car transfer training. Occupational Therapy:  Eating: Independent  Grooming: Supervision  Bathing: Moderate Assistance  Bathing: Moderate Assistance  Upper Body Dressing: Supervision  Lower Body Dressing: Moderate Assistance  Toileting: Minimal Assistance  Tub/Shower Transfer: Minimal Assistance  Toilet Transfer: Minimal Assistance  Cognition: Exceptions to WNL  Cognition: Decreased Memory  Orientation: Person, Place, Time, Situation  Discharge Recommendations: Home with:  [address] [place] [address] with[de-identified] 24 Hour Supervision, Family Support, First Floor Setup, Home Occupational Therapy       10/18/23  ADL: Supervision UB, ModA LB, Noah toileting  TRANSFER: Noah SPT with RWNedra STS  D/C PLAN: Home with family/friend support and home therapy. Family training scheduled for 10/25 10am. Anticipate DC late next week. Pt barriers include UE strength, ROM, positioning for STS, balance, endurance impacting participation in ADL/IADL's. In order to address fx deficits, skilled OT services have worked on self-care, therapeutic exercise, therapeutic activity, modalities PRN in order to inc fx performance and independence. Therapist has discussed POC with pt and areas of focus with pt verbalizing understanding. Barriers Intervention   UE strength and ROM TE   Positioning for STS Edu, repetition   Balance TA, balance training   Endurance Activity tolerance     MOCA 8.1 completed.  Pt scored 20/30 indicating a mild cognitive deficit. Anticipate supervision for med mgmt. Speech Therapy:           No notes on file    Nursing Notes:  Appetite: Good  Diet Type: Regular/House, Thin Liquids                                                                     Pain Location/Orientation: Location: Knee, Orientation: Bilateral  Pain Score: 7                       Hospital Pain Intervention(s): Rest, Elevated          Marlen Strong is a 74year old male who presented to [address] [place] [address] on 10/10/23 with bilateral knee pain. Patient stated that he was hiking in woods when he tripped and fell landing on both his knees. He was unable to ambulate, bear weight or flex either knee. He required rescue out of the woods which took several hours. In ER, patient was found to have a right quadriceps tendon rupture and contusion of the left upper leg. He went to the OR on 10/12 and is s/p repair of the tendon rupture done by Dr. Elena Banegas. He is currently WBAT in TROM brace locked in extension. He is ordered WBAT on LLE with a hinged knee brace. Pain managed by Tylenol 975 mg q8 hours, Lidocaine 5% patch daily, Oxycodone IR 5mg [id number] or Oxycodone split tablet 2.5mg q 4 hours. Anticoagulation managed with  mg BID. Multivitamin 1 tablet every morning, Constipation relief managed with Senokot 17.2 mg daily. This week we will continue to monitor vital signs and lab values. We will manage pain with the above orders so that the patient can participate in his therapy sessions to his optimal abilities. We will teach the patient how to manage and energy conservation so that he may perform ADL's independently as much as he can. We will educated patient on the importance of repositioning and off-loading pressure to help lower risk of skin breakdown. We will prevent falls by keeping personal items within reach and call bell within reach, we will also initiate and maintain hourly rounding.      Case Management: Discharge Planning  Living Arrangements: Lives w/ Spouse/significant other  Support Systems: Spouse/significant other  Assistance Needed: unable to assess at present  Type of Current Residence: Private residence  Current [address]: No  No notes on file    Is the patient actively participating in therapies? yes  List any modifications to the treatment plan: None    Barriers Interventions   Pain - MILD/MOD Med mgmt , ice, rest   WBAT hinge knee brace locked in extension Comp strategies   Cellulitis Keflex   Positioning Education, cuing   Shoulder ROM Comp strategies, UE strengthening   Family support Family training 10 AM Wednesdsy   Steep ramp to enter Upper embulation   1 tatyana Stair negotiaiton   MT le rom and strength TE   Balance Balance training    Cog MOCA 20/30     Is the patient making expected progress toward goals? yes  List any update or changes to goals: None    Medical Goals: Patient will be medically [facility] program and Patient will be able to manage medical conditions and comorbid conditions with medications and follow up upon completion of rehab program    Weekly Team Goals:   Rehab Team Goals  ADL Team Goal: Patient will require assist with ADLs with least restrictive device upon completion of rehab program  Bowel/Bladder Team Goal: Patient will return to premorbid level for bladder/bowel management upon completion of rehab program  Transfer Team Goal: Patient will require supervision with transfers with least restrictive device upon completion of rehab program  Locomotion Team Goal: Patient will require supervision with locomotion with least restrictive device upon completion of rehab program    Discussion: Pt participating in therapies. Pt functioning at min mod with walker for transfers, cg for ambulation, can be max for sit to  AM, supervision for bed mobility, min for stairs.  Team recommending DC Friday 10/27 home pt. Anticipated Discharge Date:    [facility] Team Members Present: The following team members are supervising care for this patient and were present during this Weekly Team Conference.     Physician: Dr. Ammon Lee MD  : DAVIS Balderrama  Registered Nurse: Radha Banda LPN  Physical Therapist: Crys Lu DPT  Occupational Therapist: Emil Nathan MS, OTR/L  Speech Therapist:

## 2023-10-19 NOTE — PROGRESS NOTES
[facility]  Progress Note  Name: Bunny Portillo  MRN: [id number]  Unit/Bed#: -67 I Date of Admission: 10/14/2023   Date of Service: 10/19/2023 I Hospital Day: 5    Assessment/Plan   * Traumatic rupture of quadriceps tendon, right, initial encounter  Assessment & Plan  Traumatic injury following fall when hiking   S/p OR 10/12/23 repair of quad tendon rupture with Dr Tarik Auguste to the right lower extremity in TROM locked in extension  WBAT to the left lower extremity in hinged knee brace, unlocked   PT/OT  Pain control per primary team.   DVT ppx: ASA 325mg BID x 6 weeks  10/17:Keflex started (noted allergies)     Follow up with Dr. Jana Jolly upon discharge. Cellulitis  Assessment & Plan  Redness near right tibial plateau  26/62: Started Keflex 500 mg Q 6 hours with Florastor    Nelly-colored urine  Assessment & Plan  Has resolved   Pt was unable to move following fall and laying in woods for 2-3 hours on 10/10/23. Encouraged increasing fluid intake  Pt denies urinary complaints   Creatinine stable   10/13: 0.75  10/16: 0.66   10/19: 0.68  Will continue to follow labs     Impaired fasting glucose  Assessment & Plan  Pt reports hx of impaired fasting glucose  Discussed diet recommendations  FBS: 114 (10/16)  Hga1c: 6 (4/6/23)  Will monitor     Thrombocytopenia (HCC)  Assessment & Plan  Follows with Dr Payam Goff ([facility]) - last consult note reviewed 9/11/23  Per Dr Rita Mann note following fluctuating leukopenia and thrombocytopenia with significant monocytosis. Recommendations were for f/u labs in 3 months. 10/10: plt 105  10/13: plt 104  10/16: plt 128   10/19: plt 158    Pathology slide 10/13:  "Peripheral blood smear shows normochromic normocytic RBCs with no schistocytes, mild thrombocytopenia, mild leukocytosis with monocytosis and dyspoietic changes.  If monocytosis persists, HEM/ONC consultation with bone marrow biopsy might be considered as clinically indicated."    Contusion of left leg, initial encounter  Assessment & Plan  Xray left knee showed mild arthritis, no acute abnl  Continue lidoderm patch  Consideration with therapies     Rotator cuff tear arthropathy of left shoulder  Assessment & Plan  Pt reports history of left shoulder pain  May have been worsened by laying on left side in woods  Lidocaine patch to left shoulder, noted improvement   Consideration for PT/OT     Brady's esophagus with dysplasia  Assessment & Plan  Continue home lansoprazole daily               VTE Pharmacologic Prophylaxis:   Pharmacologic: Other Medication: aspirin 325mg bid  Mechanical VTE Prophylaxis in Place: No    Current Length of Stay: 5 day(s)    Current Patient Status: Inpatient Rehab     Discharge Plan: As per treatment team     Code Status: Level 1 - Full Code    Subjective:   Pt examined this am, sitting in chair. Pt reports he slept well. He reports ongoing pain in L knee and tenderness to touch superior to patella. He does reports his ROM has improved with PT. Pt reports BM yesterday and states he had a little difficulty with this. He has been having daily Bms and has not had a problem prior to this. His L shoulder pain is improved with lidocaine patch. Pt reports his RLE pain is controlled at rest 4-5/10. Objective:     Vitals:   Temp (24hrs), Av.5 °F (36.9 °C), Min:98 °F (36.7 °C), Max:99.5 °F (37.5 °C)    Temp:  [98 °F (36.7 °C)-99.5 °F (37.5 °C)] 98.1 °F (36.7 °C)  HR:  [68-87] 68  Resp:  [18] 18  BP: (130-146)/(65-70) 136/70  SpO2:  [95 %-96 %] 96 %  Body mass index is 30.49 kg/m². Review of Systems   Constitutional:  Negative for activity change, appetite change, chills, diaphoresis and fever. HENT:  Negative for congestion and sore throat. Eyes:  Negative for pain and redness. Respiratory:  Negative for cough and shortness of breath. Cardiovascular:  Negative for chest pain and leg swelling.    Gastrointestinal:  Negative for abdominal pain, constipation, diarrhea and nausea. Musculoskeletal:  Positive for arthralgias (R knee, L knee, L shoulder) and gait problem. Skin:  Negative for rash. Neurological:  Negative for dizziness, light-headedness and headaches. Psychiatric/Behavioral:  Negative for dysphoric mood and sleep disturbance. The patient is not nervous/anxious. Input and Output Summary (last 24 hours): Intake/Output Summary (Last 24 hours) at 10/19/2023 1146  Last data filed at 10/19/2023 1046  Gross per 24 hour   Intake 1620 ml   Output 3675 ml   Net -2055 ml       Physical Exam:     Physical Exam  Vitals reviewed. Constitutional:       General: He is not in acute distress. HENT:      Head: Normocephalic and atraumatic. Nose: Nose normal.   Eyes:      Conjunctiva/sclera: Conjunctivae normal.   Cardiovascular:      Rate and Rhythm: Normal rate and regular rhythm. Pulmonary:      Effort: Pulmonary effort is normal. No respiratory distress. Breath sounds: No wheezing or rales. Abdominal:      General: Bowel sounds are normal. There is no distension. Musculoskeletal:         General: Swelling (L knee eccyhmosis and mild edema superior to patella), tenderness (L quad tenderness with eccyhmosis) and deformity (RLE in brace/ace wrap, incision dry) present. Cervical back: Normal range of motion. Right lower leg: No edema. Left lower leg: No edema. Skin:     Findings: Bruising (L knee, RLE) present. No rash. Neurological:      Mental Status: He is alert.    Psychiatric:         Mood and Affect: Mood normal.         Behavior: Behavior normal.         Additional Data:     Labs:    Results from last 7 days   Lab Units 10/19/23  0516   WBC Thousand/uL 4.34   HEMOGLOBIN g/dL 13.2   HEMATOCRIT % 39.6   PLATELETS Thousands/uL 158   BANDS PCT % 1   LYMPHO PCT % 38   MONO PCT % 12   EOS PCT % 1     Results from last 7 days   Lab Units 10/19/23  0516   SODIUM mmol/L 140   POTASSIUM mmol/L 4.1 CHLORIDE mmol/L 104   CO2 mmol/L 30   BUN mg/dL 12   CREATININE mg/dL 0.68   ANION GAP mmol/L 6   CALCIUM mg/dL 8.8   GLUCOSE RANDOM mg/dL 119                       Labs reviewed        Last 24 Hours Medication List:   Current Facility-Administered Medications   Medication Dose Route Frequency Provider Last Rate    acetaminophen  975 mg Oral Q8H [facility], DO      aspirin  325 mg Oral BID [facility], DO      bisacodyl  10 mg Rectal Daily PRN [facility], DO      cephalexin  500 mg Oral Q6H CHANEL Allen      hydrocortisone   Topical 4x Daily PRN Lenetta Market, DO      lansoprazole  30 mg Oral Daily [facility], DO      lidocaine  1 patch Topical Daily [facility], DO      lidocaine  1 patch Topical Daily Nini Beck PA-C      magnesium hydroxide  30 mL Oral Daily PRN Lenetta Market, DO      multivitamin stress formula  1 tablet Oral QAM [facility], DO      oxyCODONE  5 mg Oral Q4H PRN Lenetta Market, DO      oxyCODONE  2.5 mg Oral Q4H PRN Lenetta Market, DO      saccharomyces boulardii  250 mg Oral BID CHANEL Portillo      senna  2 tablet Oral Daily [facility], DO          M*Modal software was used to dictate this note. It may contain errors with dictating incorrect words or incorrect spelling. Please contact the provider directly with any questions.

## 2023-10-19 NOTE — PROGRESS NOTES
10/19/23 1100   Pain Assessment   Pain Assessment Tool 0-10   Pain Score 4   Pain Location/Orientation Orientation: Bilateral;Orientation: Right   Pain Radiating Towards none   Pain Onset/Description Onset: Ongoing   Effect of Pain on Daily Activities tolerated   Patient's Stated Pain Goal No pain   Hospital Pain Intervention(s) Cold applied;Elevated; Rest   Multiple Pain Sites No   Restrictions/Precautions   Precautions Cognitive; Fall Risk;Hard of hearing  (HKB RLE locked in 0* Ext)   Weight Bearing Restrictions Yes   RLE Weight Bearing Per Order WBAT   LLE Weight Bearing Per Order WBAT   RLE ROM Restriction Range Limitation  (HKB RLE locked in 0* Ext)   Braces or Orthoses Knee brace   Oral Hygiene   Type of Assistance Needed Supervision   Comment only utilized mouth wash to rinse mouth this session in stance   Oral Hygiene CARE Score 4   Sit to Stand   Type of Assistance Needed Incidental touching; Adaptive equipment   Comment CGA with RW with imporved carryover of hand placement and bearing weight on LLE when completing from recliner   Sit to Stand CARE Score 4   Bed-Chair Transfer   Type of Assistance Needed Incidental touching; Adaptive equipment   Comment CGA SPT with RW   Chair/Bed-to-Chair Transfer CARE Score 4   Toilet Transfer   Type of Assistance Needed Physical assistance   Physical Assistance Level 26%-50%   Comment CGA for SPT onto [facility] over toilet. However, req modA for stnading from [facility]   Toilet Transfer CARE Score 3   Therapeutic Excerise-Strength   UE Strength Yes   Right Upper Extremity- Strength   R Shoulder Flexion; Extension   R Elbow Elbow flexion;Elbow extension   R Position Seated   Equipment Dumbbell  (3#)   R Weight/Reps/Sets 2x15   RUE Strength Comment seated engaged pt in UE TE iwth 3# 2x15 to cont ot inc fx strength fo rimproved STS. Tolerated well with rest break in between.  Shoulder exercises modifed 2* to RTC   Left Upper Extremity-Strength   LUE Strength Comment see above   Cognition Overall Cognitive Status Impaired   Arousal/Participation Responsive; Alert; Cooperative   Attention Attends with cues to redirect   Orientation Level Oriented X4   Memory Decreased short term memory   Following Commands Follows one step commands with increased time or repetition   Additional Activities   Additional Activities Comments Engaged pt in fx mobility around RN unit. Pt able to complete at [facility] with RW, no LOB observed   Activity Tolerance   Activity Tolerance Patient tolerated treatment well   Assessment   Treatment Assessment Engaged pt in 30mins of skilled OT services with focus on toilet transfers, fx mobility and UE TE. Pt demosntrates improvement in overall STS adn SPT fx at [facility]. However, cont to require modA for STS from [facility]. Barriers cont to be strength, positioning, endurance. FT schedued for Wed 10/25. Anticiapted DC set for 10/17 home PT only. Prognosis Good   Problem List Decreased strength;Decreased range of motion;Decreased endurance; Impaired balance;Decreased mobility; Decreased cognition; Impaired hearing;Orthopedic restrictions;Pain   Barriers to Discharge Decreased caregiver support; Inaccessible home environment   Plan   Treatment/Interventions ADL retraining;Functional transfer training; Therapeutic exercise; Endurance training;Patient/family training;Bed mobility; Compensatory technique education   Progress Progressing toward goals   Discharge Recommendation   OT Discharge Recommendation Home with home health rehabilitation  (PT only)   Equipment Recommended Bedside commode; Other (comment)  (RW)   Commode Type Standard   Additional Comments  DME ordered by [facility] Pineda   OT Therapy Minutes   OT Time In 1100   OT Time Out 1130   OT Total Time (minutes) 30   OT Mode of treatment - Individual (minutes) 30   OT Mode of treatment - Concurrent (minutes) 0   OT Mode of treatment - Group (minutes) 0   OT Mode of treatment - Co-treat (minutes) 0   OT Mode of Treatment - Total time(minutes) 30 minutes OT Cumulative Minutes 480   Therapy Time missed   Time missed?  No

## 2023-10-19 NOTE — PROGRESS NOTES
10/19/23 0700   Pain Assessment   Pain Assessment Tool 0-10   Pain Score 5   Pain Location/Orientation Location: Knee;Orientation: Bilateral   Pain Radiating Towards none   Pain Onset/Description Onset: Ongoing; Descriptor: Sore   Effect of Pain on Daily Activities no impact on todays session   Patient's Stated Pain Goal No pain   Hospital Pain Intervention(s) Rest;Elevated   Restrictions/Precautions   Precautions Cognitive; Fall Risk;Hard of hearing;Pain   Weight Bearing Restrictions Yes   RLE Weight Bearing Per Order WBAT   LLE Weight Bearing Per Order WBAT   ROM Restrictions Yes   RLE ROM Restriction Range Limitation  (HKB RLE locked in extension)   Eating   Type of Assistance Needed Independent   Comment provided breakfast end of session   Eating CARE Score 6   Oral Hygiene   Type of Assistance Needed Supervision   Comment pt stood to complete oral hygiene. mouthwash only   Oral Hygiene CARE Score 4   Shower/Bathe Self   Type of Assistance Needed Physical assistance   Physical Assistance Level 25% or less   Comment pt completed SB standing/seated. pt in stance to wash UB, bronson/buttock, upper legs. seated to wash lower legs/feet. Therapist A with lower leg/foot thoroughness. Shower/Bathe Self CARE Score 3   Bathing   Assessed Bath Style Sponge Bath   Anticipated D/C Bath Style Sponge Bath   Able to [place] No   Able to [employer] Temperature Yes   Able to Wash/Rinse/Dry (body part) Right Arm;Left Arm;L Upper Leg;R Upper Leg;L Lower Leg/Foot;R Lower Leg/Foot;Chest;Perineal Area; Abdomen;Buttocks   Limitations Noted in Balance; Endurance;Problem Solving;Strength;Timeliness   Positioning Seated;Standing   Adaptive Equipment Longhand Reacher   Tub/Shower Transfer   Reason Not Assessed Safety;Sponge Bath  (SB at [place], [place])   Upper Body Dressing   Type of Assistance Needed Supervision   Comment pt reporting difficulty with getting button-down shirt around back, but able to complete with increased time. Upper Body Dressing CARE Score 4   Lower Body Dressing   Type of Assistance Needed Physical assistance   Physical Assistance Level 26%-50%   Comment pt able to thread pants with LHR seated in wc, SUP in stance for CM. A for HKB mgmt   Lower Body Dressing CARE Score 3   Putting On/Taking Off Footwear   Type of Assistance Needed Verbal cues; Supervision   Comment pt utilized sock aid to don both socks. inc time to complete. vc for positioning   Putting On/Taking Off Footwear CARE Score 4   Lying to Sitting on Side of Bed   Type of Assistance Needed Supervision   Comment bed flat, no bed rails. inc time to complete   Lying to Sitting on Side of Bed CARE Score 4   Sit to Stand   Type of Assistance Needed Physical assistance   Physical Assistance Level 76% or more   Comment pt requiring maxA to stand this session, vc for positioning. Pt with improved STS throughout session with continued cueing. Sit to Stand CARE Score 2   Bed-Chair Transfer   Type of Assistance Needed Physical assistance   Physical Assistance Level 25% or less   Comment contact guard/Noah for SPT with RW EOB<>WC, wc<>recliner   Chair/Bed-to-Chair Transfer CARE Score 3   Cognition   Overall Cognitive Status Impaired   Arousal/Participation Alert; Responsive; Cooperative   Attention Attends with cues to redirect   Orientation Level Oriented X4   Memory Decreased short term memory   Following Commands Follows one step commands with increased time or repetition   Activity Tolerance   Activity Tolerance Patient tolerated treatment well   Assessment   Treatment Assessment pt engaged in 90 minute skilled OT session focusing on bathing and dressing. Pt with difficulty with STS from EOB beginning of session req maxA, however req Noah for STS from [facility] later in session. Pt requiring frequent verbal cues for positioning during STS/transfers. Showed photos of half bath, looks like RW should fit as well as room for R leg to stick out.  Pt completed majority of SB in stance at sink, sitting to wash lower legs/feet. Pt reports feeling better in stance. Pt reported no increase of pain during todays session. Continue OT POC with focus on UE ROM/Strength, standing balance, and positioning for STS to increase independence and decrease CG burden   OT Family training done with: Ft scheduled for wed 10/25 @ 10am, with wife ANDRIA portillo and friend Artist Alfredito   Prognosis Good   Problem List Decreased endurance; Impaired balance;Decreased range of motion;Decreased strength;Decreased cognition;Pain   Barriers to Discharge Inaccessible home environment   Plan   Treatment/Interventions ADL retraining;Functional transfer training;LE strengthening/ROM; Elevations; Therapeutic exercise; Endurance training;Cognitive reorientation;Patient/family training   Progress Progressing toward goals   Discharge Recommendation   OT Discharge Recommendation Home with home health rehabilitation  (DC planned for friday 10/27 with home PT.)   Equipment Recommended Bedside commode; Hip Kit ($)   Commode Type Standard   Additional Comments  pt ordered hip kit, commode ordered w/ insurance   OT Therapy Minutes   OT Time In 0700   OT Time Out 0830   OT Total Time (minutes) 90   OT Mode of treatment - Individual (minutes) 90   OT Mode of treatment - Concurrent (minutes) 0   OT Mode of treatment - Group (minutes) 0   OT Mode of treatment - Co-treat (minutes) 0   OT Mode of Treatment - Total time(minutes) 90 minutes   OT Cumulative Minutes 450   Therapy Time missed   Time missed?  No

## 2023-10-19 NOTE — PROGRESS NOTES
10/19/23 1000   Pain Assessment   Pain Assessment Tool 0-10   Pain Score 4   Pain Location/Orientation Orientation: Right;Location: Knee;Orientation: Bilateral   Pain Radiating Towards none   Pain Onset/Description Onset: Ongoing; Descriptor: Sore   Patient's Stated Pain Goal No pain   Hospital Pain Intervention(s) Cold applied;Elevated; Rest   Multiple Pain Sites No   Restrictions/Precautions   Precautions Cognitive; Fall Risk;Pain;Hard of hearing  (HKB RLE locked in 0* Ext)   Weight Bearing Restrictions Yes   RLE Weight Bearing Per Order WBAT   LLE Weight Bearing Per Order WBAT   RLE ROM Restriction Range Limitation  (HKB locked in EXT)   Braces or Orthoses Knee brace  (HKB)   Cognition   Overall Cognitive Status Impaired   Arousal/Participation Responsive; Alert; Cooperative   Attention Attends with cues to redirect   Orientation Level Oriented X4   Memory Decreased short term memory   Following Commands Follows one step commands with increased time or repetition   Subjective   Subjective Pt was attentive and responsive to PTs upon entry to pt's room. Pt was ready and willing to participate in AM skilled PT session. Sit to Lying   Type of Assistance Needed Supervision   Physical Assistance Level No physical assistance   Comment Able to descend/ascend from mat in PT gym w/ RW. Pushes up from mat w/ BUEs. Sit to Lying CARE Score 4   Lying to Sitting on Side of Bed   Type of Assistance Needed Supervision   Physical Assistance Level No physical assistance   Comment Increase time to perform transfer from lying to sitting on EO mat   Lying to Sitting on Side of Bed CARE Score 4   Sit to Stand   Type of Assistance Needed Incidental touching   Physical Assistance Level No physical assistance   Comment CG w/ recliner to RW, may need minAx1 if fatigued or seated surfae is lower.    Sit to Stand CARE Score 4   Bed-Chair Transfer   Type of Assistance Needed Incidental touching   Physical Assistance Level No physical assistance   Comment SPTs from RW to recliner when entering room. Chair/Bed-to-Chair Transfer CARE Score 4   Walk 10 Feet   Type of Assistance Needed Supervision   Physical Assistance Level No physical assistance   Comment CS w/ RW   Walk 10 Feet CARE Score 4   Walk 50 Feet with Two Turns   Type of Assistance Needed Supervision   Physical Assistance Level No physical assistance   Comment CS w/ RW   Walk 50 Feet with Two Turns CARE Score 4   Ambulation   Primary Mode of Locomotion Prior to Admission Walk   Distance Walked (feet) 70 ft  (70ft x2, room to PT gym)   Assist Device Roller Walker   Gait Pattern Inconsistant Therese; Antalgic; Slow Therese;Decreased foot clearance;Decreased R stance; Step through; Improper weight shift   Limitations Noted In Balance; Endurance;Speed;Strength;Swing   Walk Assist Level Close Supervision   Findings Pt improving weight shift onto RLE, less use of BUEs w/ RW. Pt states that he feels he "is getting stonger in my legs and arms."   Does the patient walk? 2. Yes   Assessment   Treatment Assessment Pt participated in 30min skilled PT session focusing on ambulation w/ RW, incision dressing, and HKB adjustments. Pt showed PTs pictures of household, [employer] ramp from street to backyard that is "roughly 4ft long w/ a 1ft drop," bathroom setup w/ toilet in corner w/ sink in front (pt will need to angle himself to be able to descend and sit onto toilet and keep RLE locked in EXT). Pt also showed PTs pictures of electric recliner which is appropriate place for R&R. Pt also showed PTs pictures of 7" outdoor step that doesn't have HRs but 2 wooden posts that look appropriate for him to hold him up, also have been practicing curbs/stairs w/ RW, will continue to practice. PTs adjusted ace wrap around R knee and incision to prevent folds/looseness in wrap to prevent edema.   Skin examination performed as well, nothing appeared new since pt's accident  HKB was tightened to fit properly on pt's RLE.  PTs to focus on outdoor ramp ambulation w/ RW in PM session to work on barrier to d/c  of macadam ramp. Continue with POC laid out by [facility] PTs. Family/Caregiver Present no   Problem List Decreased strength;Decreased range of motion;Decreased endurance; Impaired balance;Decreased mobility; Decreased cognition; Impaired hearing;Orthopedic restrictions;Pain   Barriers to Discharge Decreased caregiver support; Inaccessible home environment  (macadam ramp outside)   Barriers to Discharge Comments GEO & macadam ramp   PT Barriers   Physical Impairment Decreased strength;Decreased range of motion;Decreased endurance; Impaired balance;Decreased mobility; Decreased cognition; Impaired hearing;Orthopedic restrictions;Pain   Functional Limitation Car transfers;Transfers   Plan   Treatment/Interventions ADL retraining;Functional transfer training;LE strengthening/ROM; Elevations; Therapeutic exercise; Endurance training;Patient/family training;Gait training   Progress Progressing toward goals   Discharge Recommendation   PT Discharge Recommendation Home with home health rehabilitation   Equipment Recommended Walker   PT Equipment ordered ARC ordered RW for pt   PT Therapy Minutes   PT Time In 1000   PT Time Out 1030   PT Total Time (minutes) 30   PT Mode of treatment - Individual (minutes) 30   PT Mode of treatment - Concurrent (minutes) 0   PT Mode of treatment - Group (minutes) 0   PT Mode of treatment - Co-treat (minutes) 0   PT Mode of Treatment - Total time(minutes) 30 minutes   PT Cumulative Minutes 390   Therapy Time missed   Time missed?  No

## 2023-10-20 ENCOUNTER — HOME HEALTH ADMISSION (OUTPATIENT)
Dept: HOME HEALTH SERVICES | Facility: HOME HEALTHCARE | Age: 73
End: 2023-10-20
Payer: MEDICARE

## 2023-10-20 PROCEDURE — 97116 GAIT TRAINING THERAPY: CPT

## 2023-10-20 PROCEDURE — 99232 SBSQ HOSP IP/OBS MODERATE 35: CPT | Performed by: PHYSICAL MEDICINE & REHABILITATION

## 2023-10-20 PROCEDURE — 97530 THERAPEUTIC ACTIVITIES: CPT

## 2023-10-20 PROCEDURE — 99232 SBSQ HOSP IP/OBS MODERATE 35: CPT | Performed by: PHYSICIAN ASSISTANT

## 2023-10-20 PROCEDURE — 97110 THERAPEUTIC EXERCISES: CPT

## 2023-10-20 PROCEDURE — 97535 SELF CARE MNGMENT TRAINING: CPT

## 2023-10-20 RX ADMIN — OXYCODONE HYDROCHLORIDE 5 MG: 5 TABLET ORAL at 10:11

## 2023-10-20 RX ADMIN — ASPIRIN 325 MG ORAL TABLET 325 MG: 325 PILL ORAL at 08:36

## 2023-10-20 RX ADMIN — Medication 250 MG: at 08:36

## 2023-10-20 RX ADMIN — ACETAMINOPHEN 975 MG: 325 TABLET ORAL at 06:08

## 2023-10-20 RX ADMIN — CEPHALEXIN 500 MG: 500 CAPSULE ORAL at 06:08

## 2023-10-20 RX ADMIN — ACETAMINOPHEN 975 MG: 325 TABLET ORAL at 13:16

## 2023-10-20 RX ADMIN — SENNOSIDES 17.2 MG: 8.6 TABLET, FILM COATED ORAL at 08:36

## 2023-10-20 RX ADMIN — B-COMPLEX W/ C & FOLIC ACID TAB 1 TABLET: TAB at 08:36

## 2023-10-20 RX ADMIN — CEPHALEXIN 500 MG: 500 CAPSULE ORAL at 11:37

## 2023-10-20 RX ADMIN — Medication 2.5 MG: at 16:19

## 2023-10-20 RX ADMIN — Medication 250 MG: at 17:08

## 2023-10-20 RX ADMIN — CEPHALEXIN 500 MG: 500 CAPSULE ORAL at 23:19

## 2023-10-20 RX ADMIN — LANSOPRAZOLE 30 MG: 30 CAPSULE, DELAYED RELEASE ORAL at 08:42

## 2023-10-20 RX ADMIN — CEPHALEXIN 500 MG: 500 CAPSULE ORAL at 17:08

## 2023-10-20 RX ADMIN — ACETAMINOPHEN 975 MG: 325 TABLET ORAL at 23:18

## 2023-10-20 RX ADMIN — LIDOCAINE 1 PATCH: 50 PATCH CUTANEOUS at 08:37

## 2023-10-20 RX ADMIN — ASPIRIN 325 MG ORAL TABLET 325 MG: 325 PILL ORAL at 17:08

## 2023-10-20 RX ADMIN — LIDOCAINE 1 PATCH: 50 PATCH CUTANEOUS at 08:36

## 2023-10-20 NOTE — PROGRESS NOTES
[facility]  Progress Note  Name: Rodolfo Vigil  MRN: [id number]  Unit/Bed#: -94 I Date of Admission: 10/14/2023   Date of Service: 10/20/2023 I Hospital Day: 6    Assessment/Plan   * Traumatic rupture of quadriceps tendon, right, initial encounter  Assessment & Plan  Traumatic injury following fall when hiking   S/p OR 10/12/23 repair of quad tendon rupture with Dr Jolene Newell to the right lower extremity in TROM locked in extension  WBAT to the left lower extremity in hinged knee brace, unlocked   PT/OT  Pain control per primary team.   DVT ppx: ASA 325mg BID x 6 weeks  10/17:Keflex started (noted allergies)   Will need staples removed next week   Follow up with Dr. Daniel Zaman upon discharge. Cellulitis  Assessment & Plan  Redness near right tibial plateau  94/73: Started Keflex 500 mg Q 6 hours with Florastor    Nelly-colored urine  Assessment & Plan  Resolved   Pt was unable to move following fall and laying in woods for 2-3 hours on 10/10/23. Continues with fluid intake   Pt denies urinary complaints   Creatinine stable   10/13: 0.75  10/16: 0.66   10/19: 0.68  Will continue to follow labs     Impaired fasting glucose  Assessment & Plan  Pt reports hx of impaired fasting glucose  Discussed diet recommendations  FBS: 114 (10/16)  Hga1c: 6 (4/6/23)  Will monitor     Thrombocytopenia (HCC)  Assessment & Plan  Follows with Dr Ebonie Evans ([facility]) - last consult note reviewed 9/11/23  Per Dr Natalie Salinas note following fluctuating leukopenia and thrombocytopenia with significant monocytosis. Recommendations were for f/u labs in 3 months. 10/10: plt 105  10/13: plt 104  10/16: plt 128   10/19: plt 158    Pathology slide 10/13:  "Peripheral blood smear shows normochromic normocytic RBCs with no schistocytes, mild thrombocytopenia, mild leukocytosis with monocytosis and dyspoietic changes.  If monocytosis persists, HEM/ONC consultation with bone marrow biopsy might be considered as clinically indicated."    Contusion of left leg, initial encounter  Assessment & Plan  Xray left knee showed mild arthritis, no acute abnl  Continue lidoderm patch  Consideration with therapies     Rotator cuff tear arthropathy of left shoulder  Assessment & Plan  Pt reports history of left shoulder pain  May have been worsened by laying on left side in woods  Lidocaine patch to left shoulder, noted improvement   Consideration for PT/OT     Brady's esophagus with dysplasia  Assessment & Plan  Continue home lansoprazole daily               VTE Pharmacologic Prophylaxis:   Pharmacologic: Other Medication: aspirin 325mg BID  Mechanical VTE Prophylaxis in Place: No    Current Length of Stay: 6 day(s)    Current Patient Status: Inpatient Rehab     Discharge Plan: As per treatment team     Code Status: Level 1 - Full Code    Subjective:   Pt examined this am, sitting in recliner. Pt reports good appetite, had BM yesterday without difficulty. Pt c/o L knee pain, improved with lidocaine patch. Pt denies ab pain, cp, sob. No new concerns per nursing. Objective:     Vitals:   Temp (24hrs), Av.4 °F (36.9 °C), Min:97.9 °F (36.6 °C), Max:98.9 °F (37.2 °C)    Temp:  [97.9 °F (36.6 °C)-98.9 °F (37.2 °C)] 98.5 °F (36.9 °C)  HR:  [70-87] 70  Resp:  [18] 18  BP: (116-136)/(58-73) 136/58  SpO2:  [95 %-99 %] 95 %  Body mass index is 30.49 kg/m². Review of Systems   Constitutional:  Negative for activity change, appetite change, chills, diaphoresis and fever. HENT:  Negative for congestion and sore throat. Eyes:  Negative for pain and redness. Respiratory:  Negative for cough, shortness of breath and wheezing. Cardiovascular:  Positive for leg swelling. Negative for chest pain. Gastrointestinal:  Negative for abdominal pain, constipation, diarrhea and nausea. Genitourinary:  Negative for dysuria and frequency. Musculoskeletal:  Positive for arthralgias, gait problem and joint swelling.    Skin: Negative for rash. Neurological:  Negative for dizziness, light-headedness and headaches. Psychiatric/Behavioral:  Negative for dysphoric mood and sleep disturbance. Input and Output Summary (last 24 hours): Intake/Output Summary (Last 24 hours) at 10/20/2023 1039  Last data filed at 10/20/2023 9742  Gross per 24 hour   Intake 400 ml   Output 2500 ml   Net -2100 ml       Physical Exam:     Physical Exam  Vitals reviewed. Constitutional:       General: He is not in acute distress. HENT:      Head: Normocephalic and atraumatic. Nose: Nose normal.   Eyes:      General:         Right eye: No discharge. Left eye: No discharge. Conjunctiva/sclera: Conjunctivae normal.   Cardiovascular:      Rate and Rhythm: Normal rate and regular rhythm. Pulmonary:      Effort: Pulmonary effort is normal. No respiratory distress. Breath sounds: Normal breath sounds. No wheezing or rales. Musculoskeletal:         General: Signs of injury (L knee eccyhmosis, RLE brace/ace wrap) present. Cervical back: Normal range of motion. Right lower leg: No edema. Left lower leg: No edema. Lymphadenopathy:      Cervical: No cervical adenopathy. Skin:     Findings: Bruising (L knee) present. Neurological:      General: No focal deficit present. Mental Status: He is alert.    Psychiatric:         Mood and Affect: Mood normal.         Behavior: Behavior normal.         Additional Data:     Labs:    Results from last 7 days   Lab Units 10/19/23  0516   WBC Thousand/uL 4.34   HEMOGLOBIN g/dL 13.2   HEMATOCRIT % 39.6   PLATELETS Thousands/uL 158   BANDS PCT % 1   LYMPHO PCT % 38   MONO PCT % 12   EOS PCT % 1     Results from last 7 days   Lab Units 10/19/23  0516   SODIUM mmol/L 140   POTASSIUM mmol/L 4.1   CHLORIDE mmol/L 104   CO2 mmol/L 30   BUN mg/dL 12   CREATININE mg/dL 0.68   ANION GAP mmol/L 6   CALCIUM mg/dL 8.8   GLUCOSE RANDOM mg/dL 119                       Labs reviewed Last 24 Hours Medication List:   Current Facility-Administered Medications   Medication Dose Route Frequency Provider Last Rate    acetaminophen  975 mg Oral Q8H [address] Theador Baller, DO      aspirin  325 mg Oral BID Theador Baller, DO      bisacodyl  10 mg Rectal Daily PRN Theador Baller, DO      cephalexin  500 mg Oral Q6H [address] CHANEL Sinclair      hydrocortisone   Topical 4x Daily PRN Theador Baller, DO      lansoprazole  30 mg Oral Daily Theador Baller, DO      lidocaine  1 patch Topical Daily Theador Baller, DO      lidocaine  1 patch Topical Daily Nini Beck PA-C      magnesium hydroxide  30 mL Oral Daily PRN Theador Baller, DO      multivitamin stress formula  1 tablet Oral QAM Theador Baller, DO      oxyCODONE  5 mg Oral Q4H PRN Theador Baller, DO      oxyCODONE  2.5 mg Oral Q4H PRN Theador Baller, DO      saccharomyces boulardii  250 mg Oral BID CHANEL Sinclair      senna  2 tablet Oral Daily Theador Baller, DO          M*Modal software was used to dictate this note. It may contain errors with dictating incorrect words or incorrect spelling. Please contact the provider directly with any questions.

## 2023-10-20 NOTE — ASSESSMENT & PLAN NOTE
Traumatic injury following fall when hiking   S/p OR 10/12/23 repair of quad tendon rupture with Dr Christiana Angelo to the right lower extremity in TROM locked in extension  WBAT to the left lower extremity in hinged knee brace, unlocked   PT/OT  Pain control per primary team.   DVT ppx: ASA 325mg BID x 6 weeks  10/17:Keflex started (noted allergies)   Will need staples removed next week   Follow up with Dr. Felipa Garcias upon discharge.

## 2023-10-20 NOTE — CASE MANAGEMENT
Team update: Cm met with pt during therapy session and review recommended dc date of Friday 10/27 with home pt. Pt agreeable to slvna referral sent, cm sent referral via [facility]. Awaiting acceptance.

## 2023-10-20 NOTE — PROGRESS NOTES
10/20/23 1030   Pain Assessment   Pain Assessment Tool 0-10   Pain Score 4   Pain Location/Orientation Orientation: Bilateral;Location: Knee   Pain Onset/Description Onset: Ongoing;Frequency: Intermittent; Descriptor: Sore   Restrictions/Precautions   Precautions Pain;Hard of hearing;Cognitive; Fall Risk   RLE Weight Bearing Per Order WBAT   LLE Weight Bearing Per Order WBAT   Braces or Orthoses Knee brace   Cognition   Overall Cognitive Status Impaired   Arousal/Participation Alert; Cooperative   Attention Attends with cues to redirect   Orientation Level Oriented X4   Memory Decreased short term memory   Following Commands Follows one step commands without difficulty   Subjective   Subjective "pain is not too bad just sitting here, they did give me pain meds for therapy"   Sit to Stand   Type of Assistance Needed Incidental touching   Physical Assistance Level No physical assistance   Sit to Stand CARE Score 4   Bed-Chair Transfer   Type of Assistance Needed Incidental touching   Physical Assistance Level No physical assistance   Comment CGA to stand, then S for turning with RW. Chair/Bed-to-Chair Transfer CARE Score 4   Walk 10 Feet   Type of Assistance Needed Supervision   Walk 10 Feet CARE Score 4   Walk 50 Feet with Two Turns   Type of Assistance Needed Supervision   Walk 50 Feet with Two Turns CARE Score 4   Walk 150 Feet   Type of Assistance Needed Supervision   Walk 150 Feet CARE Score 4   Ambulation   Primary Mode of Locomotion Prior to Admission Walk   Distance Walked (feet) 235 ft   Assist Device Roller Walker   Gait Pattern Antalgic; Inconsistant Therese; Step through; Decreased L stance   Limitations Noted In Balance; Endurance;Speed;Strength;Swing   Walk Assist Level Close Supervision   Findings progressivly inc distance tolerance despite feeling he walked too much yesterday. Does the patient walk? 2.  Yes   Therapeutic Interventions   Strengthening B Ankle DF PF with grn tband x30; ball squeeze x30; L grn tband HS curls x30. Standing L step taps to 6inch block with RW support 3x10. Assessment   Treatment Assessment Pt engaged in 60 min skilled PT with ongoing focus on gait, overall mobility with restricted R knee AROM in HKB. Pt continues to struggle slightly with sit to stands, needing inc time and processing at times to stand, cues to lean forward. Amb distance progressing with RW support. Brace maintained positioned t/o session, no adjustments needed. To reivew steps, ramp and item pickup in PM session.    Discharge Recommendation   PT Discharge Recommendation Home with home health rehabilitation   PT Therapy Minutes   PT Time In 1030   PT Time Out 1130   PT Total Time (minutes) 60   PT Mode of treatment - Individual (minutes) 60   PT Mode of treatment - Concurrent (minutes) 0   PT Mode of treatment - Group (minutes) 0   PT Mode of treatment - Co-treat (minutes) 0   PT Mode of Treatment - Total time(minutes) 60 minutes   PT Cumulative Minutes 510

## 2023-10-20 NOTE — PLAN OF CARE
Problem: SAFETY ADULT  Goal: Patient will remain free of falls  Description: INTERVENTIONS:  - Educate patient/family on patient safety including physical limitations  - Instruct patient to call for assistance with activity   - Consult OT/PT to assist with strengthening/mobility   - Keep Call bell within reach  - Keep bed low and locked with side rails adjusted as appropriate  - Keep care items and personal belongings within reach  - Initiate and maintain comfort rounds  - Make Fall Risk Sign visible to staff  - Offer Toileting every 2 Hours, in advance of need  - Obtain necessary fall risk management equipment  - Apply yellow socks and bracelet for high fall risk patients  - Consider moving patient to room near nurses station  Outcome: Progressing

## 2023-10-20 NOTE — PLAN OF CARE
Problem: PAIN - ADULT  Goal: Verbalizes/displays adequate comfort level or baseline comfort level  Description: Interventions:  - Encourage patient to monitor pain and request assistance  - Assess pain using appropriate pain scale  - Administer analgesics based on type and severity of pain and evaluate response  - Implement non-pharmacological measures as appropriate and evaluate response  - Consider cultural and social influences on pain and pain management  - Notify physician/advanced practitioner if interventions unsuccessful or patient reports new pain  Outcome: Progressing     Problem: INFECTION - ADULT  Goal: Absence or prevention of progression during hospitalization  Description: INTERVENTIONS:  - Assess and monitor for signs and symptoms of infection  - Monitor lab/diagnostic results  - Monitor all insertion sites, i.e. indwelling lines, tubes, and drains  - Monitor endotracheal if appropriate and nasal secretions for changes in amount and color  - [place] appropriate cooling/warming therapies per order  - Administer medications as ordered  - Instruct and encourage patient and family to use good hand hygiene technique  - Identify and instruct in appropriate isolation precautions for identified infection/condition  Outcome: Progressing
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
## 2023-10-20 NOTE — PROGRESS NOTES
10/20/23 0700   Pain Assessment   Pain Assessment Tool 0-10   Pain Score 5   Pain Location/Orientation Location: Knee;Orientation: Bilateral   Pain Radiating Towards none   Pain Onset/Description Onset: Ongoing; Descriptor: Sore   Effect of Pain on Daily Activities tolerated session well   Patient's Stated Pain Goal No pain   Hospital Pain Intervention(s) Elevated; Rest   Restrictions/Precautions   Precautions Pain;Hard of hearing; Fall Risk;Cognitive   Weight Bearing Restrictions Yes   RLE Weight Bearing Per Order WBAT   LLE Weight Bearing Per Order WBAT   ROM Restrictions Yes   RLE ROM Restriction Range Limitation  (HKB locked in extension)   Braces or Orthoses Knee brace   Eating   Type of Assistance Needed Independent   Comment pt provided with breakfast end of session   Eating CARE Score 6   Oral Hygiene   Type of Assistance Needed Supervision   Comment pt in stance w/ rw at sink. mouthwash only   Oral Hygiene CARE Score 4   Grooming   Able To Comb/Brush Hair;Wash/Dry Face;Brush/Clean Teeth;Wash/Dry Hands   Shower/Bathe Self   Type of Assistance Needed Supervision   Comment pt completed SB in stance at sink, seated in wc to use [facility] for lower legs/feet. pt able to wash 10/10 parts. Shower/Bathe Self CARE Score 4   Bathing   Assessed Bath Style Sponge Bath   Anticipated D/C Bath Style Sponge Bath   Able to Safia Carlito No   Able to [employer] Temperature Yes   Able to Wash/Rinse/Dry (body part) Left Arm;Right Arm;L Upper Leg;R Upper Leg;L Lower Leg/Foot;R Lower Leg/Foot;Chest;Abdomen;Perineal Area; Buttocks   Limitations Noted in Balance; Endurance;Strength;Timeliness   Positioning Standing;Seated   Adaptive Equipment Longhand Reacher   Upper Body Dressing   Type of Assistance Needed Supervision   Comment pt able to don button down shirt. pt reporting he needs his back to be dry to make it easier to slide his shirt over his back.  limited shoulder ROM   Upper Body Dressing CARE Score 4   Lower Body Dressing Type of Assistance Needed Physical assistance   Physical Assistance Level 25% or less   Comment pt able to thread pants onto legs with LHR. pt requiring vc to keep walker close when standing for CM. A req for HKB mgmt   Lower Body Dressing CARE Score 3   Putting On/Taking Off Footwear   Type of Assistance Needed Supervision   Comment pt able to don socks with sock aid   Putting On/Taking Off Footwear CARE Score 4   Sit to Stand   Type of Assistance Needed Verbal cues; Incidental touching   Comment recliner <> WC w/ RW. pt req verbal cues to keep L foot under him and lift R leg at hip to get R leg underneath him during stand. pt able to boost up with arms this session, CGA for boost.   Sit to Stand CARE Score 4   Bed-Chair Transfer   Type of Assistance Needed Supervision;Verbal cues   Comment SPT with RW. verbal cues to remind pt to slowly lower to sit. Chair/Bed-to-Chair Transfer CARE Score 4   Therapeutic Excerise-Strength   UE Strength Yes   Right Upper Extremity- Strength   R Shoulder Flexion   R Elbow Elbow flexion;Elbow extension   Equipment Dumbbell   R Weight/Reps/Sets 2x15   RUE Strength Comment 3# dumbbell, 2x15, pt completed bicep curls and shoulder flexion. No weight for LUE shoulder flexion due to decreased strength   Left Upper Extremity-Strength   L Shoulder Flexion   L Elbow Elbow flexion;Elbow extension   Equipment Dumbell   L Weights/Reps/Sets see above   LUE Strength Comment see above   Cognition   Overall Cognitive Status Impaired   Arousal/Participation Alert; Cooperative   Attention Attends with cues to redirect   Orientation Level Oriented X4   Memory Decreased short term memory   Following Commands Follows one step commands without difficulty   Activity Tolerance   Activity Tolerance Patient tolerated treatment well   Assessment   Treatment Assessment pt engaged in 90 minute skilled OT session focusing on bathing, dressing, and UE strength/ROM.  Pt tolerated session well, reporting no increase in pain with activity. Pt overall at supervision level, requiring A for brace mgmt and CGA for STS. Pt demonstrating good carryover for use of LHAE to help with bathing and dressing. Pt still requiring some verbal cues for positioning during transfers and mgmt of RLE during STS. Continue OT POC with focus on UE strengthening/ROM and positioning for STS and transfers to improve fx mobility and increase independence. OT Family training done with: Ft scheduled for wed 10/25 @ 10am, with wife ANDRIA portillo and friend Rosalva Anderson   Prognosis Good   Problem List Decreased strength;Decreased range of motion;Decreased endurance; Impaired balance;Decreased cognition;Decreased safety awareness; Impaired hearing;Pain   Barriers to Discharge Decreased caregiver support; Inaccessible home environment   Plan   Treatment/Interventions ADL retraining;Functional transfer training;Elevations; Therapeutic exercise; Endurance training;Cognitive reorientation;Patient/family training   Progress Progressing toward goals   Discharge Recommendation   OT Discharge Recommendation Home with home health rehabilitation   Equipment Recommended Bedside commode; Shower transfer bench ($$);Hip Kit ($)   Commode Type Standard   Additional Comments  pt ordered hip kit, commode ordered by [facility]   OT Therapy Minutes   OT Time In 0700   OT Time Out 0830   OT Total Time (minutes) 90   OT Mode of treatment - Individual (minutes) 90   OT Mode of treatment - Concurrent (minutes) 0   OT Mode of treatment - Group (minutes) 0   OT Mode of treatment - Co-treat (minutes) 0   OT Mode of Treatment - Total time(minutes) 90 minutes   OT Cumulative Minutes 570   Therapy Time missed   Time missed?  No

## 2023-10-20 NOTE — ASSESSMENT & PLAN NOTE
Follows with Dr Gagandeep Garcia ([facility]) - last consult note reviewed 9/11/23  Per Dr Cyndie Grider note following fluctuating leukopenia and thrombocytopenia with significant monocytosis. Recommendations were for f/u labs in 3 months. 10/10: plt 105  10/13: plt 104  10/16: plt 128   10/19: plt 158    Pathology slide 10/13:  "Peripheral blood smear shows normochromic normocytic RBCs with no schistocytes, mild thrombocytopenia, mild leukocytosis with monocytosis and dyspoietic changes.  If monocytosis persists, HEM/ONC consultation with bone marrow biopsy might be considered as clinically indicated."

## 2023-10-20 NOTE — PROGRESS NOTES
10/20/23 1415   Pain Assessment   Pain Assessment Tool 0-10   Pain Score 4   Pain Location/Orientation Orientation: Bilateral;Location: Knee   Pain Onset/Description Onset: Ongoing;Frequency: Intermittent; Descriptor: Sore   Patient's Stated Pain Goal No pain   Hospital Pain Intervention(s) Elevated; Rest   Restrictions/Precautions   Precautions Pain;Cognitive; Fall Risk;Hard of hearing   Weight Bearing Restrictions Yes   RLE Weight Bearing Per Order WBAT   LLE Weight Bearing Per Order WBAT   ROM Restrictions Yes   RLE ROM Restriction Range Limitation  (HKB Locked in EXT)   Braces or Orthoses Knee brace   Cognition   Overall Cognitive Status Impaired   Arousal/Participation Alert; Cooperative   Attention Attends with cues to redirect  (pt easily distracted by stories, needs cueing to perform exercises/activities)   Orientation Level Oriented X4   Memory Decreased short term memory   Following Commands Follows one step commands without difficulty   Subjective   Subjective Pt was happy because he just received a haircut in his room. Pt was more than willing to participate in skilled PT session in PM because "getting up and moving makes me feel much better."   Sit to Lying   Type of Assistance Needed Supervision   Physical Assistance Level No physical assistance   Comment Supervision to perform sit to lying transfer on mat table, improved time. Sit to Lying CARE Score 4   Lying to Sitting on Side of Bed   Type of Assistance Needed Supervision   Physical Assistance Level No physical assistance   Comment Improved time to perform compared to prior sessions   Lying to Sitting on Side of Bed CARE Score 4   Sit to Stand   Type of Assistance Needed Incidental touching   Physical Assistance Level No physical assistance   Comment CG recliner to RW, mat to RW, and vice versa   Sit to Stand CARE Score 4   Bed-Chair Transfer   Type of Assistance Needed Supervision; Incidental touching   Physical Assistance Level No physical assistance   Comment CG for STS, S for for RW SPT   Chair/Bed-to-Chair Transfer CARE Score 4   Walk 10 Feet   Type of Assistance Needed Supervision   Physical Assistance Level No physical assistance   Comment S w/ RW   Walk 10 Feet CARE Score 4   Walk 50 Feet with Two Turns   Type of Assistance Needed Supervision   Physical Assistance Level No physical assistance   Comment S w/ RW   Walk 50 Feet with Two Turns CARE Score 4   Walk 150 Feet   Type of Assistance Needed Supervision   Physical Assistance Level No physical assistance   Comment S w/ RW. No WC follow   Walk 150 Feet CARE Score 4   Ambulation   Primary Mode of Locomotion Prior to Admission Walk   Distance Walked (feet) 240 ft  (x1, 70ftx1)   Assist Device Roller Walker   Gait Pattern Antalgic; Inconsistant Therese; Step through; Decreased R stance   Limitations Noted In Balance; Endurance;Speed;Strength;Swing   Walk Assist Level Supervision   Findings Pt increasing ambulatory distance w/ RW. Supervision for ambulation   Does the patient walk? 2. Yes   Curb or Single Stair   Style negotiated Curb  (8" Trial curb)   Type of Assistance Needed Incidental touching   Physical Assistance Level No physical assistance   Comment CG on 8" step 2x ascending/descending   1 Step (Curb) CARE Score 4   4 Steps   Type of Assistance Needed Incidental touching   Physical Assistance Level No physical assistance   Comment Trial steps w/ BL HRs, 3x   4 Steps CARE Score 4   12 Steps   Type of Assistance Needed Incidental touching   Physical Assistance Level No physical assistance   Comment CG, Trial steps in PT gym. Pt understands proper way to navigate stairs (up w/ good, down w/ bad). Pt has improved LLE weight shift when ascending stairs allowing pt to follow through onto step w/ RLE. Descending stairs pt has improved LLE ecentric flexion allowing pt to gently and slowly TT w/ RLE and softly lower R foot onto step to avoid increased force causing pain.   PT instructed pt on how to properly perform stair navigation   12 Steps CARE Score 4   Stairs   Type Stairs   # of Steps 12   Weight Bearing Precautions WBAT   Assist Devices Bilateral Rail   Findings non reciprocal b/l HRs   Therapeutic Interventions   Strengthening Supine RLE leg raises 3x15, Modified bridges w/ soft bolster under R ankle 3x15, B/L hip ABD 3x15   Balance Standing in RW for 3mins while PT was demonstrating proper stair navigation   Other Time spent by PT to make adjustment to R HKB to align it proximally to be inline w/ hinge gauges lining up w/ epicondlyes   Assessment   Treatment Assessment Pt engaged in 60min skilled PT session focusing on LE strengthening, increased ambulatory endurance, curb/stair navigation to simulate home environment. Pt overall has improved ambulation w/ RW w/ S from PT. Pt able to ambulate 240ft w/o needing a rest break. Pt states that "I feel much stronger in my UEs and LEs since coming to the [facility]."  Pt performed 8" curb step to simulate GEO home which is 7". Pt CG w/ RW performing curb step and has improved LLE weight shift and understanding of stair navigation. Pt to still practice and trial curb step to improve endurance and strength to safey perform GEO upon d/c. Pt performed trial stairs in PT gym despite being hesitant, pt has 13 stairs in home to reach 2nd floor where shower is. PT reeducated pt on proper way to perform stair ambulation, pt performed stairs 3x to achieve 12 total stairs (see comments on "stairs" for further details). Pt was very pleased with himself upon completion of trial stairs and at the end of the PT session in general.  Pt to continue w/ POC focusing on barriers to d/c which include macadam ramp, 7" GEO, and 13 stairs to 2nd floor. Pt has been progressing very well and has been improving daily in his recovery while at the [facility].   Plan for pt to d/c to home w/ home rehab next friday 10/27, PT ordered RW for pt, and FT set up for next Wednesday 10/25 w/ pt's wife and 2 friends. Family/Caregiver Present no   Problem List Decreased strength;Decreased range of motion;Decreased endurance; Impaired balance;Decreased cognition;Decreased safety awareness; Impaired hearing;Pain   Barriers to Discharge Decreased caregiver support; Inaccessible home environment   PT Barriers   Physical Impairment Decreased strength;Decreased range of motion;Decreased endurance; Impaired balance;Decreased mobility; Decreased cognition; Impaired hearing;Orthopedic restrictions;Pain   Functional Limitation Car transfers;Transfers   Plan   Treatment/Interventions ADL retraining;Functional transfer training;LE strengthening/ROM; Elevations; Therapeutic exercise; Endurance training;Patient/family training;Gait training   Progress Progressing toward goals   Discharge Recommendation   PT Discharge Recommendation Home with home health rehabilitation   Equipment Recommended Walker   PT Therapy Minutes   PT Time In 1415   PT Time Out 1515   PT Total Time (minutes) 60   PT Mode of treatment - Individual (minutes) 60   PT Mode of treatment - Concurrent (minutes) 0   PT Mode of treatment - Group (minutes) 0   PT Mode of treatment - Co-treat (minutes) 0   PT Mode of Treatment - Total time(minutes) 60 minutes   PT Cumulative Minutes 570   Therapy Time missed   Time missed?  No

## 2023-10-20 NOTE — ASSESSMENT & PLAN NOTE
Resolved   Pt was unable to move following fall and laying in woods for 2-3 hours on 10/10/23.    Continues with fluid intake   Pt denies urinary complaints   Creatinine stable   10/13: 0.75  10/16: 0.66   10/19: 0.68  Will continue to follow labs

## 2023-10-21 PROCEDURE — 97116 GAIT TRAINING THERAPY: CPT

## 2023-10-21 PROCEDURE — 97110 THERAPEUTIC EXERCISES: CPT

## 2023-10-21 PROCEDURE — 97530 THERAPEUTIC ACTIVITIES: CPT

## 2023-10-21 RX ADMIN — Medication 250 MG: at 17:57

## 2023-10-21 RX ADMIN — ASPIRIN 325 MG ORAL TABLET 325 MG: 325 PILL ORAL at 17:58

## 2023-10-21 RX ADMIN — CEPHALEXIN 500 MG: 500 CAPSULE ORAL at 17:57

## 2023-10-21 RX ADMIN — ACETAMINOPHEN 975 MG: 325 TABLET ORAL at 23:41

## 2023-10-21 RX ADMIN — ASPIRIN 325 MG ORAL TABLET 325 MG: 325 PILL ORAL at 08:03

## 2023-10-21 RX ADMIN — B-COMPLEX W/ C & FOLIC ACID TAB 1 TABLET: TAB at 08:00

## 2023-10-21 RX ADMIN — CEPHALEXIN 500 MG: 500 CAPSULE ORAL at 06:11

## 2023-10-21 RX ADMIN — SENNOSIDES 17.2 MG: 8.6 TABLET, FILM COATED ORAL at 08:00

## 2023-10-21 RX ADMIN — ACETAMINOPHEN 975 MG: 325 TABLET ORAL at 14:23

## 2023-10-21 RX ADMIN — OXYCODONE HYDROCHLORIDE 5 MG: 5 TABLET ORAL at 00:02

## 2023-10-21 RX ADMIN — OXYCODONE HYDROCHLORIDE 5 MG: 5 TABLET ORAL at 16:42

## 2023-10-21 RX ADMIN — LIDOCAINE 1 PATCH: 50 PATCH CUTANEOUS at 08:01

## 2023-10-21 RX ADMIN — CEPHALEXIN 500 MG: 500 CAPSULE ORAL at 23:41

## 2023-10-21 RX ADMIN — ACETAMINOPHEN 975 MG: 325 TABLET ORAL at 06:10

## 2023-10-21 RX ADMIN — LIDOCAINE 1 PATCH: 50 PATCH CUTANEOUS at 08:02

## 2023-10-21 RX ADMIN — Medication 250 MG: at 08:00

## 2023-10-21 RX ADMIN — CEPHALEXIN 500 MG: 500 CAPSULE ORAL at 11:39

## 2023-10-21 RX ADMIN — LANSOPRAZOLE 30 MG: 30 CAPSULE, DELAYED RELEASE ORAL at 08:02

## 2023-10-21 RX ADMIN — OXYCODONE HYDROCHLORIDE 5 MG: 5 TABLET ORAL at 08:00

## 2023-10-21 NOTE — PROGRESS NOTES
10/21/23 3730   Pain Assessment   Pain Assessment Tool 0-10   Pain Score 2   Pain Location/Orientation Orientation: Right;Location: Leg;Location: Knee   Effect of Pain on Daily Activities CP provided post session for pain relief   Restrictions/Precautions   Precautions Cognitive; Fall Risk;Pain;Hard of hearing   Weight Bearing Restrictions Yes   RLE Weight Bearing Per Order WBAT   LLE Weight Bearing Per Order WBAT   ROM Restrictions Yes   RLE ROM Restriction Range Limitation  (HKB locked in ext)   Braces or Orthoses Knee brace  (HKB)   General   Change In Medical/Functional Status (S)  PT goals updated to reflect progress with mobilities   Cognition   Overall Cognitive Status Impaired   Arousal/Participation Alert; Cooperative   Subjective   Subjective Pt reporting he feels he is improving daily   Sit to Lying   Type of Assistance Needed Set-up / clean-up   Sit to Lying CARE Score 5   Lying to Sitting on Side of Bed   Type of Assistance Needed Set-up / clean-up   Lying to Sitting on Side of Bed CARE Score 5   Sit to Stand   Type of Assistance Needed Supervision   Comment CS with RW from mat table and recliner   Sit to Stand CARE Score 4   Bed-Chair Transfer   Type of Assistance Needed Supervision   Comment CS SPT RW   Chair/Bed-to-Chair Transfer CARE Score 4   Car Transfer   Comment (S)  please reassess in upcoming session   Walk 10 Feet   Type of Assistance Needed Supervision   Comment RW   Walk 10 Feet CARE Score 4   Walk 50 Feet with Two Turns   Type of Assistance Needed Supervision   Comment RW   Walk 50 Feet with Two Turns CARE Score 4   Walking 10 Feet on Uneven Surfaces   Type of Assistance Needed Supervision   Comment across floor mat in PT gym to simulate walking on grass, CS with RW   Walking 10 Feet on Uneven Surfaces CARE Score 4   Ambulation   Primary Mode of Locomotion Prior to Admission Walk   Distance Walked (feet) 60 ft  (x2, multiple short distances in PT gym)   Assist Device CARDFREE   Gait Pattern Antalgic;Decreased R stance   Limitations Noted In Balance; Endurance;Speed;Strength   Walk Assist Level Close Supervision   Findings CS with RW, w/o LOB t/o session   Does the patient walk? 2. Yes   Wheel 50 Feet with Two Turns   Reason if not Attempted Activity not applicable   Wheel 50 Feet with Two Turns CARE Score 9   Wheel 150 Feet   Reason if not Attempted Activity not applicable   Wheel 511 Feet CARE Score 9   Curb or Single Stair   Style negotiated Curb   Type of Assistance Needed Incidental touching   Comment CG/CS on 8" curb step with RW, no vc's needed, x2 trials   1 Step (Curb) CARE Score 4   4 Steps   Type of Assistance Needed Supervision; Incidental touching   Comment CS on  steps w/ b/l HRs, CG/CS on  steps with B/L UE on R HR only to mimic steps inside of home   4 Steps CARE Score 4   12 Steps   Type of Assistance Needed Supervision; Incidental touching   Comment CS on  steps w/ b/l HRs, CG/CS on  steps with B/L UE on R HR only to mimic steps inside of home   12 Steps CARE Score 4   Stairs   Type Stairs;Curb   # of Steps 12   Weight Bearing Precautions Fall Risk;WBAT   Assist Devices Single Rail;Bilateral Rail   Findings (S)  initiated trialing single railing on R side this session to mimic home s/u, pt did well with angling self to ascend/descend steps w/ b/l UE support on HR. Please attempt full flight next session   Picking Up Object   Comment (S)  please reassess in upcoming session with reacher   Therapeutic Interventions   Other spent time unwrapping and rewrapping R knee as well as having LPN Dianna perform dressing change for R knee. PT readjusted HKB as well. Noted red spot proximal to patella in center of thigh which looks to be due to strap being too tight. Educated pt if he feels straps too tight to alert nsg staff.  LPN Jaleel Tinoco observed red area as well, ACE placed over this area but would continue to monitor skin due to potential for skin breakdown from brace   Assessment   Treatment Assessment Pt engaged in 60 min skilled PT session focusing on brace management, gait trng with RW, and stair navgiation. Pt continuing to progress with all mobilities with use of RW, increased ease to complete transfers and steady with mobilities. Therefore discussed with pt and upgrading PT mobility goals to [place] due to progress pt is making. Pain minimal t/o tx as well with upright mobilities but pt did request CP post session to keep pain level low. Would recommend continuing to perform skin checks on RLE due to [facility], noted red area proximal to patella which MAGDA Carias made aware and appears to be from [facility] strap. At this time cont with POC focusing on LE strengthening, balance interventions to reduce risk for falls, gait trng with RW on various surfaces, stair navigatoin so pt can access home and education regarding brace management. Planning for FT Weds 10/25 with pt's spouse and BILs. Family/Caregiver Present no   Problem List Decreased strength;Decreased range of motion;Decreased endurance; Impaired balance;Decreased cognition;Decreased safety awareness; Impaired hearing;Pain   Barriers to Discharge Decreased caregiver support; Inaccessible home environment   PT Barriers   Functional Limitation Car transfers;Stair negotiation   Plan   Treatment/Interventions Functional transfer training;LE strengthening/ROM; Elevations; Therapeutic exercise; Endurance training;Bed mobility;Gait training   Progress Progressing toward goals   Discharge Recommendation   PT Discharge Recommendation Home with home health rehabilitation   Equipment Recommended Walker   PT Therapy Minutes   PT Time In 0830   PT Time Out 0930   PT Total Time (minutes) 60   PT Mode of treatment - Individual (minutes) 60   PT Mode of treatment - Concurrent (minutes) 0   PT Mode of treatment - Group (minutes) 0   PT Mode of treatment - Co-treat (minutes) 0   PT Mode of Treatment - Total time(minutes) 60 minutes   PT Cumulative Minutes 630   Therapy Time missed   Time missed?  No

## 2023-10-21 NOTE — PROGRESS NOTES
10/21/23 1030   Pain Assessment   Pain Assessment Tool 0-10   Pain Score 5   Pain Location/Orientation Orientation: Right;Location: Knee   Effect of Pain on Daily Activities increased pain post supine TE, CP applied post session for pain relief to R knee   Restrictions/Precautions   Precautions Cognitive; Fall Risk;Hard of hearing;Pain   Weight Bearing Restrictions Yes   RLE Weight Bearing Per Order WBAT   LLE Weight Bearing Per Order WBAT   ROM Restrictions Yes   RLE ROM Restriction Range Limitation  (HKB locked in ext)   Braces or Orthoses Knee brace  (R HKB)   Cognition   Overall Cognitive Status Impaired   Arousal/Participation Alert; Cooperative   Subjective   Subjective Pt agreeable to PT session with no complaints to report   Roll Left and Right   Type of Assistance Needed Set-up / clean-up   Roll Left and Right CARE Score 5   Sit to Lying   Type of Assistance Needed Set-up / clean-up   Sit to Lying CARE Score 5   Lying to Sitting on Side of Bed   Type of Assistance Needed Set-up / clean-up   Lying to Sitting on Side of Bed CARE Score 5   Sit to Stand   Type of Assistance Needed Supervision   Comment RW   Sit to Stand CARE Score 4   Bed-Chair Transfer   Type of Assistance Needed Supervision   Comment RW   Chair/Bed-to-Chair Transfer CARE Score 4   Walk 10 Feet   Type of Assistance Needed Supervision   Comment RW   Walk 10 Feet CARE Score 4   Walk 50 Feet with Two Turns   Type of Assistance Needed Supervision   Comment RW   Walk 50 Feet with Two Turns CARE Score 4   Walk 150 Feet   Type of Assistance Needed Supervision   Comment RW   Walk 150 Feet CARE Score 4   Walking 10 Feet on Uneven Surfaces   Type of Assistance Needed Supervision   Comment CS on indoor ramp with RW   Walking 10 Feet on Uneven Surfaces CARE Score 4   Ambulation   Primary Mode of Locomotion Prior to Admission Walk   Distance Walked (feet) 250 ft  (X1, 60'X1)   Assist Device Roller Walker   Walk Assist Level Close Supervision   Does the patient walk? 2. Yes   Therapeutic Interventions   Strengthening supine b/l SLR and s/l hip abd (2# LLE, slight AAROM LLE), 2 sets to fatigue (10-15 reps)   Modalities CP to R knee post session due to increased pain from supine TE   Other Comments   Comments discussed with pt not utilizing power recliner at home to stand pt unless absolutely necessary to cont to improve LE strength, pt receptive to education   Assessment   Treatment Assessment Pt engaged in 30 min skilled PT session focusing on long distance ambulation with RW as well as supine TE for LE strengthening. Pt tolerated long distance ambulation well and did well with ramp negotiation. Increased pain in RLE during AROM TE, however, once PT provided slight AAROM knee pain greatly reduced. CP provided post session due to increased R knee pain however despite pain did not affect pt's ability to perform transfers/gait. At this time cont with POC as outlined in previous session.  Barriers to d/c home include 1 GEO   PT Therapy Minutes   PT Time In 1030   PT Time Out 1100   PT Total Time (minutes) 30   PT Mode of treatment - Individual (minutes) 30   PT Mode of treatment - Concurrent (minutes) 0   PT Mode of treatment - Group (minutes) 0   PT Mode of treatment - Co-treat (minutes) 0   PT Mode of Treatment - Total time(minutes) 30 minutes   PT Cumulative Minutes 660

## 2023-10-21 NOTE — PROGRESS NOTES
10/21/23 1230   Pain Assessment   Pain Assessment Tool 0-10   Pain Score 5   Pain Location/Orientation Orientation: Right;Location: Knee   Hospital Pain Intervention(s) Emotional support; Rest   Restrictions/Precautions   Precautions Cognitive; Fall Risk;Hard of hearing;Pain   Weight Bearing Restrictions Yes   RLE Weight Bearing Per Order WBAT   LLE Weight Bearing Per Order WBAT   ROM Restrictions Yes   RLE ROM Restriction Range Limitation  (HKB locked in ext)   Braces or Orthoses Knee brace  (R HKB)   Oral Hygiene   Type of Assistance Needed Set-up / clean-up   Physical Assistance Level No physical assistance   Comment Set-up for mouthwash seated in bedside recliner   Oral Hygiene CARE Score 5   Picking Up Object   Type of Assistance Needed Incidental touching   Physical Assistance Level No physical assistance   Comment CGA with RW and  reacher accessing various common items off ground; no LOB with fair standing balance with  reacher. Pt reports already ordering [facility] reacher for home use. Picking Up Object CARE Score 4   Sit to Stand   Type of Assistance Needed Supervision   Physical Assistance Level No physical assistance   Comment Sup with RW   Sit to Stand CARE Score 4   Bed-Chair Transfer   Type of Assistance Needed Supervision   Physical Assistance Level No physical assistance   Comment Sup with RW   Chair/Bed-to-Chair Transfer CARE Score 4   Cognition   Overall Cognitive Status Impaired   Arousal/Participation Alert; Cooperative   Attention Attends with cues to redirect   Orientation Level Oriented X4   Memory Decreased short term memory   Following Commands Follows multistep commands with increased time or repetition   Assessment   Treatment Assessment Pt participated in 90 min OT session with fair activity tolerance with focus on therapeutic activities and therapeutic exercise. Pt seated in bedside recliner upon therapist entering room.  OTR educated pt on energy conservation techniques with pt verbalizing understanding in regards to daily activities and pain management. Pt participated in functional transfers at [facility] level with RW. Pt participated in functional mobility from bedroom to therapy gym at [facility] with RW. Pt education provided on [facility] reacher training. Demonstration and discussion on item retrieval completed at this time with pt able to verbalize understanding and demonstrate without verbal cues. Pt participated in item retrieval with [address] [place] [address],B-1 at [facility] level with pt able to access 10/10 items off floor with reacher. Pt participated in table top activity/standing tolerance activity with fair standing balance, standing for 5 min x3 with seated 5 min rest breaks inbetween. Pt able to cross midline in stance and reach without LOB noted. Pt participated in bilateral UE strengthening exercises 2x15 reps with 2# weight completing bilateral bicep curl, modified chest press due to L shoulder limited ROM. Pt educated on pendulum exercises for L shoulder in seated position to allow for maximized AROM in L shoulder (pt reports he was seeing doctor in Jan 2024 for shoulder replacement surgery- but may delay due to recent injury). Pt able to complete pendulum exercises without issue. Pt participated in functional mobility back to bedroom at [facility] level with RW. Pt seated in bedside recliner requesting mouthwash rinse. Pt able to complete at set-up A level. Pt seated in bedside recliner with all needs within reach, chair alarm on, and bilateral LE elevated. Pt continues to be limited by strength, functional transfers, ADL IND, and overall activity tolerance. Pt would benefit from continued OT services to progress pt through [address] [place] [address] to meet established OT goals. Continue with established POC at this time. Problem List Decreased strength;Decreased range of motion;Decreased endurance; Impaired balance;Decreased mobility; Decreased cognition;Decreased safety awareness; Impaired hearing;Pain   Barriers to Discharge Decreased caregiver support; Inaccessible home environment   Plan   Treatment/Interventions ADL retraining;Functional transfer training; Therapeutic exercise; Endurance training;Patient/family training;Equipment eval/education; Bed mobility; Compensatory technique education   OT Therapy Minutes   OT Time In 1230   OT Time Out 1400   OT Total Time (minutes) 90   OT Mode of treatment - Individual (minutes) 90   OT Mode of treatment - Concurrent (minutes) 0   OT Mode of treatment - Group (minutes) 0   OT Mode of treatment - Co-treat (minutes) 0   OT Mode of Treatment - Total time(minutes) 90 minutes   OT Cumulative Minutes 660   Therapy Time missed   Time missed?  No

## 2023-10-22 PROCEDURE — 97110 THERAPEUTIC EXERCISES: CPT

## 2023-10-22 PROCEDURE — 97760 ORTHOTIC MGMT&TRAING 1ST ENC: CPT

## 2023-10-22 PROCEDURE — 97530 THERAPEUTIC ACTIVITIES: CPT

## 2023-10-22 PROCEDURE — 99232 SBSQ HOSP IP/OBS MODERATE 35: CPT | Performed by: PHYSICAL MEDICINE & REHABILITATION

## 2023-10-22 PROCEDURE — 97116 GAIT TRAINING THERAPY: CPT

## 2023-10-22 PROCEDURE — 97535 SELF CARE MNGMENT TRAINING: CPT

## 2023-10-22 RX ADMIN — ASPIRIN 325 MG ORAL TABLET 325 MG: 325 PILL ORAL at 17:41

## 2023-10-22 RX ADMIN — LANSOPRAZOLE 30 MG: 30 CAPSULE, DELAYED RELEASE ORAL at 08:01

## 2023-10-22 RX ADMIN — LIDOCAINE 1 PATCH: 50 PATCH CUTANEOUS at 08:01

## 2023-10-22 RX ADMIN — CEPHALEXIN 500 MG: 500 CAPSULE ORAL at 06:45

## 2023-10-22 RX ADMIN — ACETAMINOPHEN 975 MG: 325 TABLET ORAL at 13:26

## 2023-10-22 RX ADMIN — ASPIRIN 325 MG ORAL TABLET 325 MG: 325 PILL ORAL at 08:01

## 2023-10-22 RX ADMIN — CEPHALEXIN 500 MG: 500 CAPSULE ORAL at 13:26

## 2023-10-22 RX ADMIN — CEPHALEXIN 500 MG: 500 CAPSULE ORAL at 23:24

## 2023-10-22 RX ADMIN — OXYCODONE HYDROCHLORIDE 5 MG: 5 TABLET ORAL at 08:05

## 2023-10-22 RX ADMIN — Medication 250 MG: at 17:41

## 2023-10-22 RX ADMIN — ACETAMINOPHEN 975 MG: 325 TABLET ORAL at 06:45

## 2023-10-22 RX ADMIN — B-COMPLEX W/ C & FOLIC ACID TAB 1 TABLET: TAB at 08:01

## 2023-10-22 RX ADMIN — OXYCODONE HYDROCHLORIDE 5 MG: 5 TABLET ORAL at 00:47

## 2023-10-22 RX ADMIN — CEPHALEXIN 500 MG: 500 CAPSULE ORAL at 17:41

## 2023-10-22 RX ADMIN — Medication 250 MG: at 08:01

## 2023-10-22 RX ADMIN — LIDOCAINE 1 PATCH: 50 PATCH CUTANEOUS at 08:02

## 2023-10-22 RX ADMIN — OXYCODONE HYDROCHLORIDE 5 MG: 5 TABLET ORAL at 17:42

## 2023-10-22 RX ADMIN — ACETAMINOPHEN 975 MG: 325 TABLET ORAL at 22:51

## 2023-10-22 RX ADMIN — SENNOSIDES 17.2 MG: 8.6 TABLET, FILM COATED ORAL at 08:01

## 2023-10-22 NOTE — PROGRESS NOTES
10/22/23 0830   Pain Assessment   Pain Assessment Tool 0-10   Pain Score 4   Pain Location/Orientation Orientation: Right;Location: Knee   Pain Onset/Description Onset: Ongoing   Patient's Stated Pain Goal No pain   Restrictions/Precautions   Precautions Cognitive; Fall Risk;Hard of hearing;Pain   Weight Bearing Restrictions Yes   RLE Weight Bearing Per Order WBAT   LLE Weight Bearing Per Order WBAT   ROM Restrictions Yes   RLE ROM Restriction Range Limitation  (Knee locked in extension)   Braces or Orthoses Knee brace  (HKB)   Cognition   Overall Cognitive Status Impaired   Arousal/Participation Alert; Cooperative   Attention Attends with cues to redirect   Orientation Level Oriented X4   Memory Decreased short term memory   Following Commands Follows multistep commands with increased time or repetition   Roll Left and Right   Type of Assistance Needed Set-up / clean-up   Roll Left and Right CARE Score 5   Sit to Lying   Type of Assistance Needed Set-up / clean-up   Sit to Lying CARE Score 5   Lying to Sitting on Side of Bed   Type of Assistance Needed Set-up / clean-up   Lying to Sitting on Side of Bed CARE Score 5   Sit to Stand   Type of Assistance Needed Supervision   Physical Assistance Level No physical assistance   Comment Supervision with RW   Sit to Stand CARE Score 4   Bed-Chair Transfer   Type of Assistance Needed Supervision   Physical Assistance Level No physical assistance   Comment Supervision with RW   Chair/Bed-to-Chair Transfer CARE Score 4   Walk 10 Feet   Type of Assistance Needed Supervision   Comment RW   Walk 10 Feet CARE Score 4   Walk 50 Feet with Two Turns   Type of Assistance Needed Supervision   Comment RW   Walk 50 Feet with Two Turns CARE Score 4   Walk 150 Feet   Type of Assistance Needed Supervision   Comment RW   Walk 150 Feet CARE Score 4   Walking 10 Feet on Uneven Surfaces   Type of Assistance Needed Supervision   Comment Supervision indoor with Ramp, completed ramp 4x   Walking 10 Feet on Uneven Surfaces CARE Score 4   Ambulation   Primary Mode of Locomotion Prior to Admission Walk   Distance Walked (feet) 400 ft   Assist Device Roller Walker   Walk Assist Level Close Supervision   Does the patient walk? 2. Yes   Wheel 50 Feet with Two Turns   Reason if not Attempted Activity not applicable   Wheel 50 Feet with Two Turns CARE Score 9   Wheel 150 Feet   Reason if not Attempted Activity not applicable   Wheel 494 Feet CARE Score 9   Wheelchair mobility   Does the patient use a wheelchair? 0. No   Curb or Single Stair   Style negotiated Single stair   Type of Assistance Needed Supervision; Incidental touching   Comment Bilateral UE on R Hand rail to simulate home set up   1 Step (Curb) CARE Score 4   4 Steps   Type of Assistance Needed Supervision; Incidental touching   Comment Bilateral UE on R Hand rail to simulate home set up   4 Steps CARE Score 4   12 Steps   Type of Assistance Needed Supervision; Incidental touching   Comment Bilateral UE on R Hand rail to simulate home set up   12 Steps CARE Score 4   Stairs   Type Stairs   # of Steps 21  (8 on , 12 ascending and descending)   Weight Bearing Precautions WBAT   Assist Devices Single Rail   Findings Bilateral UE on R Hand rail   Picking Up Object   Type of Assistance Needed Supervision   Physical Assistance Level No physical assistance   Comment Reacher with cone   Picking Up Object CARE Score 4   Therapeutic Interventions   Other Ambulation per objective, stair training per objective, transfers and functional activites per objective, reacher 1x   Assessment   Treatment Assessment Patient tolerated session well today, patient's desires were to progress his stair training capabilities, patient completed a full flight of steps for the first time today, 12 steps ascending and 12 steps descending. Patient reported no dramatic increases in pain noted during session.  Patient demonstrated adequate mechanics with ascending and descending steps, patient continually limited by fatigue with functional activities such as stair training, patient challenged with overall energy and slight L knee buckling with increased fatigue. Patient requires skilled PT to maximize function and to meet POC goals. Family/Caregiver Present No   PT Family training done with: No   Problem List Decreased strength;Decreased range of motion;Decreased endurance; Impaired balance;Decreased mobility; Decreased cognition;Decreased safety awareness; Impaired hearing;Pain   Barriers to Discharge Decreased caregiver support; Inaccessible home environment   Plan   Treatment/Interventions ADL retraining;Functional transfer training; Therapeutic exercise; Endurance training;Patient/family training;Equipment eval/education; Bed mobility; Compensatory technique education   Progress Progressing toward goals   PT Therapy Minutes   PT Time In 0830   PT Time Out 0930   PT Total Time (minutes) 60   PT Mode of treatment - Individual (minutes) 60   PT Mode of treatment - Concurrent (minutes) 0   PT Mode of treatment - Group (minutes) 0   PT Mode of treatment - Co-treat (minutes) 0   PT Mode of Treatment - Total time(minutes) 60 minutes   PT Cumulative Minutes 720   Therapy Time missed   Time missed?  No
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
## 2023-10-22 NOTE — PROGRESS NOTES
10/22/23 1000   Pain Assessment   Pain Assessment Tool 0-10   Pain Score No Pain   Restrictions/Precautions   Precautions Combative; Fall Risk;Hard of hearing;Pain   RLE Weight Bearing Per Order WBAT   LLE Weight Bearing Per Order WBAT   RLE ROM Restriction Range Limitation  (HNB Locked in extension)   Braces or Orthoses Knee brace  (HKB RLE)   Lifestyle   Autonomy "I'm doing much much better than when I got here"   Lower Body Dressing   Comment Assist to doff HKB. RN Faheem Jeter completed incision care. Assist to reapply ACE wrap to knee. Pt able to partially assist w/ donning HKB by applying and tightening top 3 straps w/ cues. Assist for low strap 2' dec flexibility at hip. Sit to Stand   Type of Assistance Needed Supervision   Comment RAMONITA BANG   Sit to Stand CARE Score 4   Transfers   Additional Comments RAMONITA BANG fxnl mobility room <> OT gym. Exercise Tools   Exercise Tools Yes   Other Exercise Tool 1 UE TE w/ 3# DB 3x10 of the following movements: seated chest press, elbow flx/extn, shoulder flx to 90*. Modified movements and AROM only for L shoulder as needed 2' chronic impairments. Tolerated well w/ short rest breaks. Completed to inc BUE strength for inc IND w/ ADLs. Cognition   Overall Cognitive Status Impaired   Arousal/Participation Alert; Cooperative   Attention Attends with cues to redirect   Orientation Level Oriented X4   Memory Decreased short term memory   Following Commands Follows multistep commands with increased time or repetition   Additional Activities   Additional Activities Comments Foam pad added to WC calf rest to inc comfort following pt reporting discomfort. MInor adjustments made to calf rest for pts inc comfort. Following additions and modifications pt reporting inc comfort sitting in WC. Activity Tolerance   Activity Tolerance Patient tolerated treatment well   Assessment   Treatment Assessment OT session focusing on fxnl transfers, UE TE, fxn mobility w/ RW.  Pt tolerated well and continues to progress towards goals. Pt to benefit from additonal IPOT targetted at improving IND w/ ADLs/IADLs to dec caregiver burden at DC. Problem List Decreased strength;Decreased endurance;Decreased range of motion; Impaired balance;Decreased mobility; Decreased coordination;Decreased cognition;Decreased skin integrity;Orthopedic restrictions;Pain;Decreased safety awareness   Plan   Treatment/Interventions ADL retraining;Functional transfer training; Therapeutic exercise; Endurance training;Cognitive reorientation;Patient/family training;Equipment eval/education; Compensatory technique education;Continued evaluation;Spoke to nursing   Progress Progressing toward goals   OT Therapy Minutes   OT Time In 1000   OT Time Out 1130   OT Total Time (minutes) 90   OT Mode of treatment - Individual (minutes) 90   OT Mode of treatment - Concurrent (minutes) 0   OT Mode of treatment - Group (minutes) 0   OT Mode of treatment - Co-treat (minutes) 0   OT Mode of Treatment - Total time(minutes) 90 minutes   OT Cumulative Minutes 750   Therapy Time missed   Time missed?  No

## 2023-10-23 PROBLEM — B37.0 ORAL THRUSH: Status: RESOLVED | Noted: 2023-10-23 | Resolved: 2023-10-23

## 2023-10-23 PROBLEM — B37.0 ORAL THRUSH: Status: ACTIVE | Noted: 2023-10-23

## 2023-10-23 LAB
ANION GAP SERPL CALCULATED.3IONS-SCNC: 7 MMOL/L
ANISOCYTOSIS BLD QL SMEAR: PRESENT
BASOPHILS # BLD MANUAL: 0 THOUSAND/UL (ref 0–0.1)
BASOPHILS NFR MAR MANUAL: 0 % (ref 0–1)
BUN SERPL-MCNC: 14 MG/DL (ref 5–25)
CALCIUM SERPL-MCNC: 8.8 MG/DL (ref 8.4–10.2)
CHLORIDE SERPL-SCNC: 105 MMOL/L (ref 96–108)
CO2 SERPL-SCNC: 29 MMOL/L (ref 21–32)
CREAT SERPL-MCNC: 0.75 MG/DL (ref 0.6–1.3)
DME PARACHUTE DELIVERY DATE REQUESTED: NORMAL
DME PARACHUTE ITEM DESCRIPTION: NORMAL
DME PARACHUTE ITEM DESCRIPTION: NORMAL
DME PARACHUTE ORDER STATUS: NORMAL
DME PARACHUTE SUPPLIER NAME: NORMAL
DME PARACHUTE SUPPLIER PHONE: NORMAL
EOSINOPHIL # BLD MANUAL: 0 THOUSAND/UL (ref 0–0.4)
EOSINOPHIL NFR BLD MANUAL: 0 % (ref 0–6)
ERYTHROCYTE [DISTWIDTH] IN BLOOD BY AUTOMATED COUNT: 13.3 % (ref 11.6–15.1)
GFR SERPL CREATININE-BSD FRML MDRD: 90 ML/MIN/1.73SQ M
GLUCOSE P FAST SERPL-MCNC: 112 MG/DL (ref 65–99)
GLUCOSE SERPL-MCNC: 112 MG/DL (ref 65–140)
HCT VFR BLD AUTO: 40 % (ref 36.5–49.3)
HGB BLD-MCNC: 12.8 G/DL (ref 12–17)
LG PLATELETS BLD QL SMEAR: PRESENT
LYMPHOCYTES # BLD AUTO: 1.61 THOUSAND/UL (ref 0.6–4.47)
LYMPHOCYTES # BLD AUTO: 33 % (ref 14–44)
MCH RBC QN AUTO: 30.3 PG (ref 26.8–34.3)
MCHC RBC AUTO-ENTMCNC: 32 G/DL (ref 31.4–37.4)
MCV RBC AUTO: 95 FL (ref 82–98)
MONOCYTES # BLD AUTO: 0.58 THOUSAND/UL (ref 0–1.22)
MONOCYTES NFR BLD: 14 % (ref 4–12)
NEUTROPHILS # BLD MANUAL: 1.95 THOUSAND/UL (ref 1.85–7.62)
NEUTS BAND NFR BLD MANUAL: 3 % (ref 0–8)
NEUTS SEG NFR BLD AUTO: 44 % (ref 43–75)
PLATELET # BLD AUTO: 212 THOUSANDS/UL (ref 149–390)
PLATELET BLD QL SMEAR: ADEQUATE
PMV BLD AUTO: 11 FL (ref 8.9–12.7)
POLYCHROMASIA BLD QL SMEAR: PRESENT
POTASSIUM SERPL-SCNC: 4.8 MMOL/L (ref 3.5–5.3)
RBC # BLD AUTO: 4.22 MILLION/UL (ref 3.88–5.62)
RBC MORPH BLD: PRESENT
SODIUM SERPL-SCNC: 141 MMOL/L (ref 135–147)
VARIANT LYMPHS # BLD AUTO: 6 %
WBC # BLD AUTO: 4.14 THOUSAND/UL (ref 4.31–10.16)

## 2023-10-23 PROCEDURE — 80048 BASIC METABOLIC PNL TOTAL CA: CPT | Performed by: PHYSICIAN ASSISTANT

## 2023-10-23 PROCEDURE — 99232 SBSQ HOSP IP/OBS MODERATE 35: CPT | Performed by: INTERNAL MEDICINE

## 2023-10-23 PROCEDURE — 97110 THERAPEUTIC EXERCISES: CPT

## 2023-10-23 PROCEDURE — 99232 SBSQ HOSP IP/OBS MODERATE 35: CPT | Performed by: PHYSICAL MEDICINE & REHABILITATION

## 2023-10-23 PROCEDURE — 97535 SELF CARE MNGMENT TRAINING: CPT

## 2023-10-23 PROCEDURE — 97530 THERAPEUTIC ACTIVITIES: CPT

## 2023-10-23 PROCEDURE — 85007 BL SMEAR W/DIFF WBC COUNT: CPT | Performed by: PHYSICIAN ASSISTANT

## 2023-10-23 PROCEDURE — 85027 COMPLETE CBC AUTOMATED: CPT | Performed by: PHYSICIAN ASSISTANT

## 2023-10-23 PROCEDURE — 97116 GAIT TRAINING THERAPY: CPT

## 2023-10-23 RX ADMIN — CEPHALEXIN 500 MG: 500 CAPSULE ORAL at 17:41

## 2023-10-23 RX ADMIN — SENNOSIDES 17.2 MG: 8.6 TABLET, FILM COATED ORAL at 08:41

## 2023-10-23 RX ADMIN — LIDOCAINE 1 PATCH: 50 PATCH CUTANEOUS at 08:40

## 2023-10-23 RX ADMIN — ACETAMINOPHEN 975 MG: 325 TABLET ORAL at 05:46

## 2023-10-23 RX ADMIN — ASPIRIN 325 MG ORAL TABLET 325 MG: 325 PILL ORAL at 08:39

## 2023-10-23 RX ADMIN — CEPHALEXIN 500 MG: 500 CAPSULE ORAL at 13:09

## 2023-10-23 RX ADMIN — Medication 250 MG: at 08:40

## 2023-10-23 RX ADMIN — B-COMPLEX W/ C & FOLIC ACID TAB 1 TABLET: TAB at 08:40

## 2023-10-23 RX ADMIN — CEPHALEXIN 500 MG: 500 CAPSULE ORAL at 05:46

## 2023-10-23 RX ADMIN — ACETAMINOPHEN 975 MG: 325 TABLET ORAL at 21:46

## 2023-10-23 RX ADMIN — CEPHALEXIN 500 MG: 500 CAPSULE ORAL at 23:04

## 2023-10-23 RX ADMIN — OXYCODONE HYDROCHLORIDE 5 MG: 5 TABLET ORAL at 06:47

## 2023-10-23 RX ADMIN — ASPIRIN 325 MG ORAL TABLET 325 MG: 325 PILL ORAL at 17:41

## 2023-10-23 RX ADMIN — LANSOPRAZOLE 30 MG: 30 CAPSULE, DELAYED RELEASE ORAL at 08:40

## 2023-10-23 RX ADMIN — ACETAMINOPHEN 975 MG: 325 TABLET ORAL at 13:09

## 2023-10-23 RX ADMIN — Medication 250 MG: at 17:41

## 2023-10-23 RX ADMIN — Medication 2.5 MG: at 17:52

## 2023-10-23 NOTE — PLAN OF CARE
Problem: PAIN - ADULT  Goal: Verbalizes/displays adequate comfort level or baseline comfort level  Description: Interventions:  - Encourage patient to monitor pain and request assistance  - Assess pain using appropriate pain scale  - Administer analgesics based on type and severity of pain and evaluate response  - Implement non-pharmacological measures as appropriate and evaluate response  - Consider cultural and social influences on pain and pain management  - Notify physician/advanced practitioner if interventions unsuccessful or patient reports new pain  Outcome: Progressing     Problem: INFECTION - ADULT  Goal: Absence or prevention of progression during hospitalization  Description: INTERVENTIONS:  - Assess and monitor for signs and symptoms of infection  - Monitor lab/diagnostic results  - Monitor all insertion sites, i.e. indwelling lines, tubes, and drains  - Monitor endotracheal if appropriate and nasal secretions for changes in amount and color  - Waves appropriate cooling/warming therapies per order  - Administer medications as ordered  - Instruct and encourage patient and family to use good hand hygiene technique  - Identify and instruct in appropriate isolation precautions for identified infection/condition  Outcome: Progressing

## 2023-10-23 NOTE — PROGRESS NOTES
10/23/23 0700   Pain Assessment   Pain Assessment Tool 0-10   Pain Score 5  (increased pain with activity)   Pain Location/Orientation Orientation: Right;Location: Knee   Pain Onset/Description Onset: Ongoing; Descriptor: Sore   Patient's Stated Pain Goal No pain   Hospital Pain Intervention(s) Rest   Restrictions/Precautions   Precautions Cognitive; Fall Risk;Hard of hearing;Pain   Weight Bearing Restrictions Yes   RLE Weight Bearing Per Order WBAT   LLE Weight Bearing Per Order WBAT   ROM Restrictions Yes   RLE ROM Restriction Range Limitation  (HKB)   Braces or Orthoses Knee brace   Lifestyle   Autonomy "I really need to concentrate on this, I can see why my wife doesnt like me talking to her when she fills her meds" (referring to filling pill organizer)   Eating   Type of Assistance Needed Independent   Comment pt provided with breakfast end of session   Eating CARE Score 6   Oral Hygiene   Type of Assistance Needed Independent   Comment pt in stance at sink w/ rw for oral hygiene, mouth wash only   Oral Hygiene CARE Score 6   Shower/Bathe Self   Type of Assistance Needed Physical assistance   Physical Assistance Level 25% or less   Comment pt completed SB in stance at sink w/ RW. seated for lower legs feet using LHR. pt will need A w/ brace   Shower/Bathe Self CARE Score 3   Bathing   Assessed Bath Style Sponge Bath   Anticipated D/C Bath Style Sponge Bath   Able to Gather/Transport Yes   Able to [employer] Temperature Yes   Able to Wash/Rinse/Dry (body part) Left Arm;L Upper Leg;Right Arm;R Upper Leg;R Lower Leg/Foot;L Lower Leg/Foot; Abdomen; Chest;Perineal Area; Buttocks   Limitations Noted in Balance;ROM; Timeliness   Positioning Seated;Standing   Adaptive Equipment Longhand Reacher   Upper Body Dressing   Type of Assistance Needed Independent   Comment pt able to don button down shirt   Upper Body Dressing CARE Score 6   Lower Body Dressing   Type of Assistance Needed Physical assistance   Physical Assistance Level 25% or less   Comment Pt able to don shorts w/ LHR. A with HKB and ace wrap. Lower Body Dressing CARE Score 3   Putting On/Taking Off Footwear   Type of Assistance Needed Independent   Comment pt donned both socks using sock aid   Putting On/Taking Off Footwear CARE Score 6   Lying to Sitting on Side of Bed   Type of Assistance Needed Supervision   Comment HOB bed raised to about 35 deg., pt reporting he will have hospital bed at home at [place]. Lying to Sitting on Side of Bed CARE Score 4   Sit to Stand   Type of Assistance Needed Supervision   Comment bed raised, pt reports he will have hospital bed at [place]. Supervision w/ RW   Sit to Stand CARE Score 4   Bed-Chair Transfer   Type of Assistance Needed Supervision;Verbal cues   Physical Assistance Level No physical assistance   Comment supervision w/ RW, verbal cues for problem solving (if chair is facing wrong way)   Chair/Bed-to-Chair Transfer CARE Score 4   Money Management   Money Management (S)  Complete next session   Health Management   Health Management Level of Assistance Distant supervision;Minimal verbal cues   Health Management Pt edu on current medications and use of AM/PM pill organizer. Pt simulated filling AM/PM slots with 5 medications. Pt w/ 1 error, placing 2 prevacid in one AM slot, however Pt self corrected. pt will benefit from additional training. Pt reports "needing to concentrate" when filling. Pt flipped pill bottles upside down after filling that medication, reporting he learned that trick from his wife. handout provided for AM/PM pill organizer   Cognition   Overall Cognitive Status Impaired   Arousal/Participation Alert; Cooperative   Attention Attends with cues to redirect   Orientation Level Oriented X4   Memory Decreased short term memory   Following Commands Follows one step commands without difficulty   Activity Tolerance   Activity Tolerance Patient tolerated treatment well   Assessment   Treatment Assessment Pt engaged in skilled OT session focusing on Bathing, dressing, and medication mgmt. pt at supervision level for majority of session. still req A for brace mgmt. Pt completed all transfers at supervision with decreased verbal cues, demo good carryover of positioning. pt edu on medications this session. able to identify purpose of all medications and frequency. Simulated pill organizer, pt w/ 1 error. continue training on use of pill organizer as pt has not used one PTA. Continue OT POC with focus on fx transfers and med mgmt to increase independence and dc caregiver burden. Plan to adress money mgmt next session. Of note, pt is making steady gains towards OT goals. Spoke with PT Rylan Giraldo, pt safe to DC 10/25 if medically appropriate. will discuss with Dr Santiago Jaramillo. OT goals updated to Indep except for LB bathing and dressing due to [id number]  [facility] [place]. OT Family training done with: FT sched wed 10/25 @ 10am, w/ wife Kelli Cantu, and friend James Cristina, and additional friends possibly   Prognosis Good   Problem List Decreased strength;Decreased range of motion;Decreased endurance; Impaired balance;Pain; Impaired hearing;Decreased cognition   Discharge Recommendation   OT Discharge Recommendation Home with home health rehabilitation  (PT only)   Equipment Recommended Bedside commode; Hip Kit ($)   Additional Comments  hip kit ordered online, commode ordered by [employer] [facility], handout provided for pill organizer   OT Therapy Minutes   OT Time In 0700   OT Time Out 0830   OT Total Time (minutes) 90   OT Mode of treatment - Individual (minutes) 90   OT Mode of treatment - Concurrent (minutes) 0   OT Mode of treatment - Group (minutes) 0   OT Mode of treatment - Co-treat (minutes) 0   OT Mode of Treatment - Total time(minutes) 90 minutes   OT Cumulative Minutes 840   Therapy Time missed   Time missed?  No

## 2023-10-23 NOTE — ASSESSMENT & PLAN NOTE
Traumatic injury following fall when hiking   S/p OR 10/12/23 repair of quad tendon rupture with Dr Sunday Sorto to the right lower extremity in TROM locked in extension  WBAT to the left lower extremity in hinged knee brace, unlocked   PT/OT  Pain control per primary team.   DVT ppx: ASA 325mg BID x 6 weeks  10/17:Keflex started (noted allergies)   Will need staples removed next week   Ortho will be consulted this week if pt here  Follow up with Dr. Tahir Nicholas upon discharge.

## 2023-10-23 NOTE — PHYSICAL THERAPY NOTE
PT called pt's spouse Chrisreynaldo Kelly to discuss D/C date moved from 10/27 to tentatively 10/25 post FT session with therapies. Chris Kelly agreeable to this, reports that pt will have 5 family members present during [id number] on 10/25 and able to assist pt as needed upon d/c home. Chris Kelly asking about purchasing skis for RW which PT encouraged and will place on RW once delivered to unit on 10/25. Chris Kelly also asking about home therapy which PT stated home PT will start with pt upon dc but expect transition to OP PT. Chris Kelly also reporting that ramp to enter home is fixed as new macadam was laid on 10/22. No further questions from Chris Kelly at this time.

## 2023-10-23 NOTE — PROGRESS NOTES
[facility]  Progress Note  Name: Nima Barriga  MRN: [id number]  Unit/Bed#: -84 I Date of Admission: 10/14/2023   Date of Service: 10/23/2023 I Hospital Day: 9    Assessment/Plan   * Traumatic rupture of quadriceps tendon, right, initial encounter  Assessment & Plan  Traumatic injury following fall when hiking   S/p OR 10/12/23 repair of quad tendon rupture with Dr Akira Blackmon to the right lower extremity in TROM locked in extension  WBAT to the left lower extremity in hinged knee brace, unlocked   PT/OT  Pain control per primary team.   DVT ppx: ASA 325mg BID x 6 weeks  10/17:Keflex started (noted allergies)   Will need staples removed next week   Ortho will be consulted this week if pt here  Follow up with Dr. Christa Pittman upon discharge. Cellulitis  Assessment & Plan  Redness near right tibial plateau, improving   10/17: Started Keflex 500 mg Q 6 hours with Florastor    Impaired fasting glucose  Assessment & Plan  Pt reports aware hx of impaired fasting glucose  Discussed diet recommendations  FBS: 114 (10/16)  Hga1c: 6 (4/6/23)  Will monitor     Thrombocytopenia (HCC)  Assessment & Plan  Follows with Dr Fredy Osorio ([facility]) - last consult note reviewed 9/11/23  Per Dr Prentice Lanes note following fluctuating leukopenia and thrombocytopenia with significant monocytosis. Recommendations were for f/u labs in 3 months. 10/10: plt 105  10/13: plt 104  10/16: plt 128   10/19: plt 158  10/23: plt 212    Pathology slide 10/13:  "Peripheral blood smear shows normochromic normocytic RBCs with no schistocytes, mild thrombocytopenia, mild leukocytosis with monocytosis and dyspoietic changes.  If monocytosis persists, HEM/ONC consultation with bone marrow biopsy might be considered as clinically indicated."    F/u with Dr Mirna Greer scheduled 12/11/23    Rotator cuff tear arthropathy of left shoulder  Assessment & Plan  Pt reports history of left shoulder pain  May have been worsened by laying on left side in woods  Lidocaine patch to left shoulder, noted improvement   Consideration for PT/OT     Brady's esophagus with dysplasia  Assessment & Plan  Continue home lansoprazole daily               VTE Pharmacologic Prophylaxis:   Pharmacologic: ambulating  Mechanical VTE Prophylaxis in Place: No    Current Length of Stay: 9 day(s)    Current Patient Status: Inpatient Rehab     Discharge Plan: planning for Wed / Thurs      Code Status: Level 1 - Full Code    Subjective:   Pt examined this am. Pt denies new complaints. No concerns per nursing. Pt doing well with PT, ambulating. Pt reports he slept well. Has been cutting down use of oxycodone. BM yesterday. No h/a, dizziness, cp, sob. Objective:     Vitals:   Temp (24hrs), Av.2 °F (36.8 °C), Min:97.5 °F (36.4 °C), Max:98.6 °F (37 °C)    Temp:  [97.5 °F (36.4 °C)-98.6 °F (37 °C)] 97.5 °F (36.4 °C)  HR:  [61-77] 61  Resp:  [18] 18  BP: (137-145)/(67-69) 145/67  SpO2:  [96 %-97 %] 97 %  Body mass index is 30.49 kg/m². Review of Systems   Constitutional:  Negative for appetite change, chills and fever. HENT:  Negative for congestion and sore throat. Eyes:  Negative for pain and redness. Respiratory:  Negative for cough and shortness of breath. Cardiovascular:  Negative for chest pain and leg swelling. Gastrointestinal:  Negative for abdominal pain, constipation, diarrhea and nausea. Musculoskeletal:  Positive for arthralgias (RLE, L knee) and gait problem. Skin:  Positive for wound (RLE incision, abrasion over tibial platau). Negative for rash. Neurological:  Negative for dizziness, light-headedness and headaches. Psychiatric/Behavioral:  Negative for sleep disturbance. Input and Output Summary (last 24 hours):        Intake/Output Summary (Last 24 hours) at 10/23/2023 1210  Last data filed at 10/23/2023 0855  Gross per 24 hour   Intake 1740 ml   Output 2650 ml   Net -910 ml       Physical Exam:     Physical Exam  Vitals reviewed. Constitutional:       General: He is not in acute distress. HENT:      Head: Normocephalic and atraumatic. Eyes:      General:         Right eye: No discharge. Left eye: No discharge. Conjunctiva/sclera: Conjunctivae normal.   Cardiovascular:      Rate and Rhythm: Normal rate and regular rhythm. Pulmonary:      Effort: Pulmonary effort is normal. No respiratory distress. Breath sounds: Normal breath sounds. No wheezing or rales. Musculoskeletal:         General: Deformity (RLE incision intact, staples, no drainage or erythema, small abrasion over R tibial platau) and signs of injury (RLE, L knee contusion - eccyhmosis improving) present. Skin:     Findings: Bruising (L knee) present. No rash. Neurological:      General: No focal deficit present. Mental Status: He is alert.    Psychiatric:         Mood and Affect: Mood normal.         Behavior: Behavior normal.         Additional Data:     Labs:    Results from last 7 days   Lab Units 10/23/23  0555   WBC Thousand/uL 4.14*   HEMOGLOBIN g/dL 12.8   HEMATOCRIT % 40.0   PLATELETS Thousands/uL 212   BANDS PCT % 3   LYMPHO PCT % 33   MONO PCT % 14*   EOS PCT % 0     Results from last 7 days   Lab Units 10/23/23  0555   SODIUM mmol/L 141   POTASSIUM mmol/L 4.8   CHLORIDE mmol/L 105   CO2 mmol/L 29   BUN mg/dL 14   CREATININE mg/dL 0.75   ANION GAP mmol/L 7   CALCIUM mg/dL 8.8   GLUCOSE RANDOM mg/dL 112                       Labs reviewed        Last 24 Hours Medication List:   Current Facility-Administered Medications   Medication Dose Route Frequency Provider Last Rate    acetaminophen  975 mg Oral Q8H [address] Sandy José Manuel, DO      aspirin  325 mg Oral BID Sandy José Manuel, DO      bisacodyl  10 mg Rectal Daily PRN Sandy José Manuel, DO      cephalexin  500 mg Oral Q6H [address] Deleon Paul, CRNP      hydrocortisone   Topical 4x Daily PRN Sandy José Manuel, DO      lansoprazole  30 mg Oral Daily Sandy José Manuel, DO      lidocaine 1 patch Topical Daily Narcisa Cleaves, DO      lidocaine  1 patch Topical Daily Nini Beck PA-C      magnesium hydroxide  30 mL Oral Daily PRN Narcisa Cleaves, DO      multivitamin stress formula  1 tablet Oral QAM Narcisa Cleaves, DO      oxyCODONE  5 mg Oral Q4H PRN Narcisa Cleaves, DO      oxyCODONE  2.5 mg Oral Q4H PRN Narcisa Cleaves, DO      saccharomyces boulardii  250 mg Oral BID CHANEL Mota      senna  2 tablet Oral Daily Narcisa Cleaves, DO          M*Modal software was used to dictate this note. It may contain errors with dictating incorrect words or incorrect spelling. Please contact the provider directly with any questions.

## 2023-10-23 NOTE — PLAN OF CARE
Problem: PAIN - ADULT  Goal: Verbalizes/displays adequate comfort level or baseline comfort level  Description: Interventions:  - Encourage patient to monitor pain and request assistance  - Assess pain using appropriate pain scale  - Administer analgesics based on type and severity of pain and evaluate response  - Implement non-pharmacological measures as appropriate and evaluate response  - Consider cultural and social influences on pain and pain management  - Notify physician/advanced practitioner if interventions unsuccessful or patient reports new pain  Outcome: Progressing     Problem: DISCHARGE PLANNING  Goal: Discharge to home or other facility with appropriate resources  Description: INTERVENTIONS:  - Identify barriers to discharge w/patient and caregiver  - Arrange for needed discharge resources and transportation as appropriate  - Identify discharge learning needs (meds, wound care, etc.)  - Arrange for interpretive services to assist at discharge as needed  - Refer to Case Management Department for coordinating discharge planning if the patient needs post-hospital services based on physician/advanced practitioner order or complex needs related to functional status, cognitive ability, or social support system  Outcome: Progressing     Problem: Potential for Falls  Goal: Patient will remain free of falls  Description: INTERVENTIONS:  - Educate patient/family on patient safety including physical limitations  - Instruct patient to call for assistance with activity   - Consult OT/PT to assist with strengthening/mobility   - Keep Call bell within reach  - Keep bed low and locked with side rails adjusted as appropriate  - Keep care items and personal belongings within reach  - Initiate and maintain comfort rounds  - Make Fall Risk Sign visible to staff  - Offer Toileting every 2 Hours, in advance of need  - Initiate/Maintain bed/chair alarm  - Obtain necessary fall risk management equipment: RW  - Apply yellow socks and bracelet for high fall risk patients  - Consider moving patient to room near nurses station  Outcome: Progressing     Problem: MOBILITY - ADULT  Goal: Maintain or return to baseline ADL function  Description: INTERVENTIONS:  -  Assess patient's ability to carry out ADLs; assess patient's baseline for ADL function and identify physical deficits which impact ability to perform ADLs (bathing, care of mouth/teeth, toileting, grooming, dressing, etc.)  - Assess/evaluate cause of self-care deficits   - Assess range of motion  - Assess patient's mobility; develop plan if impaired  - Assess patient's need for assistive devices and provide as appropriate  - Encourage maximum independence but intervene and supervise when necessary  - Involve family in performance of ADLs  - Assess for home care needs following discharge   - Consider OT consult to assist with ADL evaluation and planning for discharge  - Provide patient education as appropriate  Outcome: Progressing     Problem: SKIN/TISSUE INTEGRITY - ADULT  Goal: Incision(s), wounds(s) or drain site(s) healing without S/S of infection  Description: INTERVENTIONS  - Assess and document dressing, incision, wound bed, drain sites and surrounding tissue  - Provide patient and family education  - Perform skin care/dressing changes every day  Problem: PAIN - ADULT  Goal: Verbalizes/displays adequate comfort level or baseline comfort level  Description: Interventions:  - Encourage patient to monitor pain and request assistance  - Assess pain using appropriate pain scale  - Administer analgesics based on type and severity of pain and evaluate response  - Implement non-pharmacological measures as appropriate and evaluate response  - Consider cultural and social influences on pain and pain management  - Notify physician/advanced practitioner if interventions unsuccessful or patient reports new pain  Outcome: Progressing     Problem: DISCHARGE PLANNING  Goal: Discharge to home or other facility with appropriate resources  Description: INTERVENTIONS:  - Identify barriers to discharge w/patient and caregiver  - Arrange for needed discharge resources and transportation as appropriate  - Identify discharge learning needs (meds, wound care, etc.)  - Arrange for interpretive services to assist at discharge as needed  - Refer to Case Management Department for coordinating discharge planning if the patient needs post-hospital services based on physician/advanced practitioner order or complex needs related to functional status, cognitive ability, or social support system  Outcome: Progressing     Problem: Potential for Falls  Goal: Patient will remain free of falls  Description: INTERVENTIONS:  - Educate patient/family on patient safety including physical limitations  - Instruct patient to call for assistance with activity   - Consult OT/PT to assist with strengthening/mobility   - Keep Call bell within reach  - Keep bed low and locked with side rails adjusted as appropriate  - Keep care items and personal belongings within reach  - Initiate and maintain comfort rounds  - Make Fall Risk Sign visible to staff  - Offer Toileting every 2 Hours, in advance of need  - Initiate/Maintain bed/chair alarm  - Obtain necessary fall risk management equipment: RW  - Apply yellow socks and bracelet for high fall risk patients  - Consider moving patient to room near nurses station  Outcome: Progressing     Problem: MOBILITY - ADULT  Goal: Maintain or return to baseline ADL function  Description: INTERVENTIONS:  -  Assess patient's ability to carry out ADLs; assess patient's baseline for ADL function and identify physical deficits which impact ability to perform ADLs (bathing, care of mouth/teeth, toileting, grooming, dressing, etc.)  - Assess/evaluate cause of self-care deficits   - Assess range of motion  - Assess patient's mobility; develop plan if impaired  - Assess patient's need for assistive devices and provide as appropriate  - Encourage maximum independence but intervene and supervise when necessary  - Involve family in performance of ADLs  - Assess for home care needs following discharge   - Consider OT consult to assist with ADL evaluation and planning for discharge  - Provide patient education as appropriate  Outcome: Progressing     Problem: SKIN/TISSUE INTEGRITY - ADULT  Goal: Incision(s), wounds(s) or drain site(s) healing without S/S of infection  Description: INTERVENTIONS  - Assess and document dressing, incision, wound bed, drain sites and surrounding tissue  - Provide patient and family education  - Perform skin care/dressing changes every day  Outcome: Progressing     Outcome: Progressing

## 2023-10-23 NOTE — PROGRESS NOTES
10/23/23 0900   Pain Assessment   Pain Assessment Tool 0-10   Pain Score 4   Pain Location/Orientation Orientation: Right;Location: Knee   Patient's Stated Pain Goal No pain   Hospital Pain Intervention(s) Cold applied;Repositioned;Elevated   Restrictions/Precautions   Precautions Cognitive; Fall Risk;Hard of hearing;Pain   Weight Bearing Restrictions Yes   RLE Weight Bearing Per Order WBAT   LLE Weight Bearing Per Order WBAT   ROM Restrictions Yes   RLE ROM Restriction Range Limitation  (RLE HKB locked in 0* EXT)   Braces or Orthoses Knee brace  (HKB)   General   Change In Medical/Functional Status Pt granted full IRPs after assessment by PTs performed. Cognition   Overall Cognitive Status Impaired   Arousal/Participation Alert; Cooperative   Attention Attends with cues to redirect   Orientation Level Oriented X4   Memory Decreased short term memory   Following Commands Follows multistep commands with increased time or repetition   Subjective   Subjective Pt stated "I am feeling much better overall, was a little bored on Sunday because I didn't have much to do."  Pt agreed to perform 2.5 hours of skilled PT to capture CARE scores in anticipation for d/c on Wednesday. Roll Left and Right   Type of Assistance Needed Independent   Physical Assistance Level No physical assistance   Comment Pt I w/ bed mobility   Roll Left and Right CARE Score 6   Sit to Lying   Type of Assistance Needed Independent   Physical Assistance Level No physical assistance   Comment Pt I w/ sit-lying, increased time to perform but no physical A   Sit to Lying CARE Score 6   Lying to Sitting on Side of Bed   Type of Assistance Needed Independent   Physical Assistance Level No physical assistance   Comment Flat bed position, use of HRs on bed due to pt having hospital bed at home on 1st floor.    Lying to Sitting on Side of Bed CARE Score 6   Sit to Stand   Type of Assistance Needed Independent   Physical Assistance Level No physical assistance   Comment I w/ RW   Sit to Stand CARE Score 6   Bed-Chair Transfer   Type of Assistance Needed Independent   Physical Assistance Level No physical assistance   Comment I w/ RW performing SPT. Chair/Bed-to-Chair Transfer CARE Score 6   Car Transfer   Type of Assistance Needed Supervision  (Difficulty standing from NuStep chair but able to complete on own, this is why S was provided)   Physical Assistance Level No physical assistance   Comment (S)  Pt to practice simulated car transfer more next session. Pt challenged by how to position body to accomodate RLE locked in EXT. Pt was able to perform transfer w/ increased time and needed VCs from PT to correct positioning.    Car Transfer CARE Score 4   Walk 10 Feet   Type of Assistance Needed Independent   Physical Assistance Level No physical assistance   Comment RW, I   Walk 10 Feet CARE Score 6   Walk 50 Feet with Two Turns   Type of Assistance Needed Independent   Physical Assistance Level No physical assistance   Comment RW, I   Walk 50 Feet with Two Turns CARE Score 6   Walk 150 Feet   Type of Assistance Needed Independent   Physical Assistance Level No physical assistance   Comment RW, I   Walk 150 Feet CARE Score 6   Walking 10 Feet on Uneven Surfaces   Type of Assistance Needed Independent   Physical Assistance Level No physical assistance   Comment Ramp in [facility]C hallway, RW, I   Walking 10 Feet on Uneven Surfaces CARE Score 6   Ambulation   Primary Mode of Locomotion Prior to Admission Walk   Distance Walked (feet) 500 ft  (x1 (from room, around floor hallway to PT gym, performed 8" curb step, ambulated to full flight of stairs in hallway w/ ramp, performed stair navigation, and walked back to PT gym before needing a seated rest break)   Assist Device Roller Walker   Gait Pattern Antalgic;Decreased R stance  (Improved antalgic gait, looking more normalized)   Limitations Noted In Balance;Speed;Strength   Walk Assist Level Modified Independent Findings I w/ ambulation w/ RW. No LOB, experienced crepitation in R knee but pt stated "it actually decreased my knee pain"   Does the patient walk? 2. Yes   Wheel 50 Feet with Two Turns   Reason if not Attempted Activity not applicable   Wheel 50 Feet with Two Turns CARE Score 9   Wheel 150 Feet   Reason if not Attempted Activity not applicable   Wheel 993 Feet CARE Score 9   Curb or Single Stair   Style negotiated Curb  (Curb and Stairs (12 stairs=full flight))   Type of Assistance Needed Independent   Physical Assistance Level No physical assistance   Comment Pt trialed use of SPC in LUE to simulate HE w/ RUE using HR. Pt stated that it was more taxing to use SPC then to use BUEs on R HR. Pt switched to performing stair navigation w/ BUEs on R HR and was able to complete stair navigation in a safer manner w/ increased speed and less fatigue. 1 Step (Curb) CARE Score 6   4 Steps   Type of Assistance Needed Supervision   Physical Assistance Level No physical assistance   Comment Bilateral UE on R HR w/ full flight of stairs (12 steps)   4 Steps CARE Score 4   12 Steps   Type of Assistance Needed Supervision   Physical Assistance Level No physical assistance   Comment Bilateral UE on R HR w/ full flight of stairs (12 steps)   12 Steps CARE Score 4   Stairs   Type Stairs   # of Steps 12  (ascending and descending in hospital stairway)   Weight Bearing Precautions WBAT   Assist Devices Single Rail  (R)   Findings Bilateral UE on R HR w/ full flight of stairs (12 steps)   Picking Up Object   Type of Assistance Needed Independent   Physical Assistance Level No physical assistance   Comment Reacher w/ RUE, kept reacher and object within RW.   Pt transfered object to E   Picking Up Object CARE Score 6   Toilet Transfer   Type of Assistance Needed Independent   Physical Assistance Level No physical assistance   Comment Able to perform STS from RW to [facility] and reverse, I   Toilet Transfer CARE Score 6   Therapeutic Interventions   Strengthening Access Code: [email or web address] URL: [email or web address] Date: 10/23/2023 Prepared by: Lauryn Torres Exercises - Supine Active Straight Leg Raise - 1 x daily - 7 x weekly - 3 sets - 10 reps - Sidelying Hip Abduction - 1 x daily - 7 x weekly - 3 sets - 10 reps - Supine Quad Set - 1 x daily - 7 x weekly - 3 sets - 10 reps - Prone Hip Extension on Table - 1 x daily - 7 x weekly - 3 sets - 10 reps - Heel Raises with Counter Support - 1 x daily - 7 x weekly - 3 sets - 10 reps                                                                                      Pt performed HEP exercises w/ no complaints of increased R knee pain, reps increased to 15 w/ 3 sets. Balance 15 minutes of continuous standing in RW in room. Modalities CP to R knee at the end of the session for 20mins   Other All ambulatory, stair/curb navigation, and Kendell performed for achievement of CARE scores. PT adjusted pt's R HKB proximally and tighter to correctly position brace on pt's knee to limit movement in R knee to keep it locked in EXT. Pt stated that the brace and knee felt much better and he experienced less pain in knee after it was adjusted w/ movement. Other Comments   Comments Pt assessed and granted IRPs due to demonstration of knowledge and ability to transfer from recliner to RW, to bed, and to [facility] in bathroom all within pt's room w/o any compromise. PTs and other disciplines discussed possibility of bumping up the d/c date for pt from Friday, 10/27 to Wednesday 10/25 due to pt's progression while in the [facility]. Pt was compliant and happy that his d/c date is sooner due to his improvements since beginning rehab at the [facility]   Treatment Assessment Pt engaged in 150 minute skilled PT session that focused on capturing CARE scores, improving LE strength, ambulatory capacity/endurance, and education of HEP.   Pt was able to perform all CARE score activities I, besides stair/curb naviagation which is S. Pt granted IRPs after assessment by PTs. Pt knowledgeable about what he must do I now such as accounting for proper footwear and positioning of AD and other equipment in room. PTs rewrapped Ace wrap on pt's R knee and adjusted R HKB at the beginning of session, nursing and doctor examined pt's incision and wound on R knee while ace wrap was removed. Pt able to perform activities that simulate home set up w/ 7" GEO, macadam ramp leading into backyard from street, and full flight of stairs w/ R HR in house. Pt progressing and improving rapidly showcasing that he is nearing ability to be d/c.  PTs and other disciplines discussed w/ CM about bumping up d/c date from Friday 10/27 to Wednesday 10/25 upon completion of FT on Wednesday. Pt has a hospital bed on 1st floor that can accomodate pt's needs for rest and an electric recliner that is an appropriate place for rest during the day. Pt to be d/c w/ home rehab. Pt's POC to continue to further improve pt's LE strength, ambulatory distance/ability, and safely ambulate around and in household. Family/Caregiver Present No   PT Family training done with: No   Problem List Decreased strength;Decreased range of motion;Decreased endurance; Impaired balance;Pain; Impaired hearing;Decreased cognition   Barriers to Discharge Decreased caregiver support; Inaccessible home environment   Barriers to Discharge Comments 7" GEO, macadam ramp from street to backyard, and 13 steps from 1st floor to 2nd floor   PT Barriers   Physical Impairment Decreased strength;Decreased range of motion;Decreased endurance; Impaired balance;Decreased mobility; Decreased cognition; Impaired hearing;Orthopedic restrictions;Pain   Functional Limitation Car transfers;Stair negotiation   Plan   Treatment/Interventions ADL retraining;Functional transfer training;LE strengthening/ROM; Elevations; Therapeutic exercise; Endurance training;Patient/family training;Bed mobility;Gait training   Progress Progressing toward goals   Discharge Recommendation   PT Discharge Recommendation Home with home health rehabilitation   Equipment Recommended Walker   PT Therapy Minutes   PT Time In 0900   PT Time Out 1130   PT Total Time (minutes) 150   PT Mode of treatment - Individual (minutes) 150   PT Mode of treatment - Concurrent (minutes) 0   PT Mode of treatment - Group (minutes) 0   PT Mode of treatment - Co-treat (minutes) 0   PT Mode of Treatment - Total time(minutes) 150 minutes   PT Cumulative Minutes 870   Therapy Time missed   Time missed?  No

## 2023-10-23 NOTE — ASSESSMENT & PLAN NOTE
Follows with Dr Latanya Sandoval ([facility]) - last consult note reviewed 9/11/23  Per Dr Casper Baker note following fluctuating leukopenia and thrombocytopenia with significant monocytosis. Recommendations were for f/u labs in 3 months. 10/10: plt 105  10/13: plt 104  10/16: plt 128   10/19: plt 158  10/23: plt 212    Pathology slide 10/13:  "Peripheral blood smear shows normochromic normocytic RBCs with no schistocytes, mild thrombocytopenia, mild leukocytosis with monocytosis and dyspoietic changes.  If monocytosis persists, HEM/ONC consultation with bone marrow biopsy might be considered as clinically indicated."    F/u with Dr Dumont Doctor scheduled 12/11/23

## 2023-10-23 NOTE — ASSESSMENT & PLAN NOTE
Pt reports aware hx of impaired fasting glucose  Discussed diet recommendations  FBS: 114 (10/16)  Hga1c: 6 (4/6/23)  Will monitor
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
## 2023-10-24 PROCEDURE — 99024 POSTOP FOLLOW-UP VISIT: CPT | Performed by: PHYSICIAN ASSISTANT

## 2023-10-24 PROCEDURE — 97116 GAIT TRAINING THERAPY: CPT

## 2023-10-24 PROCEDURE — 99232 SBSQ HOSP IP/OBS MODERATE 35: CPT | Performed by: INTERNAL MEDICINE

## 2023-10-24 PROCEDURE — 97530 THERAPEUTIC ACTIVITIES: CPT

## 2023-10-24 PROCEDURE — 99232 SBSQ HOSP IP/OBS MODERATE 35: CPT | Performed by: PHYSICAL MEDICINE & REHABILITATION

## 2023-10-24 PROCEDURE — 97535 SELF CARE MNGMENT TRAINING: CPT

## 2023-10-24 PROCEDURE — 97110 THERAPEUTIC EXERCISES: CPT

## 2023-10-24 RX ORDER — ACETAMINOPHEN 325 MG/1
650 TABLET ORAL EVERY 6 HOURS PRN
Refills: 0
Start: 2023-10-24

## 2023-10-24 RX ORDER — OXYCODONE HYDROCHLORIDE 5 MG/1
5 TABLET ORAL 2 TIMES DAILY PRN
Qty: 15 TABLET | Refills: 0 | Status: SHIPPED | OUTPATIENT
Start: 2023-10-24

## 2023-10-24 RX ORDER — ASPIRIN 325 MG
325 TABLET, DELAYED RELEASE (ENTERIC COATED) ORAL 2 TIMES DAILY
Qty: 32 TABLET | Refills: 0 | Status: SHIPPED | OUTPATIENT
Start: 2023-10-24 | End: 2023-11-09

## 2023-10-24 RX ADMIN — LIDOCAINE 1 PATCH: 50 PATCH CUTANEOUS at 08:10

## 2023-10-24 RX ADMIN — CEPHALEXIN 500 MG: 500 CAPSULE ORAL at 06:53

## 2023-10-24 RX ADMIN — ASPIRIN 325 MG ORAL TABLET 325 MG: 325 PILL ORAL at 17:46

## 2023-10-24 RX ADMIN — ACETAMINOPHEN 975 MG: 325 TABLET ORAL at 14:41

## 2023-10-24 RX ADMIN — LANSOPRAZOLE 30 MG: 30 CAPSULE, DELAYED RELEASE ORAL at 08:11

## 2023-10-24 RX ADMIN — ACETAMINOPHEN 975 MG: 325 TABLET ORAL at 21:32

## 2023-10-24 RX ADMIN — ACETAMINOPHEN 975 MG: 325 TABLET ORAL at 06:52

## 2023-10-24 RX ADMIN — ASPIRIN 325 MG ORAL TABLET 325 MG: 325 PILL ORAL at 08:11

## 2023-10-24 RX ADMIN — SENNOSIDES 17.2 MG: 8.6 TABLET, FILM COATED ORAL at 08:11

## 2023-10-24 RX ADMIN — B-COMPLEX W/ C & FOLIC ACID TAB 1 TABLET: TAB at 08:11

## 2023-10-24 RX ADMIN — OXYCODONE HYDROCHLORIDE 5 MG: 5 TABLET ORAL at 08:11

## 2023-10-24 RX ADMIN — LIDOCAINE 1 PATCH: 50 PATCH CUTANEOUS at 08:11

## 2023-10-24 NOTE — DISCHARGE INSTR - AVS FIRST PAGE
ACUTE REHABILITATION CENTER DISCHARGE INSTRUCTIONS:      WEIGHT BEARING STATUS:  Right lower extremity as tolerated with hinged knee brace locked in extension      ACTIVITY:   Please follow mobility, self care instructions as your were taught by inpatient rehabilitation therapists. Please continue using RW and adaptive equipment. You will continue your rehabilitation with home care PT. RESTRICTIONS:  No Driving until cleared by your orthopedic surgeon      INCISIONAL CARE:  Ok to wash with soap and water and pat dry. Cover with dressing followed by ace wrap to protect your skin from hinged knee brace       MEDICATION CHANGES:  For pain: Tylenol for mild pain.   Oxycodone IR 2.5 mg (1/2 tablet) or 5 mg (1 tablet) twice daily as needed for sever pain (use cautiously)  For blood clot prevention: Continue Aspirin 325 mg twice daily until 11/9/23  PLEASE SEE YOUR PCP FOR ALL MEDICATION REFILLS

## 2023-10-24 NOTE — ASSESSMENT & PLAN NOTE
Redness near right tibial plateau, improving   10/17: tx'd w/ Keflex 500 mg Q 6 hours with Florastor  10/25: resolved

## 2023-10-24 NOTE — ASSESSMENT & PLAN NOTE
Follows with Dr Ebonie Evans ([facility]) - last consult note reviewed 9/11/23  Per Dr Natalie Salinas note following fluctuating leukopenia and thrombocytopenia with significant monocytosis. Recommendations were for f/u labs in 3 months. 10/10: plt 105  10/13: plt 104  10/16: plt 128   10/19: plt 158  10/23: plt 212    Pathology slide 10/13:  "Peripheral blood smear shows normochromic normocytic RBCs with no schistocytes, mild thrombocytopenia, mild leukocytosis with monocytosis and dyspoietic changes.  If monocytosis persists, HEM/ONC consultation with bone marrow biopsy might be considered as clinically indicated."    F/u with Dr Nogueira Body scheduled 12/11/23

## 2023-10-24 NOTE — PROGRESS NOTES
10/24/23 0830   Pain Assessment   Pain Assessment Tool 0-10   Pain Score 4   Pain Location/Orientation Orientation: Right;Location: Knee   Pain Onset/Description Frequency: Intermittent   Effect of Pain on Daily Activities Increased pain w/ unsupported RLE   Patient's Stated Pain Goal No pain   Hospital Pain Intervention(s) Repositioned;Elevated; Rest   Restrictions/Precautions   Precautions Cognitive; Fall Risk;Hard of hearing;Pain   Weight Bearing Restrictions Yes   RLE Weight Bearing Per Order WBAT   LLE Weight Bearing Per Order WBAT   ROM Restrictions Yes   RLE ROM Restriction Range Limitation  (R HKB locked in 0* EXT)   Braces or Orthoses Knee brace  (HKB)   Cognition   Overall Cognitive Status Impaired   Arousal/Participation Alert; Cooperative   Attention Attends with cues to redirect   Orientation Level Oriented X4   Memory Decreased short term memory   Following Commands Follows multistep commands with increased time or repetition   Subjective   Subjective Pt stated knees are feeling much better overall, excited and happy to be possibly d/c tomorrow after FT.    Sit to Lying   Type of Assistance Needed Independent   Physical Assistance Level No physical assistance   Comment I, RW to mat in PT gym   Sit to Lying CARE Score 6   Lying to Sitting on Side of Bed   Type of Assistance Needed Independent   Physical Assistance Level No physical assistance   Comment Mat in PT gym,   Lying to Sitting on Side of Bed CARE Score 6   Sit to Stand   Type of Assistance Needed Independent   Physical Assistance Level No physical assistance   Comment STS in RW & SPC   Sit to Stand CARE Score 6   Bed-Chair Transfer   Type of Assistance Needed Independent   Physical Assistance Level No physical assistance   Comment I, RW and SPC, use of SPC required VCs from PT to perform pivot instead of wide turns   Chair/Bed-to-Chair Transfer CARE Score 6   Car Transfer   Type of Assistance Needed Supervision   Physical Assistance Level No physical assistance   Comment Pt much improved in performing car transfer w/ NuStep. PT reeducated pt on proper way of performing car transfer by backing up to seat, placing behind on seat as much as possbile before swinging LEs into "car."  Pt understood and complied and was much more confident in ability to perform transfer upon completion   Car Transfer CARE Score 4   Walk 10 Feet   Type of Assistance Needed Independent   Physical Assistance Level No physical assistance   Comment RW = I, SPC = S   Walk 10 Feet CARE Score 6   Walk 50 Feet with Two Turns   Type of Assistance Needed Independent  (RW = I, SPC = S)   Physical Assistance Level No physical assistance   Comment I with RW (planned AD for DC home) SPC trial - Pt needed VCs to perform tight turns by pivoting instead of wide turns w/o pivots when using SPC   Walk 50 Feet with Two Turns CARE Score 6   Walk 150 Feet   Type of Assistance Needed Independent   Physical Assistance Level No physical assistance   Comment RW   Walk 150 Feet CARE Score 6   Walking 10 Feet on Uneven Surfaces   Type of Assistance Needed Independent   Physical Assistance Level No physical assistance   Comment RW on carpeted ramp & laminate ramp   Walking 10 Feet on Uneven Surfaces CARE Score 6   Ambulation   Primary Mode of Locomotion Prior to Admission Walk   Distance Walked (feet) 500 ft  (x1 RW, 300x1 RW, 75x2 w/ SPC)   Assist Device Roller Walker;[facility])   Gait Pattern Antalgic;Decreased foot clearance;Decreased R stance   Limitations Noted In Balance;Speed; Heel Strike;Swing;Strength   Provided Assistance with: Balance   Walk Assist Level Independent;Supervision  (S w/ SPC, I w/ RW)   Findings Pt I w/ ambulation using RW, trialed and used SPC for increased I, pt educated and practiced proper method of using SPC and was able to perform w/o LOB.   Pt stated that it will be an adjustment to use the [facility] but he is willing to use it because he knows it leads him closer to the use of no AD   Does the patient walk? 2. Yes   Wheel 50 Feet with Two Turns   Reason if not Attempted Activity not applicable   Wheel 50 Feet with Two Turns CARE Score 9   Wheel 150 Feet   Reason if not Attempted Activity not applicable   Wheel 028 Feet CARE Score 9   Wheelchair mobility   Does the patient use a wheelchair? 0. No   Curb or Single Stair   Style negotiated Curb   Type of Assistance Needed Independent   Physical Assistance Level No physical assistance   Comment RW on 8" curb step, pt has 7" GEO at home   1 Step (Curb) CARE Score 6   4 Steps   Type of Assistance Needed Independent   Physical Assistance Level No physical assistance   Comment Bilateral UE on R HR w/ full flight of stairs (12 steps), increased speed from previous session   4 Steps CARE Score 6   12 Steps   Type of Assistance Needed Independent   Physical Assistance Level No physical assistance   Comment Bilateral UE on R HR w/ full flight of stairs (12 steps), increased speed from previous session. Pt informed that if he ambulates at home he will need a 2nd RW to keep upstairs.   Home PT to reevaluate situation upon d/c.   12 Steps CARE Score 6   Stairs   Type Stairs   # of Steps 12  (ascending and descending in hospital stairway, pt has 13 steps from 1st floor to 2nd floor at home)   Weight Bearing Precautions WBAT   Assist Devices Single Rail  (R)   Therapeutic Interventions   Balance Pt statically stood in RW for 5mins, pt statically stood w/ SPC for 3mins   Other Pt performed activities associated w/ barriers to d/c to (curb step, stairs, and macadam ramp) to demonstrate Independent/Supervision level to perform activities w/ the use of the least restrictive AD (see CARE scores and comments for details)   Other Comments   Comments Pt planned for d/c tomorrow after FT dependent upon Ortho outcomes regarding incision/integrity of joint   Assessment   Treatment Assessment Pt engaged in 90 min skilled PT session focusing on improving ability of overcoming barriers to d/c (macadam ramp, 13 stairs in home, 7" curb step) to allow for safe return to home and independence. Pt still ambulating w/ RW and has IRPs, pt was seen at start of session wearing hospital gown, pt dressed himself w/ the use of OT equipment to assist, no assistance needed from PT. Pt R HKB adjusted proximally and tighter to provide more stability. Incision was redressed w/ ace wrap  Pt was able to ambulate w/ RW from room to PT gym, to carpeted ramp and back to PT gym w/o the need for a rest break. Pt had no complaints of increased pain in RLE. Pt trialed and performed ambulation w/ SPC to use least restrictive AD possible. Pt was educated by PT on how to properly ambulate w/ SPC and pt was able to understand and perform after some practice. Pt had decreased speed and minimal increase in R knee pain, but was able to perform w/ no LOB and S from PT.  Pt's main source of locomotion is still w/ the RW which will be used in home and community upon d/c, SPC trialed to see pt's potential once home health skilled PT begins rehab, did discuss w/ pt about purchasing SPC when applicable. Pt planned to have FT tomorrow, and possible d/c after Ft tomorrow dependent on Ortho outcomes. CARE scores captured for potential d/c tomorrow, pt has imprved tremendously since being admitted to [facility] and having skilled PT. Family/Caregiver Present No   Problem List Decreased strength;Decreased range of motion;Decreased endurance; Impaired balance;Pain; Impaired hearing;Decreased cognition   Barriers to Discharge Decreased caregiver support; Inaccessible home environment   Barriers to Discharge Comments 7" GEO, macadam ramp from [facility], 13 steps in home from 1st to 2nd floor   PT Barriers   Physical Impairment Decreased strength;Decreased range of motion;Decreased endurance; Impaired balance;Decreased mobility; Decreased cognition; Impaired hearing;Orthopedic restrictions;Pain   Functional Limitation Car transfers;Stair negotiation   Plan   Treatment/Interventions ADL retraining;Functional transfer training;LE strengthening/ROM; Elevations; Therapeutic exercise; Endurance training;Gait training   Progress Progressing toward goals   Discharge Recommendation   PT Discharge Recommendation Home with home health rehabilitation   Equipment Recommended Walker   PT Therapy Minutes   PT Time In 0830   PT Time Out 1000   PT Total Time (minutes) 90   PT Mode of treatment - Individual (minutes) 90   PT Mode of treatment - Concurrent (minutes) 0   PT Mode of treatment - Group (minutes) 0   PT Mode of treatment - Co-treat (minutes) 0   PT Mode of Treatment - Total time(minutes) 90 minutes   PT Cumulative Minutes 960   Therapy Time missed   Time missed?  No

## 2023-10-24 NOTE — PLAN OF CARE
Problem: SAFETY ADULT  Goal: Patient will remain free of falls  Description: INTERVENTIONS:  - Educate patient/family on patient safety including physical limitations  - Instruct patient to call for assistance with activity   - Consult OT/PT to assist with strengthening/mobility   - Keep Call bell within reach  - Keep bed low and locked with side rails adjusted as appropriate  - Keep care items and personal belongings within reach  - Initiate and maintain comfort rounds  - Make Fall Risk Sign visible to staff    - Apply yellow socks and bracelet for high fall risk patients  - Consider moving patient to room near nurses station  Outcome: Progressing     Problem: DISCHARGE PLANNING  Goal: Discharge to home or other facility with appropriate resources  Description: INTERVENTIONS:  - Identify barriers to discharge w/patient and caregiver  - Arrange for needed discharge resources and transportation as appropriate  - Identify discharge learning needs (meds, wound care, etc.)  - Arrange for interpretive services to assist at discharge as needed  - Refer to Case Management Department for coordinating discharge planning if the patient needs post-hospital services based on physician/advanced practitioner order or complex needs related to functional status, cognitive ability, or social support system  Outcome: Progressing     Problem: Knowledge Deficit  Goal: Patient/family/caregiver demonstrates understanding of disease process, treatment plan, medications, and discharge instructions  Description: Complete learning assessment and assess knowledge base.   Interventions:  - Provide teaching at level of understanding  - Provide teaching via preferred learning methods  Outcome: Progressing

## 2023-10-24 NOTE — PROGRESS NOTES
[facility]  Progress Note  Name: Jaclyn Garrett  MRN: [id number]  Unit/Bed#: -80 I Date of Admission: 10/14/2023   Date of Service: 10/24/2023 I Hospital Day: 10    Assessment/Plan   Cellulitis  Assessment & Plan  Redness near right tibial plateau, improving   10/17: Started Keflex 500 mg Q 6 hours with Florastor    Impaired fasting glucose  Assessment & Plan  Pt reports aware hx of impaired fasting glucose  Discussed diet recommendations  FBS: 114 (10/16)  Hga1c: 6 (4/6/23)  Will monitor     Thrombocytopenia (HCC)  Assessment & Plan  Follows with Dr Ann Smith ([facility]) - last consult note reviewed 9/11/23  Per Dr Fernanda Amin note following fluctuating leukopenia and thrombocytopenia with significant monocytosis. Recommendations were for f/u labs in 3 months. 10/10: plt 105  10/13: plt 104  10/16: plt 128   10/19: plt 158  10/23: plt 212    Pathology slide 10/13:  "Peripheral blood smear shows normochromic normocytic RBCs with no schistocytes, mild thrombocytopenia, mild leukocytosis with monocytosis and dyspoietic changes.  If monocytosis persists, HEM/ONC consultation with bone marrow biopsy might be considered as clinically indicated."    F/u with Dr Shashank Alarcon scheduled 12/11/23    Contusion of left leg, initial encounter  Assessment & Plan  Xray left knee showed mild arthritis, no acute abnl  Continue lidoderm patch  Consideration with therapies     Rotator cuff tear arthropathy of left shoulder  Assessment & Plan  Pt reports history of left shoulder pain  May have been worsened by laying on left side in woods  Lidocaine patch to left shoulder, noted improvement   Consideration for PT/OT     Brady's esophagus with dysplasia  Assessment & Plan  Continue home lansoprazole daily      * Traumatic rupture of quadriceps tendon, right, initial encounter  Assessment & Plan  Traumatic injury following fall when hiking   S/p OR 10/12/23 repair of quad tendon rupture with  Polansky   WBAT to the right lower extremity in TROM locked in extension  WBAT to the left lower extremity in hinged knee brace, unlocked   PT/OT  Pain control per primary team.   DVT ppx: ASA 325mg BID x 6 weeks  10/17:Keflex started (noted allergies)   Will need staples removed next week   Ortho will be consulted this week if pt here  Follow up with Dr. Arline Faustin upon discharge. VTE Pharmacologic Prophylaxis:   Pharmacologic: ambulating  Mechanical VTE Prophylaxis in Place: No    Current Length of Stay: 10 day(s)    Current Patient Status: Inpatient Rehab     Discharge Plan: planning for       Code Status: Level 1 - Full Code    Subjective:   Pt examined this while seated in recliner next to his bed. Pt denies new complaints or concerns. Pt doing well with PT, ambulating, reports that his pain is well controlled. Pt reports he slept well. Has been cutting down use of oxycodone. BM 10/23, denies dysuria. No h/a, dizziness, cp, sob. Plan for D/C this week after staples removed    Objective:     Vitals:   Temp (24hrs), Av.4 °F (36.9 °C), Min:97.7 °F (36.5 °C), Max:99 °F (37.2 °C)    Temp:  [97.7 °F (36.5 °C)-99 °F (37.2 °C)] 97.7 °F (36.5 °C)  HR:  [70-90] 70  Resp:  [18] 18  BP: (130-144)/(68-71) 144/68  SpO2:  [96 %-97 %] 97 %  Body mass index is 30.49 kg/m². Review of Systems   Constitutional:  Negative for chills and fatigue. Respiratory:  Negative for chest tightness, shortness of breath and wheezing. Cardiovascular:  Negative for chest pain, palpitations and leg swelling. Gastrointestinal:  Negative for blood in stool, constipation, diarrhea, nausea and vomiting. Genitourinary:  Negative for dysuria and hematuria. Musculoskeletal:  Positive for arthralgias (RLE, L knee) and gait problem. Neurological:  Negative for dizziness, numbness and headaches. Input and Output Summary (last 24 hours):        Intake/Output Summary (Last 24 hours) at 10/24/2023 [address] filed at 10/24/2023 0919  Gross per 24 hour   Intake 480 ml   Output 600 ml   Net -120 ml       Physical Exam:     Physical Exam  Vitals reviewed. Constitutional:       General: He is not in acute distress. Appearance: He is not diaphoretic. HENT:      Head: Normocephalic and atraumatic. Cardiovascular:      Rate and Rhythm: Normal rate and regular rhythm. Pulmonary:      Effort: Pulmonary effort is normal. No respiratory distress. Breath sounds: Normal breath sounds. No wheezing, rhonchi or rales. Abdominal:      General: Bowel sounds are normal. There is no distension. Palpations: Abdomen is soft. Tenderness: There is no abdominal tenderness. Musculoskeletal:         General: Deformity (RLE incision intact, staples, no drainage or erythema, small abrasion over R tibial platau) and signs of injury (RLE, L knee contusion - eccyhmosis improving) present. Right lower leg: No edema. Left lower leg: Edema (+1 pitting) present. Skin:     General: Skin is warm and dry. Findings: Bruising (L knee) present. Neurological:      Mental Status: He is alert. Mental status is at baseline.    Psychiatric:         Mood and Affect: Mood normal.         Behavior: Behavior normal.         Additional Data:     Labs:    Results from last 7 days   Lab Units 10/23/23  0555   WBC Thousand/uL 4.14*   HEMOGLOBIN g/dL 12.8   HEMATOCRIT % 40.0   PLATELETS Thousands/uL 212   BANDS PCT % 3   LYMPHO PCT % 33   MONO PCT % 14*   EOS PCT % 0     Results from last 7 days   Lab Units 10/23/23  0555   SODIUM mmol/L 141   POTASSIUM mmol/L 4.8   CHLORIDE mmol/L 105   CO2 mmol/L 29   BUN mg/dL 14   CREATININE mg/dL 0.75   ANION GAP mmol/L 7   CALCIUM mg/dL 8.8   GLUCOSE RANDOM mg/dL 112                       Labs reviewed        Last 24 Hours Medication List:   Current Facility-Administered Medications   Medication Dose Route Frequency Provider Last Rate    acetaminophen  975 mg Oral [facility] Onel Cm, DO      aspirin  325 mg Oral BID Onel Cm, DO      bisacodyl  10 mg Rectal Daily PRN Onel Cm, DO      hydrocortisone   Topical 4x Daily PRN Onel Cm, DO      lansoprazole  30 mg Oral Daily Onel Cm, DO      lidocaine  1 patch Topical Daily Onel Cm, DO      lidocaine  1 patch Topical Daily Nini Beck PA-C      magnesium hydroxide  30 mL Oral Daily PRN Onel Cm, DO      multivitamin stress formula  1 tablet Oral QAM Onel Cm, DO      oxyCODONE  5 mg Oral Q4H PRN Onel Cm, DO      oxyCODONE  2.5 mg Oral Q4H PRN Onel Cm, DO      senna  2 tablet Oral Daily Onel Cm, DO          M*Modal software was used to dictate this note. It may contain errors with dictating incorrect words or incorrect spelling. Please contact the provider directly with any questions.
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
## 2023-10-24 NOTE — PROGRESS NOTES
10/24/23 1230   Pain Assessment   Pain Score No Pain   Restrictions/Precautions   Precautions Cognitive; Fall Risk   Weight Bearing Restrictions Yes   RLE Weight Bearing Per Order WBAT   LLE Weight Bearing Per Order WBAT   ROM Restrictions Yes   RLE ROM Restriction Range Limitation  (R HKB locked in 0* EXT)   Braces or Orthoses Knee brace   Eating   Type of Assistance Needed Independent   Eating CARE Score 6   Oral Hygiene   Type of Assistance Needed Independent   Comment completed in stance only to rinse mouth with mouth wash   Oral Hygiene CARE Score 6   Shower/Bathe Self   Type of Assistance Needed Adaptive equipment;Physical assistance   Physical Assistance Level 25% or less   Comment completed SB this session in stance at sink. Pt able to wash 9/10 parts. washed LE seated with use of LHR to wash feet. A to unwrap RLE and wash. RN Ita Hernandez assessed incision and took pictures. A for HKB mngmnt   Shower/Bathe Self CARE Score 3   Tub/Shower Transfer   Reason Not Assessed Safety;Sponge Bath   Upper Body Dressing   Type of Assistance Needed Independent   Comment button shirt   Upper Body Dressing CARE Score 6   Lower Body Dressing   Type of Assistance Needed Adaptive equipment;Physical assistance   Physical Assistance Level 25% or less   Comment dee use of LHR to don shorts. Noah for HKB mngmnt   Lower Body Dressing CARE Score 3   Putting On/Taking Off Footwear   Type of Assistance Needed Independent; Adaptive equipment   Comment use of sock aid   Putting On/Taking Off Footwear CARE Score 6   Sit to Stand   Type of Assistance Needed Independent; Adaptive equipment   Comment Dee with RW   Sit to Stand CARE Score 6   Bed-Chair Transfer   Type of Assistance Needed Independent; Adaptive equipment   Comment Dee with RW   Chair/Bed-to-Chair Transfer CARE Score 6   Toileting Hygiene   Type of Assistance Needed Independent   Comment simulated buttock hygiene, able to complete at [facility] Score 6   Toilet Transfer   Type of Assistance Needed Independent; Adaptive equipment   Comment Leonel with RW and use of BSC over toilet   Toilet Transfer CARE Score 6   Money Management   Money Management Level of Assistance Independent   Money Management engaged pt in finance activity this session. pt able to correctly answer 10/10 questions and fill out 6/6 in check writing task. Pt reporting he completed finances indep PTA. Therapeutic Excerise-Strength   UE Strength Yes   Right Upper Extremity- Strength   R Shoulder Flexion   R Elbow Elbow flexion;Elbow extension   R Position Seated   Equipment Dumbbell  (3#)   R Weight/Reps/Sets 2x15   RUE Strength Comment seated engaged pt in UE TE with 3# 2x15 to cont to inc fx strength. Pt tolerated well, modifed LUE shoudler exercises 2* RTC. Pt has HEP with green tband. Left Upper Extremity-Strength   LUE Strength Comment see above   Cognition   Overall Cognitive Status Impaired   Arousal/Participation Alert; Cooperative   Attention Attends with cues to redirect   Orientation Level Oriented X4   Memory Decreased short term memory   Following Commands Follows multistep commands with increased time or repetition   Activity Tolerance   Activity Tolerance Patient tolerated treatment well   Assessment   Treatment Assessment Engaged pt in 90mins of skilled OT services with focus on DC ADL, finances and UE TE. Pt has progressed to IRP with RW and demonstrates G safety. In addition, demonstrates improved STS. Pt reports he has purchased hip kit. RW and BSC to be delievered by [employer]. Cont OT POC with focus on FT with focus on RLE ace wrapping and HKB mngmnt. Prognosis Good   Problem List Decreased strength;Decreased range of motion;Decreased endurance; Impaired balance;Pain; Impaired hearing;Decreased cognition   Barriers to Discharge Decreased caregiver support; Inaccessible home environment   Plan   Treatment/Interventions Patient/family training   Progress Progressing toward goals Discharge Recommendation   OT Discharge Recommendation Home with home health rehabilitation  (PT only)   Equipment Recommended Bedside commode; Hip Kit ($)  (RW)   Commode Type Standard   Additional Comments  hip kit ordered online, commode ordered by [employer] [facility], handout provided for pill organizer   Discharge Summary anticiapted DC 10/25 pendign ortho and R knee staple removal   OT Therapy Minutes   OT Time In 1230   OT Time Out 1400   OT Total Time (minutes) 90   OT Mode of treatment - Individual (minutes) 90   OT Mode of treatment - Concurrent (minutes) 0   OT Mode of treatment - Group (minutes) 0   OT Mode of treatment - Co-treat (minutes) 0   OT Mode of Treatment - Total time(minutes) 90 minutes   OT Cumulative Minutes 930   Therapy Time missed   Time missed?  No

## 2023-10-24 NOTE — ASSESSMENT & PLAN NOTE
Traumatic injury following fall when hiking   S/p OR 10/12/23 repair of quad tendon rupture with Dr Ava Perez to the right lower extremity in TROM locked in extension  WBAT to the left lower extremity in hinged knee brace, unlocked   PT/OT  Pain control per primary team.   DVT ppx: ASA 325mg BID x 6 weeks  10/17:Keflex started, now completed   Ortho saw pt and removed staples 10/24, steri strips applied  Will follow up with Dr Carmelina Wolfe on 10/30 outpatient

## 2023-10-24 NOTE — ASSESSMENT & PLAN NOTE
Pt reports aware hx of impaired fasting glucose  Discussed diet recommendations  FBS: 103  Hga1c: 6 (4/6/23)  stable

## 2023-10-24 NOTE — PLAN OF CARE
Problem: PAIN - ADULT  Goal: Verbalizes/displays adequate comfort level or baseline comfort level  Description: Interventions:  - Encourage patient to monitor pain and request assistance  - Assess pain using appropriate pain scale  - Administer analgesics based on type and severity of pain and evaluate response  - Implement non-pharmacological measures as appropriate and evaluate response  - Consider cultural and social influences on pain and pain management  - Notify physician/advanced practitioner if interventions unsuccessful or patient reports new pain  Outcome: Progressing     Problem: DISCHARGE PLANNING  Goal: Discharge to home or other facility with appropriate resources  Description: INTERVENTIONS:  - Identify barriers to discharge w/patient and caregiver  - Arrange for needed discharge resources and transportation as appropriate  - Identify discharge learning needs (meds, wound care, etc.)  - Arrange for interpretive services to assist at discharge as needed  - Refer to Case Management Department for coordinating discharge planning if the patient needs post-hospital services based on physician/advanced practitioner order or complex needs related to functional status, cognitive ability, or social support system  Outcome: Progressing     Problem: MOBILITY - ADULT  Goal: Maintain or return to baseline ADL function  Description: INTERVENTIONS:  -  Assess patient's ability to carry out ADLs; assess patient's baseline for ADL function and identify physical deficits which impact ability to perform ADLs (bathing, care of mouth/teeth, toileting, grooming, dressing, etc.)  - Assess/evaluate cause of self-care deficits   - Assess range of motion  - Assess patient's mobility; develop plan if impaired  - Assess patient's need for assistive devices and provide as appropriate  - Encourage maximum independence but intervene and supervise when necessary  - Involve family in performance of ADLs  - Assess for home care needs following discharge   - Consider OT consult to assist with ADL evaluation and planning for discharge  - Provide patient education as appropriate  Outcome: Progressing     Problem: SKIN/TISSUE INTEGRITY - ADULT  Goal: Incision(s), wounds(s) or drain site(s) healing without S/S of infection  Description: INTERVENTIONS  - Assess and document dressing, incision, wound bed, drain sites and surrounding tissue  - Provide patient and family education  - Perform skin care/dressing changes every day  Outcome: Progressing

## 2023-10-25 ENCOUNTER — HOME CARE VISIT (OUTPATIENT)
Dept: HOME HEALTH SERVICES | Facility: HOME HEALTHCARE | Age: 73
End: 2023-10-25

## 2023-10-25 VITALS
WEIGHT: 200.5 LBS | TEMPERATURE: 97.5 F | OXYGEN SATURATION: 97 % | DIASTOLIC BLOOD PRESSURE: 67 MMHG | HEART RATE: 72 BPM | HEIGHT: 68 IN | SYSTOLIC BLOOD PRESSURE: 143 MMHG | RESPIRATION RATE: 18 BRPM | BODY MASS INDEX: 30.39 KG/M2

## 2023-10-25 PROCEDURE — 97530 THERAPEUTIC ACTIVITIES: CPT

## 2023-10-25 PROCEDURE — 99232 SBSQ HOSP IP/OBS MODERATE 35: CPT | Performed by: NURSE PRACTITIONER

## 2023-10-25 PROCEDURE — 99239 HOSP IP/OBS DSCHRG MGMT >30: CPT | Performed by: PHYSICAL MEDICINE & REHABILITATION

## 2023-10-25 RX ADMIN — Medication 2.5 MG: at 10:02

## 2023-10-25 RX ADMIN — Medication 2.5 MG: at 05:52

## 2023-10-25 RX ADMIN — B-COMPLEX W/ C & FOLIC ACID TAB 1 TABLET: TAB at 08:28

## 2023-10-25 RX ADMIN — LANSOPRAZOLE 30 MG: 30 CAPSULE, DELAYED RELEASE ORAL at 08:29

## 2023-10-25 RX ADMIN — ACETAMINOPHEN 975 MG: 325 TABLET ORAL at 05:51

## 2023-10-25 RX ADMIN — SENNOSIDES 17.2 MG: 8.6 TABLET, FILM COATED ORAL at 08:28

## 2023-10-25 RX ADMIN — ASPIRIN 325 MG ORAL TABLET 325 MG: 325 PILL ORAL at 08:28

## 2023-10-25 NOTE — PROGRESS NOTES
10/25/23 1100   Pain Assessment   Pain Assessment Tool 0-10   Pain Score 5   Pain Location/Orientation Orientation: Right;Location: Leg   Pain Radiating Towards none   Hospital Pain Intervention(s) Rest;Elevated   Restrictions/Precautions   Precautions Cognitive; Fall Risk   Weight Bearing Restrictions Yes   RLE Weight Bearing Per Order WBAT   ROM Restrictions Yes   RLE ROM Restriction Range Limitation  (HKB locked in 0* EXT)   Braces or Orthoses Knee brace  (HKB)   Cognition   Overall Cognitive Status Impaired   Arousal/Participation Alert; Cooperative   Attention Attends with cues to redirect   Orientation Level Oriented X4   Memory Decreased short term memory   Following Commands Follows one step commands without difficulty   Subjective   Subjective pt was seen OT gym ready to be d/c post FT w/ wife   Sit to Stand   Type of Assistance Needed Independent   Physical Assistance Level No physical assistance   Comment STS from WC to RW   Sit to Stand CARE Score 6   Bed-Chair Transfer   Type of Assistance Needed Independent   Physical Assistance Level No physical assistance   Comment SPT from RW   Chair/Bed-to-Chair Transfer CARE Score 6   Car Transfer   Type of Assistance Needed Supervision   Physical Assistance Level No physical assistance   Comment d/c car transfer into [address] [place] [address], trialed front seat w/ RW w/ VCs from PT, pt had to perform transfer in back seat due to RLE HKB locked in 0* EXT   Car Transfer CARE Score 4   Walk 10 Feet   Type of Assistance Needed Independent   Physical Assistance Level No physical assistance   Comment RW   Walk 10 Feet CARE Score 6   Walk 50 Feet with Two Turns   Type of Assistance Needed Independent   Physical Assistance Level No physical assistance   Comment RW   Walk 50 Feet with Two Turns CARE Score 6   Walk 150 Feet   Type of Assistance Needed Independent   Physical Assistance Level No physical assistance   Comment RW   Walk 150 Feet CARE Score 6   Walking 10 Feet on Uneven Surfaces   Type of Assistance Needed Independent   Physical Assistance Level No physical assistance   Comment RW   Walking 10 Feet on Uneven Surfaces CARE Score 6   Ambulation   Primary Mode of Locomotion Prior to Admission Walk   Distance Walked (feet) 200 ft   Assist Device Roller Walker   Gait Pattern Antalgic;Decreased foot clearance;Decreased R stance   Limitations Noted In Balance;Speed;Strength;Swing;Heel Strike   Provided Assistance with: Balance   Walk Assist Level Independent   Findings Pt ambulated I w/ RW upon d/c   Does the patient walk? 2. Yes   Wheel 50 Feet with Two Turns   Reason if not Attempted Activity not applicable   Wheel 50 Feet with Two Turns CARE Score 9   Wheel 150 Feet   Reason if not Attempted Activity not applicable   Wheel 175 Feet CARE Score 9   Curb or Single Stair   Style negotiated Curb   Type of Assistance Needed Supervision   Physical Assistance Level No physical assistance   Comment 8" curb w/ RW for FT w/ wife   1 Step (Curb) CARE Score 4   Stairs   Findings Wife did not want to see pt perfrom stairs during FT due to pt not performing in-home stairs upon d/c   Other Comments   Comments Session focused on FT w/ wife, setting up DME for pt, and transferring pt to wife's car for d/c   Assessment   Treatment Assessment Pt engaged in 30min skilled PT session focused on FT w/ wife Eddye Goltz and friends Bora Lamb. Pt has good family support around him, wife works during the day but Zac Leonard and Akash Palomares can A pt during the day if needed. Pt progressed during stay at [facility] to achieve IRPs w/ use of RW, reduced RLE pain, pt I w/ ambulation w/ RW at home, requires S when ambulating w/ RW over 7" GEO and macadam ramp from driveway to backyard, these were pt's barrier to d/c.  Pt's DME received (RW & BSC), PT adjusted RW and BSC to pt's appropriate height, pt's ski accessories added to the rear supports of RW.   Pt demonstrated those barriers to d/c to wife during FT, wife did not want to see stair navigation w/ pt due to pt not performing stairs at home upon d/c.  Pt d/c car transfer into [facility] [address], trialed front seat w/ RW w/ VCs from PT, pt had to perform transfer in back seat due to RLE HKB locked in 0* EXT. Pt progressed and achieved LTGs during stay at [facility], will be seen by home health PT upon d/c. Family/Caregiver Present Yes   PT Family training done with: Wife Jimmy Ruano), friends (Ysabel Perez)   Problem List Decreased cognition;Decreased endurance;Pain   Barriers to Discharge Inaccessible home environment   Barriers to Discharge Comments 7" GEO, macadam ramp from [facility], 13 steps in home from 1st to 2nd floor   PT Barriers   Physical Impairment Decreased strength;Decreased range of motion;Decreased endurance; Impaired balance;Decreased mobility; Decreased cognition; Impaired hearing;Orthopedic restrictions;Pain   Functional Limitation Car transfers;Stair negotiation  (Pt needs S w/ these activities)   Plan   Treatment/Interventions ADL retraining;Functional transfer training;LE strengthening/ROM; Elevations; Therapeutic exercise; Endurance training;Patient/family training;Gait training   Progress Discontinue PT   Discharge Recommendation   PT Discharge Recommendation Home with home health rehabilitation   Equipment Recommended [address] [facility] Recommended Wheeled walker   Change/add to [employer]?  Yes, Add Accessories  (Skis for rear)   Yrn Jaramillo Other (Comment)  (Skis for rear supports)   PT Equipment ordered  & [facility]   Discharge Summary Pt d/c today from [facility] s/p R quadriceps tendon rupture and surgical repair   PT Therapy Minutes   PT Time In 1100   PT Time Out 1135   PT Total Time (minutes) 35   PT Mode of treatment - Individual (minutes) 35   PT Mode of treatment - Concurrent (minutes) 0   PT Mode of treatment - Group (minutes) 0   PT Mode of treatment - Co-treat (minutes) 0   PT Mode of Treatment - Total time(minutes) 35 minutes   PT Cumulative Minutes 995   Therapy Time missed Time missed?  No

## 2023-10-25 NOTE — PROGRESS NOTES
10/25/23 1030   Pain Assessment   Pain Assessment Tool 0-10   Pain Score 5   Pain Location/Orientation Orientation: Right;Location: Leg   Pain Radiating Towards none   Pain Onset/Description Onset: Ongoing; Descriptor: Sore   Effect of Pain on Daily Activities none   Patient's Stated Pain Goal No pain   Hospital Pain Intervention(s) Rest   Restrictions/Precautions   Precautions Cognitive; Fall Risk  (HKB locked in extension)   Weight Bearing Restrictions Yes   RLE Weight Bearing Per Order WBAT   LLE Weight Bearing Per Order WBAT   ROM Restrictions Yes   RLE ROM Restriction Range Limitation  (R HKB locked in extension)   Braces or Orthoses Knee brace   Sit to Stand   Type of Assistance Needed Independent   Comment Leonel with RW. Sit to Stand CARE Score 6   Bed-Chair Transfer   Type of Assistance Needed Adaptive equipment; Independent   Comment Leonel with RW, recliner to chair with arms   Chair/Bed-to-Chair Transfer CARE Score 6   Cognition   Overall Cognitive Status Impaired   Arousal/Participation Alert; Cooperative   Attention Attends with cues to redirect   Orientation Level Oriented X4   Memory Decreased short term memory   Following Commands Follows one step commands without difficulty   Activity Tolerance   Activity Tolerance Patient tolerated treatment well   Assessment   Treatment Assessment Pt engaged in 30 minute skilled OT session focused on family training with wife Mehreen Mcfarland and friend Tenzin Hernandez. Pt w/ in room privileges starting yesterday 10/24. Pt overall independent in all ADLs. A req for ace wrap and brace mgmt on RLE. family edu on wrapping/unwrapping ace wrap and donning brace, and skin integrity. Family w/ verbal understanding. Pt scheduled for DC home with family support following PT session this afternoon. OT Family training done with: wife Lea Sandhu and friend Felicity Meza of family training Family edu on mgmt of HKB and ace wrap. Pt currently Leonel in all ADLs with the exception of RLE brace mgmt. family edu on skin integrity of RLE with brace   Prognosis Good   Problem List Decreased cognition;Decreased endurance;Pain   Barriers to Discharge Inaccessible home environment   Discharge Recommendation   OT Discharge Recommendation Home with home health rehabilitation  (PT only)   Equipment Recommended Bedside commode; Hip Kit ($)   Commode Type Standard   Additional Comments  pt provided with commode, walker; Hip kit ordered on [facility]   Discharge Summary anticipated DC today 10/25 following FT. OT Therapy Minutes   OT Time In 1030   OT Time Out 1100   OT Total Time (minutes) 30   OT Mode of treatment - Individual (minutes) 30   OT Mode of treatment - Concurrent (minutes) 0   OT Mode of treatment - Group (minutes) 0   OT Mode of treatment - Co-treat (minutes) 0   OT Mode of Treatment - Total time(minutes) 30 minutes   OT Cumulative Minutes 960   Therapy Time missed   Time missed?  No

## 2023-10-25 NOTE — PROGRESS NOTES
[facility]  Progress Note  Name: Katarina Greer  MRN: [id number]  Unit/Bed#: -25 I Date of Admission: 10/14/2023   Date of Service: 10/25/2023 I Hospital Day: 11    Assessment/Plan   Cellulitis  Assessment & Plan  Redness near right tibial plateau, improving   10/17: tx'd w/ Keflex 500 mg Q 6 hours with Florastor  10/25: resolved     Impaired fasting glucose  Assessment & Plan  Pt reports aware hx of impaired fasting glucose  Discussed diet recommendations  FBS: 103  Hga1c: 6 (4/6/23)  stable    Thrombocytopenia (HCC)  Assessment & Plan  Follows with Dr Aime Parsons ([facility]) - last consult note reviewed 9/11/23  Per Dr Madeleine Olivarez note following fluctuating leukopenia and thrombocytopenia with significant monocytosis. Recommendations were for f/u labs in 3 months. 10/10: plt 105  10/13: plt 104  10/16: plt 128   10/19: plt 158  10/23: plt 212    Pathology slide 10/13:  "Peripheral blood smear shows normochromic normocytic RBCs with no schistocytes, mild thrombocytopenia, mild leukocytosis with monocytosis and dyspoietic changes.  If monocytosis persists, HEM/ONC consultation with bone marrow biopsy might be considered as clinically indicated."    F/u with Dr Rama Patel scheduled 12/11/23    Contusion of left leg, initial encounter  Assessment & Plan  Xray left knee showed mild arthritis, no acute abnl  Continue lidoderm patch  Consideration with therapies     Rotator cuff tear arthropathy of left shoulder  Assessment & Plan  Pt reports history of left shoulder pain  May have been worsened by laying on left side in woods  Lidocaine patch to left shoulder, noted improvement   Consideration for PT/OT     Brady's esophagus with dysplasia  Assessment & Plan  Continue home lansoprazole daily      * Traumatic rupture of quadriceps tendon, right, initial encounter  Assessment & Plan  Traumatic injury following fall when hiking   S/p OR 10/12/23 repair of quad tendon rupture with  Polrodriguezky   WBAT to the right lower extremity in TROM locked in extension  WBAT to the left lower extremity in hinged knee brace, unlocked   PT/OT  Pain control per primary team.   DVT ppx: ASA 325mg BID x 6 weeks  10/17:Keflex started, now completed   Ortho saw pt and removed staples 10/24, steri strips applied  Will follow up with Dr Wil Ramírez on 10/30 outpatient         VTE Pharmacologic Prophylaxis:   Pharmacologic: ambulating   Mechanical VTE Prophylaxis in Place: No    Current Length of Stay: 11 day(s)    Current Patient Status: Inpatient Rehab     Discharge Plan: Discharge today     Code Status: Level 1 - Full Code    Subjective:   Patient reports doing well today. Pain is 5/10 and receiving pain medicine while I was in the room. He slept well. He is eating and drinking well. His ROS is otherwise negative and he denies any concerns or complaints. He is scheduled for discharge home today. He is scheduled to follow up with his orthopedic on Monday. Objective:     Vitals:   Temp (24hrs), Av °F (36.7 °C), Min:97.5 °F (36.4 °C), Max:98.6 °F (37 °C)    Temp:  [97.5 °F (36.4 °C)-98.6 °F (37 °C)] 97.5 °F (36.4 °C)  HR:  [72-81] 72  Resp:  [18] 18  BP: (125-143)/(67-71) 143/67  SpO2:  [97 %] 97 %  Body mass index is 30.49 kg/m². Review of Systems   Constitutional:  Negative for chills and fever. Respiratory:  Negative for cough, chest tightness and shortness of breath. Cardiovascular:  Negative for chest pain. Gastrointestinal:  Negative for abdominal pain, blood in stool and constipation. Genitourinary:  Negative for difficulty urinating. Musculoskeletal:  Positive for arthralgias. Neurological:  Negative for dizziness, light-headedness and headaches. Psychiatric/Behavioral:  Negative for sleep disturbance. Input and Output Summary (last 24 hours):        Intake/Output Summary (Last 24 hours) at 10/25/2023 1025  Last data filed at 10/25/2023 0900  Gross per 24 hour   Intake 1020 ml Output --   Net 1020 ml       Physical Exam:     Physical Exam  Constitutional:       General: He is not in acute distress. Appearance: Normal appearance. He is not diaphoretic. HENT:      Head: Normocephalic and atraumatic. Eyes:      Conjunctiva/sclera: Conjunctivae normal.   Cardiovascular:      Rate and Rhythm: Normal rate and regular rhythm. Heart sounds: Normal heart sounds. Pulmonary:      Effort: Pulmonary effort is normal. No respiratory distress. Breath sounds: Normal breath sounds. No wheezing, rhonchi or rales. Abdominal:      General: Bowel sounds are normal.      Palpations: Abdomen is soft. Tenderness: There is no abdominal tenderness. Musculoskeletal:      Right lower leg: No edema (knee immobilizer in place). Left lower leg: Edema (+1 pitting) present. Neurological:      Mental Status: He is alert. Mental status is at baseline. Psychiatric:         Mood and Affect: Mood normal.         Behavior: Behavior normal.         Thought Content:  Thought content normal.         Judgment: Judgment normal.         Additional Data:     Labs:    Results from last 7 days   Lab Units 10/23/23  0555   WBC Thousand/uL 4.14*   HEMOGLOBIN g/dL 12.8   HEMATOCRIT % 40.0   PLATELETS Thousands/uL 212   BANDS PCT % 3   LYMPHO PCT % 33   MONO PCT % 14*   EOS PCT % 0     Results from last 7 days   Lab Units 10/23/23  0555   SODIUM mmol/L 141   POTASSIUM mmol/L 4.8   CHLORIDE mmol/L 105   CO2 mmol/L 29   BUN mg/dL 14   CREATININE mg/dL 0.75   ANION GAP mmol/L 7   CALCIUM mg/dL 8.8   GLUCOSE RANDOM mg/dL 112                       Labs reviewed    Imaging:    Imaging reviewed    Recent Cultures (last 7 days):           Last 24 Hours Medication List:   Current Facility-Administered Medications   Medication Dose Route Frequency Provider Last Rate    acetaminophen  975 mg Oral Q8H [facility] Krysten Fuentes, DO      aspirin  325 mg Oral BID Krysten Fuentes DO      bisacodyl  10 mg Rectal Daily PRN Carl Haskins TANISHA Leblanc, DO      hydrocortisone   Topical 4x Daily PRN Rolm Dauer, DO      lansoprazole  30 mg Oral Daily Rolm Dauer, DO      lidocaine  1 patch Topical Daily Rolm Dauer, DO      lidocaine  1 patch Topical Daily Nini Beck PA-C      magnesium hydroxide  30 mL Oral Daily PRN Rolm Dauer, DO      multivitamin stress formula  1 tablet Oral QAM Rolm Dauer, DO      oxyCODONE  5 mg Oral Q4H PRN Rolm Dauer, DO      oxyCODONE  2.5 mg Oral Q4H PRN Rolm Dauer, DO      senna  2 tablet Oral Daily Rolm Dauer, DO          M*Modal software was used to dictate this note. It may contain errors with dictating incorrect words or incorrect spelling. Please contact the provider directly with any questions.

## 2023-10-25 NOTE — PLAN OF CARE
Problem: PAIN - ADULT  Goal: Verbalizes/displays adequate comfort level or baseline comfort level  Description: Interventions:  - Encourage patient to monitor pain and request assistance  - Assess pain using appropriate pain scale  - Administer analgesics based on type and severity of pain and evaluate response  - Implement non-pharmacological measures as appropriate and evaluate response  - Consider cultural and social influences on pain and pain management  - Notify physician/advanced practitioner if interventions unsuccessful or patient reports new pain  10/25/2023 0907 by Anam Dukes RN  Outcome: Adequate for Discharge  10/25/2023 0755 by Anam Dukes RN  Outcome: Progressing     Problem: INFECTION - ADULT  Goal: Absence or prevention of progression during hospitalization  Description: INTERVENTIONS:  - Assess and monitor for signs and symptoms of infection  - Monitor lab/diagnostic results  - Monitor all insertion sites, i.e. indwelling lines, tubes, and drains  - Monitor endotracheal if appropriate and nasal secretions for changes in amount and color  - [place] appropriate cooling/warming therapies per order  - Administer medications as ordered  - Instruct and encourage patient and family to use good hand hygiene technique  - Identify and instruct in appropriate isolation precautions for identified infection/condition  Outcome: Adequate for Discharge  Goal: Absence of fever/infection during neutropenic period  Description: INTERVENTIONS:  - Monitor WBC    Outcome: Adequate for Discharge     Problem: SAFETY ADULT  Goal: Patient will remain free of falls  Description: INTERVENTIONS:  - Educate patient/family on patient safety including physical limitations  - Instruct patient to call for assistance with activity   - Consult OT/PT to assist with strengthening/mobility   - Keep Call bell within reach  - Keep bed low and locked with side rails adjusted as appropriate  - Keep care items and personal belongings within reach  - Initiate and maintain comfort rounds  - Make Fall Risk Sign visible to staff  - Apply yellow socks and bracelet for high fall risk patients  - Consider moving patient to room near nurses station  10/25/2023 0907 by Margarita Higginbotham RN  Outcome: Adequate for Discharge  10/25/2023 0755 by Margarita Higginbotham RN  Outcome: Progressing  Goal: Maintain or return to baseline ADL function  Description: INTERVENTIONS:  -  Assess patient's ability to carry out ADLs; assess patient's baseline for ADL function and identify physical deficits which impact ability to perform ADLs (bathing, care of mouth/teeth, toileting, grooming, dressing, etc.)  - Assess/evaluate cause of self-care deficits   - Assess range of motion  - Assess patient's mobility; develop plan if impaired  - Assess patient's need for assistive devices and provide as appropriate  - Encourage maximum independence but intervene and supervise when necessary  - Involve family in performance of ADLs  - Assess for home care needs following discharge   - Consider OT consult to assist with ADL evaluation and planning for discharge  - Provide patient education as appropriate  Outcome: Adequate for Discharge  Goal: Maintains/Returns to pre admission functional level  Description: INTERVENTIONS:  - Perform BMAT or MOVE assessment daily.   - Set and communicate daily mobility goal to care team and patient/family/caregiver.    - Collaborate with rehabilitation services on mobility goals if consulted  - Out of bed for toileting  - Record patient progress and toleration of activity level   Outcome: Adequate for Discharge     Problem: DISCHARGE PLANNING  Goal: Discharge to home or other facility with appropriate resources  Description: INTERVENTIONS:  - Identify barriers to discharge w/patient and caregiver  - Arrange for needed discharge resources and transportation as appropriate  - Identify discharge learning needs (meds, wound care, etc.)  - Arrange for interpretive services to assist at discharge as needed  - Refer to Case Management Department for coordinating discharge planning if the patient needs post-hospital services based on physician/advanced practitioner order or complex needs related to functional status, cognitive ability, or social support system  10/25/2023 0907 by Louisa Hayes RN  Outcome: Adequate for Discharge  10/25/2023 0755 by Louisa Hayes RN  Outcome: Progressing     Problem: Knowledge Deficit  Goal: Patient/family/caregiver demonstrates understanding of disease process, treatment plan, medications, and discharge instructions  Description: Complete learning assessment and assess knowledge base.   Interventions:  - Provide teaching at level of understanding  - Provide teaching via preferred learning methods  Outcome: Adequate for Discharge     Problem: Potential for Falls  Goal: Patient will remain free of falls  Description: INTERVENTIONS:  - Educate patient/family on patient safety including physical limitations  - Instruct patient to call for assistance with activity   - Consult OT/PT to assist with strengthening/mobility   - Keep Call bell within reach  - Keep bed low and locked with side rails adjusted as appropriate  - Keep care items and personal belongings within reach  - Initiate and maintain comfort rounds  - Make Fall Risk Sign visible to staff  - Apply yellow socks and bracelet for high fall risk patients  - Consider moving patient to room near nurses station  Outcome: Adequate for Discharge     Problem: MUSCULOSKELETAL - ADULT  Goal: Maintain or return mobility to safest level of function  Description: INTERVENTIONS:  - Assess patient's ability to carry out ADLs; assess patient's baseline for ADL function and identify physical deficits which impact ability to perform ADLs (bathing, care of mouth/teeth, toileting, grooming, dressing, etc.)  - Assess/evaluate cause of self-care deficits   - Assess range of motion  - Assess patient's mobility  - Assess patient's need for assistive devices and provide as appropriate  - Encourage maximum independence but intervene and supervise when necessary  - Involve family in performance of ADLs  - Assess for home care needs following discharge   - Consider OT consult to assist with ADL evaluation and planning for discharge  - Provide patient education as appropriate  Outcome: Adequate for Discharge  Goal: Maintain proper alignment of affected body part  Description: INTERVENTIONS:  - Support, maintain and protect limb and body alignment  - Provide patient/ family with appropriate education  Outcome: Adequate for Discharge     Problem: MOBILITY - ADULT  Goal: Maintain or return to baseline ADL function  Description: INTERVENTIONS:  -  Assess patient's ability to carry out ADLs; assess patient's baseline for ADL function and identify physical deficits which impact ability to perform ADLs (bathing, care of mouth/teeth, toileting, grooming, dressing, etc.)  - Assess/evaluate cause of self-care deficits   - Assess range of motion  - Assess patient's mobility; develop plan if impaired  - Assess patient's need for assistive devices and provide as appropriate  - Encourage maximum independence but intervene and supervise when necessary  - Involve family in performance of ADLs  - Assess for home care needs following discharge   - Consider OT consult to assist with ADL evaluation and planning for discharge  - Provide patient education as appropriate  Outcome: Adequate for Discharge  Goal: Maintains/Returns to pre admission functional level  Description: INTERVENTIONS:  - Perform BMAT or MOVE assessment daily.   - Set and communicate daily mobility goal to care team and patient/family/caregiver.    - Collaborate with rehabilitation services on mobility goals if consulted  - Out of bed for toileting  - Record patient progress and toleration of activity level   Outcome: Adequate for Discharge     Problem: Prexisting or High Potential for Compromised Skin Integrity  Goal: Skin integrity is maintained or improved  Description: INTERVENTIONS:  - Identify patients at risk for skin breakdown  - Assess and monitor skin integrity  - Assess and monitor nutrition and hydration status  - Monitor labs   - Assess for incontinence   - Turn and reposition patient  - Assist with mobility/ambulation  - Relieve pressure over bony prominences  - Avoid friction and shearing  - Provide appropriate hygiene as needed including keeping skin clean and dry  - Evaluate need for skin moisturizer/barrier cream  - Collaborate with interdisciplinary team   - Patient/family teaching  - Consider wound care consult   Outcome: Adequate for Discharge     Problem: SKIN/TISSUE INTEGRITY - ADULT  Goal: Incision(s), wounds(s) or drain site(s) healing without S/S of infection  Description: INTERVENTIONS  - Assess and document dressing, incision, wound bed, drain sites and surrounding tissue  - Provide patient and family education  Outcome: Adequate for Discharge

## 2023-10-25 NOTE — NURSING NOTE
Patient discharged to home with home PT services. Patient accompanied by his spouse and other family/friends. Reviewed AVS including medication list, instructions and follow up appointments with patient and spouse without question or concern. No written prescriptions, patient aware meds sent to local pharmacy. Belongings at bedside, nothing locked with security. Patient's home Prevacid in inpatient pharmacy was returned prior to discharge. Patient's right knee remains in hinged knee brace with ace wrap. Dme for home use sent with patient, commode and RW. Patient transported off unit with PT staff.
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
## 2023-10-26 ENCOUNTER — TELEPHONE (OUTPATIENT)
Age: 73
End: 2023-10-26

## 2023-10-26 ENCOUNTER — HOME CARE VISIT (OUTPATIENT)
Dept: HOME HEALTH SERVICES | Facility: HOME HEALTHCARE | Age: 73
End: 2023-10-26
Payer: MEDICARE

## 2023-10-26 VITALS
DIASTOLIC BLOOD PRESSURE: 72 MMHG | WEIGHT: 195 LBS | HEART RATE: 78 BPM | HEIGHT: 68 IN | OXYGEN SATURATION: 97 % | BODY MASS INDEX: 29.55 KG/M2 | TEMPERATURE: 97.3 F | SYSTOLIC BLOOD PRESSURE: 118 MMHG

## 2023-10-26 PROCEDURE — G0151 HHCP-SERV OF PT,EA 15 MIN: HCPCS

## 2023-10-26 PROCEDURE — 10330081 VN NO-PAY CLAIM PROCEDURE

## 2023-10-26 PROCEDURE — 400013 VN SOC

## 2023-10-26 NOTE — TELEPHONE ENCOUNTER
Caller: Lonnie WASSERMAN PT    Doctor: Phyllis Georges    Reason for call:ROWDY WASSERMAN is calling tto find out when they can start doing range of motion on the right knee with the hinged brace and when can they take it out of the brace to work on range of motion? Also, patient has a contusion on the left leg, can they still work on that leg as well as long as he tolerates it?     Call back#: [phone]
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
## 2023-10-26 NOTE — PROGRESS NOTES
10/26/23 1241   Hello, [Guardian’s Name / Patient’s Lacretia Yoly, this is [Caller Lacretia Yoly from [facility], and our clinical care team wanted to check on you / your child after your recent visit to the hospital. It will only take 3-5 minutes. Is this a good time? Discharge Call Type/ Specific Diagnosis: General Call   Call Complete   Attempted Number of Calls 1   Discharge phone call complete?  Left Message

## 2023-10-26 NOTE — CASE COMMUNICATION
Dr Sudeep Nicholson,    What are the pts restrictions concerning ROM for BLE? Please clarify movements and ROM you would like home PT to stay away from. Please clarify movements and ROM you would like home PT to complete and are safe.     Thank you,  Sean HILLST

## 2023-10-27 ENCOUNTER — HOSPITAL ENCOUNTER (EMERGENCY)
Facility: HOSPITAL | Age: 73
Discharge: HOME | End: 2023-10-27
Attending: EMERGENCY MEDICINE
Payer: MEDICARE

## 2023-10-27 ENCOUNTER — APPOINTMENT (EMERGENCY)
Dept: RADIOLOGY | Facility: HOSPITAL | Age: 73
End: 2023-10-27
Payer: MEDICARE

## 2023-10-27 VITALS
SYSTOLIC BLOOD PRESSURE: 143 MMHG | OXYGEN SATURATION: 99 % | HEART RATE: 68 BPM | TEMPERATURE: 98.1 F | RESPIRATION RATE: 18 BRPM | DIASTOLIC BLOOD PRESSURE: 68 MMHG

## 2023-10-27 DIAGNOSIS — M25.562 LEFT KNEE PAIN: Primary | ICD-10-CM

## 2023-10-27 PROBLEM — H61.23 BILATERAL IMPACTED CERUMEN: Status: RESOLVED | Noted: 2022-03-29 | Resolved: 2023-10-27

## 2023-10-27 PROCEDURE — 99284 EMERGENCY DEPT VISIT MOD MDM: CPT | Performed by: EMERGENCY MEDICINE

## 2023-10-27 PROCEDURE — 73564 X-RAY EXAM KNEE 4 OR MORE: CPT

## 2023-10-27 PROCEDURE — 99283 EMERGENCY DEPT VISIT LOW MDM: CPT
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
## 2023-10-27 NOTE — OCCUPATIONAL THERAPY NOTE
OT DISCHARGE SUMMARY    Pt made good progress during stay on the ARC following admission for traumatic rupture of quadriceps tendon, right. Pt presented upon IE with generalized weakness, decreased endurance, activity tolerance, and functional mobility. On evaluation, pt required Ax2 to complete all ADLs and functional transfers. Pt was discharged to home with wife and friend Rosalva Anderson support. IE goals were set for SUP, however, pt making G progress and upgraded to Leonel, except for HKB mngmnt Pt currently functioning at [address] level for ADL (except Noah for RLE bathing 2* HKB), Leonel level for transfers with RW. The following DME was recommended BSC, RW, hip kit. Family training occurred with wife Jewell Zabala and friend Rosalva Anderson on 10/25/23. Pt engaged in 30 minute skilled OT session focused on family training with wife Jewell Zabala and friend Rosalva Anderson. Pt w/ in room privileges starting yesterday 10/24. Pt overall independent in all ADLs. A req for ace wrap and brace mgmt on RLE. family edu on wrapping/unwrapping ace wrap and donning brace, and skin integrity. Family w/ verbal understanding. No further OT services warranted at this time, pt to follow up with OP PT only. Pt completed Rudolph Cognitive Assessment (MOCA) version 8.1. Pt scored overall 20 / 30  indicating a Mild cognitive deficit. 10/18/23  Visuospatial/executive: 5/5   Naming: 3/3   Memory:    (worth no points)   Attention:  5/ 6   Language: 1 / 3   Abstraction: 1 / 2   Delayed recall: 1 / 5   Orientation: 4 / 6   Completed 12 year edu.       Total score 20/30, indicating a mild cognitive deficit.       -Miguel Limon MS, OTR/L, CSRS

## 2023-10-27 NOTE — PROGRESS NOTES
10/27/23 1336   Hello, [Guardian’s Name / Patient’s Ramy Shelton, this is [Caller Ramy Shelton from [facility], and our clinical care team wanted to check on you / your child after your recent visit to the hospital. It will only take 3-5 minutes. Is this a good time? Discharge Call Type/ Specific Diagnosis: General Call   Call Complete   Attempted Number of Calls 2   Discharge phone call complete?  Left Message

## 2023-10-30 NOTE — CASE COMMUNICATION
[facility] has admitted your patient to [facility] service with the following disciplines:      PT    This report is informational only, no responses is needed  Primary focus of home health care MUSCULOSKELETAL    Patient stated goals of care to get back to hiking and being active, to be able to bend the right knee in order to move better and walk without a device, to be able to get in and out of the shower to bath    Anticipated v isit pattern 1WK1 AND STARTING WEEK OF 10/29/23  9IT1    See medication list - meds in home differ from AVS  pt taking the following medications OTC vitamins prior to recent hospitalization:  Tumeric/bioperine/garlic/darren 0496 mg  [place] Cinnamon 1g/1000mg  Ultimate Mushroom Complex 1g  [place]odiola Rosea 1000 mg    Pt needs to  aspirin. Pt is taking 2 tablets every 8-12 hours of extra strength tylenol 500 mg   Education and instru ction to pt that DC Summary from [facility] concerning medication and dosages for tylenol and aspirin. Pt reports will notify family to  appropriate and prescribed OTC tylenol and aspirin as listed in DC Summary paperwork. Significant clinical findings Edema L knee area and lower L thigh. Pt reports slipping and falling into recliner chair and reports pain L lateral thigh and L lateral knee pain following. Requested and instructed th at pt reports pain to ortho MD during appointment that is scheduled for Monday 10/30/23. Pt agreed. Potential barriers to goal achievement pain, pt has large dog,edema  Other pertinent information pt currently is receiving A and help from local family and wife. Pt is never left alone per pt reporting. PC made to pts PCP concerning home PT POC. Message left    Thank you for allowing us to participate in the care of your patient.       [address]  DPT

## 2023-10-31 ENCOUNTER — OFFICE VISIT (OUTPATIENT)
Dept: OBGYN CLINIC | Facility: CLINIC | Age: 73
End: 2023-10-31

## 2023-10-31 VITALS — WEIGHT: 195 LBS | HEIGHT: 68 IN | BODY MASS INDEX: 29.55 KG/M2

## 2023-10-31 DIAGNOSIS — S76.111S QUADRICEPS TENDON RUPTURE, RIGHT, SEQUELA: Primary | ICD-10-CM

## 2023-10-31 PROCEDURE — 99024 POSTOP FOLLOW-UP VISIT: CPT | Performed by: STUDENT IN AN ORGANIZED HEALTH CARE EDUCATION/TRAINING PROGRAM
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
## 2023-11-01 ENCOUNTER — HOME CARE VISIT (OUTPATIENT)
Dept: HOME HEALTH SERVICES | Facility: HOME HEALTHCARE | Age: 73
End: 2023-11-01
Payer: MEDICARE

## 2023-11-01 VITALS — OXYGEN SATURATION: 98 % | SYSTOLIC BLOOD PRESSURE: 148 MMHG | HEART RATE: 101 BPM | DIASTOLIC BLOOD PRESSURE: 82 MMHG

## 2023-11-01 PROCEDURE — G0151 HHCP-SERV OF PT,EA 15 MIN: HCPCS

## 2023-11-01 PROCEDURE — G0321 AUDIO-ONLY HHS: HCPCS

## 2023-11-03 ENCOUNTER — HOME CARE VISIT (OUTPATIENT)
Dept: HOME HEALTH SERVICES | Facility: HOME HEALTHCARE | Age: 73
End: 2023-11-03
Payer: MEDICARE

## 2023-11-03 VITALS — DIASTOLIC BLOOD PRESSURE: 82 MMHG | HEART RATE: 107 BPM | SYSTOLIC BLOOD PRESSURE: 142 MMHG | OXYGEN SATURATION: 97 %

## 2023-11-03 PROCEDURE — G0151 HHCP-SERV OF PT,EA 15 MIN: HCPCS

## 2023-11-06 ENCOUNTER — HOME CARE VISIT (OUTPATIENT)
Dept: HOME HEALTH SERVICES | Facility: HOME HEALTHCARE | Age: 73
End: 2023-11-06
Payer: MEDICARE

## 2023-11-06 VITALS — SYSTOLIC BLOOD PRESSURE: 142 MMHG | HEART RATE: 96 BPM | OXYGEN SATURATION: 99 % | DIASTOLIC BLOOD PRESSURE: 80 MMHG

## 2023-11-06 PROCEDURE — G0151 HHCP-SERV OF PT,EA 15 MIN: HCPCS

## 2023-11-06 PROCEDURE — G0180 MD CERTIFICATION HHA PATIENT: HCPCS | Performed by: PHYSICAL MEDICINE & REHABILITATION

## 2023-11-08 ENCOUNTER — HOME CARE VISIT (OUTPATIENT)
Dept: HOME HEALTH SERVICES | Facility: HOME HEALTHCARE | Age: 73
End: 2023-11-08
Payer: MEDICARE

## 2023-11-08 VITALS — HEART RATE: 87 BPM | OXYGEN SATURATION: 99 % | SYSTOLIC BLOOD PRESSURE: 130 MMHG | DIASTOLIC BLOOD PRESSURE: 80 MMHG

## 2023-11-08 PROCEDURE — G0151 HHCP-SERV OF PT,EA 15 MIN: HCPCS

## 2023-11-10 LAB
DME PARACHUTE DELIVERY DATE ACTUAL: NORMAL
DME PARACHUTE DELIVERY DATE REQUESTED: NORMAL
DME PARACHUTE ITEM DESCRIPTION: NORMAL
DME PARACHUTE ITEM DESCRIPTION: NORMAL
DME PARACHUTE ORDER STATUS: NORMAL
DME PARACHUTE SUPPLIER NAME: NORMAL
DME PARACHUTE SUPPLIER PHONE: NORMAL

## 2023-11-13 ENCOUNTER — HOME CARE VISIT (OUTPATIENT)
Dept: HOME HEALTH SERVICES | Facility: HOME HEALTHCARE | Age: 73
End: 2023-11-13
Payer: MEDICARE

## 2023-11-13 VITALS — SYSTOLIC BLOOD PRESSURE: 138 MMHG | DIASTOLIC BLOOD PRESSURE: 82 MMHG | HEART RATE: 53 BPM | OXYGEN SATURATION: 99 %

## 2023-11-13 PROCEDURE — G0151 HHCP-SERV OF PT,EA 15 MIN: HCPCS

## 2023-11-15 ENCOUNTER — HOME CARE VISIT (OUTPATIENT)
Dept: HOME HEALTH SERVICES | Facility: HOME HEALTHCARE | Age: 73
End: 2023-11-15
Payer: MEDICARE

## 2023-11-15 VITALS — DIASTOLIC BLOOD PRESSURE: 82 MMHG | SYSTOLIC BLOOD PRESSURE: 124 MMHG | OXYGEN SATURATION: 99 % | HEART RATE: 68 BPM

## 2023-11-15 PROCEDURE — G0151 HHCP-SERV OF PT,EA 15 MIN: HCPCS

## 2023-11-18 ENCOUNTER — HOME CARE VISIT (OUTPATIENT)
Dept: HOME HEALTH SERVICES | Facility: HOME HEALTHCARE | Age: 73
End: 2023-11-18
Payer: MEDICARE

## 2023-11-18 NOTE — CASE COMMUNICATION
1100. [facility] into office regarding having trouble locking his DONNELL brace.  Notified Kristal Walter PT who is agreeable to call patient to assist.

## 2023-11-20 ENCOUNTER — HOME CARE VISIT (OUTPATIENT)
Dept: HOME HEALTH SERVICES | Facility: HOME HEALTHCARE | Age: 73
End: 2023-11-20
Payer: MEDICARE

## 2023-11-20 VITALS — HEART RATE: 69 BPM | OXYGEN SATURATION: 99 % | DIASTOLIC BLOOD PRESSURE: 80 MMHG | SYSTOLIC BLOOD PRESSURE: 130 MMHG

## 2023-11-20 PROCEDURE — G0151 HHCP-SERV OF PT,EA 15 MIN: HCPCS

## 2023-11-24 ENCOUNTER — HOME CARE VISIT (OUTPATIENT)
Dept: HOME HEALTH SERVICES | Facility: HOME HEALTHCARE | Age: 73
End: 2023-11-24
Payer: MEDICARE

## 2023-11-24 VITALS — HEART RATE: 81 BPM | OXYGEN SATURATION: 99 % | SYSTOLIC BLOOD PRESSURE: 130 MMHG | DIASTOLIC BLOOD PRESSURE: 70 MMHG

## 2023-11-24 PROCEDURE — G0151 HHCP-SERV OF PT,EA 15 MIN: HCPCS

## 2023-11-27 ENCOUNTER — HOME CARE VISIT (OUTPATIENT)
Dept: HOME HEALTH SERVICES | Facility: HOME HEALTHCARE | Age: 73
End: 2023-11-27
Payer: MEDICARE

## 2023-11-27 VITALS — SYSTOLIC BLOOD PRESSURE: 140 MMHG | DIASTOLIC BLOOD PRESSURE: 80 MMHG | HEART RATE: 72 BPM

## 2023-11-27 PROCEDURE — G0151 HHCP-SERV OF PT,EA 15 MIN: HCPCS

## 2023-11-30 ENCOUNTER — HOME CARE VISIT (OUTPATIENT)
Dept: HOME HEALTH SERVICES | Facility: HOME HEALTHCARE | Age: 73
End: 2023-11-30
Payer: MEDICARE

## 2023-11-30 VITALS — HEART RATE: 66 BPM | SYSTOLIC BLOOD PRESSURE: 134 MMHG | DIASTOLIC BLOOD PRESSURE: 80 MMHG

## 2023-11-30 PROCEDURE — G0151 HHCP-SERV OF PT,EA 15 MIN: HCPCS

## 2023-12-04 ENCOUNTER — TELEPHONE (OUTPATIENT)
Dept: OBGYN CLINIC | Facility: HOSPITAL | Age: 73
End: 2023-12-04

## 2023-12-04 NOTE — TELEPHONE ENCOUNTER
Caller: Patient    Doctor: Naveen Leavitt    Reason for call: Patient is coming in for PO visit on rt knee and would like to know if you could put in a request to have a CT scan done of his Left leg since he is having some trouble with it since he had surgery on the other. Would like to have it done before his visit with you 12/12/23.      Call back#: [phone]

## 2023-12-05 ENCOUNTER — APPOINTMENT (EMERGENCY)
Dept: VASCULAR ULTRASOUND | Facility: HOSPITAL | Age: 73
DRG: 301 | End: 2023-12-05
Payer: MEDICARE

## 2023-12-05 ENCOUNTER — HOSPITAL ENCOUNTER (INPATIENT)
Facility: HOSPITAL | Age: 73
LOS: 1 days | Discharge: HOME | DRG: 301 | End: 2023-12-06
Attending: EMERGENCY MEDICINE | Admitting: INTERNAL MEDICINE
Payer: MEDICARE

## 2023-12-05 ENCOUNTER — APPOINTMENT (EMERGENCY)
Dept: RADIOLOGY | Facility: HOSPITAL | Age: 73
DRG: 301 | End: 2023-12-05
Payer: MEDICARE

## 2023-12-05 DIAGNOSIS — S76.111A: ICD-10-CM

## 2023-12-05 DIAGNOSIS — I82.409 DVT (DEEP VENOUS THROMBOSIS) (HCC): ICD-10-CM

## 2023-12-05 DIAGNOSIS — I82.412 ACUTE DEEP VEIN THROMBOSIS (DVT) OF FEMORAL VEIN OF LEFT LOWER EXTREMITY (HCC): Primary | ICD-10-CM

## 2023-12-05 DIAGNOSIS — M25.572 ACUTE LEFT ANKLE PAIN: ICD-10-CM

## 2023-12-05 DIAGNOSIS — R26.2 AMBULATORY DYSFUNCTION: ICD-10-CM

## 2023-12-05 DIAGNOSIS — M18.31 POST-TRAUMATIC OSTEOARTHRITIS OF FIRST CARPOMETACARPAL JOINT OF RIGHT HAND: ICD-10-CM

## 2023-12-05 LAB
ALBUMIN SERPL BCP-MCNC: 3.8 G/DL (ref 3.5–5)
ALP SERPL-CCNC: 96 U/L (ref 34–104)
ALT SERPL W P-5'-P-CCNC: 24 U/L (ref 7–52)
ANION GAP SERPL CALCULATED.3IONS-SCNC: 9 MMOL/L
APTT PPP: 38 SECONDS (ref 23–37)
AST SERPL W P-5'-P-CCNC: 16 U/L (ref 13–39)
BASOPHILS # BLD MANUAL: 0 THOUSAND/UL (ref 0–0.1)
BASOPHILS NFR MAR MANUAL: 0 % (ref 0–1)
BILIRUB DIRECT SERPL-MCNC: 0.15 MG/DL (ref 0–0.2)
BILIRUB SERPL-MCNC: 0.77 MG/DL (ref 0.2–1)
BUN SERPL-MCNC: 13 MG/DL (ref 5–25)
CALCIUM SERPL-MCNC: 9.2 MG/DL (ref 8.4–10.2)
CHLORIDE SERPL-SCNC: 101 MMOL/L (ref 96–108)
CO2 SERPL-SCNC: 28 MMOL/L (ref 21–32)
CREAT SERPL-MCNC: 0.64 MG/DL (ref 0.6–1.3)
EOSINOPHIL # BLD MANUAL: 0 THOUSAND/UL (ref 0–0.4)
EOSINOPHIL NFR BLD MANUAL: 0 % (ref 0–6)
ERYTHROCYTE [DISTWIDTH] IN BLOOD BY AUTOMATED COUNT: 13.7 % (ref 11.6–15.1)
GFR SERPL CREATININE-BSD FRML MDRD: 95 ML/MIN/1.73SQ M
GLUCOSE SERPL-MCNC: 121 MG/DL (ref 65–140)
HCT VFR BLD AUTO: 38.1 % (ref 36.5–49.3)
HGB BLD-MCNC: 12.4 G/DL (ref 12–17)
INR PPP: 1.18 (ref 0.84–1.19)
LG PLATELETS BLD QL SMEAR: PRESENT
LYMPHOCYTES # BLD AUTO: 1.27 THOUSAND/UL (ref 0.6–4.47)
LYMPHOCYTES # BLD AUTO: 25 % (ref 14–44)
MCH RBC QN AUTO: 29.2 PG (ref 26.8–34.3)
MCHC RBC AUTO-ENTMCNC: 32.5 G/DL (ref 31.4–37.4)
MCV RBC AUTO: 90 FL (ref 82–98)
MONOCYTES # BLD AUTO: 1.27 THOUSAND/UL (ref 0–1.22)
MONOCYTES NFR BLD: 25 % (ref 4–12)
NEUTROPHILS # BLD MANUAL: 2.54 THOUSAND/UL (ref 1.85–7.62)
NEUTS BAND NFR BLD MANUAL: 3 % (ref 0–8)
NEUTS SEG NFR BLD AUTO: 47 % (ref 43–75)
OVALOCYTES BLD QL SMEAR: PRESENT
PLATELET # BLD AUTO: 166 THOUSANDS/UL (ref 149–390)
PLATELET BLD QL SMEAR: ADEQUATE
PMV BLD AUTO: 10.5 FL (ref 8.9–12.7)
POLYCHROMASIA BLD QL SMEAR: PRESENT
POTASSIUM SERPL-SCNC: 4 MMOL/L (ref 3.5–5.3)
PROT SERPL-MCNC: 6.7 G/DL (ref 6.4–8.4)
PROTHROMBIN TIME: 14.9 SECONDS (ref 11.6–14.5)
RBC # BLD AUTO: 4.24 MILLION/UL (ref 3.88–5.62)
RBC MORPH BLD: PRESENT
SODIUM SERPL-SCNC: 138 MMOL/L (ref 135–147)
WBC # BLD AUTO: 5.07 THOUSAND/UL (ref 4.31–10.16)

## 2023-12-05 PROCEDURE — 99285 EMERGENCY DEPT VISIT HI MDM: CPT | Performed by: EMERGENCY MEDICINE

## 2023-12-05 PROCEDURE — 73610 X-RAY EXAM OF ANKLE: CPT

## 2023-12-05 PROCEDURE — 99284 EMERGENCY DEPT VISIT MOD MDM: CPT

## 2023-12-05 PROCEDURE — 85007 BL SMEAR W/DIFF WBC COUNT: CPT | Performed by: EMERGENCY MEDICINE

## 2023-12-05 PROCEDURE — 93971 EXTREMITY STUDY: CPT

## 2023-12-05 PROCEDURE — 85027 COMPLETE CBC AUTOMATED: CPT | Performed by: EMERGENCY MEDICINE

## 2023-12-05 PROCEDURE — 80076 HEPATIC FUNCTION PANEL: CPT | Performed by: INTERNAL MEDICINE

## 2023-12-05 PROCEDURE — 36415 COLL VENOUS BLD VENIPUNCTURE: CPT | Performed by: EMERGENCY MEDICINE

## 2023-12-05 PROCEDURE — 85610 PROTHROMBIN TIME: CPT | Performed by: EMERGENCY MEDICINE

## 2023-12-05 PROCEDURE — 93971 EXTREMITY STUDY: CPT | Performed by: SURGERY

## 2023-12-05 PROCEDURE — 99223 1ST HOSP IP/OBS HIGH 75: CPT | Performed by: NURSE PRACTITIONER

## 2023-12-05 PROCEDURE — 85730 THROMBOPLASTIN TIME PARTIAL: CPT | Performed by: EMERGENCY MEDICINE

## 2023-12-05 PROCEDURE — 80048 BASIC METABOLIC PNL TOTAL CA: CPT | Performed by: EMERGENCY MEDICINE

## 2023-12-05 RX ORDER — ONDANSETRON 2 MG/ML
4 INJECTION INTRAMUSCULAR; INTRAVENOUS EVERY 6 HOURS PRN
Status: DISCONTINUED | OUTPATIENT
Start: 2023-12-05 | End: 2023-12-06 | Stop reason: HOSPADM

## 2023-12-05 RX ORDER — OXYCODONE HYDROCHLORIDE 5 MG/1
5 TABLET ORAL 2 TIMES DAILY PRN
Status: DISCONTINUED | OUTPATIENT
Start: 2023-12-05 | End: 2023-12-06 | Stop reason: HOSPADM

## 2023-12-05 RX ORDER — DOCUSATE SODIUM 100 MG/1
100 CAPSULE, LIQUID FILLED ORAL 2 TIMES DAILY
Status: DISCONTINUED | OUTPATIENT
Start: 2023-12-05 | End: 2023-12-06 | Stop reason: HOSPADM

## 2023-12-05 RX ORDER — ACETAMINOPHEN 325 MG/1
975 TABLET ORAL ONCE
Status: COMPLETED | OUTPATIENT
Start: 2023-12-05 | End: 2023-12-05

## 2023-12-05 RX ORDER — HEPARIN SODIUM 10000 [USP'U]/100ML
3-30 INJECTION, SOLUTION INTRAVENOUS
Status: DISCONTINUED | OUTPATIENT
Start: 2023-12-05 | End: 2023-12-05

## 2023-12-05 RX ORDER — PANTOPRAZOLE SODIUM 40 MG/1
40 TABLET, DELAYED RELEASE ORAL
Status: DISCONTINUED | OUTPATIENT
Start: 2023-12-06 | End: 2023-12-06 | Stop reason: HOSPADM

## 2023-12-05 RX ORDER — HEPARIN SODIUM 1000 [USP'U]/ML
6800 INJECTION, SOLUTION INTRAVENOUS; SUBCUTANEOUS EVERY 6 HOURS PRN
Status: DISCONTINUED | OUTPATIENT
Start: 2023-12-05 | End: 2023-12-05

## 2023-12-05 RX ORDER — HEPARIN SODIUM 1000 [USP'U]/ML
3400 INJECTION, SOLUTION INTRAVENOUS; SUBCUTANEOUS EVERY 6 HOURS PRN
Status: DISCONTINUED | OUTPATIENT
Start: 2023-12-05 | End: 2023-12-05

## 2023-12-05 RX ORDER — DIPHENOXYLATE HYDROCHLORIDE AND ATROPINE SULFATE 2.5; .025 MG/1; MG/1
1 TABLET ORAL EVERY MORNING
Status: DISCONTINUED | OUTPATIENT
Start: 2023-12-06 | End: 2023-12-05

## 2023-12-05 RX ORDER — HEPARIN SODIUM 1000 [USP'U]/ML
6800 INJECTION, SOLUTION INTRAVENOUS; SUBCUTANEOUS ONCE
Status: COMPLETED | OUTPATIENT
Start: 2023-12-05 | End: 2023-12-05

## 2023-12-05 RX ORDER — ACETAMINOPHEN 325 MG/1
650 TABLET ORAL EVERY 6 HOURS PRN
Status: DISCONTINUED | OUTPATIENT
Start: 2023-12-05 | End: 2023-12-06 | Stop reason: HOSPADM

## 2023-12-05 RX ADMIN — Medication 2 G: at 08:04

## 2023-12-05 RX ADMIN — APIXABAN 10 MG: 5 TABLET, FILM COATED ORAL at 17:18

## 2023-12-05 RX ADMIN — HEPARIN SODIUM 18 UNITS/KG/HR: 10000 INJECTION, SOLUTION INTRAVENOUS at 10:54

## 2023-12-05 RX ADMIN — ACETAMINOPHEN 650 MG: 325 TABLET, FILM COATED ORAL at 19:46

## 2023-12-05 RX ADMIN — HEPARIN SODIUM 6800 UNITS: 1000 INJECTION INTRAVENOUS; SUBCUTANEOUS at 10:54

## 2023-12-05 RX ADMIN — ACETAMINOPHEN 975 MG: 325 TABLET, FILM COATED ORAL at 08:04
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
## 2023-12-05 NOTE — ASSESSMENT & PLAN NOTE
Patient presented to the ED for severe left lower extremity pain since yesterday  Was seen by orthopedic yesterday no acute concerns from their standpoint  Lower extremity duplex reveals nonocclusive thrombus in the distal femoral vein.    Initially started on a heparin drip in the ED  Transition to Eliquis once presented to the floor  Patient was unable to ambulate secondary to pain which she states is now improving  PT/OT consult

## 2023-12-05 NOTE — ASSESSMENT & PLAN NOTE
Xray-No acute osseous abnormality.    Could be related to some referred pain secondary to nonocclusive DVT
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
## 2023-12-06 VITALS
TEMPERATURE: 97.8 F | OXYGEN SATURATION: 98 % | SYSTOLIC BLOOD PRESSURE: 121 MMHG | WEIGHT: 187.6 LBS | RESPIRATION RATE: 20 BRPM | HEIGHT: 68 IN | HEART RATE: 68 BPM | BODY MASS INDEX: 28.43 KG/M2 | DIASTOLIC BLOOD PRESSURE: 64 MMHG

## 2023-12-06 LAB
ALBUMIN SERPL BCP-MCNC: 3.8 G/DL (ref 3.5–5)
ALP SERPL-CCNC: 94 U/L (ref 34–104)
ALT SERPL W P-5'-P-CCNC: 20 U/L (ref 7–52)
ANION GAP SERPL CALCULATED.3IONS-SCNC: 7 MMOL/L
AST SERPL W P-5'-P-CCNC: 14 U/L (ref 13–39)
BILIRUB SERPL-MCNC: 0.7 MG/DL (ref 0.2–1)
BUN SERPL-MCNC: 12 MG/DL (ref 5–25)
CALCIUM SERPL-MCNC: 9.4 MG/DL (ref 8.4–10.2)
CHLORIDE SERPL-SCNC: 103 MMOL/L (ref 96–108)
CO2 SERPL-SCNC: 30 MMOL/L (ref 21–32)
CREAT SERPL-MCNC: 0.64 MG/DL (ref 0.6–1.3)
ERYTHROCYTE [DISTWIDTH] IN BLOOD BY AUTOMATED COUNT: 13.5 % (ref 11.6–15.1)
GFR SERPL CREATININE-BSD FRML MDRD: 95 ML/MIN/1.73SQ M
GLUCOSE SERPL-MCNC: 110 MG/DL (ref 65–140)
HCT VFR BLD AUTO: 39.8 % (ref 36.5–49.3)
HGB BLD-MCNC: 12.9 G/DL (ref 12–17)
MAGNESIUM SERPL-MCNC: 2.2 MG/DL (ref 1.9–2.7)
MCH RBC QN AUTO: 29.2 PG (ref 26.8–34.3)
MCHC RBC AUTO-ENTMCNC: 32.4 G/DL (ref 31.4–37.4)
MCV RBC AUTO: 90 FL (ref 82–98)
PHOSPHATE SERPL-MCNC: 3.4 MG/DL (ref 2.3–4.1)
PLATELET # BLD AUTO: 181 THOUSANDS/UL (ref 149–390)
PMV BLD AUTO: 11.2 FL (ref 8.9–12.7)
POTASSIUM SERPL-SCNC: 3.9 MMOL/L (ref 3.5–5.3)
PROT SERPL-MCNC: 6.9 G/DL (ref 6.4–8.4)
RBC # BLD AUTO: 4.42 MILLION/UL (ref 3.88–5.62)
SODIUM SERPL-SCNC: 140 MMOL/L (ref 135–147)
WBC # BLD AUTO: 3.48 THOUSAND/UL (ref 4.31–10.16)

## 2023-12-06 PROCEDURE — 83735 ASSAY OF MAGNESIUM: CPT | Performed by: NURSE PRACTITIONER

## 2023-12-06 PROCEDURE — 80053 COMPREHEN METABOLIC PANEL: CPT | Performed by: NURSE PRACTITIONER

## 2023-12-06 PROCEDURE — 97166 OT EVAL MOD COMPLEX 45 MIN: CPT

## 2023-12-06 PROCEDURE — 85027 COMPLETE CBC AUTOMATED: CPT | Performed by: NURSE PRACTITIONER

## 2023-12-06 PROCEDURE — 99239 HOSP IP/OBS DSCHRG MGMT >30: CPT | Performed by: INTERNAL MEDICINE

## 2023-12-06 PROCEDURE — 84100 ASSAY OF PHOSPHORUS: CPT | Performed by: NURSE PRACTITIONER

## 2023-12-06 RX ADMIN — APIXABAN 10 MG: 5 TABLET, FILM COATED ORAL at 09:05

## 2023-12-06 RX ADMIN — PANTOPRAZOLE SODIUM 40 MG: 40 TABLET, DELAYED RELEASE ORAL at 05:55

## 2023-12-06 NOTE — CASE MANAGEMENT
Case Management Progress Note    Patient name Vanita Pardo  Location [address] [facility] [id number] MRN [id number]  : 1950 Date 2023       LOS (days): 1  Geometric Mean LOS (GMLOS) (days): 2.10  Days to GMLOS:1        OBJECTIVE:        Current admission status: Inpatient  Preferred Pharmacy:   [address]  Phone: [phone] Fax: [phone] Located within [facility], [address] [place] [address]  Phone: [phone] Fax: [phone]    Primary Care Provider: Roshan Espinal MD    Primary Insurance: MEDICARE  Secondary Insurance: AARP    PROGRESS NOTE:    OSCAR informed by MD that patient needs Eliquis price check. CM call patient pharmacy and the price for 30 days is $141. OSCAR informed the the care team of the eliquis pricing. Care team requested for a co pay servings coupon card. CM called the pharmacy to applied ID # [id number]- Per pharmacy and Eliquis support team, patient is not illegible for this coupon. Care team made aware of the above information.

## 2023-12-06 NOTE — DISCHARGE SUMMARY
[address]  Discharge- Luciana Rain 1950, 74 y.o. male MRN: [id number]  Unit/Bed#: MS Aure-Gabriella Encounter: [id number]  Primary Care Provider: Raj Barrow MD   Date and time admitted to hospital: 12/5/2023  7:21 AM    Acute left ankle pain  Assessment & Plan  Xray-No acute osseous abnormality. Could be related to some referred pain secondary to nonocclusive DVT    Ambulatory dysfunction  Assessment & Plan  Patient was unable to ambulate secondary to pain  Fall precautions  PT/OT consult  Pain improving    * Acute deep vein thrombosis (DVT) of femoral vein of left lower extremity [facility])  Assessment & Plan  Patient presented to the ED for severe left lower extremity pain since yesterday  Was seen by orthopedic yesterday no acute concerns from their standpoint  Lower extremity duplex reveals nonocclusive thrombus in the distal femoral vein. Initially started on a heparin drip in the ED  Transition to Eliquis once presented to the floor  Patient was unable to ambulate secondary to pain which she states is now improving  PT/OT consult                Medical Problems       Resolved Problems  Date Reviewed: 12/6/2023   None         Admission Date:   Admission Orders (From admission, onward)       Ordered        12/05/23 1004  [address]  Once                            Admitting Diagnosis: Ankle pain [M25.579]  Ambulatory dysfunction [R26.2]  Acute deep vein thrombosis (DVT) of femoral vein of left lower extremity ([address]) [I82.412]  Acute left ankle pain [M25.572]    Procedures Performed: No orders of the defined types were placed in this encounter. Summary of Hospital Course:   Patient is a 58-year-old male admitted with sudden onset of left ankle pain and swelling. Patient had recent history of surgery on the right leg with quadriceps tendon repair on 10/12/2023 after he had a fall. Patient has been using a brace and has had limited mobility since the surgery.   Patient presented with left leg pain and increasing swelling at the ankle area and venous Doppler of the lower extremity was done which showed nonocclusive thrombus in the distal femoral vein on the left leg. Was started on heparin drip which was transitioned to Eliquis. Patient will be given therapeutic dose of Eliquis for at least 3 months as it is a provoked DVT. Patient advised to follow-up with PCP and orthopedic team as outpatient. Patient also was evaluated by physical therapy and was cleared. HEENT-PERRLA, moist oral mucosa  Neck-supple, no JVD elevation   Respiratory-equal air entry bilaterally, no rales or rhonchi  Cardiovascular system-S1, S2 heard, no murmur or gallops or rubs  Abdomen-soft, nontender, no guarding or rigidity, bowel sounds heard  Extremities-left ankle swelling noted, right leg is in brace. Peripheral pulses palpable  Musculoskeletal-no contractures,left leg swelling and ankle swelling present   Central nervous system-no acute focal neurological deficit ,no sensory or motor deficit noted. Skin-no rash noted       Significant Findings, Care, Treatment and Services Provided: venous doppler scan     Complications: nil    Condition at Discharge: fair         Discharge instructions/Information to patient and family:   See after visit summary for information provided to patient and family. Provisions for Follow-Up Care:  See after visit summary for information related to follow-up care and any pertinent home health orders. PCP: Yariel Newman MD    Disposition: Home    Planned Readmission: No    Discharge Statement   I spent 45 minutes discharging the patient. This time was spent on the day of discharge. I had direct contact with the patient on the day of discharge. Additional documentation is required if more than 30 minutes were spent on discharge. Discharge Medications:  See after visit summary for reconciled discharge medications provided to patient and family.
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
## 2023-12-12 ENCOUNTER — TELEPHONE (OUTPATIENT)
Age: 73
End: 2023-12-12

## 2023-12-12 ENCOUNTER — OFFICE VISIT (OUTPATIENT)
Dept: OBGYN CLINIC | Facility: CLINIC | Age: 73
End: 2023-12-12

## 2023-12-12 VITALS — HEIGHT: 68 IN | WEIGHT: 187.6 LBS | BODY MASS INDEX: 28.43 KG/M2

## 2023-12-12 DIAGNOSIS — S76.111S QUADRICEPS TENDON RUPTURE, RIGHT, SEQUELA: Primary | ICD-10-CM

## 2023-12-12 PROCEDURE — 99024 POSTOP FOLLOW-UP VISIT: CPT | Performed by: STUDENT IN AN ORGANIZED HEALTH CARE EDUCATION/TRAINING PROGRAM

## 2023-12-12 NOTE — TELEPHONE ENCOUNTER
Called and spoke w/pt and relayed Dr Amin's msg to pt in detail. Pt states that he understands. No further questions/concerns at this time. CB if needed.
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
## 2023-12-15 ENCOUNTER — EVALUATION (OUTPATIENT)
Dept: PHYSICAL THERAPY | Facility: CLINIC | Age: 73
End: 2023-12-15
Payer: MEDICARE

## 2023-12-15 DIAGNOSIS — S76.111D RUPTURE OF RIGHT QUADRICEPS TENDON, SUBSEQUENT ENCOUNTER: Primary | ICD-10-CM

## 2023-12-15 DIAGNOSIS — Z47.89 AFTERCARE FOLLOWING SURGERY OF THE MUSCULOSKELETAL SYSTEM: ICD-10-CM

## 2023-12-15 PROCEDURE — 97161 PT EVAL LOW COMPLEX 20 MIN: CPT | Performed by: PHYSICAL THERAPIST

## 2023-12-15 PROCEDURE — 97110 THERAPEUTIC EXERCISES: CPT | Performed by: PHYSICAL THERAPIST

## 2023-12-15 NOTE — PROGRESS NOTES
PT Evaluation    Today's date: 12/15/2023  Patient name: Emilio Gomez  : 1950  MRN: [id number]  Referring provider: Spencer Kimball DO  Dx:   Encounter Diagnosis     ICD-10-CM    1. Rupture of right quadriceps tendon, subsequent encounter  S76.111D       2. Aftercare following surgery of the musculoskeletal system  Z47.89               Subjective Evaluation     History of Present Illness    Patient presents post-operatively for R quad tendon repair on 10/13/23. He twisted his L ankle getting up. This was from a fall in the park when he was taking pictures. He tripped over a root and then could not stand and was lying in the mud and then had to wait 2.5 hours for help. He has trouble standing up and he has a blood clot in his LLE last week. He is used to walking 4 miles. He was just unlocked in his knee brace this week and was told he has no precautions. He was told he can transition to quad cane but then since the blood clot he has lost some of his balance. MD instructions:  Patient to continue to be weightbearing as tolerated right lower extremity in TROM at 0-100 degrees, unlocked and increase by 10 degrees per week with physical therapy until full ROM is achieved. Discussed with patient that he will begin to ambulate with range of motion from 0 to 100 degrees currently increasing by 10 degrees each week in the T ROM unlocked. The patient can discontinue the T ROM 4 weeks from today's date. 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 He did have home PT. Patient Goals  Patient goals for therapy: Walk s AD and to climb steps and get back where he was before the fall    STGs  1.  Decrease pain by 20% in 2-4 weeks. 2. Improve knee ROM from 0-120 degrees in 2-4 weeks. 3. Improve knee strength by 1/3 grade in 2-4 weeks to perform sit to stand independently s UE. 4. Perform stairs in step to pattern c rail in 4 weeks. 5. Eliminate lift chair in 2 weeks. LTGs  1. Decrease pain by 60% in 6-8 weeks. 2. Improve walking tolerance to >30 minutes in 6-8 weeks to perform community ambulation. 3. Perform job/dressing/showering activities without pain in 6-8 weeks. 4. Return to driving activities in 6 weeks  5 Perform sit to stand s assistance nor UE support in 8 weeks. Objective Measurements:  Observation: quad atrophy, swelling in R calf and pitting edema 10 sec  Incision: all healed  10 cm below R patella 35.7  R patellar 40.7  TUG: nt  Gait:RW even WB bilaterally  LAQ: lacking 12 deg    BRICE:     Knee A/PROM L R Strength L R   Flex   /  66/94 Flex 5 4   Ext  /  12/2 Ext 5 3-           Hip A/PROM        Flex  /  / Flex 5 3   ER at 90   ER     IR at 90   IR     Abd   Abd     Ext   Ext             Lumbar AROM   Ankle     Flex   DF     Ext   PF           Assessment:    Guillermina Medrano is a pleasant 74 y.o. male who presents s/p R quad tendon repair limiting his ability to walk s AD, perform sit to stand independently and safely shower and dress. The patient's greatest concern is getting back to full independent lifestyle. No further referral appears necessary at this time based upon examination results. Etiologic factors include post surgical.    Negative prognostic indicators:none. Positive prognostic indicators: good attitude and motivation. Please contact me if you have any further questions or recommendations.  Thank you very much for the kind referral.        Plan  Patient would benefit from:Skilled physical therapy  Planned therapy interventions: manual therapy, neuromuscular re-education, stretching, strengthening, therapeutic activities, therapeutic exercise, patient education, home exercise program, and activity modification.     Frequency: 2x week  Duration in weeks: 8  Treatment plan discussed with: patient         Goal: Patient's goal is to Walk s AD and to climb steps and get back where he was before the fall    Precautions: WBAT in TROM brace 0-100, increasing 10 deg each week, d/c brace in 4 weeks from 12/12 (1/2)    Dx: R patellar tendon repair 10/13/23    Daily Treatment Diary  Manuals 12/15/2023        Knee PROM 0-100  5'                                   Ther Ex         Bike c brace 0-100         Gastroc slant bd         TKE         Heel slides 20x        HR/TR                                             Neuro Re-ed         Quad set 20x :05        saq         laq         SLR 2x3                 Ther Activity         stairs nv        Sit to stand c foam                  Gait Training         Gait c quad cane nv                 Modalities         CP in elevation

## 2023-12-20 ENCOUNTER — OFFICE VISIT (OUTPATIENT)
Dept: PHYSICAL THERAPY | Facility: CLINIC | Age: 73
End: 2023-12-20
Payer: MEDICARE

## 2023-12-20 DIAGNOSIS — S76.111D RUPTURE OF RIGHT QUADRICEPS TENDON, SUBSEQUENT ENCOUNTER: Primary | ICD-10-CM

## 2023-12-20 DIAGNOSIS — Z47.89 AFTERCARE FOLLOWING SURGERY OF THE MUSCULOSKELETAL SYSTEM: ICD-10-CM

## 2023-12-20 PROCEDURE — 97112 NEUROMUSCULAR REEDUCATION: CPT

## 2023-12-20 PROCEDURE — 97110 THERAPEUTIC EXERCISES: CPT

## 2023-12-20 NOTE — PROGRESS NOTES
"Daily Note     Today's date: 2023  Patient name: Basim Humphries  : 1950  MRN: [id number]  Referring provider: Mika Amin DO  Dx:   Encounter Diagnosis     ICD-10-CM    1. Rupture of right quadriceps tendon, subsequent encounter  S76.111D       2. Aftercare following surgery of the musculoskeletal system  Z47.89           Start Time: 830  Stop Time: 905  Total time in clinic (min): 35 minutes    Subjective: Pt reports that he is doing well, some pain with stretching. Notices some pain in his L LE from the fall.       Objective: See treatment diary below      Assessment: Tolerated treatment well. Patient would benefit from continued PT      Plan: Continue per plan of care.      Goal: Patient's goal is to Walk s AD and to climb steps and get back where he was before the fall    Precautions: WBAT in TROM brace 0-100, increasing 10 deg each week, d/c brace in 4 weeks from  ()    Dx: R patellar tendon repair 10/13/23    Daily Treatment Diary  Manuals 12/15/2023 12/20       Knee PROM 0-100  5' LNK                                  Ther Ex         Bike c brace 0-100  6 min        Gastroc slant bd         TKE  20x :05       Heel slides 20x 20       HR/TR                                             Neuro Re-ed         Quad set 20x :05 20x: 05       saq         laq  20x:05        SLR 2x3 2x10                Ther Activity         stairs nv 4\" 5x       Sit to stand c foam                  Gait Training         Gait c quad cane nv 1 lap                Modalities         CP in elevation                            "

## 2023-12-22 ENCOUNTER — OFFICE VISIT (OUTPATIENT)
Dept: PHYSICAL THERAPY | Facility: CLINIC | Age: 73
End: 2023-12-22
Payer: MEDICARE

## 2023-12-22 DIAGNOSIS — S76.111D RUPTURE OF RIGHT QUADRICEPS TENDON, SUBSEQUENT ENCOUNTER: Primary | ICD-10-CM

## 2023-12-22 DIAGNOSIS — Z47.89 AFTERCARE FOLLOWING SURGERY OF THE MUSCULOSKELETAL SYSTEM: ICD-10-CM

## 2023-12-22 PROCEDURE — 97112 NEUROMUSCULAR REEDUCATION: CPT

## 2023-12-22 PROCEDURE — 97140 MANUAL THERAPY 1/> REGIONS: CPT

## 2023-12-22 PROCEDURE — 97110 THERAPEUTIC EXERCISES: CPT

## 2023-12-22 NOTE — PROGRESS NOTES
"Daily Note     Today's date: 2023  Patient name: Basim Humphries  : 1950  MRN: [id number]  Referring provider: Mika Amin DO  Dx:   Encounter Diagnosis     ICD-10-CM    1. Rupture of right quadriceps tendon, subsequent encounter  S76.111D       2. Aftercare following surgery of the musculoskeletal system  Z47.89           Start Time: 0800  Stop Time: 0845  Total time in clinic (min): 45 minutes    Subjective: Pt reports that he was a little sore from last visit but is doing well. Can go short distances at home without his AD.      Objective: See treatment diary below      Assessment: Tolerated treatment well. ROM progressing well. Patient would benefit from continued PT      Plan: Continue per plan of care.      Goal: Patient's goal is to Walk s AD and to climb steps and get back where he was before the fall    Precautions: WBAT in TROM brace 0-100, increasing 10 deg each week, d/c brace in 4 weeks from  ()    Dx: R patellar tendon repair 10/13/23    Daily Treatment Diary  Manuals 12/15/2023 12/20 12/22      Knee PROM 0-100  5' LNK LNK                                 Ther Ex         Bike c brace 0-100  6 min  6 min       Gastroc slant bd         TKE  20x :05 20x :05       Heel slides 20x 20 10x6\"       HR/TR                                             Neuro Re-ed         Quad set 20x :05 20x: 05 20x :05      saq   2x10       laq  20x:05  20x :05       SLR 2x3 2x10 2x10                Ther Activity         stairs nv 4\" 5x 4\" 2x10       Sit to stand c foam                  Gait Training         Gait c quad cane nv 1 lap SPC around clinic                Modalities         CP in elevation                            "

## 2023-12-27 ENCOUNTER — OFFICE VISIT (OUTPATIENT)
Dept: PHYSICAL THERAPY | Facility: CLINIC | Age: 73
End: 2023-12-27
Payer: MEDICARE

## 2023-12-27 DIAGNOSIS — Z47.89 AFTERCARE FOLLOWING SURGERY OF THE MUSCULOSKELETAL SYSTEM: ICD-10-CM

## 2023-12-27 DIAGNOSIS — S76.111D RUPTURE OF RIGHT QUADRICEPS TENDON, SUBSEQUENT ENCOUNTER: Primary | ICD-10-CM

## 2023-12-27 PROCEDURE — 97112 NEUROMUSCULAR REEDUCATION: CPT | Performed by: PHYSICAL THERAPIST

## 2023-12-27 PROCEDURE — 97140 MANUAL THERAPY 1/> REGIONS: CPT | Performed by: PHYSICAL THERAPIST

## 2023-12-27 PROCEDURE — 97110 THERAPEUTIC EXERCISES: CPT | Performed by: PHYSICAL THERAPIST

## 2023-12-27 NOTE — PROGRESS NOTES
"Daily Note     Today's date: 2023  Patient name: Basim Humphries  : 1950  MRN: [id number]  Referring provider: Mika Amin DO  Dx:   Encounter Diagnosis     ICD-10-CM    1. Rupture of right quadriceps tendon, subsequent encounter  S76.111D       2. Aftercare following surgery of the musculoskeletal system  Z47.89           Start Time: 925  Stop Time: 100  Total time in clinic (min): 40 minutes    Subjective: Patient reports that he is using the quad cane as primary AD. He feels steady with it but will occasionally use the RW if his L hip starts to hurt. Patient's short term goal is to be able to ascend/descend his steps at home (railing on the R) so that he can take a shower for the first time since he fell.       Objective: See treatment diary below      Assessment: Patient continues to demonstrate excellent tolerance to PT with no onset of pain reported with performance of prescribed activity. PROM full within brace limits with minimal to no resistance in tissue at available end ranges. TKE progressed with addition of resistance, which patient was able to perform with good technique and no compensatory hip strategies. Continue to progress Wbing exercises, as able.       Plan: Continue per plan of care.      Goal: Patient's goal is to Walk s AD and to climb steps and get back where he was before the fall    Precautions: WBAT in TROM brace 0-100, increasing 10 deg each week, d/c brace in 4 weeks from  ()    Dx: R patellar tendon repair 10/13/23    Daily Treatment Diary  Manuals 12/15/2023 12/20 12/22 12/27     Knee PROM 0-100  5' LNK LNK JAB      LEFT jacqui stretch    JAB                       Ther Ex         Bike c brace 0-100  6 min  6 min  6 min 1-110     Gastroc slant bd         TKE  20x :05 20x :05  20x5\" 10#     Heel slides 20x 20 10x6\"  20x5\"     HR/TR                                             Neuro Re-ed         Quad set 20x :05 20x: 05 20x :05 20x5\"      saq   2x10  2x10 5\"   " "  laq  20x:05  20x :05  20x5\"     SLR 2x3 2x10 2x10                Ther Activity         stairs nv 4\" 5x 4\" 2x10  4\" 2x10      Sit to stand c foam                  Gait Training         Gait c quad cane nv 1 lap SPC around clinic                Modalities         CP in elevation                              "

## 2023-12-29 ENCOUNTER — OFFICE VISIT (OUTPATIENT)
Dept: PHYSICAL THERAPY | Facility: CLINIC | Age: 73
End: 2023-12-29
Payer: MEDICARE

## 2023-12-29 DIAGNOSIS — S76.111D RUPTURE OF RIGHT QUADRICEPS TENDON, SUBSEQUENT ENCOUNTER: Primary | ICD-10-CM

## 2023-12-29 DIAGNOSIS — Z47.89 AFTERCARE FOLLOWING SURGERY OF THE MUSCULOSKELETAL SYSTEM: ICD-10-CM

## 2023-12-29 PROCEDURE — 97140 MANUAL THERAPY 1/> REGIONS: CPT | Performed by: PHYSICAL THERAPIST

## 2023-12-29 PROCEDURE — 97112 NEUROMUSCULAR REEDUCATION: CPT | Performed by: PHYSICAL THERAPIST

## 2023-12-29 PROCEDURE — 97110 THERAPEUTIC EXERCISES: CPT | Performed by: PHYSICAL THERAPIST

## 2023-12-29 NOTE — PROGRESS NOTES
"Daily Note     Today's date: 2023  Patient name: Basim Humphries  : 1950  MRN: [id number]  Referring provider: Mika Amin DO  Dx:   No diagnosis found.                 Subjective: Patient reports he does use RW at night when he has to go to the bathroom. His wife is upset that he is unable to go up the stairs and stand up from lower surfaces.        Objective: See treatment diary below    Brace unlocked to 120 today  Assessment: Patient  was able to go around the bike today due to brace being unlocked. Worked on sit to stands c foam c good tolerance even s UE support. But s foam he needed to use UE support on chair handles. Stairs performed c cane and rail c good tolerance and he was given tubigrip to help with swelling. Education given to elevate legs for swelling.        Plan: Continue per plan of care.      Goal: Patient's goal is to Walk s AD and to climb steps and get back where he was before the fall    Precautions: WBAT in TROM brace 0-100, increasing 10 deg each week, d/c brace in 4 weeks from  ()  Hx of blood clots  Dx: R patellar tendon repair 10/13/23    Daily Treatment Diary  Manuals 12/15/2023 12/20 12/22 12/27 12/29    Knee PROM 0-100  5' LNK LNK JAB  WA 8'    LEFT jacqui stretch    JAB                       Ther Ex         Bike c brace 0-100  6 min  6 min  6 min 1-110 6 min 0-120    Gastroc slant bd         TKE  20x :05 20x :05  20x5\" 10#     Heel slides 20x 20 10x6\"  20x5\" 20x :05    HR/TR         bridges     20x                               Neuro Re-ed         Quad set 20x :05 20x: 05 20x :05 20x5\"  20x :05    saq   2x10  2x10 5\" 2x10    laq  20x:05  20x :05  20x5\" 20x    SLR 2x3 2x10 2x10   2x10             Ther Activity         stairs nv 4\" 5x 4\" 2x10  4\" 2x10  1 flight    Sit to stand c foam     10x- foam no UE/  10x- no foam c UE support             Gait Training         Gait c quad cane nv 1 lap SPC around clinic                Modalities         CP in elevation     "

## 2024-01-02 ENCOUNTER — OFFICE VISIT (OUTPATIENT)
Dept: PHYSICAL THERAPY | Facility: CLINIC | Age: 74
End: 2024-01-02
Payer: MEDICARE

## 2024-01-02 ENCOUNTER — TELEPHONE (OUTPATIENT)
Age: 74
End: 2024-01-02

## 2024-01-02 DIAGNOSIS — Z47.89 AFTERCARE FOLLOWING SURGERY OF THE MUSCULOSKELETAL SYSTEM: ICD-10-CM

## 2024-01-02 DIAGNOSIS — S76.111D RUPTURE OF RIGHT QUADRICEPS TENDON, SUBSEQUENT ENCOUNTER: Primary | ICD-10-CM

## 2024-01-02 PROCEDURE — 97110 THERAPEUTIC EXERCISES: CPT | Performed by: PHYSICAL THERAPIST

## 2024-01-02 PROCEDURE — 97140 MANUAL THERAPY 1/> REGIONS: CPT | Performed by: PHYSICAL THERAPIST

## 2024-01-02 NOTE — TELEPHONE ENCOUNTER
Caller: Jose Humphries    Doctor: Dr. Wright?    Reason for call: appt/never seen by Dr Wright/scheduled as NP    Call back#: NA

## 2024-01-02 NOTE — PROGRESS NOTES
"Daily Note     Today's date: 2024  Patient name: Basim Humphries  : 1950  MRN: [id number]  Referring provider: Mika Amin DO  Dx:   Encounter Diagnosis     ICD-10-CM    1. Rupture of right quadriceps tendon, subsequent encounter  S76.111D       2. Aftercare following surgery of the musculoskeletal system  Z47.89                        Subjective: Patient reports he did take a bit of steps s RW and is steadily feeling more strength. He did not feel safe c trialing stairs c cane at home yet.      Objective: See treatment diary below    Brace unlocked to 120 today  Assessment: Patient  states the compression sock made his ankles more sore.  Worked on sit to stands c foam tolerated well. Stairs performed c SPC and rail today but did have fatigue when WB on RLE.       Plan: Continue per plan of care.      Goal: Patient's goal is to Walk s AD and to climb steps and get back where he was before the fall    Precautions: WBAT in TROM brace 0-100, increasing 10 deg each week, d/c brace in 4 weeks from  ()  Hx of blood clots  Dx: R patellar tendon repair 10/13/23    Daily Treatment Diary  Manuals 12/15/2023 12/20 12/22 12/27 12/29 1/2   Knee PROM  5' LNK LNK JAB  WA 8' 10'   LEFT jacqui stretch    JAB                       Ther Ex         Bike c brace 0-100  6 min  6 min  6 min 1-110 6 min 0-120 6'   Gastroc slant bd         TKE  20x :05 20x :05  20x5\" 10#     Heel slides 20x 20 10x6\"  20x5\" 20x :05 20x :05   HR/TR         bridges     20x 20x                              Neuro Re-ed         Quad set 20x :05 20x: 05 20x :05 20x5\"  20x :05    saq   2x10  2x10 5\" 2x10    laq  20x:05  20x :05  20x5\" 20x    SLR 2x3 2x10 2x10   2x10 2x10            Ther Activity         stairs nv 4\" 5x 4\" 2x10  4\" 2x10  1 flight 1 flight   Sit to stand c foam     10x- foam no UE/  10x- no foam c UE support 2x10 foam            Gait Training         Gait c quad cane nv 1 lap SPC around clinic                Modalities       "   CP in elevation

## 2024-01-04 ENCOUNTER — OFFICE VISIT (OUTPATIENT)
Dept: PHYSICAL THERAPY | Facility: CLINIC | Age: 74
End: 2024-01-04
Payer: MEDICARE

## 2024-01-04 DIAGNOSIS — Z47.89 AFTERCARE FOLLOWING SURGERY OF THE MUSCULOSKELETAL SYSTEM: ICD-10-CM

## 2024-01-04 DIAGNOSIS — S76.111D RUPTURE OF RIGHT QUADRICEPS TENDON, SUBSEQUENT ENCOUNTER: Primary | ICD-10-CM

## 2024-01-04 PROCEDURE — 97110 THERAPEUTIC EXERCISES: CPT | Performed by: PHYSICAL THERAPIST

## 2024-01-04 PROCEDURE — 97140 MANUAL THERAPY 1/> REGIONS: CPT | Performed by: PHYSICAL THERAPIST

## 2024-01-04 PROCEDURE — 97112 NEUROMUSCULAR REEDUCATION: CPT | Performed by: PHYSICAL THERAPIST

## 2024-01-04 NOTE — PROGRESS NOTES
"Daily Note     Today's date: 2024  Patient name: Basim Humphries  : 1950  MRN: [id number]  Referring provider: Mika Amin DO  Dx:   Encounter Diagnosis     ICD-10-CM    1. Rupture of right quadriceps tendon, subsequent encounter  S76.111D       2. Aftercare following surgery of the musculoskeletal system  Z47.89                        Subjective: Patient reports he did take a bit of steps s RW and is steadily feeling more strength. He did not feel safe c trialing stairs c cane at home yet.      Objective: See treatment diary below  KNEE ROM -2 to 110  AROM knee flexion 108  Assessment: He did fatigue c sit to stands c foam today due to L knee soreness. He had more pain in L knee than R knee today. t Stairs performed c SPC and rail today but did have fatigue when WB on LLE.       Plan: Continue per plan of care.      Goal: Patient's goal is to Walk s AD and to climb steps and get back where he was before the fall    Precautions: WBAT in TROM brace 0-100, increasing 10 deg each week, d/c brace in 4 weeks from  ()  Hx of blood clots  Dx: R patellar tendon repair 10/13/23    Daily Treatment Diary  Manuals    Knee PROM  10' LNK LNK JAB  WA 8' 10'   LEFT jacqui stretch 3'   JAB                       Ther Ex         Bike c brace 0-120 6' 6 min  6 min  6 min 1-110 6 min 0-120 6'   Gastroc slant bd         TKE  20x :05 20x :05  20x5\" 10#     Heel slides 20x 20 10x6\"  20x5\" 20x :05 20x :05   HR/TR         bridges     20x 20x                              Neuro Re-ed         Quad set 20x :05 20x: 05 20x :05 20x5\"  20x :05    saq   2x10  2x10 5\" 2x10    laq 20x 20x:05  20x :05  20x5\" 20x    SLR 2x10 2x10 2x10   2x10 2x10            Ther Activity         stairs 1 flight 4\" 5x 4\" 2x10  4\" 2x10  1 flight 1 flight   Sit to stand c foam 7x     10x- foam no UE/  10x- no foam c UE support 2x10 foam            Gait Training         Gait c quad cane nv 1 lap SPC around clinic     "            Modalities         CP in elevation

## 2024-01-08 ENCOUNTER — OFFICE VISIT (OUTPATIENT)
Dept: PHYSICAL THERAPY | Facility: CLINIC | Age: 74
End: 2024-01-08
Payer: MEDICARE

## 2024-01-08 DIAGNOSIS — S76.111D RUPTURE OF RIGHT QUADRICEPS TENDON, SUBSEQUENT ENCOUNTER: Primary | ICD-10-CM

## 2024-01-08 DIAGNOSIS — Z47.89 AFTERCARE FOLLOWING SURGERY OF THE MUSCULOSKELETAL SYSTEM: ICD-10-CM

## 2024-01-08 PROCEDURE — 97110 THERAPEUTIC EXERCISES: CPT | Performed by: PHYSICAL THERAPIST

## 2024-01-08 PROCEDURE — 97112 NEUROMUSCULAR REEDUCATION: CPT | Performed by: PHYSICAL THERAPIST

## 2024-01-08 PROCEDURE — 97140 MANUAL THERAPY 1/> REGIONS: CPT | Performed by: PHYSICAL THERAPIST

## 2024-01-08 NOTE — PROGRESS NOTES
"Daily Note     Today's date: 2024  Patient name: Basim Humphries  : 1950  MRN: [id number]  Referring provider: Mika Amin DO  Dx:   Encounter Diagnosis     ICD-10-CM    1. Rupture of right quadriceps tendon, subsequent encounter  S76.111D       2. Aftercare following surgery of the musculoskeletal system  Z47.89                        Subjective: Pt indicates that he is excited to be able to start performing stairs comfortably.  Feeling well otherwise.        Objective: See treatment diary below  KNEE ROM 0 to 114  AROM knee flexion 108    Assessment: Range coming along comfortably for pt, able to ascend/descend with 1 step at a time leading with R and descend with L respectively.  For descent he ER's R hip to perform. Uses SPC and Hand rail to perform.  Otherwise progression is positive.        Plan: Continue per plan of care.      Goal: Patient's goal is to Walk s AD and to climb steps and get back where he was before the fall    Precautions: WBAT in TROM brace 0-100, increasing 10 deg each week, d/c brace in 4 weeks from  ()  Hx of blood clots  Dx: R patellar tendon repair 10/13/23    Daily Treatment Diary  Manuals    Knee PROM  10' 10 min LNK JAB  WA 8' 10'   LEFT jacqui stretch 3' 3 min  JAB                       Ther Ex         Bike c brace 0-120 6' 6 min 6 min  6 min 1-110 6 min 0-120 6'   Gastroc slant bd         TKE   20x :05  20x5\" 10#     Heel slides 20x 20x 10x6\"  20x5\" 20x :05 20x :05   HR/TR         bridges     20x 20x                              Neuro Re-ed         Quad set 20x :05 20\" x 4 20x :05 20x5\"  20x :05    saq   2x10  2x10 5\" 2x10    laq 20x 20x 20x :05  20x5\" 20x    SLR 2x10 2 x 10 2x10   2x10 2x10            Ther Activity         stairs 1 flight 1 FF 4\" 2x10  4\" 2x10  1 flight 1 flight   Sit to stand c foam 7x  10x    10x- foam no UE/  10x- no foam c UE support 2x10 foam            Gait Training         Gait c quad cane nv SPC t/o.   " SPC around clinic                Modalities         CP in elevation

## 2024-01-11 ENCOUNTER — OFFICE VISIT (OUTPATIENT)
Dept: PHYSICAL THERAPY | Facility: CLINIC | Age: 74
End: 2024-01-11
Payer: MEDICARE

## 2024-01-11 DIAGNOSIS — Z47.89 AFTERCARE FOLLOWING SURGERY OF THE MUSCULOSKELETAL SYSTEM: ICD-10-CM

## 2024-01-11 DIAGNOSIS — S76.112S QUADRICEPS STRAIN, LEFT, SEQUELA: ICD-10-CM

## 2024-01-11 DIAGNOSIS — S76.111D RUPTURE OF RIGHT QUADRICEPS TENDON, SUBSEQUENT ENCOUNTER: Primary | ICD-10-CM

## 2024-01-11 PROCEDURE — 97112 NEUROMUSCULAR REEDUCATION: CPT | Performed by: PHYSICAL THERAPIST

## 2024-01-11 PROCEDURE — 97140 MANUAL THERAPY 1/> REGIONS: CPT | Performed by: PHYSICAL THERAPIST

## 2024-01-11 PROCEDURE — 97110 THERAPEUTIC EXERCISES: CPT | Performed by: PHYSICAL THERAPIST

## 2024-01-11 NOTE — PROGRESS NOTES
"Daily Note     Today's date: 2024  Patient name: Basim Humphries  : 1950  MRN: [id number]  Referring provider: Mika Amin DO  Dx:   Encounter Diagnosis     ICD-10-CM    1. Rupture of right quadriceps tendon, subsequent encounter  S76.111D       2. Aftercare following surgery of the musculoskeletal system  Z47.89                        Subjective: Pt states he is continuing to perform stairs and is doing well. He just would like to perform sit to stands s UE support.        Objective: See treatment diary below  KNEE ROM 0 to 114 nt  AROM knee flexion 108nt    Assessment: He was weaned off the brace during session and showed good stability going up and down steps. L quad appears to be significantly weak as he has 30 deg lag c LAQ. He was given further strengthening for LLE.      Plan: Continue per plan of care.      Goal: Patient's goal is to Walk s AD and to climb steps and get back where he was before the fall    Precautions: WBAT in TROM brace 0-100, increasing 10 deg each week, d/c brace in 4 weeks from  ()  Hx of blood clots  Dx: R patellar tendon repair 10/13/23, L quad weakness    Daily Treatment Diary  Manuals    Knee PROM  10' 10 min 10' JAB  WA 8' 10'   LEFT jacqui stretch 3' 3 min 3' JAB                       Ther Ex         Bike c brace 0-120 6' 6 min 6 min  6 min 1-110 6 min 0-120 6'            TKE   20x :05  20x5\" 10#     Heel slides 20x 20x 10x6\"  20x5\" 20x :05 20x :05   HR/TR         bridges     20x 20x                              Neuro Re-ed         Quad set 20x :05 20\" x 4 20x :05 LLE 20x5\"  20x :05    saq   2x10 :03  LLE 2x10 5\" 2x10    laq 20x 20x 10x :05 LLE 20x5\" 20x    SLR 2x10 2 x 10 2x10 RLE   2x10 2x10   SLS   10x :05      Ther Activity         stairs 1 flight 1 FF 1 FF no brace 4\" 2x10  1 flight 1 flight   Sit to stand c foam 7x  10x  No foam 1x8/1x10  10x- foam no UE/  10x- no foam c UE support 2x10 foam            Gait Training       "   Gait c quad cane nv SPC t/o.   Trialed no cane for 15 ft               Modalities         CP in elevation

## 2024-01-15 ENCOUNTER — OFFICE VISIT (OUTPATIENT)
Dept: PHYSICAL THERAPY | Facility: CLINIC | Age: 74
End: 2024-01-15
Payer: MEDICARE

## 2024-01-15 DIAGNOSIS — S76.111D RUPTURE OF RIGHT QUADRICEPS TENDON, SUBSEQUENT ENCOUNTER: Primary | ICD-10-CM

## 2024-01-15 DIAGNOSIS — Z47.89 AFTERCARE FOLLOWING SURGERY OF THE MUSCULOSKELETAL SYSTEM: ICD-10-CM

## 2024-01-15 PROCEDURE — 97110 THERAPEUTIC EXERCISES: CPT | Performed by: PHYSICAL THERAPIST

## 2024-01-15 PROCEDURE — 97140 MANUAL THERAPY 1/> REGIONS: CPT | Performed by: PHYSICAL THERAPIST

## 2024-01-15 PROCEDURE — 97112 NEUROMUSCULAR REEDUCATION: CPT | Performed by: PHYSICAL THERAPIST

## 2024-01-15 NOTE — PROGRESS NOTES
"Daily Note     Today's date: 1/15/2024  Patient name: Basim Humphries  : 1950  MRN: [id number]  Referring provider: Mika Amin DO  Dx:   Encounter Diagnosis     ICD-10-CM    1. Rupture of right quadriceps tendon, subsequent encounter  S76.111D       2. Aftercare following surgery of the musculoskeletal system  Z47.89                        Subjective: Pt states he is continuing to walk more and was able to go to [facility] with his single point cane. He did feel a little tired afterwards. He does get a bit sore after PT and has been doing more reps to help strengthen LLE.       Objective: See treatment diary below  KNEE ROM 0 to 114 nt  AROM knee flexion 108nt    Assessment: He did have improved quad lag in LLE. He has been getting LBP while doing sit to stands and it appears it could be due to excessive lumbar flexion compensation to gain momentum. He may benefit from squats c UE support. Will begin SL LP nv if able.       Plan: Continue per plan of care.      Goal: Patient's goal is to Walk s AD and to climb steps and get back where he was before the fall    Precautions: WBAT in TROM brace 0-100, increasing 10 deg each week, d/c brace in 4 weeks from  ()  Hx of blood clots  Dx: R patellar tendon repair 10/13/23, L quad weakness from fall    Daily Treatment Diary  Manuals 1/4 1/8 1/11 1/15  12/29 1/2   Knee PROM  10' 10 min 10' WA WA 8' 10'   LEFT jacqui stretch 3' 3 min 3' WA                       Ther Ex         Bike ROM 6' 6 min 6 min  6 min  6 min 0-120 6'            TKE   20x :05  20x5\" 10#     Heel slides 20x 20x 10x6\"  20x5\" 20x :05 20x :05   HR/TR    20x     bridges    20x 20x 20x   SL LP #30-40    nv                       Neuro Re-ed         Quad set 20x :05 20\" x 4 20x :05 LLE  20x :05    saq   2x10 :03  LLE  2x10    laq 20x 20x 10x :05 LLE 20x5\" 20x    SLR 2x10 2 x 10 2x10 RLE  3x 10 B 2x10 2x10   SLS   10x :05 10x :05     Ther Activity         stairs 1 flight 1 FF 1 FF no brace  1 " flight 1 flight   Sit to stand c foam 7x  10x  No foam 1x8/1x10 1x10 back soreness 10x- foam no UE/  10x- no foam c UE support 2x10 foam   Squats c ue support    2x10     Gait Training         Gait c quad cane nv SPC t/o.   Trialed no cane for 15 ft nv              Modalities         CP in elevation

## 2024-01-18 ENCOUNTER — OFFICE VISIT (OUTPATIENT)
Dept: PHYSICAL THERAPY | Facility: CLINIC | Age: 74
End: 2024-01-18
Payer: MEDICARE

## 2024-01-18 DIAGNOSIS — S76.111D RUPTURE OF RIGHT QUADRICEPS TENDON, SUBSEQUENT ENCOUNTER: Primary | ICD-10-CM

## 2024-01-18 DIAGNOSIS — Z47.89 AFTERCARE FOLLOWING SURGERY OF THE MUSCULOSKELETAL SYSTEM: ICD-10-CM

## 2024-01-18 DIAGNOSIS — S76.112S QUADRICEPS STRAIN, LEFT, SEQUELA: ICD-10-CM

## 2024-01-18 PROCEDURE — 97140 MANUAL THERAPY 1/> REGIONS: CPT

## 2024-01-18 PROCEDURE — 97110 THERAPEUTIC EXERCISES: CPT

## 2024-01-18 PROCEDURE — 97112 NEUROMUSCULAR REEDUCATION: CPT

## 2024-01-18 NOTE — PROGRESS NOTES
"Daily Note     Today's date: 2024  Patient name: Basim Humphries  : 1950  MRN: [id number]  Referring provider: Mika Amin DO  Dx:   Encounter Diagnosis     ICD-10-CM    1. Rupture of right quadriceps tendon, subsequent encounter  S76.111D       2. Aftercare following surgery of the musculoskeletal system  Z47.89       3. Quadriceps strain, left, sequela  S76.112S                      Subjective: Jose reports mild soreness, more knee stiffness this AM.       Objective: See treatment diary below      Assessment: Tolerated treatment well. Incremental gains into knee flexion. Jose is able to safely walk short distances without AD in the clinic, but with decreased evans. Low back soreness continues to limit STS reps. Good tolerance to low weight leg press, cuing for eccentric/concentric control. Continued PT would be beneficial to improve function.          Plan: Continue per plan of care.       Goal: Patient's goal is to Walk s AD and to climb steps and get back where he was before the fall    Precautions: WBAT in TROM brace 0-100, increasing 10 deg each week, d/c brace in 4 weeks from  ()  Hx of blood clots  Dx: R patellar tendon repair 10/13/23, L quad weakness from fall    Daily Treatment Diary  Manuals 1/4 1/8 1/11 1/15  1/18 1/2   Knee PROM  10' 10 min 10' WA MB 10'   LEFT jacqui stretch 3' 3 min 3' WA MB                      Ther Ex         Bike ROM 6' 6 min 6 min  6 min  6 min 6'            TKE   20x :05  20x5\" 10# 20x5\" 10#    Heel slides 20x 20x 10x6\"  20x5\" 20x5\" 20x :05   HR/TR    20x 20x    bridges    20x  20x   SL LP #30-40    nv 30# 2x15                      Neuro Re-ed         Quad set 20x :05 20\" x 4 20x :05 LLE      saq   2x10 :03  LLE      laq 20x 20x 10x :05 LLE 20x5\" 20x5\"    SLR 2x10 2 x 10 2x10 RLE  3x 10 B 3x10 bilat 2x10   SLS   10x :05 10x :05 5x 10\"    Ther Activity         stairs 1 flight 1 FF 1 FF no brace   1 flight   Sit to stand c foam 7x  10x  No foam 1x8/1x10 " 1x10 back soreness 7x back soreness 2x10 foam   Squats c ue support    2x10 2x10    Gait Training         Gait c quad cane nv SPC t/o.   Trialed no cane for 15 ft nv No AD 15-20 ft between tasks             Modalities         CP in elevation

## 2024-01-22 ENCOUNTER — APPOINTMENT (OUTPATIENT)
Dept: PHYSICAL THERAPY | Facility: CLINIC | Age: 74
End: 2024-01-22
Payer: MEDICARE

## 2024-01-23 ENCOUNTER — OFFICE VISIT (OUTPATIENT)
Dept: PHYSICAL THERAPY | Facility: CLINIC | Age: 74
End: 2024-01-23
Payer: MEDICARE

## 2024-01-23 ENCOUNTER — OFFICE VISIT (OUTPATIENT)
Dept: OBGYN CLINIC | Facility: CLINIC | Age: 74
End: 2024-01-23
Payer: MEDICARE

## 2024-01-23 VITALS
SYSTOLIC BLOOD PRESSURE: 117 MMHG | BODY MASS INDEX: 28.43 KG/M2 | HEART RATE: 90 BPM | WEIGHT: 187.6 LBS | HEIGHT: 68 IN | DIASTOLIC BLOOD PRESSURE: 75 MMHG

## 2024-01-23 DIAGNOSIS — S76.112S QUADRICEPS STRAIN, LEFT, SEQUELA: ICD-10-CM

## 2024-01-23 DIAGNOSIS — S76.111D RUPTURE OF RIGHT QUADRICEPS TENDON, SUBSEQUENT ENCOUNTER: Primary | ICD-10-CM

## 2024-01-23 DIAGNOSIS — Z47.89 AFTERCARE FOLLOWING SURGERY OF THE MUSCULOSKELETAL SYSTEM: ICD-10-CM

## 2024-01-23 DIAGNOSIS — S76.111S QUADRICEPS TENDON RUPTURE, RIGHT, SEQUELA: Primary | ICD-10-CM

## 2024-01-23 PROCEDURE — 97140 MANUAL THERAPY 1/> REGIONS: CPT | Performed by: PHYSICAL THERAPIST

## 2024-01-23 PROCEDURE — 97110 THERAPEUTIC EXERCISES: CPT | Performed by: PHYSICAL THERAPIST

## 2024-01-23 PROCEDURE — 97112 NEUROMUSCULAR REEDUCATION: CPT | Performed by: PHYSICAL THERAPIST

## 2024-01-23 PROCEDURE — 99213 OFFICE O/P EST LOW 20 MIN: CPT | Performed by: STUDENT IN AN ORGANIZED HEALTH CARE EDUCATION/TRAINING PROGRAM

## 2024-01-23 NOTE — PROGRESS NOTES
"Daily Note     Today's date: 2024  Patient name: Basim Humphries  : 1950  MRN: [id number]  Referring provider: Mika Amin DO  Dx:   Encounter Diagnosis     ICD-10-CM    1. Rupture of right quadriceps tendon, subsequent encounter  S76.111D       2. Aftercare following surgery of the musculoskeletal system  Z47.89       3. Quadriceps strain, left, sequela  S76.112S                      Subjective: Jose states his L leg is feeling better and stronger.       Objective: See treatment diary below  L knee ext 4  Flex: 4-    Assessment: Tolerated treatment well. He did have difficulty going up c LLE up step and had some cramping in L knee. Continued PT would be beneficial to improve function.  Trialed repeated lumbar ext which seemed to help L knee extensor strength 4+/5 knee ext and flexibility        Plan: Continue per plan of care.       Goal: Patient's goal is to Walk s AD and to climb steps and get back where he was before the fall    Precautions: WBAT in TROM brace 0-100, increasing 10 deg each week, d/c brace in 4 weeks from  ()  Hx of blood clots  Dx: R patellar tendon repair 10/13/23, L quad weakness from fall    Daily Treatment Diary  Manuals 1/4 1/8 1/11 1/15  1/18 1/23   Knee PROM  10' 10 min 10' ABE FISH   LEFT jacqui stretch 3' 3 min 3' ABE FISH                     Ther Ex         Bike ROM 6' 6 min 6 min  6 min  6 min 6'            TKE   20x :05  20x5\" 10# 20x5\" 10#    Heel slides 20x 20x 10x6\"  20x5\" 20x5\" 20x :05   HR/TR    20x 20x    bridges    20x  20x   SL LP #30-40    nv 30# 2x15 45# 2x15   Standing lumbar ext over table      2x12            Neuro Re-ed         Quad set 20x :05 20\" x 4 20x :05 LLE      saq   2x10 :03  LLE      laq 20x 20x 10x :05 LLE 20x5\" 20x5\"    SLR 2x10 2 x 10 2x10 RLE  3x 10 B 3x10 bilat 2x10   SLS   10x :05 10x :05 5x 10\"    Ther Activity         stairs 1 flight 1 FF 1 FF no brace   1 flight   Sit to stand c foam 7x  10x  No foam 1x8/1x10 1x10 back " soreness 7x back soreness x10    Squats c ue support    2x10 2x10    Gait Training         Gait c quad cane nv SPC t/o.   Trialed no cane for 15 ft nv No AD 15-20 ft between tasks             Modalities         CP in elevation
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
## 2024-01-24 ENCOUNTER — APPOINTMENT (OUTPATIENT)
Dept: PHYSICAL THERAPY | Facility: CLINIC | Age: 74
End: 2024-01-24
Payer: MEDICARE

## 2024-01-25 ENCOUNTER — OFFICE VISIT (OUTPATIENT)
Dept: PHYSICAL THERAPY | Facility: CLINIC | Age: 74
End: 2024-01-25
Payer: MEDICARE

## 2024-01-25 DIAGNOSIS — Z47.89 AFTERCARE FOLLOWING SURGERY OF THE MUSCULOSKELETAL SYSTEM: ICD-10-CM

## 2024-01-25 DIAGNOSIS — S76.111D RUPTURE OF RIGHT QUADRICEPS TENDON, SUBSEQUENT ENCOUNTER: Primary | ICD-10-CM

## 2024-01-25 DIAGNOSIS — S76.112S QUADRICEPS STRAIN, LEFT, SEQUELA: ICD-10-CM

## 2024-01-25 PROCEDURE — 97112 NEUROMUSCULAR REEDUCATION: CPT | Performed by: PHYSICAL THERAPIST

## 2024-01-25 PROCEDURE — 97140 MANUAL THERAPY 1/> REGIONS: CPT | Performed by: PHYSICAL THERAPIST

## 2024-01-25 PROCEDURE — 97110 THERAPEUTIC EXERCISES: CPT | Performed by: PHYSICAL THERAPIST

## 2024-01-25 NOTE — PROGRESS NOTES
PT Evaluation    Today's date: 2024  Patient name: Basim Humphries  : 1950  MRN: [id number]  Referring provider: Mika Amin DO  Dx:   Encounter Diagnosis     ICD-10-CM    1. Rupture of right quadriceps tendon, subsequent encounter  S76.111D       2. Quadriceps strain, left, sequela  S76.112S       3. Aftercare following surgery of the musculoskeletal system  Z47.89               Subjective Evaluation     History of Present Illness    Jose states his L knee feels more flexible. He saw MD who was pleased c his motion. He states he is sore after PT and at times to the next morning.     HX of injury:  Patient presents post-operatively for R quad tendon repair on 10/13/23. He twisted his L ankle getting up. This was from a fall in the park when he was taking pictures. He tripped over a root and then could not stand and was lying in the mud and then had to wait 2.5 hours for help. He has trouble standing up and he has a blood clot in his LLE last week. He is used to walking 4 miles. He was just unlocked in his knee brace this week and was told he has no precautions. He was told he can transition to quad cane but then since the blood clot he has lost some of his balance.     MD instructions:  Patient to continue to be weightbearing as tolerated right lower extremity in TROM at 0-100 degrees, unlocked and increase by 10 degrees per week with physical therapy until full ROM is achieved.  Discussed with patient that he will begin to ambulate with range of motion from 0 to 100 degrees currently increasing by 10 degrees each week in the T ROM unlocked.  The patient can discontinue the T ROM 4 weeks from today's date.    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 He did have home PT.       Patient Goals  Patient goals for therapy: Walk s AD and to climb steps and get back where he was before the fall    STGs  1. Decrease pain by 20% in 2-4 weeks.  2. Improve knee ROM from 0-120 degrees in 2-4 weeks.   3. Improve knee strength by 1/3 grade in 2-4 weeks to perform sit to stand independently s UE.  4. Perform stairs in step to pattern c rail in 4 weeks.   5. Eliminate lift chair in 2 weeks.     LTGs  1. Decrease pain by 60% in 6-8 weeks.  2. Improve walking tolerance to >30 minutes in 6-8 weeks to perform community ambulation.   3. Perform job/dressing/showering activities without pain in 6-8 weeks.    4. Return to driving activities in 6 weeks  5 Perform sit to stand s assistance nor UE support in 8 weeks.     Objective Measurements:  Observation: quad atrophy, swelling in R calf and pitting edema 10 sec    10 cm below R patella 35.7 nt  R patellar 40.7 nt     Gait:no AD decreased flexion in LLE in swing  LAQ: lacking 14 deg L   R 5 deg  Steps: step through pattern c rail and cane decreased strength and control going up and down LLE.   Squat: good depth and ROM s UE support  BRICE:     Knee A/PROM L R Strength L R   Flex  115 /120 110/115 Flex 4+ 5   Ext  0/0  0/0 Ext 4+ 5           Hip A/PROM        Flex  /  / Flex 4 4+   ER at 90   ER     IR at 90   IR     Abd   Abd     Ext   Ext             Lumbar AROM   Ankle     Flex   DF     Ext   PF           Assessment:  Basim Humphries has demonstrated overall improvement in ROM, strength, function, and a reduction in pain. Currently, he has made steady progress towards his goals, but continues to remain limited with L quad/HS strength, decreased lumbar ROM, and decreased balance. At this time, skilled physical therapy is warranted to address the remaining impairments and aide in return to walking s AD, driving, and performing all ADLs at home and safe transfers s  "leg giving out. He does seem to respond well to lumbar extension which seems to help his L quad strength. Therefore he could be getting compression on a nerve creating more weakness. Limiting factors to therapy LLE weakness and he  demonstrates good motivation.        Plan  Patient would benefit from:Skilled physical therapy  Planned therapy interventions: manual therapy, neuromuscular re-education, stretching, strengthening, therapeutic activities, therapeutic exercise, patient education, home exercise program, and activity modification.    Frequency: 2x week  Duration in weeks: 8  Treatment plan discussed with: patient         Goal: Patient's goal is to Walk s AD and to climb steps and get back where he was before the fall    Precautions: WBAT in TROM brace 0-100, increasing 10 deg each week, d/c brace in 4 weeks from 12/12 (1/2)  Hx of blood clots  Dx: R patellar tendon repair 10/13/23, L quad weakness from fall    Daily Treatment Diary  Manuals 1/25 1/8 1/11 1/15  1/18 1/23   Knee PROM  10' 10 min 10' ABE FISH   LEFT jacqui stretch 3' 3 min 3' ABE FISH                     Ther Ex         Bike ROM 6' 6 min 6 min  6 min  6 min 6'            TKE   20x :05  20x5\" 10# 20x5\" 10#    Heel slides  20x 10x6\"  20x5\" 20x5\" 20x :05   HR/TR    20x 20x    bridges    20x  20x   SL LP #45 #45 2x15   nv 30# 2x15 45# 2x15   Standing lumbar ext over table 3x10     2x12            Neuro Re-ed         Quad set 20x :05 20\" x 4 20x :05 LLE      saq   2x10 :03  LLE      laq 20x 20x 10x :05 LLE 20x5\" 20x5\"    SLR 2x10 2 x 10 2x10 RLE  3x 10 B 3x10 bilat 2x10   SLS 5x :05 difficulty  10x :05 10x :05 5x 10\"    Ther Activity         stairs 1 flight 1 FF 1 FF no brace   1 flight   Sit to stand c foam  10x  No foam 1x8/1x10 1x10 back soreness 7x back soreness x10    Squats c ue support 2x10   2x10 2x10    Gait Training         Gait c quad cane Throughout session SPC t/o.   Trialed no cane for 15 ft nv No AD 15-20 ft between tasks           "   Modalities         CP in elevation

## 2024-01-29 ENCOUNTER — OFFICE VISIT (OUTPATIENT)
Dept: PHYSICAL THERAPY | Facility: CLINIC | Age: 74
End: 2024-01-29
Payer: MEDICARE

## 2024-01-29 DIAGNOSIS — S76.112S QUADRICEPS STRAIN, LEFT, SEQUELA: ICD-10-CM

## 2024-01-29 DIAGNOSIS — S76.111D RUPTURE OF RIGHT QUADRICEPS TENDON, SUBSEQUENT ENCOUNTER: Primary | ICD-10-CM

## 2024-01-29 PROCEDURE — 97140 MANUAL THERAPY 1/> REGIONS: CPT | Performed by: PHYSICAL THERAPIST

## 2024-01-29 PROCEDURE — 97110 THERAPEUTIC EXERCISES: CPT | Performed by: PHYSICAL THERAPIST

## 2024-01-29 PROCEDURE — 97112 NEUROMUSCULAR REEDUCATION: CPT | Performed by: PHYSICAL THERAPIST

## 2024-01-29 NOTE — PROGRESS NOTES
"Daily Note     Today's date: 2024  Patient name: Basim Humphries  : 1950  MRN: [id number]  Referring provider: Mika Amin DO  Dx:   Encounter Diagnosis     ICD-10-CM    1. Rupture of right quadriceps tendon, subsequent encounter  S76.111D       2. Quadriceps strain, left, sequela  S76.112S                      Subjective: He states he was able to walk step through gait pattern at home a bit c some UE support. His LLE does feel stronger and is doing lumbar ext 3x10/day.      Objective: See treatment diary below      Assessment: Tolerated treatment well. He still has decreased LLE strength but tolerated stairs much better c step through pattern c 1 rail and coming down c 2 rails. He was given further strengthening today. He was encouraged to trial driving.  Patient would benefit from continued PT      Plan: Continue per plan of care.      Goal: Patient's goal is to Walk s AD and to climb steps and get back where he was before the fall    Precautions: WBAT in TROM brace 0-100, increasing 10 deg each week, d/c brace in 4 weeks from  ()  Hx of blood clots  Dx: R patellar tendon repair 10/13/23, L quad weakness from fall    Daily Treatment Diary  Manuals 1/25 1/29 1/11 1/15  1/18 1/23   Knee PROM  B knees 10' 10 min 10' WA MB WA                              Ther Ex         Bike ROM 6' 6 min 6 min  6 min  6 min 6'   sidesteps  5 laps       Hip ext/abd RTB  10x                         bridges    20x  20x   SL LP #45 #45 2x15 #55 20x  nv 30# 2x15 45# 2x15   Standing lumbar ext over table 3x10 3x10    2x12            Neuro Re-ed         Quad set 20x :05  20x :05 LLE      saq   2x10 :03  LLE      laq 20x 40x 10x :05 LLE 20x5\" 20x5\"    SLR 2x10 2 x 10 2x10 RLE  3x 10 B 3x10 bilat 2x10   SLS 5x :05 difficulty 5x :05 10x :05 10x :05 5x 10\"    Ther Activity         stairs 1 flight 1 FF 1 FF no brace   1 flight   Sit to stand c foam  10x  No foam 1x8/1x10 1x10 back soreness 7x back soreness x10    Squats " chair behind 2x10 20x  2x10 2x10    Gait Training         Gait c quad cane Throughout session SPC t/o.   Trialed no cane for 15 ft nv No AD 15-20 ft between tasks             Modalities         CP in elevation

## 2024-02-01 ENCOUNTER — OFFICE VISIT (OUTPATIENT)
Age: 74
End: 2024-02-01
Payer: MEDICARE

## 2024-02-01 ENCOUNTER — OFFICE VISIT (OUTPATIENT)
Dept: PHYSICAL THERAPY | Facility: CLINIC | Age: 74
End: 2024-02-01
Payer: MEDICARE

## 2024-02-01 VITALS
DIASTOLIC BLOOD PRESSURE: 78 MMHG | WEIGHT: 187 LBS | RESPIRATION RATE: 18 BRPM | HEART RATE: 77 BPM | HEIGHT: 68 IN | BODY MASS INDEX: 28.34 KG/M2 | SYSTOLIC BLOOD PRESSURE: 144 MMHG

## 2024-02-01 DIAGNOSIS — B35.1 ONYCHOMYCOSIS: ICD-10-CM

## 2024-02-01 DIAGNOSIS — M20.42 HAMMER TOES OF BOTH FEET: ICD-10-CM

## 2024-02-01 DIAGNOSIS — L60.0 INGROWN TOENAIL: ICD-10-CM

## 2024-02-01 DIAGNOSIS — M79.671 PAIN IN BOTH FEET: Primary | ICD-10-CM

## 2024-02-01 DIAGNOSIS — S76.112S QUADRICEPS STRAIN, LEFT, SEQUELA: ICD-10-CM

## 2024-02-01 DIAGNOSIS — M20.41 HAMMER TOES OF BOTH FEET: ICD-10-CM

## 2024-02-01 DIAGNOSIS — I70.209 PERIPHERAL ARTERIOSCLEROSIS (HCC): ICD-10-CM

## 2024-02-01 DIAGNOSIS — S76.111D RUPTURE OF RIGHT QUADRICEPS TENDON, SUBSEQUENT ENCOUNTER: Primary | ICD-10-CM

## 2024-02-01 DIAGNOSIS — R60.9 CHRONIC EDEMA: ICD-10-CM

## 2024-02-01 DIAGNOSIS — Z47.89 AFTERCARE FOLLOWING SURGERY OF THE MUSCULOSKELETAL SYSTEM: ICD-10-CM

## 2024-02-01 DIAGNOSIS — M79.672 PAIN IN BOTH FEET: Primary | ICD-10-CM

## 2024-02-01 PROCEDURE — 97110 THERAPEUTIC EXERCISES: CPT | Performed by: PHYSICAL THERAPIST

## 2024-02-01 PROCEDURE — 97140 MANUAL THERAPY 1/> REGIONS: CPT | Performed by: PHYSICAL THERAPIST

## 2024-02-01 PROCEDURE — 99203 OFFICE O/P NEW LOW 30 MIN: CPT | Performed by: PODIATRIST

## 2024-02-01 PROCEDURE — 97112 NEUROMUSCULAR REEDUCATION: CPT | Performed by: PHYSICAL THERAPIST

## 2024-02-01 NOTE — PROGRESS NOTES
Assessment/Plan: Pain upon ambulation secondary to hammertoe formation.  Mycosis of nail.  Hallux nail bilateral with ingrown toenail.  Acquired deformity of foot.  Mild peripheral artery disease.  Chronic edema.    Plan.  Chart reviewed.  Patient evaluated.  Primary care notes reviewed.  Patient advised on condition.  At this time patient's been given instruction for proper shoe gear.  He will elevate feet at end of day.  Nail cutting technique recommended.  Watch for signs of infection.  Aftercare instruction given.  Return for follow-up.         Diagnoses and all orders for this visit:    Pain in both feet    Peripheral arteriosclerosis (HCC)    Onychomycosis    Ingrown toenail    Hammer toes of both feet    Chronic edema          Subjective: She has pain in his feet.  He has painful toes.  He has hammertoes.  No history of pedal trauma.  He also has swollen legs.    Allergies   Allergen Reactions    Cefpodoxime Other (See Comments)     Made heart race was given when he had covid pneumonia    Demerol [Meperidine] Itching    Codeine GI Intolerance    Diclofenac Sodium GI Intolerance    Erythromycin Other (See Comments)     Making ears ring    Meloxicam GI Intolerance    Oxaprozin GI Intolerance    Penicillins Hives and Itching         Current Outpatient Medications:     apixaban (ELIQUIS) 5 mg, Take 2 tablets (10 mg total) by mouth 2 (two) times a day for 6 days, THEN 1 tablet (5 mg total) 2 (two) times a day., Disp: 84 tablet, Rfl: 0    lansoprazole (PREVACID) 30 mg capsule, Take 30 mg by mouth every morning  , Disp: , Rfl:     multivitamin (THERAGRAN) TABS, Take 1 tablet by mouth every morning, Disp: , Rfl:     oxyCODONE (ROXICODONE) 5 immediate release tablet, Take 1 tablet (5 mg total) by mouth 2 (two) times a day as needed for severe pain Max Daily Amount: 10 mg, Disp: 15 tablet, Rfl: 0    Patient Active Problem List   Diagnosis    Brady's esophagus with dysplasia    History of colon polyps    Elevated LFTs     Rotator cuff tear arthropathy of left shoulder    Rotator cuff tear arthropathy of right shoulder    Sensorineural hearing loss (SNHL), bilateral    Gastroesophageal reflux disease without esophagitis    Aftercare following right shoulder joint replacement surgery    Post-traumatic osteoarthritis of first carpometacarpal joint of right hand    Muscle strain of right shoulder    Acute pain of right shoulder    Hx of total shoulder replacement, right    Ambulatory dysfunction    Fall from standing    Traumatic rupture of quadriceps tendon, right, initial encounter    Acute pain    Contusion of left leg, initial encounter    Thrombocytopenia (HCC)    Melanoma (HCC)    Impaired fasting glucose    Nelly-colored urine    Cellulitis    Acute deep vein thrombosis (DVT) of femoral vein of left lower extremity (HCC)    Acute left ankle pain          Patient ID: Basim Humphries is a 73 y.o. male.    HPI    The following portions of the patient's history were reviewed and updated as appropriate:     family history includes Arthritis in his maternal grandmother and mother; Diabetes in his father; Heart disease in his father; Lung cancer in his father; Other in his mother; Ovarian cancer in his maternal grandmother.      reports that he has never smoked. He has never used smokeless tobacco. He reports that he does not currently use alcohol. He reports current drug use. Drug: Marijuana.    Vitals:    02/01/24 1315   BP: 144/78   Pulse: 77   Resp: 18       Review of Systems      Objective:  Patient's shoes and socks removed.   Foot Exam    General  General Appearance: appears stated age and healthy   Orientation: alert and oriented to person, place, and time   Affect: appropriate   Gait: antalgic   Assistance: cane use       Right Foot/Ankle     Inspection and Palpation  Tenderness: metatarsals   Swelling: dorsum   Arch: pes cavus  Hammertoes: fifth toe, fourth toe, third toe and second toe  Hallux limitus: yes  Skin Exam: dry  skin;     Neurovascular  Dorsalis pedis: 2+  Posterior tibial: 2+      Left Foot/Ankle      Inspection and Palpation  Swelling: dorsum   Arch: pes cavus  Hammertoes: second toe, fifth toe, fourth toe and third toe  Hallux limitus: yes  Skin Exam: dry skin;     Neurovascular  Dorsalis pedis: 2+  Posterior tibial: 2+      Physical Exam  Vitals and nursing note reviewed.   Constitutional:       Appearance: Normal appearance.   Cardiovascular:      Rate and Rhythm: Normal rate.      Pulses:           Dorsalis pedis pulses are 2+ on the right side and 2+ on the left side.        Posterior tibial pulses are 2+ on the right side and 2+ on the left side.   Musculoskeletal:      Right lower leg: 3+ Pitting Edema present.      Left lower leg: 3+ Pitting Edema present.   Feet:      Right foot:      Skin integrity: Dry skin present.      Left foot:      Skin integrity: Dry skin present.   Skin:     Capillary Refill: Capillary refill takes less than 2 seconds.      Comments: Patient has xerosis of skin.  All nails are dystrophic.  They demonstrate distal mycosis.  Hallux bilateral demonstrates wide incurvated toenail.   Neurological:      Mental Status: He is alert.   Psychiatric:         Mood and Affect: Mood normal.         Behavior: Behavior normal.         Thought Content: Thought content normal.         Judgment: Judgment normal.

## 2024-02-01 NOTE — PROGRESS NOTES
"Daily Note     Today's date: 2024  Patient name: Basim Humphries  : 1950  MRN: [id number]  Referring provider: Mika Amin DO  Dx:   Encounter Diagnosis     ICD-10-CM    1. Rupture of right quadriceps tendon, subsequent encounter  S76.111D       2. Quadriceps strain, left, sequela  S76.112S       3. Aftercare following surgery of the musculoskeletal system  Z47.89                      Subjective: He states he does have LBP with doing lumbar ext so he would like to hold on this. He did trial driving and felt pretty good.       Objective: See treatment diary below      Assessment: Tolerated treatment well. He still has decreased LLE strength but tolerated stairs much better c step through pattern c 1 rail and coming down c 2 rails. He was given further strengthening today. He was encouraged to trial driving.  Patient would benefit from continued PT      Plan: Continue per plan of care.      Goal: Patient's goal is to Walk s AD and to climb steps and get back where he was before the fall    Precautions: WBAT in TROM brace 0-100, increasing 10 deg each week, d/c brace in 4 weeks from  ()  Hx of blood clots  Dx: R patellar tendon repair 10/13/23, L quad weakness from fall    Daily Treatment Diary  Manuals 1/25 1/29 2/1 1/15  1/18 1/23   Knee PROM  B knees 10' 10 min 10' WA MB WA                              Ther Ex         Bike ROM 6' 6 min 6 min  6 min  6 min 6'   sidesteps  5 laps       Hip ext/abd RTB  10x 20x                        bridges    20x  20x   SL LP #45 #45 2x15 #55 20x #60 20x nv 30# 2x15 45# 2x15   Standing lumbar ext over table 3x10 3x10 30x c belt today   2x12            Neuro Re-ed         Quad set 20x :05        saq         laq 20x 40x Nv weight 20x5\" 20x5\"    SLR 2x10 2 x 10 2x10  3x 10 B 3x10 bilat 2x10   SLS 5x :05 difficulty 5x :05 10x :05 10x :05 5x 10\"    Ther Activity         stairs 1 flight 1 FF 10 inch step 10x c cane and rail    1 flight   Sit to stand c foam  10x  " 2x10 1x10 back soreness 7x back soreness x10    Squats chair behind 2x10 20x 2x10 2x10 2x10    Gait Training         Gait c quad cane Throughout session SPC t/o.    nv No AD 15-20 ft between tasks             Modalities         CP in elevation

## 2024-02-05 ENCOUNTER — OFFICE VISIT (OUTPATIENT)
Dept: PHYSICAL THERAPY | Facility: CLINIC | Age: 74
End: 2024-02-05
Payer: MEDICARE

## 2024-02-05 DIAGNOSIS — S76.112S QUADRICEPS STRAIN, LEFT, SEQUELA: ICD-10-CM

## 2024-02-05 DIAGNOSIS — S76.111D RUPTURE OF RIGHT QUADRICEPS TENDON, SUBSEQUENT ENCOUNTER: Primary | ICD-10-CM

## 2024-02-05 PROCEDURE — 97140 MANUAL THERAPY 1/> REGIONS: CPT | Performed by: PHYSICAL THERAPIST

## 2024-02-05 PROCEDURE — 97110 THERAPEUTIC EXERCISES: CPT | Performed by: PHYSICAL THERAPIST

## 2024-02-05 PROCEDURE — 97112 NEUROMUSCULAR REEDUCATION: CPT | Performed by: PHYSICAL THERAPIST

## 2024-02-08 ENCOUNTER — OFFICE VISIT (OUTPATIENT)
Dept: PHYSICAL THERAPY | Facility: CLINIC | Age: 74
End: 2024-02-08
Payer: MEDICARE

## 2024-02-08 DIAGNOSIS — S76.111D RUPTURE OF RIGHT QUADRICEPS TENDON, SUBSEQUENT ENCOUNTER: Primary | ICD-10-CM

## 2024-02-08 DIAGNOSIS — Z47.89 AFTERCARE FOLLOWING SURGERY OF THE MUSCULOSKELETAL SYSTEM: ICD-10-CM

## 2024-02-08 DIAGNOSIS — S76.112S QUADRICEPS STRAIN, LEFT, SEQUELA: ICD-10-CM

## 2024-02-08 PROCEDURE — 97110 THERAPEUTIC EXERCISES: CPT | Performed by: PHYSICAL THERAPIST

## 2024-02-08 PROCEDURE — 97140 MANUAL THERAPY 1/> REGIONS: CPT | Performed by: PHYSICAL THERAPIST

## 2024-02-08 NOTE — PROGRESS NOTES
"Daily Note     Today's date: 2024  Patient name: Basim Humphries  : 1950  MRN: [id number]  Referring provider: Mika Amin DO  Dx:   Encounter Diagnosis     ICD-10-CM    1. Rupture of right quadriceps tendon, subsequent encounter  S76.111D       2. Quadriceps strain, left, sequela  S76.112S       3. Aftercare following surgery of the musculoskeletal system  Z47.89                      Subjective: He feels much stronger and would like to get back to hiking. He did drive himself here today but still struggles with stepping into his truck. He brought his other car today.       Objective: See treatment diary below  LLE strength: 4+/5    Assessment: Tolerated treatment well. We progressed c steps utilizing LLE more and tolerated it well today.  Patient would benefit from continued PT      Plan: Continue per plan of care.  Discussed potential DA eval for L shoulder pain and will consider in the next few weeks.      Goal: Patient's goal is to Walk s AD and to climb steps and get back where he was before the fall    Precautions: WBAT, Hx of blood clots  Dx: R patellar tendon repair 10/13/23, L quad weakness from fall    Daily Treatment Diary  Manuals    Knee PROM  B knees 10' 10 min 10' WA WA WA                              Ther Ex         SL HR     nv    sidesteps  5 laps  5 laps 5 laps    Hip ext/abd RTB  10x 20x                        bridges    20x 20x 20x   SL LP #45 #45 2x15 #55 20x #60 20x #65 20x 75# 2x10 45# 2x15   Standing lumbar ext over table 3x10 3x10 30x c belt today 30 x belt 40x 2x12            Neuro Re-ed         Quad set 20x :05        saq         laq 20x 40x Nv weight 20 #2 20x5\"    SLR 2x10 2 x 10 2x10  3x 10 B 3x10 bilat 2x10   SLS 5x :05 difficulty 5x :05 10x :05 10x :05 5x 10\"    Ther Activity         stairs 1 flight 1 FF 10 inch step 10x c cane and rail  1 flight 10x 6 inch 1 flight   Sit to stand   10x  2x10 2x10 2x10 x10    Squats chair behind 2x10 20x " 2x10 2x10     Gait Training         Gait c quad cane Throughout session SPC t/o.                  Modalities         CP in elevation

## 2024-02-12 ENCOUNTER — OFFICE VISIT (OUTPATIENT)
Dept: PHYSICAL THERAPY | Facility: CLINIC | Age: 74
End: 2024-02-12
Payer: MEDICARE

## 2024-02-12 DIAGNOSIS — S76.112S QUADRICEPS STRAIN, LEFT, SEQUELA: ICD-10-CM

## 2024-02-12 DIAGNOSIS — S76.111D RUPTURE OF RIGHT QUADRICEPS TENDON, SUBSEQUENT ENCOUNTER: Primary | ICD-10-CM

## 2024-02-12 PROCEDURE — 97112 NEUROMUSCULAR REEDUCATION: CPT | Performed by: PHYSICAL THERAPIST

## 2024-02-12 PROCEDURE — 97110 THERAPEUTIC EXERCISES: CPT | Performed by: PHYSICAL THERAPIST

## 2024-02-12 PROCEDURE — 97140 MANUAL THERAPY 1/> REGIONS: CPT | Performed by: PHYSICAL THERAPIST

## 2024-02-12 NOTE — PROGRESS NOTES
"Daily Note     Today's date: 2024  Patient name: Basim Humphries  : 1950  MRN: [id number]  Referring provider: Mika Amin DO  Dx:   Encounter Diagnosis     ICD-10-CM    1. Rupture of right quadriceps tendon, subsequent encounter  S76.111D       2. Quadriceps strain, left, sequela  S76.112S                      Subjective: He states he is feeling better but his wife does state he is bent over walking towards the end of the day.       Objective: See treatment diary below  LLE strength: 4+/5 NT today    Assessment: Tolerated treatment well. He was progressed c doing step to pattern going down stairs although he did need rail support on R side. He was instructed to do EIS c belt once feeling tired.  Patient would benefit from continued PT      Plan: Continue per plan of care.  Discussed potential DA eval for L shoulder pain and will consider in the next few weeks.      Goal: Patient's goal is to Walk s AD and to climb steps and get back where he was before the fall    Precautions: WBAT, Hx of blood clots  Dx: R patellar tendon repair 10/13/23, L quad weakness from fall    Daily Treatment Diary  Manuals    Knee PROM  B knees 10' 10 min 10' WA WA WA                              Ther Ex         SL HR     nv 20x   sidesteps  5 laps  5 laps 5 laps 5 laps   Hip ext/abd RTB  10x 20x                        bridges    20x 20x 20x   SL LP #45 #45 2x15 #55 20x #60 20x #65 20x 75# 2x10 75# 2x15   Standing lumbar ext over table 3x10 3x10 30x c belt today 30 x belt 40x 3x10            Neuro Re-ed         Quad set 20x :05        saq         laq 20x 40x Nv weight 20 #2 20x5\" 20x :05   SLR 2x10 2 x 10 2x10  3x 10 B 3x10 bilat 2x10   SLS 5x :05 difficulty 5x :05 10x :05 10x :05 5x 10\" 5x :10   SLS foam      7x :10   Ther Activity         stairs 1 flight 1 FF 10 inch step 10x c cane and rail  1 flight 10x 6 inch 1 flight   Sit to stand   10x  2x10 2x10 2x10 nv    Squats chair behind 2x10 20x " 2x10 2x10  2x10   Gait Training         Gait c quad cane Throughout session SPC t/o.                  Modalities         CP in elevation

## 2024-02-15 ENCOUNTER — OFFICE VISIT (OUTPATIENT)
Dept: PHYSICAL THERAPY | Facility: CLINIC | Age: 74
End: 2024-02-15
Payer: MEDICARE

## 2024-02-15 DIAGNOSIS — Z47.89 AFTERCARE FOLLOWING SURGERY OF THE MUSCULOSKELETAL SYSTEM: ICD-10-CM

## 2024-02-15 DIAGNOSIS — S76.111D RUPTURE OF RIGHT QUADRICEPS TENDON, SUBSEQUENT ENCOUNTER: Primary | ICD-10-CM

## 2024-02-15 DIAGNOSIS — S76.112S QUADRICEPS STRAIN, LEFT, SEQUELA: ICD-10-CM

## 2024-02-15 PROCEDURE — 97110 THERAPEUTIC EXERCISES: CPT | Performed by: PHYSICAL THERAPIST

## 2024-02-15 PROCEDURE — 97140 MANUAL THERAPY 1/> REGIONS: CPT | Performed by: PHYSICAL THERAPIST

## 2024-02-15 PROCEDURE — 97112 NEUROMUSCULAR REEDUCATION: CPT | Performed by: PHYSICAL THERAPIST

## 2024-02-15 NOTE — PROGRESS NOTES
"Daily Note     Today's date: 2/15/2024  Patient name: Basim Humphries  : 1950  MRN: [id number]  Referring provider: Mika Amin DO  Dx:   Encounter Diagnosis     ICD-10-CM    1. Rupture of right quadriceps tendon, subsequent encounter  S76.111D       2. Quadriceps strain, left, sequela  S76.112S       3. Aftercare following surgery of the musculoskeletal system  Z47.89                      Subjective: He states he was able to perform sit to stands s UE support at the Dermatologist office which felt good for him.  feeling better but his wife does state he is bent over walking towards the end of the day.       Objective: See treatment diary below  LLE strength: 4+/5 NT today    Assessment: Tolerated treatment well. He is improving in overall strength and will continue to benefit from continued walking progression. Steps was performed better. Patient would benefit from continued PT      Plan: Continue per plan of care.  Discussed potential DA eval for L shoulder pain and will consider in the next few weeks.      Goal: Patient's goal is to Walk s AD and to climb steps and get back where he was before the fall    Precautions: WBAT, Hx of blood clots  Dx: R patellar tendon repair 10/13/23, L quad weakness from fall    Daily Treatment Diary  Manuals 2/15 1/29 2/1 2/5 2/8 2/12   Knee PROM  B knees 10' 10 min 10' WA WA WA                              Ther Ex         SL HR 20x    nv 20x   sidesteps  5 laps  5 laps 5 laps 5 laps   Hip ext/abd RTB 20x 10x 20x                        bridges    20x 20x 20x   SL LP #45 #85 2x15 #55 20x #60 20x #65 20x 75# 2x10 75# 2x15   Standing lumbar ext over table nv 3x10 30x c belt today 30 x belt 40x 3x10            Neuro Re-ed         Quad set 20x :05        saq         laq 20x 40x Nv weight 20 #2 20x5\" 20x :05   SLR 2x10 2 x 10 2x10  4x 10 B 3x10 bilat 2x10   SLS 5x :05 difficulty 5x :05 10x :05 10x :05 5x 10\" 5x :10   SLS foam 7x :10     7x :10   Ther Activity         stairs " 8 inch 20x 1 FF 10 inch step 10x c cane and rail  1 flight 10x 6 inch 1 flight   Sit to stand  2x10 10x  2x10 2x10 2x10 nv    Squats chair behind  20x 2x10 2x10  2x10   Gait Training         Gait c quad cane  SPC t/o.                  Modalities         CP in elevation

## 2024-02-19 ENCOUNTER — OFFICE VISIT (OUTPATIENT)
Dept: PHYSICAL THERAPY | Facility: CLINIC | Age: 74
End: 2024-02-19
Payer: MEDICARE

## 2024-02-19 DIAGNOSIS — S76.112S QUADRICEPS STRAIN, LEFT, SEQUELA: ICD-10-CM

## 2024-02-19 DIAGNOSIS — Z47.89 AFTERCARE FOLLOWING SURGERY OF THE MUSCULOSKELETAL SYSTEM: ICD-10-CM

## 2024-02-19 DIAGNOSIS — S76.111D RUPTURE OF RIGHT QUADRICEPS TENDON, SUBSEQUENT ENCOUNTER: Primary | ICD-10-CM

## 2024-02-19 PROCEDURE — 97110 THERAPEUTIC EXERCISES: CPT

## 2024-02-19 PROCEDURE — 97112 NEUROMUSCULAR REEDUCATION: CPT

## 2024-02-19 NOTE — PROGRESS NOTES
"Daily Note     Today's date: 2024  Patient name: Basim Humphries  : 1950  MRN: [id number]  Referring provider: Mika Amin DO  Dx:   Encounter Diagnosis     ICD-10-CM    1. Rupture of right quadriceps tendon, subsequent encounter  S76.111D       2. Quadriceps strain, left, sequela  S76.112S       3. Aftercare following surgery of the musculoskeletal system  Z47.89                      Subjective: Pt reports that he is feeling good today.       Objective: See treatment diary below      Assessment: Pt did well with all TE as listed no increased pain during or post tx.     Plan: Continue per plan of care.      Goal: Patient's goal is to Walk s AD and to climb steps and get back where he was before the fall    Precautions: WBAT, Hx of blood clots  Dx: R patellar tendon repair 10/13/23, L quad weakness from fall    Daily Treatment Diary  Manuals 2/15 2/19 2/1 2/5 2/8 2/12   Knee PROM  B knees 10'  10' WA WA WA                              Ther Ex         SL HR 20x 20x   nv 20x   sidesteps    5 laps 5 laps 5 laps   Hip ext/abd RTB 20x 20x  20x      Bike   5'                bridges    20x 20x 20x   SL LP #45 #85 2x15 85# 2x15 #60 20x #65 20x 75# 2x10 75# 2x15   Standing lumbar ext over table nv  30x c belt today 30 x belt 40x 3x10            Neuro Re-ed         Quad set 20x :05 20x :05       saq         laq 20x 2# X20 Nv weight 20 #2 20x5\" 20x :05   SLR 2x10 2x10 2x10  4x 10 B 3x10 bilat 2x10   SLS 5x :05 difficulty 5x:05 10x :05 10x :05 5x 10\" 5x :10   SLS foam 7x :10     7x :10   Ther Activity         stairs 8 inch 20x 8\" 20x 10 inch step 10x c cane and rail  1 flight 10x 6 inch 1 flight   Sit to stand  2x10 2x10 2x10 2x10 2x10 nv    Squats chair behind   2x10 2x10  2x10   Gait Training         Gait c quad cane                  Modalities         CP in elevation                                    "

## 2024-02-22 ENCOUNTER — OFFICE VISIT (OUTPATIENT)
Dept: PHYSICAL THERAPY | Facility: CLINIC | Age: 74
End: 2024-02-22
Payer: MEDICARE

## 2024-02-22 DIAGNOSIS — S76.111D RUPTURE OF RIGHT QUADRICEPS TENDON, SUBSEQUENT ENCOUNTER: ICD-10-CM

## 2024-02-22 DIAGNOSIS — S76.112S QUADRICEPS STRAIN, LEFT, SEQUELA: Primary | ICD-10-CM

## 2024-02-22 PROCEDURE — 97140 MANUAL THERAPY 1/> REGIONS: CPT | Performed by: PHYSICAL THERAPIST

## 2024-02-22 PROCEDURE — 97110 THERAPEUTIC EXERCISES: CPT | Performed by: PHYSICAL THERAPIST

## 2024-02-22 PROCEDURE — 97112 NEUROMUSCULAR REEDUCATION: CPT | Performed by: PHYSICAL THERAPIST

## 2024-02-22 NOTE — PROGRESS NOTES
"Daily Note     Today's date: 2024  Patient name: Basim Humphries  : 1950  MRN: [id number]  Referring provider: Mika Amin DO  Dx:   Encounter Diagnosis     ICD-10-CM    1. Quadriceps strain, left, sequela  S76.112S       2. Rupture of right quadriceps tendon, subsequent encounter  S76.111D                      Subjective: Pt reports he continues to have improved strength but still feels limitation c walking reciprocally. He did walk 3000 steps and felt good progress with this.       Objective: See treatment diary below    L knee flexion 120  R knee flexion 112  Assessment: Pt did well and is improving his knee ROM. Added in HS strength today and tolerated it well in prone. Will RE shoulder nv.     Plan: Continue per plan of care.      Goal: Patient's goal is to Walk s AD and to climb steps and get back where he was before the fall    Precautions: WBAT, Hx of blood clots  Dx: R patellar tendon repair 10/13/23, L quad weakness from fall    Daily Treatment Diary  Manuals 2/15 2/19 2/22 2/5 2/8 2/12   Knee PROM  B knees 10'  10' WA WA WA                              Ther Ex         SL HR 20x 20x 20x  nv 20x   sidesteps    5 laps 5 laps 5 laps   Hip ext/abd RTB 20x 20x  20x      Bike   5' 6'               bridges    20x 20x 20x   SL LP #45 #85 2x15 85# 2x15 #85 20x #65 20x 75# 2x10 75# 2x15   Standing lumbar ext belt nv  30x 30 x belt 40x 3x10   Prone knee flex   20x      Neuro Re-ed         Quad set 20x :05 20x :05       saq         laq 20x 2# X20 2# X20 20 #2 20x5\" 20x :05   SLR 2x10 2x10 2# X20  4x 10 B 3x10 bilat 2x10   SLS 5x :05 difficulty 5x:05   10x :05 5x 10\" 5x :10   SLS foam 7x :10  7x :10   7x :10   Ther Activity         stairs 8 inch 20x 8\" 20x 1 flight 1 flight 10x 6 inch 1 flight   Sit to stand  2x10 2x10 2x10 2x10 2x10 nv    Squats chair behind   2x10 2x10  2x10   Gait Training         Gait c quad cane                  Modalities         CP in elevation                                  "

## 2024-02-26 ENCOUNTER — OFFICE VISIT (OUTPATIENT)
Dept: PHYSICAL THERAPY | Facility: CLINIC | Age: 74
End: 2024-02-26
Payer: MEDICARE

## 2024-02-26 DIAGNOSIS — Z47.89 AFTERCARE FOLLOWING SURGERY OF THE MUSCULOSKELETAL SYSTEM: ICD-10-CM

## 2024-02-26 DIAGNOSIS — S76.112S QUADRICEPS STRAIN, LEFT, SEQUELA: ICD-10-CM

## 2024-02-26 DIAGNOSIS — S76.111D RUPTURE OF RIGHT QUADRICEPS TENDON, SUBSEQUENT ENCOUNTER: ICD-10-CM

## 2024-02-26 DIAGNOSIS — M12.812 ROTATOR CUFF ARTHROPATHY OF LEFT SHOULDER: Primary | ICD-10-CM

## 2024-02-26 PROCEDURE — 97140 MANUAL THERAPY 1/> REGIONS: CPT | Performed by: PHYSICAL THERAPIST

## 2024-02-26 PROCEDURE — 97110 THERAPEUTIC EXERCISES: CPT | Performed by: PHYSICAL THERAPIST

## 2024-02-26 PROCEDURE — 97161 PT EVAL LOW COMPLEX 20 MIN: CPT | Performed by: PHYSICAL THERAPIST
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
## 2024-02-27 ENCOUNTER — OFFICE VISIT (OUTPATIENT)
Dept: OTOLARYNGOLOGY | Facility: CLINIC | Age: 74
End: 2024-02-27
Payer: MEDICARE

## 2024-02-27 VITALS — WEIGHT: 187 LBS | BODY MASS INDEX: 28.34 KG/M2 | HEIGHT: 68 IN | TEMPERATURE: 97.8 F

## 2024-02-27 DIAGNOSIS — H61.23 BILATERAL IMPACTED CERUMEN: ICD-10-CM

## 2024-02-27 DIAGNOSIS — H90.3 SENSORINEURAL HEARING LOSS (SNHL), BILATERAL: Primary | ICD-10-CM

## 2024-02-27 PROCEDURE — 69210 REMOVE IMPACTED EAR WAX UNI: CPT | Performed by: NURSE PRACTITIONER

## 2024-02-27 PROCEDURE — 99213 OFFICE O/P EST LOW 20 MIN: CPT | Performed by: NURSE PRACTITIONER

## 2024-02-27 NOTE — PROGRESS NOTES
Assessment/Plan:    Bilateral impacted cerumen  On exam noted bilateral cerumen impaction and unable to fully view tympanic membrane.  Cerumen impaction removed bilateral eac with suction, pt tolerated procedure well.  Upon removal, improved hearing and decreased clogged sensation of bilateral ears.  Discussed routine cerumen care including avoidance of q-tips and cerumen softeners. Hydrocortisone cream to both ears at bedtime for 30 days then as needed for itching.  Debrox (cleaning drops) ear drops - put drops in ears after hair and ear trimming.   Encourage ongoing follow up in 4 to 6 months to monitor for cerumen and hearing.  Audiogram if symptoms worsen.  Continue binaural hearing aids       Diagnoses and all orders for this visit:    Sensorineural hearing loss (SNHL), bilateral    Bilateral impacted cerumen    Other orders  -     Ear cerumen removal          Subjective:      Patient ID: Basim Humphries is a 73 y.o. male.    Presents today as a follow up due to hearing loss.  Hearing aid facility in [facility].  Difficulty hearing TV at times.  Certain sounds difficulty hearing.  During routine audiology care they did remove some cerumen.  Both ears itch often.      Since last visit, obtained new hearing aids.  Feels hearing is improved.  Fall 10/2023 with injuries to knees.           The following portions of the patient's history were reviewed and updated as appropriate: allergies, current medications, past family history, past medical history, past social history, past surgical history, and problem list.    Review of Systems   Constitutional: Negative.    HENT:  Positive for hearing loss. Negative for congestion, ear discharge, ear pain, nosebleeds, postnasal drip, rhinorrhea, sinus pressure, sinus pain, sore throat, tinnitus and voice change.    Respiratory:  Negative for chest tightness and shortness of breath.    Skin:  Negative for color change.   Neurological:  Negative for dizziness,  "numbness and headaches.   Psychiatric/Behavioral: Negative.           Objective:      Temp 97.8 °F (36.6 °C) (Temporal)   Ht 5' 8\" (1.727 m)   Wt 84.8 kg (187 lb)   BMI 28.43 kg/m²          Physical Exam  Constitutional:       Appearance: He is well-developed.   HENT:      Head: Normocephalic.      Right Ear: Hearing, tympanic membrane, ear canal and external ear normal. No decreased hearing noted. No drainage or tenderness. There is impacted cerumen. Tympanic membrane is not perforated or erythematous.      Left Ear: Hearing, tympanic membrane, ear canal and external ear normal. No decreased hearing noted. No drainage or tenderness. There is impacted cerumen. Tympanic membrane is not perforated or erythematous.      Nose: Nose normal. No nasal deformity or septal deviation.      Mouth/Throat:      Mouth: Mucous membranes are not pale and not dry. No oral lesions.      Dentition: Normal dentition.      Pharynx: Uvula midline. No oropharyngeal exudate.   Neck:      Trachea: No tracheal deviation.   Pulmonary:      Effort: No accessory muscle usage or respiratory distress.   Musculoskeletal:      Cervical back: Neck supple.   Lymphadenopathy:      Cervical: No cervical adenopathy.   Skin:     General: Skin is warm and dry.   Neurological:      Mental Status: He is alert and oriented to person, place, and time.      Cranial Nerves: No cranial nerve deficit.      Sensory: No sensory deficit.      Gait: Gait abnormal.   Psychiatric:         Behavior: Behavior is cooperative.         Ear cerumen removal    Date/Time: 2/27/2024 2:00 PM    Performed by: CHANEL Alvarado  Authorized by: CHANEL Alvarado  Universal Protocol:  Consent: Verbal consent obtained.  Risks and benefits: risks, benefits and alternatives were discussed  Consent given by: patient  Patient understanding: patient states understanding of the procedure being performed    Patient location:  Clinic  Procedure details:     Local anesthetic:  None    " Location:  L ear and R ear    Approach:  External  Post-procedure details:     Complication:  None    Hearing quality:  Normal    Patient tolerance of procedure:  Tolerated well, no immediate complications

## 2024-02-29 ENCOUNTER — OFFICE VISIT (OUTPATIENT)
Dept: PHYSICAL THERAPY | Facility: CLINIC | Age: 74
End: 2024-02-29
Payer: MEDICARE

## 2024-02-29 DIAGNOSIS — S76.111D RUPTURE OF RIGHT QUADRICEPS TENDON, SUBSEQUENT ENCOUNTER: Primary | ICD-10-CM

## 2024-02-29 DIAGNOSIS — S76.112S QUADRICEPS STRAIN, LEFT, SEQUELA: ICD-10-CM

## 2024-02-29 PROCEDURE — 97112 NEUROMUSCULAR REEDUCATION: CPT | Performed by: PHYSICAL THERAPIST

## 2024-02-29 PROCEDURE — 97110 THERAPEUTIC EXERCISES: CPT | Performed by: PHYSICAL THERAPIST

## 2024-02-29 PROCEDURE — 97140 MANUAL THERAPY 1/> REGIONS: CPT | Performed by: PHYSICAL THERAPIST

## 2024-02-29 NOTE — PROGRESS NOTES
"Daily Note     Today's date: 2024  Patient name: Basim Humphries  : 1950  MRN: [id number]  Referring provider: Mika Amin DO  Dx:   Encounter Diagnosis     ICD-10-CM    1. Rupture of right quadriceps tendon, subsequent encounter  S76.111D       2. Quadriceps strain, left, sequela  S76.112S                      Subjective: He states he was sore after last session but states it felt a bit better. He notices it is not clicking as much but still he has difficulty raising it overhead.       Objective: See treatment diary below      Assessment: Tolerated treatment well. He still did get ratcheting with L GH PROM. Will slowly progress c AAROM and isometrics as tolerable. Patient would benefit from continued PT      Plan: Continue per plan of care.      Goal: Patient's goal is to Walk s AD and to climb steps and get back where he was before the fall    Precautions: DA, WBAT in TROM brace 0-100, increasing 10 deg each week, d/c brace in 4 weeks from  ()  Hx of blood clots  Dx: R patellar tendon repair 10/13/23, L quad weakness from fall  L GH Direct access- rotator cuff tear likely and OA  Daily Treatment Diary  Manuals 2/15 2/19 2/22 2/26 2/29 2/12   Knee PROM  B knees 10'  10'   5 WA   L GH PROM    5' 5'                      Ther Ex         SL HR 20x 20x 20x  20 20x   sidesteps     5 laps 5 laps   Hip ext/abd RTB 20x 20x  20x      Bike   5' 6'      pulleys    3' 3'    Wand c cane    10x :05 20x :05    bridges    20x 20x 20x   SL LP #45 #85 2x15 85# 2x15 #85 20x #85 20x 85# 2x10 75# 2x15   Standing lumbar ext belt nv  30x   3x10   Prone knee flex   20x      Neuro Re-ed         GH isometric ER/IR     10x :05    saq         laq 20x 2# X20 2# X20  20x  20x :05   SLR 2x10 2x10 2# X20   2x10 2x10   SLS 5x :05 difficulty 5x:05    5x 10\" 5x :10   SLS foam 7x :10  7x :10  7x :10 7x :10   Ther Activity         stairs 8 inch 20x 8\" 20x 1 flight   8 inch 20x 1 flight   Sit to stand  2x10 2x10 2x10  2x10 nv  "   Squats chair behind   2x10   2x10                              Modalities         CP in elevation

## 2024-03-04 ENCOUNTER — OFFICE VISIT (OUTPATIENT)
Dept: PHYSICAL THERAPY | Facility: CLINIC | Age: 74
End: 2024-03-04
Payer: MEDICARE

## 2024-03-04 DIAGNOSIS — S76.111D RUPTURE OF RIGHT QUADRICEPS TENDON, SUBSEQUENT ENCOUNTER: Primary | ICD-10-CM

## 2024-03-04 DIAGNOSIS — Z47.89 AFTERCARE FOLLOWING SURGERY OF THE MUSCULOSKELETAL SYSTEM: ICD-10-CM

## 2024-03-04 DIAGNOSIS — S76.112S QUADRICEPS STRAIN, LEFT, SEQUELA: ICD-10-CM

## 2024-03-04 PROCEDURE — 97110 THERAPEUTIC EXERCISES: CPT | Performed by: PHYSICAL THERAPIST

## 2024-03-04 PROCEDURE — 97140 MANUAL THERAPY 1/> REGIONS: CPT | Performed by: PHYSICAL THERAPIST

## 2024-03-04 NOTE — PROGRESS NOTES
"Daily Note     Today's date: 3/4/2024  Patient name: Basim Humphries  : 1950  MRN: [id number]  Referring provider: Mika Amin DO  Dx:   Encounter Diagnosis     ICD-10-CM    1. Rupture of right quadriceps tendon, subsequent encounter  S76.111D       2. Aftercare following surgery of the musculoskeletal system  Z47.89       3. Quadriceps strain, left, sequela  S76.112S                      Subjective: He states his arm is feeling a bit more loose. He presents today s cane today.       Objective: See treatment diary below      Assessment: Tolerated treatment well. He did have quite a bit of clicking during PROM but tolerated session well.  Patient would benefit from continued PT      Plan: Continue per plan of care.      Goal: Patient's goal is to Walk s AD and to climb steps and get back where he was before the fall    Precautions: DA, WBAT in TROM brace 0-100, increasing 10 deg each week, d/c brace in 4 weeks from  ()  Hx of blood clots  Dx: R patellar tendon repair 10/13/23, L quad weakness from fall  L GH Direct access- rotator cuff tear likely and OA  Daily Treatment Diary  Manuals 2/15 2/19 2/22 2/26 2/29 3/4   Knee PROM  B knees 10'  10'   5 WA   L GH PROM    5' 5'                      Ther Ex         SL HR 20x 20x 20x  20 20x   sidesteps     5 laps 5 laps   Hip ext/abd RTB 20x 20x  20x      Bike   5' 6'      pulleys    3' 3' 33   Wand c cane    10x :05 20x :05 20x   SL bridge      nv   SL LP #45 #85 2x15 85# 2x15 #85 20x #85 20x 85# 2x10 90# 2x15   Standing lumbar ext belt nv  30x   3x10   Prone knee flex   20x      Neuro Re-ed         GH isometric ER/IR     10x :05 10x :05   saq         laq 20x 2# X20 2# X20  20x  20x :05   SLR 2x10 2x10 2# X20   2x10 2x10   SLS 5x :05 difficulty 5x:05    5x 10\" 5x :10   SLS foam 7x :10  7x :10  7x :10 7x :10   Ther Activity         stairs 8 inch 20x 8\" 20x 1 flight   8 inch 20x 1   Sit to stand  2x10 2x10 2x10  2x10 2x10   Squats chair behind   2x10    "                              Modalities         CP in elevation

## 2024-03-07 ENCOUNTER — OFFICE VISIT (OUTPATIENT)
Dept: PHYSICAL THERAPY | Facility: CLINIC | Age: 74
End: 2024-03-07
Payer: MEDICARE

## 2024-03-07 DIAGNOSIS — S76.111D RUPTURE OF RIGHT QUADRICEPS TENDON, SUBSEQUENT ENCOUNTER: Primary | ICD-10-CM

## 2024-03-07 DIAGNOSIS — Z47.89 AFTERCARE FOLLOWING SURGERY OF THE MUSCULOSKELETAL SYSTEM: ICD-10-CM

## 2024-03-07 PROCEDURE — 97140 MANUAL THERAPY 1/> REGIONS: CPT | Performed by: PHYSICAL THERAPIST

## 2024-03-07 PROCEDURE — 97110 THERAPEUTIC EXERCISES: CPT | Performed by: PHYSICAL THERAPIST

## 2024-03-07 PROCEDURE — 97112 NEUROMUSCULAR REEDUCATION: CPT | Performed by: PHYSICAL THERAPIST

## 2024-03-07 NOTE — PROGRESS NOTES
"Daily Note     Today's date: 3/7/2024  Patient name: Basim Humphries  : 1950  MRN: [id number]  Referring provider: Mika Amin DO  Dx:   Encounter Diagnosis     ICD-10-CM    1. Rupture of right quadriceps tendon, subsequent encounter  S76.111D       2. Aftercare following surgery of the musculoskeletal system  Z47.89                      Subjective: He states he notices his arm is able to raise higher and he believes it is due to isometric exercises.       Objective: See treatment diary below      Assessment: Tolerated treatment well. He had significantly less clicking during GH flexion PROM. GH abduction still did have ratcheting. AROM of L shoulder is improving. He was progressed c walking tolerance on uneven surfaces today.  Patient would benefit from continued PT      Plan: Continue per plan of care.      Goal: Patient's goal is to Walk s AD and to climb steps and get back where he was before the fall    Precautions: DA, WBAT in TROM brace 0-100, increasing 10 deg each week, d/c brace in 4 weeks from  ()  Hx of blood clots  Dx: R patellar tendon repair 10/13/23, L quad weakness from fall  L GH Direct access- rotator cuff tear likely and OA  Daily Treatment Diary  Manuals 3/7 2/19 2/22 2/26 2/29 3/4   Knee PROM  B knees 5  10'   5 WA   L GH PROM 5   5' 5'                      Ther Ex         SL HR 20x 20x 20x  20 20x   sidesteps     5 laps 5 laps   Hip ext/abd RTB 20x 20x  20x      Bike  6' 5' 6'      pulleys 3'/3   3' 3' 3/3   Wand c cane 20x   10x :05 20x :05 20x   SL bridge 20x     nv   SL LP #45 #90 2x15 85# 2x15 #85 20x #85 20x 85# 2x10 90# 2x15   Standing lumbar ext belt 3x10  30x   3x10   Prone knee flex   20x      Neuro Re-ed         GH isometric ER/IR     10x :05 10x :05   saq         laq 20x 2# X20 2# X20  20x  20x :05   SLR #2 2x10 #2 2x10 2# X20   2x10 2x10             SLS foam 7x :10  7x :10  7x :10 7x :10   Ther Activity         stairs  8\" 20x 1 flight   8 inch 20x 1   Sit to " stand  2x10 2x10 2x10  2x10 2x10   Squats chair behind   2x10                                 Modalities         CP in elevation

## 2024-03-11 ENCOUNTER — OFFICE VISIT (OUTPATIENT)
Dept: PHYSICAL THERAPY | Facility: CLINIC | Age: 74
End: 2024-03-11
Payer: MEDICARE

## 2024-03-11 DIAGNOSIS — M12.812 ROTATOR CUFF ARTHROPATHY OF LEFT SHOULDER: ICD-10-CM

## 2024-03-11 DIAGNOSIS — Z47.89 AFTERCARE FOLLOWING SURGERY OF THE MUSCULOSKELETAL SYSTEM: ICD-10-CM

## 2024-03-11 DIAGNOSIS — S76.112S QUADRICEPS STRAIN, LEFT, SEQUELA: ICD-10-CM

## 2024-03-11 DIAGNOSIS — S76.111D RUPTURE OF RIGHT QUADRICEPS TENDON, SUBSEQUENT ENCOUNTER: Primary | ICD-10-CM

## 2024-03-11 PROCEDURE — 97140 MANUAL THERAPY 1/> REGIONS: CPT | Performed by: PHYSICAL THERAPIST

## 2024-03-11 PROCEDURE — 97112 NEUROMUSCULAR REEDUCATION: CPT | Performed by: PHYSICAL THERAPIST

## 2024-03-11 PROCEDURE — 97110 THERAPEUTIC EXERCISES: CPT | Performed by: PHYSICAL THERAPIST

## 2024-03-11 NOTE — PROGRESS NOTES
Daily Note     Today's date: 3/11/2024  Patient name: Basim Humphries  : 1950  MRN: [id number]  Referring provider: Mika Amin DO  Dx:   Encounter Diagnosis     ICD-10-CM    1. Rupture of right quadriceps tendon, subsequent encounter  S76.111D       2. Aftercare following surgery of the musculoskeletal system  Z47.89       3. Quadriceps strain, left, sequela  S76.112S       4. Rotator cuff arthropathy of left shoulder  M12.812                      Subjective: He states he was able to stand up from the couch for the first time since 10/10.       Objective: See treatment diary below      Assessment: Tolerated treatment well. He had improved ROM after IR strap stretch. Will continue as tolerable.  Patient would benefit from continued PT      Plan: Continue per plan of care.      Goal: Patient's goal is to Walk s AD and to climb steps and get back where he was before the fall    Precautions: DA, Hx of blood clots  Dx: R patellar tendon repair 10/13/23, L quad weakness from fall  L GH Direct access- rotator cuff tear likely and OA  Daily Treatment Diary  Manuals 3/7 3/11  2/26 2/29 3/4   Knee PROM  B knees 5 5 10'   5 WA   L GH PROM 5 5  5' 5'                      Ther Ex         SL HR 20x 20x 20x  20 20x   sidesteps     5 laps 5 laps   IR strap st  10x :05       Hip ext/abd RTB 20x 20x  20x      Bike  6' 6' 6'      pulleys 3'/3 3/3  3' 3' 3/3   Wand c cane 20x 20x  10x :05 20x :05 20x   SL bridge 20x     nv   SL LP #45 #90 2x15 90# 2x15 #85 20x #85 20x 85# 2x10 90# 2x15   Standing lumbar ext belt 3x10  30x   3x10   Prone knee flex   20x      Neuro Re-ed         GH isometric ER/IR     10x :05 10x :05    Foam walking 5 laps nv       laq 20x  2# X20  20x  20x :05   SLR #2 2x10 #2 2x10 #2 2# X20   2x10 2x10             SLS foam 7x :10  7x :10  7x :10 7x :10   Ther Activity         stairs    1 flight   8 inch 20x 1   Sit to stand  2x10 2x10 2x10  2x10 2x10   Squats chair behind   2x10                                  Modalities         CP in elevation

## 2024-03-14 ENCOUNTER — APPOINTMENT (OUTPATIENT)
Dept: PHYSICAL THERAPY | Facility: CLINIC | Age: 74
End: 2024-03-14
Payer: MEDICARE

## 2024-03-14 ENCOUNTER — OFFICE VISIT (OUTPATIENT)
Dept: PHYSICAL THERAPY | Facility: CLINIC | Age: 74
End: 2024-03-14
Payer: MEDICARE

## 2024-03-14 DIAGNOSIS — S76.111D RUPTURE OF RIGHT QUADRICEPS TENDON, SUBSEQUENT ENCOUNTER: ICD-10-CM

## 2024-03-14 DIAGNOSIS — S76.112S QUADRICEPS STRAIN, LEFT, SEQUELA: ICD-10-CM

## 2024-03-14 DIAGNOSIS — Z47.89 AFTERCARE FOLLOWING SURGERY OF THE MUSCULOSKELETAL SYSTEM: Primary | ICD-10-CM

## 2024-03-14 PROCEDURE — 97110 THERAPEUTIC EXERCISES: CPT

## 2024-03-14 PROCEDURE — 97112 NEUROMUSCULAR REEDUCATION: CPT

## 2024-03-14 PROCEDURE — 97140 MANUAL THERAPY 1/> REGIONS: CPT

## 2024-03-14 NOTE — PROGRESS NOTES
Daily Note     Today's date: 3/14/2024  Patient name: Basim Humphries  : 1950  MRN: [id number]  Referring provider: Mika Amin DO  Dx:   Encounter Diagnosis     ICD-10-CM    1. Aftercare following surgery of the musculoskeletal system  Z47.89       2. Rupture of right quadriceps tendon, subsequent encounter  S76.111D       3. Quadriceps strain, left, sequela  S76.112S                      Subjective: Patient states he is doing better than in the beginning of therapy. He blew a lot of leaves and is a bit sore.       Objective: See treatment diary below      Assessment: Tolerated treatment well. Patient would benefit from continued PT. No increased symptoms by end of session.       Plan: Continue per plan of care.      Goal: Patient's goal is to Walk s AD and to climb steps and get back where he was before the fall    Precautions: DA, Hx of blood clots  Dx: R patellar tendon repair 10/13/23, L quad weakness from fall  L GH Direct access- rotator cuff tear likely and OA  Daily Treatment Diary  Manuals 3/7 3/11 3/14 2/26 2/29 3/4   Knee PROM  B knees 5 5 10'   5 WA   L GH PROM 5 5  5' 5'                      Ther Ex         SL HR 20x 20x 20x  20 20x   sidesteps     5 laps 5 laps   IR strap st  10x :05       Hip ext/abd RTB 20x 20x  20x      Bike  6' 6' 6'      pulleys 3'/3 3/3  3' 3' 3/3   Wand c cane 20x 20x  10x :05 20x :05 20x   SL bridge 20x     nv   SL LP #45 #90 2x15 90# 2x15 #85 20x #85 20x 85# 2x10 90# 2x15   Standing lumbar ext belt 3x10  30x   3x10   Prone knee flex   20x      Neuro Re-ed         GH isometric ER/IR     10x :05 10x :05    Foam walking 5 laps nv       laq 20x  2# X20  20x  20x :05   SLR #2 2x10 #2 2x10 #2 2# X20   2x10 2x10             SLS foam 7x :10  7x :10  7x :10 7x :10   Ther Activity         stairs    1 flight   8 inch 20x 1   Sit to stand  2x10 2x10 2x10  2x10 2x10   Squats chair behind   2x10                                 Modalities         CP in elevation

## 2024-03-18 ENCOUNTER — APPOINTMENT (OUTPATIENT)
Dept: PHYSICAL THERAPY | Facility: CLINIC | Age: 74
End: 2024-03-18
Payer: MEDICARE

## 2024-03-18 ENCOUNTER — OFFICE VISIT (OUTPATIENT)
Dept: PHYSICAL THERAPY | Facility: CLINIC | Age: 74
End: 2024-03-18
Payer: MEDICARE

## 2024-03-18 DIAGNOSIS — Z47.89 AFTERCARE FOLLOWING SURGERY OF THE MUSCULOSKELETAL SYSTEM: Primary | ICD-10-CM

## 2024-03-18 DIAGNOSIS — S76.112S QUADRICEPS STRAIN, LEFT, SEQUELA: ICD-10-CM

## 2024-03-18 DIAGNOSIS — M12.812 ROTATOR CUFF ARTHROPATHY OF LEFT SHOULDER: ICD-10-CM

## 2024-03-18 DIAGNOSIS — S76.111D RUPTURE OF RIGHT QUADRICEPS TENDON, SUBSEQUENT ENCOUNTER: ICD-10-CM

## 2024-03-18 PROCEDURE — 97112 NEUROMUSCULAR REEDUCATION: CPT | Performed by: PHYSICAL THERAPIST

## 2024-03-18 PROCEDURE — 97110 THERAPEUTIC EXERCISES: CPT | Performed by: PHYSICAL THERAPIST

## 2024-03-18 NOTE — PROGRESS NOTES
Daily Note     Today's date: 3/18/2024  Patient name: Basim Humphries  : 1950  MRN: [id number]  Referring provider: Mika Amin DO  Dx:   Encounter Diagnosis     ICD-10-CM    1. Aftercare following surgery of the musculoskeletal system  Z47.89       2. Rupture of right quadriceps tendon, subsequent encounter  S76.111D       3. Quadriceps strain, left, sequela  S76.112S       4. Rotator cuff arthropathy of left shoulder  M12.812                      Subjective: Patient states he did have some difficulty getting up out of a low chair yesterday but overall is feeling better.       Objective: See treatment diary below      Assessment: Tolerated treatment well. He still demonstrates stiffness in B legs when walking and will continue c HS strengthening. Patient would benefit from continued PT.        Plan: Continue per plan of care.      Goal: Patient's goal is to Walk s AD and to climb steps and get back where he was before the fall    Precautions: DA, Hx of blood clots  Dx: R patellar tendon repair 10/13/23, L quad weakness from fall  L GH Direct access- rotator cuff tear likely and OA  Daily Treatment Diary  Manuals 3/7 3/11 3/14 3/18 2/29 3/4   Knee PROM  B knees 5 5 10' 5'  5 WA   L GH PROM 5 5  5' 5'                      Ther Ex         SL HR 20x 20x 20x  20 20x   SL RDL    10x     IR strap st  10x :05  10x :05     Hip ext/abd RTB 20x 20x  20x      Bike  6' 6' 6' 6'     pulleys 3'/3 3/3  3' 3' 3/3   Wand c cane 20x 20x  10x :05 20x :05 20x   SL bridge 20x     nv   SL LP #45 #90 2x15 90# 2x15 #85 20x #95 20x 85# 2x10 90# 2x15   Standing lumbar ext belt 3x10  30x 20x  3x10   Prone knee flex   20x      Neuro Re-ed         GH isometric ER/IR     10x :05 10x :05    Foam walking 5 laps nv  5 laps     laq 20x  2# X20  20x  20x :05   SLR #2 2x10 #2 2x10 #2 2# X20   2x10 2x10             SLS foam 7x :10  7x :10  7x :10 7x :10   Ther Activity         stairs    1 flight   8 inch 20x 1             Squats chair  behind   2x10 20x                                Modalities         CP in elevation

## 2024-03-21 ENCOUNTER — OFFICE VISIT (OUTPATIENT)
Dept: PHYSICAL THERAPY | Facility: CLINIC | Age: 74
End: 2024-03-21
Payer: MEDICARE

## 2024-03-21 DIAGNOSIS — S76.112S QUADRICEPS STRAIN, LEFT, SEQUELA: ICD-10-CM

## 2024-03-21 DIAGNOSIS — S76.111D RUPTURE OF RIGHT QUADRICEPS TENDON, SUBSEQUENT ENCOUNTER: ICD-10-CM

## 2024-03-21 DIAGNOSIS — M12.812 ROTATOR CUFF ARTHROPATHY OF LEFT SHOULDER: ICD-10-CM

## 2024-03-21 DIAGNOSIS — Z47.89 AFTERCARE FOLLOWING SURGERY OF THE MUSCULOSKELETAL SYSTEM: Primary | ICD-10-CM

## 2024-03-21 PROCEDURE — 97112 NEUROMUSCULAR REEDUCATION: CPT | Performed by: PHYSICAL THERAPIST

## 2024-03-21 PROCEDURE — 97110 THERAPEUTIC EXERCISES: CPT | Performed by: PHYSICAL THERAPIST

## 2024-03-21 NOTE — PROGRESS NOTES
Daily Note     Today's date: 3/21/2024  Patient name: Basim Humphries  : 1950  MRN: [id number]  Referring provider: Mika Amin DO  Dx:   Encounter Diagnosis     ICD-10-CM    1. Aftercare following surgery of the musculoskeletal system  Z47.89       2. Rupture of right quadriceps tendon, subsequent encounter  S76.111D       3. Quadriceps strain, left, sequela  S76.112S       4. Rotator cuff arthropathy of left shoulder  M12.812                      Subjective: Patient states he feels he can walk longer and faster but he still does feel a bit of wobble.       Objective: See treatment diary below    Observation: quad atrophy, swelling in R calf and pitting edema 10 sec    10 cm below R patella 35.7 nt  R patellar 40.7 nt     Gait:no AD decreased flexion in LLE in swing  LAQ: lacking 7deg L   R 12 deg  Steps: step through pattern c rail and cane decreased strength and control going up and down LLE.   Squat: good depth and ROM s UE support  Repeated lumbar ext     Knee A/PROM L R Strength L R   Flex  120/120 112/115 Flex 4+ 5   Ext  0/0  0/0 Ext 4+ 5           Hip A/PROM        Flex  /  / Flex 4+ 5   ER at 90   ER     IR at 90   IR     Abd   Abd 4- 3+   Ext   Ext             Lumbar AROM   Ankle     Flex   DF 5 4-   Ext   PF       Observation: ratcheting and clicking when moving L arm up and down   Sensation:wfl   Distraction:nt     Painful arc:L + Gamez: Crossbody add:  ER lag: L+ Drop arm:- Belly press:B +       BRICE: post GH mob hypomobile  Palpation:  Repeated C ext trialed s any change to L GH ROM nor pain    Cervical ROM L R Strength L R   Flex   WFL   Mid trap     Ext  Mod banks   Low trap     Rotation   WFL WFL      Shoulder A/PROM        Flex  99/160 115 /145 Flex 2 4   Abd  99/125 114 /120 Abd     ER  32/55  53/50 ER 2+ 3-   IR scratch L1 S1/35 IR 4  4   ER scratch occiput C4/90                                        Assessment: Tolerated treatment well. Did not assess GH PROM today but will  complete RE next visit. He would still benefit from lumbar extension in standing as this did improve his steppage gait and DF strength to 4+/5. Will assess this nv as well.  Patient would benefit from continued PT.        Plan: Continue per plan of care.      Goal: Patient's goal is to Walk s AD and to climb steps and get back where he was before the fall    Precautions: DA, Hx of blood clots  Dx: R patellar tendon repair 10/13/23, L quad weakness from fall  L GH Direct access- rotator cuff tear likely and OA  Daily Treatment Diary  Manuals 3/7 3/11 3/14 3/18 3/21 3/4   Knee PROM  B knees 5 5 10' 5'  WA   L GH PROM 5 5  5'      R LAD     8'             Ther Ex         SL HR 20x 20x 20x  20 20x   SL RDL    10x     IR strap st  10x :05  10x :05     Hip ext/abd RTB 20x 20x  20x      Bike  6' 6' 6' 6'     pulleys 3'/3 3/3  3' 3' 3/3   Wand c cane 20x 20x  10x :05 20x :05 20x   SL bridge 20x     nv   SL LP #45 #90 2x15 90# 2x15 #85 20x #95 20x 95# 2x10 90# 2x15   Standing lumbar ext belt 3x10  30x 20x 40x 3x10   Prone knee flex   20x      Neuro Re-ed         GH isometric ER/IR     10x :05 10x :05    Foam walking 5 laps nv  5 laps     laq 20x  2# X20  20x  20x :05   SLR #2 2x10 #2 2x10 #2 2# X20     2x10             SLS foam 7x :10  7x :10  nv 7x :10   Ther Activity         stairs    1 flight   8 inch 20x 1             Squats chair behind   2x10 20x                                Modalities         CP in elevation

## 2024-03-25 ENCOUNTER — EVALUATION (OUTPATIENT)
Dept: PHYSICAL THERAPY | Facility: CLINIC | Age: 74
End: 2024-03-25
Payer: MEDICARE

## 2024-03-25 DIAGNOSIS — S76.111D RUPTURE OF RIGHT QUADRICEPS TENDON, SUBSEQUENT ENCOUNTER: ICD-10-CM

## 2024-03-25 DIAGNOSIS — S76.112S QUADRICEPS STRAIN, LEFT, SEQUELA: ICD-10-CM

## 2024-03-25 DIAGNOSIS — Z47.89 AFTERCARE FOLLOWING SURGERY OF THE MUSCULOSKELETAL SYSTEM: Primary | ICD-10-CM

## 2024-03-25 DIAGNOSIS — M12.812 ROTATOR CUFF ARTHROPATHY OF LEFT SHOULDER: ICD-10-CM

## 2024-03-25 PROCEDURE — 97112 NEUROMUSCULAR REEDUCATION: CPT | Performed by: PHYSICAL THERAPIST

## 2024-03-25 PROCEDURE — 97110 THERAPEUTIC EXERCISES: CPT | Performed by: PHYSICAL THERAPIST

## 2024-03-25 PROCEDURE — 97140 MANUAL THERAPY 1/> REGIONS: CPT | Performed by: PHYSICAL THERAPIST

## 2024-03-25 NOTE — PROGRESS NOTES
PT Re-Evaluation    Today's date: 3/25/2024  Patient name: Basim Humphries  : 1950  MRN: [id number]  Referring provider: Mika Amin DO  Dx:   Encounter Diagnosis     ICD-10-CM    1. Aftercare following surgery of the musculoskeletal system  Z47.89       2. Rupture of right quadriceps tendon, subsequent encounter  S76.111D       3. Quadriceps strain, left, sequela  S76.112S       4. Rotator cuff arthropathy of left shoulder  M12.812               Subjective Evaluation     History of Present Illness  He states at this time he is walking 5000 steps. He still has decreased overhead strength and difficulty putting T-shirts on. He is able to put on jackets more easily than before. He does notice less clicking in his shoulder. His wife notices his walking is improved, his upright posture is improved and he is wobbling less.  He would still like to get back to walking in the park and taking photos.       24  Jose presents with L arm pain. He states before the fall which, was in 10/2023, he could lift it all the way up but it is still limited. He has trouble putting his coat on. He has a hard time lifting his L hand over his head. He was supposed to have this shoulder replaced but then the leg injury and surgery took precedence. He thinks this pain is from splitting logs, lifting and overuse. He is improving in LE strength and is walking 4000 steps s cane. For the last 2 years he has been doing most things with his R arm. He has had a few falls and is not sure how this could have affected his shoulder pain as it was on and off. One fall was off a bike.     HX of injury:  Patient presents post-operatively for R quad tendon repair on 10/13/23. He twisted his L ankle getting up. This was from a fall in the park when he was taking pictures. He tripped over a root and then could not stand and was lying in the mud and then had to wait 2.5 hours for help. He has trouble standing up and he has a blood clot in his  LLE last week. He is used to walking 4 miles. He was just unlocked in his knee brace this week and was told he has no precautions. He was told he can transition to quad cane but then since the blood clot he has lost some of his balance.     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 He did have home PT.       Patient Goals  Patient goals for therapy: Walk s AD and to climb steps and get back where he was before the fall, take pictures in park    STGs  1. Decrease pain by 20% in 2-4 weeks.- met  2. Improve knee ROM from 0-120 degrees in 2-4 weeks. - met  3. Improve knee strength by 1/3 grade in 2-4 weeks to perform sit to stand independently s UE.- met  4. Perform stairs in step to pattern c rail in 4 weeks.-met   5. Eliminate lift chair in 2 weeks. - met  6. Improve putting jacket on by 50% in 4 weeks. - met  7. Improved shoulder ROM by 20 deg in 4 weeks. - partially met  8. Improve GH strength by 1/3 grade in 4 weeks. -partially met    LTGs  1. Decrease pain by 60% in 6-8 weeks.- met  2. Improve walking tolerance to >30 minutes in 6-8 weeks to perform community ambulation. - partially met  3. Perform job/dressing/showering activities without pain in 6-8 weeks.  - partially met  4. Return to driving activities in 6 weeks- met  5 Perform sit to stand s assistance nor UE support in 8 weeks. - met  6. Return to taking photos in 10 weeks.- not met  7. Walk [email or web address] for 10-15 mins in 10 weeks. - not met    Objective Measurements:  Observation: quad atrophy, swelling in R  calf and pitting edema 10 sec    10 cm below R patella 39.4 nt  R patellar 38 nt     Gait:no AD decreased flexion in LLE in swing  LAQ: lacking 7deg L   R 12 deg  Steps: step through pattern c rail and cane decreased strength and control going up and down LLE.   Squat: good depth and ROM s UE support  Repeated lumbar ext     Knee A/PROM L R Strength L R   Flex  120/120 120/121 Flex 4+ 5   Ext  0/0  0/0 Ext 4+ 5           Hip A/PROM        Flex  /  / Flex 4+ 5   ER at 90   ER     IR at 90   IR     Abd   Abd 4- 4-   Ext   Ext             Lumbar AROM   Ankle     Flex   DF 5 4   Ext   PF       Observation: ratcheting and clicking when moving L arm up and down   Sensation:wfl   Distraction:nt     Painful arc:L + Gamez: Crossbody add:  ER lag: L+ Drop arm:- Belly press:B +       BRICE: post GH mob hypomobile  Palpation:  Repeated C ext trialed s any change to L GH ROM nor pain    Cervical ROM L R Strength L R   Flex   WFL   Mid trap     Ext  Mod banks   Low trap     Rotation   WFL WFL      Shoulder A/PROM        Flex  99/140 115 /145 Flex 2 4   Abd  99/105 114 /120 Abd     ER  32/55  53/50 ER 2+ 3-   IR scratch L1/30 S1/35 IR 4  4   ER scratch Occiput/50 C4/90                                          Assessment:  Basim Humphries has demonstrated overall improvement in GH AROM, R knee ROM, strength, function as he is driving and walking s cane, and a reduction in pain. Currently, he has made steady progress towards his goals, but continues to remain limited with hip abd strength, L GH strength, decreased lumbar ROM, and decreased balance. He still does have decreased strength with ER which presents as a torn rotator cuff and this may be limiting his prognosis for his shoulder pain and function.       At this time, skilled physical therapy is warranted to address the remaining impairments and aide in return to fully  walking s AD on uneven surfaces,and performing all ADLs at home and safe transfers.  Limiting factors to  therapy significant damage to L GH and he  demonstrates good motivation.        Plan  Patient would benefit from:Skilled physical therapy    Planned therapy interventions: manual therapy, neuromuscular re-education, stretching, strengthening, therapeutic activities, therapeutic exercise, patient education, home exercise program, and activity modification.    Frequency: 2x week  Duration in weeks: 6  Treatment plan discussed with: patient         Goal: Patient's goal is to Walk s AD and to climb steps and get back where he was before the fall    Precautions: DA, WBAT in TROM brace 0-100, increasing 10 deg each week, d/c brace in 4 weeks from 12/12 (1/2)  Hx of blood clots  Dx: R patellar tendon repair 10/13/23, L quad weakness from fall  L GH Direct access- rotator cuff tear likely and OA  Daily Treatment Diary    Manuals 3/7 3/11 3/14 3/18 3/21 3/25   Knee PROM  B knees 5 5 10' 5'     L GH PROM 5 5  5'  5'    R LAD     8' nv            Ther Ex         SL HR 20x 20x 20x  20 20x   SL RDL    10x     IR strap st  10x :05  10x :05     Hip ext/abd RTB 20x 20x  20x      Bike  6' 6' 6' 6'     pulleys 3'/3 3/3  3' 3' 3/3   Wand c cane 20x 20x  10x :05 20x :05 20x   SL bridge 20x     nv   SL LP #45 #90 2x15 90# 2x15 #85 20x #95 20x 95# 2x10 95# 2x15   Standing lumbar ext belt 3x10  30x 20x 40x 3x10   Prone knee flex   20x      Neuro Re-ed         GH isometric ER/IR     10x :05     Foam walking 5 laps nv  5 laps     laq 20x  2# X20  20x  20x :05   SLR #2 2x10 #2 2x10 #2 2# X20     2x10             SLS foam 7x :10  7x :10  nv  nv   Ther Activity         stairs    1 flight   8 inch 20x              Squats chair behind   2x10 20x                                Modalities         CP in elevation

## 2024-03-28 ENCOUNTER — OFFICE VISIT (OUTPATIENT)
Dept: PHYSICAL THERAPY | Facility: CLINIC | Age: 74
End: 2024-03-28
Payer: MEDICARE

## 2024-03-28 DIAGNOSIS — S76.112S QUADRICEPS STRAIN, LEFT, SEQUELA: ICD-10-CM

## 2024-03-28 DIAGNOSIS — Z47.89 AFTERCARE FOLLOWING SURGERY OF THE MUSCULOSKELETAL SYSTEM: Primary | ICD-10-CM

## 2024-03-28 DIAGNOSIS — S76.111D RUPTURE OF RIGHT QUADRICEPS TENDON, SUBSEQUENT ENCOUNTER: ICD-10-CM

## 2024-03-28 DIAGNOSIS — M12.812 ROTATOR CUFF ARTHROPATHY OF LEFT SHOULDER: ICD-10-CM

## 2024-03-28 PROCEDURE — 97140 MANUAL THERAPY 1/> REGIONS: CPT

## 2024-03-28 PROCEDURE — 97112 NEUROMUSCULAR REEDUCATION: CPT

## 2024-03-28 PROCEDURE — 97110 THERAPEUTIC EXERCISES: CPT

## 2024-03-28 NOTE — PROGRESS NOTES
Daily Note     Today's date: 3/28/2024  Patient name: Basim Humphries  : 1950  MRN: [id number]  Referring provider: Mika Amin DO  Dx:   Encounter Diagnosis     ICD-10-CM    1. Aftercare following surgery of the musculoskeletal system  Z47.89       2. Rupture of right quadriceps tendon, subsequent encounter  S76.111D       3. Quadriceps strain, left, sequela  S76.112S       4. Rotator cuff arthropathy of left shoulder  M12.812           Start Time: 828  Stop Time: 906  Total time in clinic (min): 38 minutes    Subjective: Reports that he is doing well. Has family coming over this weekend so he will be pretty busy.       Objective: See treatment diary below      Assessment: Tolerated treatment well. Continued with primary therapist POC. Has some crepitus during L shoulder passive range.Does well with active assisted motion using wand, gets a little extra motion without accompanying symptoms. Has difficulty with single leg balance on foam pad, with appropriate amounts of sway, intermittent fingertip haptic touch required. Did well with leg press with no issues accompanying increase in volume from last session. Requested to do recumbent bike end of session. Patient demonstrated fatigue post treatment, exhibited good technique with therapeutic exercises, and would benefit from continued PT      Plan: Continue per plan of care.  Progress treatment as tolerated.       Goal: Patient's goal is to Walk s AD and to climb steps and get back where he was before the fall    Precautions: DA, WBAT in TROM brace 0-100, increasing 10 deg each week, d/c brace in 4 weeks from  (/2)  Hx of blood clots  Dx: R patellar tendon repair 10/13/23, L quad weakness from fall  L GH Direct access- rotator cuff tear likely and OA  Daily Treatment Diary    Manuals 3/28  3/11 3/14 3/18 3/21 3/25   Knee PROM  B knees  5 10' 5'     L GH PROM 5'  5  5'  5'    R LAD 3'    8' nv            Ther Ex         SL HR 20x 20x 20x  20 20x  "  SL RDL    10x     IR strap st  10x :05  10x :05     Hip ext/abd RTB  20x  20x      Bike  6' L1 6' 6' 6'     pulleys 3'  3/3  3' 3' 3/3   Wand c cane 20x  20x  10x :05 20x :05 20x   SL bridge 2x10     nv   SL LP #45 95# 2x15 90# 2x15 #85 20x #95 20x 95# 2x10 95# 2x15   Standing lumbar ext belt   30x 20x 40x 3x10   Prone knee flex   20x      Neuro Re-ed         GH isometric ER/IR     10x :05     Foam walking  nv  5 laps     laq 20x5\"   2# X20  20x  20x :05   SLR #2 3x10 2#  2x10 #2 2# X20     2x10             SLS foam 10x10\"   7x :10  nv  nv   Ther Activity         stairs    1 flight   8 inch 20x              Squats chair behind   2x10 20x                                Modalities         CP in elevation                            "

## 2024-04-02 ENCOUNTER — OFFICE VISIT (OUTPATIENT)
Dept: PHYSICAL THERAPY | Facility: CLINIC | Age: 74
End: 2024-04-02
Payer: MEDICARE

## 2024-04-02 DIAGNOSIS — Z47.89 AFTERCARE FOLLOWING SURGERY OF THE MUSCULOSKELETAL SYSTEM: Primary | ICD-10-CM

## 2024-04-02 DIAGNOSIS — S76.112S QUADRICEPS STRAIN, LEFT, SEQUELA: ICD-10-CM

## 2024-04-02 DIAGNOSIS — S76.111D RUPTURE OF RIGHT QUADRICEPS TENDON, SUBSEQUENT ENCOUNTER: ICD-10-CM

## 2024-04-02 DIAGNOSIS — M12.812 ROTATOR CUFF ARTHROPATHY OF LEFT SHOULDER: ICD-10-CM

## 2024-04-02 PROCEDURE — 97140 MANUAL THERAPY 1/> REGIONS: CPT | Performed by: PHYSICAL THERAPIST

## 2024-04-02 PROCEDURE — 97110 THERAPEUTIC EXERCISES: CPT | Performed by: PHYSICAL THERAPIST

## 2024-04-02 NOTE — PROGRESS NOTES
"Daily Note     Today's date: 2024  Patient name: Basim Humphries  : 1950  MRN: [id number]  Referring provider: Mika Amin DO  Dx:   Encounter Diagnosis     ICD-10-CM    1. Aftercare following surgery of the musculoskeletal system  Z47.89       2. Rupture of right quadriceps tendon, subsequent encounter  S76.111D       3. Quadriceps strain, left, sequela  S76.112S       4. Rotator cuff arthropathy of left shoulder  M12.812                      Subjective: [place] and had no pain c this.        Objective: See treatment diary below      Assessment: Tolerated treatment well. He had marked improvement of balance today. He may be ready for d/c at next week. Patient demonstrated fatigue post treatment, exhibited good technique with therapeutic exercises, and would benefit from continued PT      Plan: Continue per plan of care.  Progress treatment as tolerated.       Goal: Patient's goal is to Walk s AD and to climb steps and get back where he was before the fall    Precautions: DA, WBAT in TROM brace 0-100, increasing 10 deg each week, d/c brace in 4 weeks from  ()  Hx of blood clots  Dx: R patellar tendon repair 10/13/23, L quad weakness from fall  L GH Direct access- rotator cuff tear likely and OA  Daily Treatment Diary    Manuals 3/28  4/2 3/14 3/18 3/21 3/25   Knee PROM  B knees   10' 5'     L GH PROM 5'  5  5'  5'    R LAD 3' 4'   8' nv            Ther Ex         SL HR 20x 20x 20x  20 20x   SL RDL    10x     IR strap st  10x :05  10x :05     Hip ext/abd RTB   20x      Bike  6' L1 6' 6' 6'     pulleys 3'  3/3  3' 3' 3/3   Wand c cane 20x  20x  10x :05 20x :05 20x   SL bridge 2x10     nv   SL LP #45 95# 2x15 95# 2x15 #85 20x #95 20x 95# 2x10 95# 2x15   Standing lumbar ext belt  20x 30x 20x 40x 3x10   Prone knee flex   20x      Neuro Re-ed         GH isometric ER/IR     10x :05     Foam walking    5 laps     laq 20x5\"   2# X20  20x  20x :05   SLR #2 3x10 2#  " "2x10 #2 2# X20     2x10             SLS foam 10x10\"  10x :10 7x :10  nv  nv   Ther Activity         stairs    1 flight   8 inch 20x              Squats chair behind  20x 2x10 20x                                Modalities         CP in elevation                            "

## 2024-04-04 ENCOUNTER — OFFICE VISIT (OUTPATIENT)
Dept: PHYSICAL THERAPY | Facility: CLINIC | Age: 74
End: 2024-04-04
Payer: MEDICARE

## 2024-04-04 DIAGNOSIS — S76.111D RUPTURE OF RIGHT QUADRICEPS TENDON, SUBSEQUENT ENCOUNTER: ICD-10-CM

## 2024-04-04 DIAGNOSIS — Z47.89 AFTERCARE FOLLOWING SURGERY OF THE MUSCULOSKELETAL SYSTEM: Primary | ICD-10-CM

## 2024-04-04 PROCEDURE — 97110 THERAPEUTIC EXERCISES: CPT | Performed by: PHYSICAL THERAPIST

## 2024-04-04 PROCEDURE — 97112 NEUROMUSCULAR REEDUCATION: CPT | Performed by: PHYSICAL THERAPIST

## 2024-04-04 PROCEDURE — 97140 MANUAL THERAPY 1/> REGIONS: CPT | Performed by: PHYSICAL THERAPIST

## 2024-04-04 NOTE — PROGRESS NOTES
Daily Note     Today's date: 2024  Patient name: Basim Humphries  : 1950  MRN: [id number]  Referring provider: Mika Amin DO  Dx:   Encounter Diagnosis     ICD-10-CM    1. Aftercare following surgery of the musculoskeletal system  Z47.89       2. Rupture of right quadriceps tendon, subsequent encounter  S76.111D                      Subjective: He states in the rain while stepping up into his truck he slipped but was holding onto the grab bar c his R arm and his L leg slipped. He was then able to pull himself up and his knee is a bit sore from this. However he still [place] and had no pain c this.        Objective: See treatment diary below      Assessment: Tolerated treatment well. He did have pain c squatting so we held on reps today but SL LP was tolerated well today. Will still plan for d/c at the end of next week.  Patient demonstrated fatigue post treatment, exhibited good technique with therapeutic exercises, and would benefit from continued PT      Plan: Continue per plan of care.  Progress treatment as tolerated.       Goal: Patient's goal is to Walk s AD and to climb steps and get back where he was before the fall    Precautions: DA, WBAT in TROM brace 0-100, increasing 10 deg each week, d/c brace in 4 weeks from  ()  Hx of blood clots  Dx: R patellar tendon repair 10/13/23, L quad weakness from fall  L GH Direct access- rotator cuff tear likely and OA  Daily Treatment Diary    Manuals 3/28  4/2 4/4 3/18 3/21 3/25   Knee PROM  B knees   3' 5'     L GH PROM 5'  5 5' 5'  5'    R LAD 3' 4' 4'  8' nv            Ther Ex         SL HR 20x 20x 20x  20 20x   SL RDL   nv 10x     IR strap st  10x :05 10x :05 10x :05     Hip ext/abd RTB   20x      Bike  6' L1 6' 6' 6'     pulleys 3'  3/3 3/3 3' 3' 3/3   Wand c cane 20x  20x 30x 10x :05 20x :05 20x   SL bridge 2x10  20x   nv   SL LP #45 95# 2x15 95# 2x15 #95 20x #95 20x 95# 2x10 95# 2x15   Standing lumbar ext  "belt  20x 30x 20x 40x 3x10   Prone knee flex         Neuro Re-ed         GH isometric ER/IR     10x :05     Foam walking    5 laps     laq 20x5\"      20x  20x :05   SLR #2 3x10 2#  2x10 #2 20x    2x10             SLS foam 10x10\"  10x :10 7x :10  nv  nv   Ther Activity         stairs    1 flight   8 inch 20x              Squats chair behind  20x 7x 20x                                Modalities         CP in elevation                            "

## 2024-04-09 ENCOUNTER — OFFICE VISIT (OUTPATIENT)
Dept: PHYSICAL THERAPY | Facility: CLINIC | Age: 74
End: 2024-04-09
Payer: MEDICARE

## 2024-04-09 DIAGNOSIS — M12.812 ROTATOR CUFF ARTHROPATHY OF LEFT SHOULDER: ICD-10-CM

## 2024-04-09 DIAGNOSIS — S76.111D RUPTURE OF RIGHT QUADRICEPS TENDON, SUBSEQUENT ENCOUNTER: ICD-10-CM

## 2024-04-09 DIAGNOSIS — S76.112S QUADRICEPS STRAIN, LEFT, SEQUELA: ICD-10-CM

## 2024-04-09 DIAGNOSIS — Z47.89 AFTERCARE FOLLOWING SURGERY OF THE MUSCULOSKELETAL SYSTEM: Primary | ICD-10-CM

## 2024-04-09 PROCEDURE — 97110 THERAPEUTIC EXERCISES: CPT | Performed by: PHYSICAL THERAPIST

## 2024-04-09 PROCEDURE — 97140 MANUAL THERAPY 1/> REGIONS: CPT | Performed by: PHYSICAL THERAPIST

## 2024-04-09 PROCEDURE — 97112 NEUROMUSCULAR REEDUCATION: CPT | Performed by: PHYSICAL THERAPIST

## 2024-04-09 NOTE — PROGRESS NOTES
Daily Note     Today's date: 2024  Patient name: Basim Humphries  : 1950  MRN: [id number]  Referring provider: Mika Amin DO  Dx:   Encounter Diagnosis     ICD-10-CM    1. Aftercare following surgery of the musculoskeletal system  Z47.89       2. Rupture of right quadriceps tendon, subsequent encounter  S76.111D       3. Quadriceps strain, left, sequela  S76.112S       4. Rotator cuff arthropathy of left shoulder  M12.812                      Subjective: He states he was able to walk 6k steps s any problem. He is feeling better after having the slip.       Objective: See treatment diary below  R knee flexion 4-/5    Assessment: Tolerated treatment well. He did have improved knee flexion strength 4/5, upright gait,and less wobble after repeated lumbar ext c belt. Emphasized continued performance of EIS c belt.  Patient demonstrated fatigue post treatment, exhibited good technique with therapeutic exercises, and would benefit from continued PT      Plan: Will likely d/c to HEP at nv.      Goal: Patient's goal is to Walk s AD and to climb steps and get back where he was before the fall    Precautions: DA, WBAT in TROM brace 0-100, increasing 10 deg each week, d/c brace in 4 weeks from  ()  Hx of blood clots  Dx: R patellar tendon repair 10/13/23, L quad weakness from fall  L GH Direct access- rotator cuff tear likely and OA  Daily Treatment Diary    Manuals 3/28  4/2 4/4 4/9 3/21 3/25   Knee PROM  B knees   3' 5'     L GH PROM 5'  5 5' 5'  5'    R LAD 3' 4' 4' 5 8' nv            Ther Ex         SL HR 20x 20x 20x 20x 20 20x   SL RDL   nv 10x     IR strap st  10x :05 10x :05 10x :05     Hip ext/abd RTB   20x      Bike  6' L1 6' 6' 6'     pulleys 3'  3/3 3/3 33 3' 3/3   Wand c cane 20x  20x 30x 10x :05 20x :05 20x   SL bridge 2x10  20x   nv   SL LP #45 95# 2x15 95# 2x15 #95 20x #95 20x 95# 2x10 95# 2x15   Standing lumbar ext belt  20x 30x 30x 40x 3x10   R hip flexor lunge st    4x :20     Neuro  "Re-ed         GH isometric ER/IR     10x :05     Foam walking    5 laps     laq 20x5\"      20x  20x :05   SLR #2 3x10 2#  2x10 #2 20x 20x    2x10             SLS foam 10x10\"  10x :10 7x :10 7x :10 nv  nv   Ther Activity         stairs    1 flight   8 inch 20x              Squats chair behind  20x 7x 20x                                Modalities         CP in elevation                            "

## 2024-04-11 ENCOUNTER — APPOINTMENT (OUTPATIENT)
Dept: LAB | Facility: HOSPITAL | Age: 74
End: 2024-04-11
Payer: MEDICARE

## 2024-04-11 DIAGNOSIS — H47.20 PARTIAL OPTIC ATROPHY: ICD-10-CM

## 2024-04-11 LAB
BUN SERPL-MCNC: 20 MG/DL (ref 5–25)
CREAT SERPL-MCNC: 0.78 MG/DL (ref 0.6–1.3)
GFR SERPL CREATININE-BSD FRML MDRD: 88 ML/MIN/1.73SQ M

## 2024-04-11 PROCEDURE — 84520 ASSAY OF UREA NITROGEN: CPT

## 2024-04-11 PROCEDURE — 36415 COLL VENOUS BLD VENIPUNCTURE: CPT

## 2024-04-11 PROCEDURE — 82565 ASSAY OF CREATININE: CPT

## 2024-04-12 ENCOUNTER — OFFICE VISIT (OUTPATIENT)
Dept: PHYSICAL THERAPY | Facility: CLINIC | Age: 74
End: 2024-04-12
Payer: MEDICARE

## 2024-04-12 DIAGNOSIS — S76.111D RUPTURE OF RIGHT QUADRICEPS TENDON, SUBSEQUENT ENCOUNTER: ICD-10-CM

## 2024-04-12 DIAGNOSIS — M12.812 ROTATOR CUFF ARTHROPATHY OF LEFT SHOULDER: ICD-10-CM

## 2024-04-12 DIAGNOSIS — Z47.89 AFTERCARE FOLLOWING SURGERY OF THE MUSCULOSKELETAL SYSTEM: Primary | ICD-10-CM

## 2024-04-12 DIAGNOSIS — S76.112S QUADRICEPS STRAIN, LEFT, SEQUELA: ICD-10-CM

## 2024-04-12 PROCEDURE — 97112 NEUROMUSCULAR REEDUCATION: CPT | Performed by: PHYSICAL THERAPIST

## 2024-04-12 PROCEDURE — 97140 MANUAL THERAPY 1/> REGIONS: CPT | Performed by: PHYSICAL THERAPIST

## 2024-04-12 PROCEDURE — 97110 THERAPEUTIC EXERCISES: CPT | Performed by: PHYSICAL THERAPIST

## 2024-04-12 NOTE — PROGRESS NOTES
Daily Note and discharge note    Today's date: 2024  Patient name: Basim Humphries  : 1950  MRN: [id number]  Referring provider: Mika Amin DO  Dx:   Encounter Diagnosis     ICD-10-CM    1. Aftercare following surgery of the musculoskeletal system  Z47.89       2. Rupture of right quadriceps tendon, subsequent encounter  S76.111D       3. Quadriceps strain, left, sequela  S76.112S       4. Rotator cuff arthropathy of left shoulder  M12.812                      Subjective: He states he was able to walk 6k steps s any problem. He is feeling better after having the slip.       Objective: See treatment diary below  R knee flexion 4-/5    Assessment: Tolerated treatment well. Added in R SB stretch to maximize his lumbar mobility which did help his strength to 4+/5. Patient demonstrated fatigue post treatment, exhibited good technique with therapeutic exercises, and would benefit from continued PT      Plan: Will d/c to HEP at this time.      Goal: Patient's goal is to Walk s AD and to climb steps and get back where he was before the fall    Precautions: DA, WBAT in TROM brace 0-100, increasing 10 deg each week, d/c brace in 4 weeks from  ()  Hx of blood clots  Dx: R patellar tendon repair 10/13/23, L quad weakness from fall  L GH Direct access- rotator cuff tear likely and OA  Daily Treatment Diary    Manuals 3/28  4/2 4/4 4/9 4/12 3/25   Knee PROM  B knees   3' 5'     L GH PROM 5'  5 5' 5' 8' 5'    R LAD 3' 4' 4' 5  nv            Ther Ex         SL HR 20x 20x 20x 20x 20 20x   SL RDL   nv 10x     IR strap st  10x :05 10x :05 10x :05     Hip ext/abd RTB   20x      Bike  6' L1 6' 6' 6' 6'    pulleys 3'  3/3 3/3 3'/3 3' 3/3   Wand c cane 20x  20x 30x 10x :05 20x :05 20x   SL bridge 2x10  20x   nv   SL LP #45 95# 2x15 95# 2x15 #95 20x #95 20x 95# 2x10 95# 2x15   Standing lumbar ext belt  20x 30x 30x 40x 3x10   R hip flexor lunge st    4x :20 4x :20    Neuro Re-ed         GH isometric ER/IR     10x  ":05     Foam walking    5 laps     laq 20x5\"      20x  20x :05   SLR #2 3x10 2#  2x10 #2 20x 20x    2x10             SLS foam 10x10\"  10x :10 7x :10 7x :10 nv  nv   Ther Activity         stairs    1 flight   8 inch 20x              Squats chair behind  20x 7x 20x 20x                               Modalities         CP in elevation                            "

## 2024-04-16 ENCOUNTER — HOSPITAL ENCOUNTER (OUTPATIENT)
Dept: RADIOLOGY | Facility: HOSPITAL | Age: 74
Discharge: HOME | End: 2024-04-16
Attending: OPHTHALMOLOGY
Payer: MEDICARE

## 2024-04-16 ENCOUNTER — APPOINTMENT (OUTPATIENT)
Dept: PHYSICAL THERAPY | Facility: CLINIC | Age: 74
End: 2024-04-16
Payer: MEDICARE

## 2024-04-16 DIAGNOSIS — H35.50 OPTIC ATROPHY ASSOCIATED WITH RETINAL DYSTROPHIES: ICD-10-CM

## 2024-04-16 DIAGNOSIS — H47.20 OPTIC ATROPHY ASSOCIATED WITH RETINAL DYSTROPHIES: ICD-10-CM

## 2024-04-16 PROCEDURE — 70487 CT MAXILLOFACIAL W/DYE: CPT

## 2024-04-16 RX ADMIN — IOHEXOL 85 ML: 350 INJECTION, SOLUTION INTRAVENOUS at 08:37

## 2024-04-19 ENCOUNTER — APPOINTMENT (OUTPATIENT)
Dept: PHYSICAL THERAPY | Facility: CLINIC | Age: 74
End: 2024-04-19
Payer: MEDICARE

## 2024-04-23 ENCOUNTER — OFFICE VISIT (OUTPATIENT)
Dept: OBGYN CLINIC | Facility: CLINIC | Age: 74
End: 2024-04-23
Payer: MEDICARE

## 2024-04-23 VITALS — HEIGHT: 68 IN | BODY MASS INDEX: 28.34 KG/M2 | WEIGHT: 187 LBS

## 2024-04-23 DIAGNOSIS — S76.111S QUADRICEPS TENDON RUPTURE, RIGHT, SEQUELA: Primary | ICD-10-CM

## 2024-04-23 PROCEDURE — 99213 OFFICE O/P EST LOW 20 MIN: CPT | Performed by: STUDENT IN AN ORGANIZED HEALTH CARE EDUCATION/TRAINING PROGRAM
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
## 2024-04-24 ENCOUNTER — APPOINTMENT (OUTPATIENT)
Dept: PHYSICAL THERAPY | Facility: CLINIC | Age: 74
End: 2024-04-24
Payer: MEDICARE

## 2024-04-25 ENCOUNTER — TELEPHONE (OUTPATIENT)
Dept: OBGYN CLINIC | Facility: CLINIC | Age: 74
End: 2024-04-25

## 2024-04-25 ENCOUNTER — OFFICE VISIT (OUTPATIENT)
Age: 74
End: 2024-04-25
Payer: MEDICARE

## 2024-04-25 VITALS
SYSTOLIC BLOOD PRESSURE: 136 MMHG | RESPIRATION RATE: 16 BRPM | BODY MASS INDEX: 28.34 KG/M2 | DIASTOLIC BLOOD PRESSURE: 80 MMHG | WEIGHT: 187 LBS | HEART RATE: 67 BPM | HEIGHT: 68 IN

## 2024-04-25 DIAGNOSIS — B35.1 ONYCHOMYCOSIS: ICD-10-CM

## 2024-04-25 DIAGNOSIS — R60.9 CHRONIC EDEMA: ICD-10-CM

## 2024-04-25 DIAGNOSIS — M54.16 RADICULOPATHY OF LUMBAR REGION: ICD-10-CM

## 2024-04-25 DIAGNOSIS — M79.671 PAIN IN BOTH FEET: ICD-10-CM

## 2024-04-25 DIAGNOSIS — I70.209 PERIPHERAL ARTERIOSCLEROSIS (HCC): Primary | ICD-10-CM

## 2024-04-25 DIAGNOSIS — M79.672 PAIN IN BOTH FEET: ICD-10-CM

## 2024-04-25 PROCEDURE — 99212 OFFICE O/P EST SF 10 MIN: CPT | Performed by: PODIATRIST

## 2024-04-25 PROCEDURE — 11721 DEBRIDE NAIL 6 OR MORE: CPT | Performed by: PODIATRIST

## 2024-04-25 RX ORDER — GABAPENTIN 100 MG/1
100 CAPSULE ORAL
Qty: 30 CAPSULE | Refills: 1 | Status: SHIPPED | OUTPATIENT
Start: 2024-04-25 | End: 2024-06-24

## 2024-04-25 NOTE — TELEPHONE ENCOUNTER
LMOM for patient to call and reschedule their appointment 7/23/24, provider will be OOO.  Left phone number.

## 2024-04-26 ENCOUNTER — APPOINTMENT (OUTPATIENT)
Dept: PHYSICAL THERAPY | Facility: CLINIC | Age: 74
End: 2024-04-26
Payer: MEDICARE

## 2024-04-27 PROBLEM — H61.23 BILATERAL IMPACTED CERUMEN: Status: RESOLVED | Noted: 2022-03-29 | Resolved: 2024-04-27

## 2024-04-30 ENCOUNTER — APPOINTMENT (OUTPATIENT)
Dept: PHYSICAL THERAPY | Facility: CLINIC | Age: 74
End: 2024-04-30
Payer: MEDICARE

## 2024-05-16 NOTE — PROGRESS NOTES
Assessment/Plan: Pain upon ambulation secondary to hammertoe formation.  Mycosis of nail.  Hallux nail bilateral with ingrown toenail.  Acquired deformity of foot.  Mild peripheral artery disease.  Chronic edema.     Plan.  Chart reviewed.  Patient evaluated.  Primary care notes reviewed.  Patient advised on condition.  At this time patient's been given instruction for proper shoe gear.  He will elevate feet at end of day.  Nail cutting technique recommended.  Watch for signs of infection.  Aftercare instruction given.  Return for follow-up.    In order to help with radicular symptoms, we will add an empiric course of gabapentin.  Patient will take 100 mg daily at bedtime.  In addition he is advised to use compression socks and elevate his feet at the end of the day.            Diagnoses and all orders for this visit:     Pain in both feet     Peripheral arteriosclerosis     Onychomycosis     Ingrown toenail     Hammer toes of both feet     Chronic edema    Radiculopathy            Subjective: Patient has pain in his feet.  He has painful toes.  He has hammertoes.  No history of pedal trauma.  He also has swollen legs.  Patient now is getting numbness in his toes.  He gets it on the top of his feet.  He gets this mostly at night.  Patient has chronic low back pain.           Allergies   Allergen Reactions    Cefpodoxime Other (See Comments)       Made heart race was given when he had covid pneumonia    Demerol [Meperidine] Itching    Codeine GI Intolerance    Diclofenac Sodium GI Intolerance    Erythromycin Other (See Comments)       Making ears ring    Meloxicam GI Intolerance    Oxaprozin GI Intolerance    Penicillins Hives and Itching            Current Outpatient Medications:     apixaban (ELIQUIS) 5 mg, Take 2 tablets (10 mg total) by mouth 2 (two) times a day for 6 days, THEN 1 tablet (5 mg total) 2 (two) times a day., Disp: 84 tablet, Rfl: 0    lansoprazole (PREVACID) 30 mg capsule, Take 30 mg by mouth every  morning  , Disp: , Rfl:     multivitamin (THERAGRAN) TABS, Take 1 tablet by mouth every morning, Disp: , Rfl:     oxyCODONE (ROXICODONE) 5 immediate release tablet, Take 1 tablet (5 mg total) by mouth 2 (two) times a day as needed for severe pain Max Daily Amount: 10 mg, Disp: 15 tablet, Rfl: 0         Patient Active Problem List   Diagnosis    Brady's esophagus with dysplasia    History of colon polyps    Elevated LFTs    Rotator cuff tear arthropathy of left shoulder    Rotator cuff tear arthropathy of right shoulder    Sensorineural hearing loss (SNHL), bilateral    Gastroesophageal reflux disease without esophagitis    Aftercare following right shoulder joint replacement surgery    Post-traumatic osteoarthritis of first carpometacarpal joint of right hand    Muscle strain of right shoulder    Acute pain of right shoulder    Hx of total shoulder replacement, right    Ambulatory dysfunction    Fall from standing    Traumatic rupture of quadriceps tendon, right, initial encounter    Acute pain    Contusion of left leg, initial encounter    Thrombocytopenia (HCC)    Melanoma (HCC)    Impaired fasting glucose    Nelly-colored urine    Cellulitis    Acute deep vein thrombosis (DVT) of femoral vein of left lower extremity (HCC)    Acute left ankle pain             Patient ID: Basim Humphries is a 73 y.o. male.     HPI     The following portions of the patient's history were reviewed and updated as appropriate:      family history includes Arthritis in his maternal grandmother and mother; Diabetes in his father; Heart disease in his father; Lung cancer in his father; Other in his mother; Ovarian cancer in his maternal grandmother.       reports that he has never smoked. He has never used smokeless tobacco. He reports that he does not currently use alcohol. He reports current drug use. Drug: Marijuana.   Objective:  Patient's shoes and socks removed.   Foot Exam     General  General Appearance: appears stated age and  healthy   Orientation: alert and oriented to person, place, and time   Affect: appropriate   Gait: antalgic   Assistance: cane use         Right Foot/Ankle      Inspection and Palpation  Tenderness: metatarsals   Swelling: dorsum   Arch: pes cavus  Hammertoes: fifth toe, fourth toe, third toe and second toe  Hallux limitus: yes  Skin Exam: dry skin;      Neurovascular  Dorsalis pedis: 2+  Posterior tibial: 2+        Left Foot/Ankle       Inspection and Palpation  Swelling: dorsum   Arch: pes cavus  Hammertoes: second toe, fifth toe, fourth toe and third toe  Hallux limitus: yes  Skin Exam: dry skin;      Neurovascular  Dorsalis pedis: 2+  Posterior tibial: 2+        Physical Exam  Vitals and nursing note reviewed.   Constitutional:       Appearance: Normal appearance.   Cardiovascular:      Rate and Rhythm: Normal rate.      Pulses:           Dorsalis pedis pulses are 2+ on the right side and 2+ on the left side.        Posterior tibial pulses are 2+ on the right side and 2+ on the left side.   Musculoskeletal:      Right lower leg: 3+ Pitting Edema present.      Left lower leg: 3+ Pitting Edema present.   Feet:      Right foot:      Skin integrity: Dry skin present.      Left foot:      Skin integrity: Dry skin present.   Skin:     Capillary Refill: Capillary refill takes less than 2 seconds.      Comments: Patient has xerosis of skin.  All nails are dystrophic.  They demonstrate distal mycosis.  Hallux bilateral demonstrates wide incurvated toenail.   Neurological:      Mental Status: He is alert.   Psychiatric:         Mood and Affect: Mood normal.         Behavior: Behavior normal.         Thought Content: Thought content normal.         Judgment: Judgment normal.                    18

## 2024-05-26 ENCOUNTER — APPOINTMENT (EMERGENCY)
Dept: CT IMAGING | Facility: HOSPITAL | Age: 74
End: 2024-05-26
Payer: MEDICARE

## 2024-05-26 ENCOUNTER — HOSPITAL ENCOUNTER (EMERGENCY)
Facility: HOSPITAL | Age: 74
Discharge: HOME | End: 2024-05-26
Attending: EMERGENCY MEDICINE
Payer: MEDICARE

## 2024-05-26 VITALS
HEART RATE: 79 BPM | RESPIRATION RATE: 18 BRPM | BODY MASS INDEX: 29.55 KG/M2 | HEIGHT: 68 IN | OXYGEN SATURATION: 100 % | DIASTOLIC BLOOD PRESSURE: 69 MMHG | SYSTOLIC BLOOD PRESSURE: 144 MMHG | WEIGHT: 195 LBS | TEMPERATURE: 98.1 F

## 2024-05-26 DIAGNOSIS — S76.102A UNSPECIFIED INJURY OF LEFT QUADRICEPS MUSCLE, FASCIA AND TENDON, INITIAL ENCOUNTER: Primary | ICD-10-CM

## 2024-05-26 DIAGNOSIS — M25.562 LEFT KNEE PAIN: ICD-10-CM

## 2024-05-26 PROCEDURE — 99283 EMERGENCY DEPT VISIT LOW MDM: CPT

## 2024-05-26 PROCEDURE — 99284 EMERGENCY DEPT VISIT MOD MDM: CPT | Performed by: EMERGENCY MEDICINE

## 2024-05-26 PROCEDURE — 73700 CT LOWER EXTREMITY W/O DYE: CPT
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
## 2024-05-28 ENCOUNTER — OFFICE VISIT (OUTPATIENT)
Dept: OBGYN CLINIC | Facility: CLINIC | Age: 74
End: 2024-05-28
Payer: MEDICARE

## 2024-05-28 VITALS
WEIGHT: 195 LBS | SYSTOLIC BLOOD PRESSURE: 126 MMHG | HEART RATE: 93 BPM | BODY MASS INDEX: 29.55 KG/M2 | HEIGHT: 68 IN | DIASTOLIC BLOOD PRESSURE: 81 MMHG

## 2024-05-28 DIAGNOSIS — M25.562 LEFT KNEE PAIN: ICD-10-CM

## 2024-05-28 DIAGNOSIS — S76.102A UNSPECIFIED INJURY OF LEFT QUADRICEPS MUSCLE, FASCIA AND TENDON, INITIAL ENCOUNTER: ICD-10-CM

## 2024-05-28 PROCEDURE — 99213 OFFICE O/P EST LOW 20 MIN: CPT | Performed by: ORTHOPAEDIC SURGERY
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
## 2024-06-13 ENCOUNTER — TELEPHONE (OUTPATIENT)
Age: 74
End: 2024-06-13

## 2024-06-13 NOTE — TELEPHONE ENCOUNTER
Spoke with patient in regards to PCP patient stated he seen his PCP on 6/12/24 message has been updated

## 2024-06-28 ENCOUNTER — OFFICE VISIT (OUTPATIENT)
Age: 74
End: 2024-06-28
Payer: MEDICARE

## 2024-06-28 VITALS
WEIGHT: 195 LBS | DIASTOLIC BLOOD PRESSURE: 80 MMHG | BODY MASS INDEX: 29.55 KG/M2 | HEART RATE: 80 BPM | RESPIRATION RATE: 18 BRPM | SYSTOLIC BLOOD PRESSURE: 130 MMHG | HEIGHT: 68 IN

## 2024-06-28 DIAGNOSIS — M79.672 PAIN IN BOTH FEET: Primary | ICD-10-CM

## 2024-06-28 DIAGNOSIS — R60.9 CHRONIC EDEMA: ICD-10-CM

## 2024-06-28 DIAGNOSIS — B35.1 ONYCHOMYCOSIS: ICD-10-CM

## 2024-06-28 DIAGNOSIS — M54.16 RADICULOPATHY OF LUMBAR REGION: ICD-10-CM

## 2024-06-28 DIAGNOSIS — M20.41 HAMMER TOES OF BOTH FEET: ICD-10-CM

## 2024-06-28 DIAGNOSIS — M20.42 HAMMER TOES OF BOTH FEET: ICD-10-CM

## 2024-06-28 DIAGNOSIS — I70.209 PERIPHERAL ARTERIOSCLEROSIS (HCC): ICD-10-CM

## 2024-06-28 DIAGNOSIS — M79.671 PAIN IN BOTH FEET: Primary | ICD-10-CM

## 2024-06-28 PROCEDURE — 99213 OFFICE O/P EST LOW 20 MIN: CPT | Performed by: PODIATRIST

## 2024-06-28 RX ORDER — GABAPENTIN 100 MG/1
100 CAPSULE ORAL
Qty: 30 CAPSULE | Refills: 1 | Status: SHIPPED | OUTPATIENT
Start: 2024-06-28 | End: 2024-08-27

## 2024-06-28 NOTE — PROGRESS NOTES
Assessment/Plan: Pain upon ambulation secondary to hammertoe formation.  Mycosis of nail.  Hallux nail bilateral with ingrown toenail.  Acquired deformity of foot.  Mild peripheral artery disease.  Chronic edema.     Plan.  Chart reviewed.  Patient evaluated.  Primary care notes reviewed.  Patient advised on condition.  At this time patient's been given instruction for proper shoe gear.  He will elevate feet at end of day.  Nail cutting technique recommended.  Watch for signs of infection.  Aftercare instruction given.  Return for follow-up.     In order to help with radicular symptoms, we will add an empiric course of gabapentin.  Patient will take 100 mg daily at bedtime.  In addition he is advised to use compression socks and elevate his feet at the end of the day.            Diagnoses and all orders for this visit:     Pain in both feet     Peripheral arteriosclerosis     Onychomycosis     Ingrown toenail     Hammer toes of both feet     Chronic edema     Radiculopathy            Subjective: Patient has pain in his feet.  He has painful toes.  He has hammertoes.  No history of pedal trauma.  He also has swollen legs.  Patient now is getting numbness in his toes.  He gets it on the top of his feet.  He gets this mostly at night.  Patient has chronic low back pain.               Allergies   Allergen Reactions    Cefpodoxime Other (See Comments)       Made heart race was given when he had covid pneumonia    Demerol [Meperidine] Itching    Codeine GI Intolerance    Diclofenac Sodium GI Intolerance    Erythromycin Other (See Comments)       Making ears ring    Meloxicam GI Intolerance    Oxaprozin GI Intolerance    Penicillins Hives and Itching            Current Outpatient Medications:     apixaban (ELIQUIS) 5 mg, Take 2 tablets (10 mg total) by mouth 2 (two) times a day for 6 days, THEN 1 tablet (5 mg total) 2 (two) times a day., Disp: 84 tablet, Rfl: 0    lansoprazole (PREVACID) 30 mg capsule, Take 30 mg by  mouth every morning  , Disp: , Rfl:     multivitamin (THERAGRAN) TABS, Take 1 tablet by mouth every morning, Disp: , Rfl:     oxyCODONE (ROXICODONE) 5 immediate release tablet, Take 1 tablet (5 mg total) by mouth 2 (two) times a day as needed for severe pain Max Daily Amount: 10 mg, Disp: 15 tablet, Rfl: 0           Patient Active Problem List   Diagnosis    Brady's esophagus with dysplasia    History of colon polyps    Elevated LFTs    Rotator cuff tear arthropathy of left shoulder    Rotator cuff tear arthropathy of right shoulder    Sensorineural hearing loss (SNHL), bilateral    Gastroesophageal reflux disease without esophagitis    Aftercare following right shoulder joint replacement surgery    Post-traumatic osteoarthritis of first carpometacarpal joint of right hand    Muscle strain of right shoulder    Acute pain of right shoulder    Hx of total shoulder replacement, right    Ambulatory dysfunction    Fall from standing    Traumatic rupture of quadriceps tendon, right, initial encounter    Acute pain    Contusion of left leg, initial encounter    Thrombocytopenia (HCC)    Melanoma (HCC)    Impaired fasting glucose    Nelly-colored urine    Cellulitis    Acute deep vein thrombosis (DVT) of femoral vein of left lower extremity (HCC)    Acute left ankle pain             Patient ID: Basim Humphries is a 73 y.o. male.     HPI     The following portions of the patient's history were reviewed and updated as appropriate:      family history includes Arthritis in his maternal grandmother and mother; Diabetes in his father; Heart disease in his father; Lung cancer in his father; Other in his mother; Ovarian cancer in his maternal grandmother.       reports that he has never smoked. He has never used smokeless tobacco. He reports that he does not currently use alcohol. He reports current drug use. Drug: Marijuana.   Objective:  Patient's shoes and socks removed.   Foot Exam     General  General Appearance: appears  stated age and healthy   Orientation: alert and oriented to person, place, and time   Affect: appropriate   Gait: antalgic   Assistance: cane use         Right Foot/Ankle      Inspection and Palpation  Tenderness: metatarsals   Swelling: dorsum   Arch: pes cavus  Hammertoes: fifth toe, fourth toe, third toe and second toe  Hallux limitus: yes  Skin Exam: dry skin;      Neurovascular  Dorsalis pedis: 2+  Posterior tibial: 2+        Left Foot/Ankle       Inspection and Palpation  Swelling: dorsum   Arch: pes cavus  Hammertoes: second toe, fifth toe, fourth toe and third toe  Hallux limitus: yes  Skin Exam: dry skin;      Neurovascular  Dorsalis pedis: 2+  Posterior tibial: 2+        Physical Exam  Vitals and nursing note reviewed.   Constitutional:       Appearance: Normal appearance.   Cardiovascular:      Rate and Rhythm: Normal rate.      Pulses:           Dorsalis pedis pulses are 2+ on the right side and 2+ on the left side.        Posterior tibial pulses are 2+ on the right side and 2+ on the left side.   Musculoskeletal:      Right lower leg: 3+ Pitting Edema present.      Left lower leg: 3+ Pitting Edema present.   Feet:      Right foot:      Skin integrity: Dry skin present.      Left foot:      Skin integrity: Dry skin present.   Skin:     Capillary Refill: Capillary refill takes less than 2 seconds.      Comments: Patient has xerosis of skin.  All nails are dystrophic.  They demonstrate distal mycosis.  Hallux bilateral demonstrates wide incurvated toenail.   Neurological:      Mental Status: He is alert.   Psychiatric:         Mood and Affect: Mood normal.         Behavior: Behavior normal.         Thought Content: Thought content normal.         Judgment: Judgment normal.

## 2024-07-18 ENCOUNTER — OFFICE VISIT (OUTPATIENT)
Dept: OBGYN CLINIC | Facility: CLINIC | Age: 74
End: 2024-07-18
Payer: MEDICARE

## 2024-07-18 VITALS — BODY MASS INDEX: 29.55 KG/M2 | WEIGHT: 195 LBS | HEIGHT: 68 IN

## 2024-07-18 DIAGNOSIS — S76.111S QUADRICEPS TENDON RUPTURE, RIGHT, SEQUELA: Primary | ICD-10-CM

## 2024-07-18 PROCEDURE — 99213 OFFICE O/P EST LOW 20 MIN: CPT | Performed by: STUDENT IN AN ORGANIZED HEALTH CARE EDUCATION/TRAINING PROGRAM
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
## 2024-07-29 ENCOUNTER — OFFICE VISIT (OUTPATIENT)
Dept: OTOLARYNGOLOGY | Facility: CLINIC | Age: 74
End: 2024-07-29
Payer: MEDICARE

## 2024-07-29 VITALS — WEIGHT: 195 LBS | HEIGHT: 68 IN | BODY MASS INDEX: 29.55 KG/M2

## 2024-07-29 DIAGNOSIS — H90.3 SENSORINEURAL HEARING LOSS (SNHL), BILATERAL: Primary | ICD-10-CM

## 2024-07-29 DIAGNOSIS — H61.23 BILATERAL IMPACTED CERUMEN: ICD-10-CM

## 2024-07-29 PROCEDURE — 99213 OFFICE O/P EST LOW 20 MIN: CPT | Performed by: NURSE PRACTITIONER

## 2024-07-29 PROCEDURE — 69210 REMOVE IMPACTED EAR WAX UNI: CPT | Performed by: NURSE PRACTITIONER

## 2024-07-29 NOTE — PROGRESS NOTES
Assessment/Plan:      Diagnoses and all orders for this visit:    Sensorineural hearing loss (SNHL), bilateral    Bilateral impacted cerumen    Other orders  -     Ear cerumen removal          On exam noted bilateral cerumen impaction and unable to fully view tympanic membrane.  Cerumen impaction removed bilateral eac with suction, pt tolerated procedure well.  Upon removal, improved hearing and decreased clogged sensation of bilateral ears.  Discussed routine cerumen care including avoidance of q-tips and cerumen softeners. Hydrocortisone cream to both ears at bedtime for 30 days then as needed for itching.  Debrox (cleaning drops) ear drops - put drops in ears after hair and ear trimming.   Encourage ongoing follow up in 4 to 6 months to monitor for cerumen and hearing.  Audiogram if symptoms worsen.  Continue binaural hearing aids          Subjective:     Patient ID: Basim Humphries is a 73 y.o. male.    Presents today as a follow up due to hearing loss.  Hearing aid facility in [facility].  Difficulty hearing TV at times.  Certain sounds difficulty hearing.  During routine audiology care they did remove some cerumen.  Both ears itch often.      Since last visit, obtained new hearing aids.  Feels hearing is improved.  Fall 10/2023 with injuries to knees.           Review of Systems   Constitutional: Negative.    HENT:  Positive for hearing loss. Negative for congestion, ear discharge, ear pain, nosebleeds, postnasal drip, rhinorrhea, sinus pressure, sinus pain, sore throat, tinnitus and voice change.    Respiratory:  Negative for chest tightness and shortness of breath.    Skin:  Negative for color change.   Neurological:  Negative for dizziness, numbness and headaches.   Psychiatric/Behavioral: Negative.           Objective:     Physical Exam  Constitutional:       Appearance: He is well-developed.   HENT:      Head: Normocephalic.      Right Ear: Hearing, tympanic membrane, ear canal and  external ear normal. No decreased hearing noted. No drainage or tenderness. There is impacted cerumen. Tympanic membrane is not perforated or erythematous.      Left Ear: Hearing, tympanic membrane, ear canal and external ear normal. No decreased hearing noted. No drainage or tenderness. There is impacted cerumen. Tympanic membrane is not perforated or erythematous.      Nose: Nose normal. No nasal deformity or septal deviation.      Mouth/Throat:      Mouth: Mucous membranes are not pale and not dry. No oral lesions.      Dentition: Normal dentition.      Pharynx: Uvula midline. No oropharyngeal exudate.   Neck:      Trachea: No tracheal deviation.   Pulmonary:      Effort: No accessory muscle usage or respiratory distress.   Musculoskeletal:      Cervical back: Neck supple.   Lymphadenopathy:      Cervical: No cervical adenopathy.   Skin:     General: Skin is warm and dry.   Neurological:      Mental Status: He is alert and oriented to person, place, and time.      Cranial Nerves: No cranial nerve deficit.      Sensory: No sensory deficit.   Psychiatric:         Behavior: Behavior is cooperative.         Ear cerumen removal    Date/Time: 7/29/2024 2:00 PM    Performed by: CHANEL Alvarado  Authorized by: CHANEL Alvarado  Universal Protocol:  Consent: Verbal consent obtained.  Risks and benefits: risks, benefits and alternatives were discussed  Consent given by: patient  Patient understanding: patient states understanding of the procedure being performed    Patient location:  Clinic  Procedure details:     Local anesthetic:  None    Location:  L ear and R ear    Approach:  External  Post-procedure details:     Complication:  None    Hearing quality:  Normal    Patient tolerance of procedure:  Tolerated well, no immediate complications

## 2024-08-01 ENCOUNTER — OFFICE VISIT (OUTPATIENT)
Dept: OBGYN CLINIC | Facility: OTHER | Age: 74
End: 2024-08-01
Payer: MEDICARE

## 2024-08-01 VITALS
BODY MASS INDEX: 29.55 KG/M2 | HEIGHT: 68 IN | WEIGHT: 195 LBS | SYSTOLIC BLOOD PRESSURE: 132 MMHG | DIASTOLIC BLOOD PRESSURE: 80 MMHG

## 2024-08-01 DIAGNOSIS — Z85.820 HISTORY OF MELANOMA: ICD-10-CM

## 2024-08-01 DIAGNOSIS — M54.50 ACUTE RIGHT-SIDED LOW BACK PAIN WITHOUT SCIATICA: Primary | ICD-10-CM

## 2024-08-01 PROCEDURE — 99203 OFFICE O/P NEW LOW 30 MIN: CPT | Performed by: FAMILY MEDICINE
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
## 2024-08-12 ENCOUNTER — TELEPHONE (OUTPATIENT)
Age: 74
End: 2024-08-12

## 2024-08-12 ENCOUNTER — HOSPITAL ENCOUNTER (OUTPATIENT)
Dept: MRI IMAGING | Facility: HOSPITAL | Age: 74
Discharge: HOME | End: 2024-08-12
Payer: MEDICARE

## 2024-08-12 DIAGNOSIS — M54.50 ACUTE RIGHT-SIDED LOW BACK PAIN WITHOUT SCIATICA: ICD-10-CM

## 2024-08-12 DIAGNOSIS — Z85.820 HISTORY OF MELANOMA: ICD-10-CM

## 2024-08-12 PROCEDURE — 72148 MRI LUMBAR SPINE W/O DYE: CPT

## 2024-08-12 NOTE — TELEPHONE ENCOUNTER
Caller: Patient     Doctor: Juan Francisco     Reason for call: Patient has an MRI scheduled for today, patient wanted to make sure he can get this due to his shoulder     Call back#: [phone]

## 2024-08-22 ENCOUNTER — TELEPHONE (OUTPATIENT)
Age: 74
End: 2024-08-22

## 2024-08-22 ENCOUNTER — OFFICE VISIT (OUTPATIENT)
Dept: OBGYN CLINIC | Facility: OTHER | Age: 74
End: 2024-08-22
Payer: MEDICARE

## 2024-08-22 ENCOUNTER — DOCUMENTATION (OUTPATIENT)
Dept: HEMATOLOGY ONCOLOGY | Facility: CLINIC | Age: 74
End: 2024-08-22

## 2024-08-22 VITALS
WEIGHT: 195 LBS | DIASTOLIC BLOOD PRESSURE: 78 MMHG | HEIGHT: 68 IN | BODY MASS INDEX: 29.55 KG/M2 | SYSTOLIC BLOOD PRESSURE: 138 MMHG

## 2024-08-22 DIAGNOSIS — M54.50 ACUTE RIGHT-SIDED LOW BACK PAIN WITHOUT SCIATICA: Primary | ICD-10-CM

## 2024-08-22 DIAGNOSIS — Z85.820 HISTORY OF MELANOMA: ICD-10-CM

## 2024-08-22 PROCEDURE — 99213 OFFICE O/P EST LOW 20 MIN: CPT | Performed by: FAMILY MEDICINE

## 2024-08-22 NOTE — TELEPHONE ENCOUNTER
Patient called for an apt/he was referred by [facility] for radiation therapy but patient wanted an apt for a second opinion before he does this.    I was advised patient needs to be referred to oncology for the radiation.     Is there anywhere we can schedule patient to be seen  for a second opinion?     Please advise.    Thank you

## 2024-08-22 NOTE — PROGRESS NOTES
Referral received/ Chart reviewed for work up completed for referral to radiation oncology     Imaging completed: NA    Pathology completed:  2/14/2024 at [facility] for Dermatopathology accession #[id number]    PN please retrieve outside records.

## 2024-08-22 NOTE — TELEPHONE ENCOUNTER
Pt is now scheduled at [facility] on 9/4/24 with Les David MD at 1 pm (which was the next available). Pt is scheduled out of referral guidelines of 7 days. Pt can be reached at [phone]. Thank you.
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
## 2024-08-22 NOTE — PATIENT INSTRUCTIONS
I recommend trial of physical therapy improving back pain.  If not satisfied with physical therapy over the next 4 to 6 weeks and patient is office and we will refer patient to pain management for consideration of more invasive intervention such as spinal injections.

## 2024-08-22 NOTE — TELEPHONE ENCOUNTER
Called and spoke to patient.    Scheduled for 8/26/2024 @ 8:20 am with Dr. Colon in [place] location to discuss treatment for confirmed SCC.    Patient aware of office location.

## 2024-08-23 ENCOUNTER — PATIENT OUTREACH (OUTPATIENT)
Dept: HEMATOLOGY ONCOLOGY | Facility: CLINIC | Age: 74
End: 2024-08-23

## 2024-08-23 NOTE — PROGRESS NOTES
Intake received, chart reviewed for need of external records.  Consulting: Dr. FOSTER  Scheduled on 9/4/2024  Dx: SCC HX OF MELANOMA & BCC   ICD:     Pathology requested:   From [facility]  phone [phone], via fax [phone]  Accession # [id number]  Slides will be sent directly to [facility] Pathology lab at [address] [place], [place] [address] Way.

## 2024-08-26 ENCOUNTER — CONSULT (OUTPATIENT)
Dept: DERMATOLOGY | Facility: CLINIC | Age: 74
End: 2024-08-26
Payer: MEDICARE

## 2024-08-26 VITALS — WEIGHT: 195 LBS | BODY MASS INDEX: 29.55 KG/M2 | HEIGHT: 68 IN

## 2024-08-26 DIAGNOSIS — D09.9 SQUAMOUS CELL CARCINOMA IN SITU: ICD-10-CM

## 2024-08-26 DIAGNOSIS — D48.5 NEOPLASM OF UNCERTAIN BEHAVIOR OF SKIN: Primary | ICD-10-CM

## 2024-08-26 PROCEDURE — 11102 TANGNTL BX SKIN SINGLE LES: CPT | Performed by: STUDENT IN AN ORGANIZED HEALTH CARE EDUCATION/TRAINING PROGRAM

## 2024-08-26 PROCEDURE — 88305 TISSUE EXAM BY PATHOLOGIST: CPT | Performed by: PATHOLOGY

## 2024-08-26 PROCEDURE — 99204 OFFICE O/P NEW MOD 45 MIN: CPT | Performed by: STUDENT IN AN ORGANIZED HEALTH CARE EDUCATION/TRAINING PROGRAM

## 2024-08-26 PROCEDURE — 11103 TANGNTL BX SKIN EA SEP/ADDL: CPT | Performed by: STUDENT IN AN ORGANIZED HEALTH CARE EDUCATION/TRAINING PROGRAM

## 2024-08-26 NOTE — PROGRESS NOTES
"[facility] Dermatology Clinic Note     Patient Name: Basim Humphries  Encounter Date: 08/26/24     Have you been cared for by a [facility] Dermatologist in the last 3 years and, if so, which description applies to you?    NO.   I am considered a \"new\" patient and must complete all patient intake questions. I am MALE/not capable of bearing children.    REVIEW OF SYSTEMS:  Have you recently had or currently have any of the following? Recent fever or chills? No  Any non-healing wound? No   PAST MEDICAL HISTORY:  Have you personally ever had or currently have any of the following?  If \"YES,\" then please provide more detail. Skin cancer (such as Melanoma, Basal Cell Carcinoma, Squamous Cell Carcinoma?  YES, squamous cell carcinoma 2024  Tuberculosis, HIV/AIDS, Hepatitis B or C: No  Radiation Treatment No   HISTORY OF IMMUNOSUPPRESSION:   Do you have a history of any of the following:  Systemic Immunosuppression such as Diabetes, Biologic or Immunotherapy, Chemotherapy, Organ Transplantation, Bone Marrow Transplantation?  No     Answering \"YES\" requires the addition of the dotphrase \"IMMUNOSUPPRESSED\" as the first diagnosis of the patient's visit.   FAMILY HISTORY:  Any \"first degree relatives\" (parent, brother, sister, or child) with the following?    Skin Cancer, Pancreatic or Other Cancer? No   PATIENT EXPERIENCE:    Do you want the Dermatologist to perform a COMPLETE skin exam today including a clinical examination under the \"bra and underwear\" areas?  NO  If necessary, do we have your permission to call and leave a detailed message on your Preferred Phone number that includes your specific medical information?  Yes      Allergies   Allergen Reactions    Cefpodoxime Other (See Comments)     Made heart race was given when he had covid pneumonia    Demerol [Meperidine] Itching    Codeine GI Intolerance    Diclofenac Sodium GI Intolerance    Erythromycin Other (See Comments)     Making ears ring    Meloxicam GI " Intolerance    Oxaprozin GI Intolerance    Penicillins Hives and Itching      Current Outpatient Medications:     gabapentin (NEURONTIN) 100 mg capsule, Take 1 capsule (100 mg total) by mouth daily at bedtime (Patient not taking: Reported on 8/1/2024), Disp: 30 capsule, Rfl: 1    lansoprazole (PREVACID) 30 mg capsule, Take 30 mg by mouth every morning  , Disp: , Rfl:     multivitamin (THERAGRAN) TABS, Take 1 tablet by mouth every morning, Disp: , Rfl:     oxyCODONE (ROXICODONE) 5 immediate release tablet, Take 1 tablet (5 mg total) by mouth 2 (two) times a day as needed for severe pain Max Daily Amount: 10 mg (Patient not taking: Reported on 5/26/2024), Disp: 15 tablet, Rfl: 0          Whom besides the patient is providing clinical information about today's encounter?   NO ADDITIONAL HISTORIAN (patient alone provided history)    Physical Exam and Assessment/Plan by Diagnosis:       Patient here today for a second opinion on treatment plan for his squamous cell carcinoma on left inferior medial forehead that was biopsy in February 2023 by [facility] private practice that wants to de radiation and patient is opting out of that treatment plan      SQUAMOUS CELL CARCINOMA IN SITU - LEFT INFERIOR MEDIAL FOREHEAD  Physical Exam:  (Anatomic Location); (Size and Morphological Description); (Differential Diagnosis): Forehead without visible scar. Unable to identify location of biopsy based on black and white photograph.     Assessment and Plan:  Patient scheduled for radiation oncology consult first week of December. Here to discuss alternative treatment options. Educated that Mohs would be gold standard; however, small black and white photograph provided in records is inadequate for localization.   Based on a thorough discussion of this condition and the management approach to it (including a comprehensive discussion of the known risks, side effects and potential benefits of treatment), the patient (family) agrees to  "implement the following specific plan:    Patient to meet with radiation oncology  Staff to work to obtain photograph of biopsy site (color preferred)    NEOPLASM OF UNCERTAIN BEHAVIOR OF SKIN    Physical Exam:  (Anatomic Location); (Size and Morphological Description); (Differential Diagnosis):  Specimen A; 2.5 cm x 2.0 cm firm mobile subcutaneus nodule at the inferior aspect of prior melanoma excision scar ;ultrasound to rule out recurrence vs cyst   Pertinent Positives:  Pertinent Negatives:    Assessment and Plan:  Based on a thorough discussion of this condition and the management approach to it (including a comprehensive discussion of the known risks, side effects and potential benefits of treatment), the patient (family) agrees to implement the following specific plan:    Ultrasound ordered    NEOPLASM OF UNCERTAIN BEHAVIOR OF SKIN-BIOPSY     Physical Exam:  (Anatomic Location); (Size and Morphological Description); (Differential Diagnosis):  Specimen A;skin;shave biopsy;0.9 CM X 0.7 CM pink hyperkeratotic plaque;Ddx: r/o SCC vs AK   Specimen B;skin;shave biopsy;central forehead ; 0.5 cm x 0.4 cm pink hyperkeratotic papule;r/o SCC vs AK  Pertinent Positives:  Pertinent Negatives:    Additional History of Present Condition:      Assessment and Plan:  I have discussed with the patient that a sample of skin via a \"skin biopsy” would be potentially helpful to further make a specific diagnosis under the microscope.  Based on a thorough discussion of this condition and the management approach to it (including a comprehensive discussion of the known risks, side effects and potential benefits of treatment), the patient (family) agrees to implement the following specific plan:    Procedure:  Skin Biopsy.  After a thorough discussion of treatment options and risk/benefits/alternatives (including but not limited to local pain, scarring, dyspigmentation, blistering, possible superinfection, and inability to confirm a " diagnosis via histopathology), verbal and written consent were obtained and portion of the rash was biopsied for tissue sample.  See below for consent that was obtained from patient and subsequent Procedure Note.     PROCEDURE TANGENTIAL (SHAVE) BIOPSY NOTE:    Performing Physician:   Anatomic Location; Clinical Description with size (cm); Pre-Op Diagnosis:   Specimen A;skin;shave biopsy;upper back;0.9 cm X 0.7 cm pink hyperkeratotic plaque;Ddx: r/o SCC vs AK   Specimen B;skin;shave biopsy;central forehead ; 0.5 cm x 0.4 cm pink hyperkeratotic papule;r/o SCC vs AK  Post-op diagnosis: Same     Local anesthesia: 2% Xylocaine with epi     Topical anesthesia: None    Hemostasis: Aluminum chloride       After obtaining informed consent  at which time there was a discussion about the purpose of biopsy  and low risks of infection and bleeding.  The area was prepped and draped in the usual fashion. Anesthesia was obtained with 1% lidocaine with epinephrine. A shave biopsy to an appropriate sampling depth was obtained by Shave (Dermablade or 15 blade) The resulting wound was covered with surgical ointment and bandaged appropriately.     The patient tolerated the procedure well without complications and was without signs of functional compromise.      Specimen has been sent for review by Dermatopathology.    Standard post-procedure care has been explained and has been included in written form within the patient's copy of Informed Consent.    INFORMED CONSENT DISCUSSION AND POST-OPERATIVE INSTRUCTIONS FOR PATIENT    I.  RATIONALE FOR PROCEDURE  I understand that a skin biopsy allows the Dermatologist to test a lesion or rash under the microscope to obtain a diagnosis.  It usually involves numbing the area with numbing medication and removing a small piece of skin; sometimes the area will be closed with sutures. In this specific procedure, sutures are not usually needed.  If any sutures are placed, then they are  "usually need to be removed in 2 weeks or less.    I understand that my Dermatologist recommends that a skin \"shave\" biopsy be performed today.  A local anesthetic, similar to the kind that a dentist uses when filling a cavity, will be injected with a very small needle into the skin area to be sampled.  The injected skin and tissue underneath \"will go to sleep” and become numb so no pain should be felt afterwards.  An instrument shaped like a tiny \"razor blade\" (shave biopsy instrument) will be used to cut a small piece of tissue and skin from the area so that a sample of tissue can be taken and examined more closely under the microscope.  A slight amount of bleeding will occur, but it will be stopped with direct pressure and a pressure bandage and any other appropriate methods.  I understands that a scar will form where the wound was created.  Surgical ointment will be applied to help protect the wound.  Sutures are not usually needed.    II.  RISKS AND POTENTIAL COMPLICATIONS   I understand the risks and potential complications of a skin biopsy include but are not limited to the following:  Bleeding  Infection  Pain  Scar/keloid  Skin discoloration  Incomplete Removal  Recurrence  Nerve Damage/Numbness/Loss of Function  Allergic Reaction to Anesthesia  Biopsies are diagnostic procedures and based on findings additional treatment or evaluation may be required  Loss or destruction of specimen resulting in no additional findings    My Dermatologist has explained to me the nature of the condition, the nature of the procedure, and the benefits to be reasonably expected compared with alternative approaches.  My Dermatologist has discussed the likelihood of major risks or complications of this procedure including the specific risks listed above, such as bleeding, infection, and scarring/keloid.  I understand that a scar is expected after this procedure.  I understand that my physician cannot predict if the scar will form " "a \"keloid,\" which extends beyond the borders of the wound that is created.  A keloid is a thick, painful, and bumpy scar.  A keloid can be difficult to treat, as it does not always respond well to therapy, which includes injecting cortisone directly into the keloid every few weeks.  While this usually reduces the pain and size of the scar, it does not eliminate it.      I understand that photographs may be taken before and after the procedure.  These will be maintained as part of the medical providers confidential records and may not be made available to me.  I further authorize the medical provider to use the photographs for teaching purposes or to illustrate scientific papers, books, or lectures if in his/her judgment, medical research, education, or science may benefit from its use.    I have had an opportunity to fully inquire about the risks and benefits of this procedure and its alternatives.   I have been given ample time and opportunity to ask questions and to seek a second opinion if I wished to do so.  I acknowledge that there have specifically been no guarantees as to the cosmetic results from the procedure.  I am aware that with any procedure there is always the possibility of an unexpected complication.    III. POST-PROCEDURAL CARE (WHAT YOU WILL NEED TO DO \"AFTER THE BIOPSY\" TO OPTIMIZE HEALING)    Keep the area clean and dry.  Try NOT to remove the bandage or get it wet for the first 24 hours.    Gently clean the area and apply surgical ointment (such as Vaseline petrolatum ointment, which is available \"over the counter\" and not a prescription) to the biopsy site for up to 2 weeks straight.  This acts to protect the wound from the outside world.      Sutures are not usually placed in this procedure.  If any sutures were placed, return for suture removal as instructed (generally 1 week for the face, 2 weeks for the body).      Take Acetaminophen (Tylenol) for discomfort, if no contraindications.  " Ibuprofen or aspirin could make bleeding worse.    Call our office immediately for signs of infection: fever, chills, increased redness, warmth, tenderness, discomfort/pain, or pus or foul smell coming from the wound.    WHAT TO DO IF THERE IS ANY BLEEDING?  If a small amount of bleeding is noticed, place a clean cloth over the area and apply firm pressure for ten minutes.  Check the wound after 10 minutes of direct pressure.  If bleeding persists, try one more time for an additional 10 minutes of direct pressure on the area.  If the bleeding becomes heavier or does not stop after the second attempt, or if you have any other questions about this procedure, then please call your [facility]'s Dermatologist by calling [phone] (SKIN).     I hereby acknowledge that I have reviewed and verified the site with my Dermatologist and have requested and authorized my Dermatologist to proceed with the procedure.         Scribe Attestation      I,:  Sonal Rush MA am acting as a scribe while in the presence of the attending physician.:       I,:  Jose Colon MD personally performed the services described in this documentation    as scribed in my presence.:            Alexia Estrella MD  Dermatology, PGY-2

## 2024-08-26 NOTE — PATIENT INSTRUCTIONS
"  PROCEDURE TANGENTIAL (SHAVE) BIOPSY NOTE:    INFORMED CONSENT DISCUSSION AND POST-OPERATIVE INSTRUCTIONS FOR PATIENT    I.  RATIONALE FOR PROCEDURE  I understand that a skin biopsy allows the Dermatologist to test a lesion or rash under the microscope to obtain a diagnosis.  It usually involves numbing the area with numbing medication and removing a small piece of skin; sometimes the area will be closed with sutures. In this specific procedure, sutures are not usually needed.  If any sutures are placed, then they are usually need to be removed in 2 weeks or less.    I understand that my Dermatologist recommends that a skin \"shave\" biopsy be performed today.  A local anesthetic, similar to the kind that a dentist uses when filling a cavity, will be injected with a very small needle into the skin area to be sampled.  The injected skin and tissue underneath \"will go to sleep” and become numb so no pain should be felt afterwards.  An instrument shaped like a tiny \"razor blade\" (shave biopsy instrument) will be used to cut a small piece of tissue and skin from the area so that a sample of tissue can be taken and examined more closely under the microscope.  A slight amount of bleeding will occur, but it will be stopped with direct pressure and a pressure bandage and any other appropriate methods.  I understands that a scar will form where the wound was created.  Surgical ointment will be applied to help protect the wound.  Sutures are not usually needed.    II.  RISKS AND POTENTIAL COMPLICATIONS   I understand the risks and potential complications of a skin biopsy include but are not limited to the following:  Bleeding  Infection  Pain  Scar/keloid  Skin discoloration  Incomplete Removal  Recurrence  Nerve Damage/Numbness/Loss of Function  Allergic Reaction to Anesthesia  Biopsies are diagnostic procedures and based on findings additional treatment or evaluation may be required  Loss or destruction of specimen resulting " "in no additional findings    My Dermatologist has explained to me the nature of the condition, the nature of the procedure, and the benefits to be reasonably expected compared with alternative approaches.  My Dermatologist has discussed the likelihood of major risks or complications of this procedure including the specific risks listed above, such as bleeding, infection, and scarring/keloid.  I understand that a scar is expected after this procedure.  I understand that my physician cannot predict if the scar will form a \"keloid,\" which extends beyond the borders of the wound that is created.  A keloid is a thick, painful, and bumpy scar.  A keloid can be difficult to treat, as it does not always respond well to therapy, which includes injecting cortisone directly into the keloid every few weeks.  While this usually reduces the pain and size of the scar, it does not eliminate it.      I understand that photographs may be taken before and after the procedure.  These will be maintained as part of the medical providers confidential records and may not be made available to me.  I further authorize the medical provider to use the photographs for teaching purposes or to illustrate scientific papers, books, or lectures if in his/her judgment, medical research, education, or science may benefit from its use.    I have had an opportunity to fully inquire about the risks and benefits of this procedure and its alternatives.   I have been given ample time and opportunity to ask questions and to seek a second opinion if I wished to do so.  I acknowledge that there have specifically been no guarantees as to the cosmetic results from the procedure.  I am aware that with any procedure there is always the possibility of an unexpected complication.    III. POST-PROCEDURAL CARE (WHAT YOU WILL NEED TO DO \"AFTER THE BIOPSY\" TO OPTIMIZE HEALING)    Keep the area clean and dry.  Try NOT to remove the bandage or get it wet for the first 24 " "hours.    Gently clean the area and apply surgical ointment (such as Vaseline petrolatum ointment, which is available \"over the counter\" and not a prescription) to the biopsy site for up to 2 weeks straight.  This acts to protect the wound from the outside world.      Sutures are not usually placed in this procedure.  If any sutures were placed, return for suture removal as instructed (generally 1 week for the face, 2 weeks for the body).      Take Acetaminophen (Tylenol) for discomfort, if no contraindications.  Ibuprofen or aspirin could make bleeding worse.    Call our office immediately for signs of infection: fever, chills, increased redness, warmth, tenderness, discomfort/pain, or pus or foul smell coming from the wound.    WHAT TO DO IF THERE IS ANY BLEEDING?  If a small amount of bleeding is noticed, place a clean cloth over the area and apply firm pressure for ten minutes.  Check the wound after 10 minutes of direct pressure.  If bleeding persists, try one more time for an additional 10 minutes of direct pressure on the area.  If the bleeding becomes heavier or does not stop after the second attempt, or if you have any other questions about this procedure, then please call your [facility] Dermatologist by calling [phone] (SKIN).     I hereby acknowledge that I have reviewed and verified the site with my Dermatologist and have requested and authorized my Dermatologist to proceed with the procedure.    "

## 2024-08-29 PROCEDURE — 88305 TISSUE EXAM BY PATHOLOGIST: CPT | Performed by: PATHOLOGY

## 2024-08-30 NOTE — TELEPHONE ENCOUNTER
Patient returning call/no note was placed to know what was the call about.  Only change on patient's chart is the apt that was made today for his 6 months chk on 3/3/25/informed patient of apt and confirmed

## 2024-08-30 NOTE — RESULT ENCOUNTER NOTE
DERMATOPATHOLOGY RESULT NOTE    Results reviewed by ordering physician.  Called patient to personally discuss results. Discussed results with patient.       Instructions for Clinical Derm Team:   (remember to route Result Note to appropriate staff):    Call patient and schedule for skin check, due to history of melanoma and other skin cancers, should be seen every 6 months at this point.     Result & Plan by Specimen:    Specimen A: benign  Plan: monitor      Specimen B: benign  Plan: monitor    Discussed with patient to contact the office if any new growths occur at the biopsied sites or if they itch or bleed. Patient states that biopsy sites are healing up well. Would like to see patient within the next 6 months for a skin check. Patient states that he is supposed to see radiation oncology for the spot on his forehead, however it returned as a pre-cancer. Instructed patient that he should still follow up with rad onc and get their opinion on radiation to the location given previous cancer history.        Component   Case Report  Surgical Pathology Report                         Case: [id number]                                  Authorizing Provider:  Alexia Estrella MD           Collected:           08/26/2024 09              Ordering Location:     [facility]      Received:            08/26/2024 [address] [place] [address]                                                                Pathologist:           Mina Beck MD                                                      Specimens:   A) - Skin, Other, specimen A;upper back                                                             B) - Skin, Other, specimen B;central forehead                                            Final Diagnosis  A. Skin, upper back:  ACTINIC KERATOSIS, HYPERPLASTIC    Note: This lesion has been traumatized and is focally crusted.      B. Skin, central forehead:  ACTINIC KERATOSIS,  "HYPERPLASTIC, TOP OF    Note: This lesion has been traumatized and is focally crusted.    Electronically signed by Mina Beck MD on 8/29/2024 at  3:08 PM  Note   Interpretation performed at [facility] [address], [place] [address]    Additional Information   All reported additional testing was performed with appropriately reactive controls.  These tests were developed and their performance characteristics determined by [facility] or appropriate performing facility, though some tests may be performed on tissues which have not been validated for performance characteristics (such as staining performed on alcohol exposed cell blocks and decalcified tissues).  Results should be interpreted with caution and in the context of the patients' clinical condition. These tests may not be cleared or approved by the U.S. Food and Drug Administration, though the FDA has determined that such clearance or approval is not necessary. These tests are used for clinical purposes and they should not be regarded as investigational or for research. This laboratory has been approved by [facility], designated as a high-complexity laboratory and is qualified to perform these tests.  .  Gross Description   A. The specimen is received in formalin, labeled with the patient's name and hospital number, and is designated \" upper back\".  The specimen consists of a tan superficial shaving of skin measuring 1 x 0.6 x 0.1 cm.  The skin surface appears keratotic.  The margin is inked green.  The skin surface is inked red.  The specimen is bisected lengthwise.  Entirely submitted. One cassette.  Between sponges.  B. The specimen is received in formalin, labeled with the patient's name and hospital number, and is designated \" central forehead\".  The specimen consists of a tan superficial shaving of skin measuring 0.6 x 0.5 x 0.1 cm.  The skin surface exhibits a tan keratotic papule measuring 0.5 x 0.5 x 0.1 cm.  " The margin is inked green.  The skin surface is inked red.  The specimen is bisected.  Entirely submitted. One cassette.  Between sponges.    Note: The estimated total formalin fixation time based upon information provided by the submitting clinician and the standard processing schedule is under 72 hours.    MCrites  Clinical Information   Specimen A;skin;shave biopsy;upper back ; 0.9 cm X 0.7 cm pink hyperkeratotic plaque;Ddx: r/o SCC vs AK  Specimen B;skin;shave biopsy;central forehead ; 0.5 cm x 0.4 cm pink hyperkeratotic papule;r/o SCC vs AK      Attn Derm Path

## 2024-09-04 ENCOUNTER — CONSULT (OUTPATIENT)
Dept: RADIATION ONCOLOGY | Facility: HOSPITAL | Age: 74
End: 2024-09-04
Attending: STUDENT IN AN ORGANIZED HEALTH CARE EDUCATION/TRAINING PROGRAM
Payer: MEDICARE

## 2024-09-04 VITALS
SYSTOLIC BLOOD PRESSURE: 135 MMHG | RESPIRATION RATE: 14 BRPM | HEART RATE: 78 BPM | DIASTOLIC BLOOD PRESSURE: 80 MMHG | OXYGEN SATURATION: 98 % | TEMPERATURE: 98.9 F

## 2024-09-04 DIAGNOSIS — C44.329 SQUAMOUS CELL CARCINOMA OF FOREHEAD: Primary | ICD-10-CM

## 2024-09-04 PROCEDURE — 99211 OFF/OP EST MAY X REQ PHY/QHP: CPT | Performed by: STUDENT IN AN ORGANIZED HEALTH CARE EDUCATION/TRAINING PROGRAM

## 2024-09-04 PROCEDURE — 99202 OFFICE O/P NEW SF 15 MIN: CPT | Performed by: STUDENT IN AN ORGANIZED HEALTH CARE EDUCATION/TRAINING PROGRAM
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
## 2024-09-09 ENCOUNTER — OFFICE VISIT (OUTPATIENT)
Age: 74
End: 2024-09-09
Payer: MEDICARE

## 2024-09-09 ENCOUNTER — PATIENT OUTREACH (OUTPATIENT)
Dept: HEMATOLOGY ONCOLOGY | Facility: CLINIC | Age: 74
End: 2024-09-09

## 2024-09-09 VITALS
WEIGHT: 195 LBS | DIASTOLIC BLOOD PRESSURE: 90 MMHG | HEIGHT: 68 IN | BODY MASS INDEX: 29.55 KG/M2 | HEART RATE: 81 BPM | SYSTOLIC BLOOD PRESSURE: 147 MMHG

## 2024-09-09 DIAGNOSIS — M20.41 HAMMER TOES OF BOTH FEET: ICD-10-CM

## 2024-09-09 DIAGNOSIS — M79.672 PAIN IN BOTH FEET: Primary | ICD-10-CM

## 2024-09-09 DIAGNOSIS — M54.16 RADICULOPATHY OF LUMBAR REGION: ICD-10-CM

## 2024-09-09 DIAGNOSIS — R60.9 CHRONIC EDEMA: ICD-10-CM

## 2024-09-09 DIAGNOSIS — M20.42 HAMMER TOES OF BOTH FEET: ICD-10-CM

## 2024-09-09 DIAGNOSIS — M79.671 PAIN IN BOTH FEET: Primary | ICD-10-CM

## 2024-09-09 DIAGNOSIS — I70.209 PERIPHERAL ARTERIOSCLEROSIS (HCC): ICD-10-CM

## 2024-09-09 DIAGNOSIS — B35.1 ONYCHOMYCOSIS: ICD-10-CM

## 2024-09-09 PROCEDURE — 99213 OFFICE O/P EST LOW 20 MIN: CPT | Performed by: PODIATRIST

## 2024-09-09 NOTE — PROGRESS NOTES
Assessment/Plan: Pain upon ambulation secondary to hammertoe formation.  Mycosis of nail.  Hallux nail bilateral with ingrown toenail.  Acquired deformity of foot.  Mild peripheral artery disease.  Chronic edema.     Plan.  Chart reviewed.  Patient evaluated.  Primary care notes reviewed.  Patient advised on condition.  At this time patient's been given instruction for proper shoe gear.  He will elevate feet at end of day.  Nail cutting technique recommended.  Watch for signs of infection.  Aftercare instruction given.  Return for follow-up.     In order to help with radicular symptoms, we will add an empiric course of gabapentin.  Patient will take 100 mg daily at bedtime.  In addition he is advised to use compression socks and elevate his feet at the end of the day.    Patient is still having pain from hammertoes of the left foot.  Most pacifically second left toe.  He is considering surgical intervention.  We would consider flexor tenotomy performed in office.            Diagnoses and all orders for this visit:     Pain in both feet     Peripheral arteriosclerosis     Onychomycosis     Ingrown toenail     Hammer toes of both feet     Chronic edema     Radiculopathy            Subjective: Patient has pain in his feet.  He has painful toes.  He has hammertoes.  No history of pedal trauma.  He also has swollen legs.  Patient now is getting numbness in his toes.  He gets it on the top of his feet.  He gets this mostly at night.  Patient has chronic low back pain.               Allergies   Allergen Reactions    Cefpodoxime Other (See Comments)       Made heart race was given when he had covid pneumonia    Demerol [Meperidine] Itching    Codeine GI Intolerance    Diclofenac Sodium GI Intolerance    Erythromycin Other (See Comments)       Making ears ring    Meloxicam GI Intolerance    Oxaprozin GI Intolerance    Penicillins Hives and Itching            Current Outpatient Medications:     apixaban (ELIQUIS) 5 mg, Take  2 tablets (10 mg total) by mouth 2 (two) times a day for 6 days, THEN 1 tablet (5 mg total) 2 (two) times a day., Disp: 84 tablet, Rfl: 0    lansoprazole (PREVACID) 30 mg capsule, Take 30 mg by mouth every morning  , Disp: , Rfl:     multivitamin (THERAGRAN) TABS, Take 1 tablet by mouth every morning, Disp: , Rfl:     oxyCODONE (ROXICODONE) 5 immediate release tablet, Take 1 tablet (5 mg total) by mouth 2 (two) times a day as needed for severe pain Max Daily Amount: 10 mg, Disp: 15 tablet, Rfl: 0           Patient Active Problem List   Diagnosis    Brady's esophagus with dysplasia    History of colon polyps    Elevated LFTs    Rotator cuff tear arthropathy of left shoulder    Rotator cuff tear arthropathy of right shoulder    Sensorineural hearing loss (SNHL), bilateral    Gastroesophageal reflux disease without esophagitis    Aftercare following right shoulder joint replacement surgery    Post-traumatic osteoarthritis of first carpometacarpal joint of right hand    Muscle strain of right shoulder    Acute pain of right shoulder    Hx of total shoulder replacement, right    Ambulatory dysfunction    Fall from standing    Traumatic rupture of quadriceps tendon, right, initial encounter    Acute pain    Contusion of left leg, initial encounter    Thrombocytopenia (HCC)    Melanoma (HCC)    Impaired fasting glucose    Nelly-colored urine    Cellulitis    Acute deep vein thrombosis (DVT) of femoral vein of left lower extremity (HCC)    Acute left ankle pain             Patient ID: Basim Humphries is a 73 y.o. male.     HPI     The following portions of the patient's history were reviewed and updated as appropriate:      family history includes Arthritis in his maternal grandmother and mother; Diabetes in his father; Heart disease in his father; Lung cancer in his father; Other in his mother; Ovarian cancer in his maternal grandmother.       reports that he has never smoked. He has never used smokeless tobacco. He  reports that he does not currently use alcohol. He reports current drug use. Drug: Marijuana.   Objective:  Patient's shoes and socks removed.   Foot Exam     General  General Appearance: appears stated age and healthy   Orientation: alert and oriented to person, place, and time   Affect: appropriate   Gait: antalgic   Assistance: cane use         Right Foot/Ankle      Inspection and Palpation  Tenderness: metatarsals   Swelling: dorsum   Arch: pes cavus  Hammertoes: fifth toe, fourth toe, third toe and second toe  Hallux limitus: yes  Skin Exam: dry skin;      Neurovascular  Dorsalis pedis: 2+  Posterior tibial: 2+        Left Foot/Ankle       Inspection and Palpation  Swelling: dorsum   Arch: pes cavus  Hammertoes: second toe, fifth toe, fourth toe and third toe  Hallux limitus: yes  Skin Exam: dry skin;      Neurovascular  Dorsalis pedis: 2+  Posterior tibial: 2+        Physical Exam  Vitals and nursing note reviewed.   Constitutional:       Appearance: Normal appearance.   Cardiovascular:      Rate and Rhythm: Normal rate.      Pulses:           Dorsalis pedis pulses are 2+ on the right side and 2+ on the left side.        Posterior tibial pulses are 2+ on the right side and 2+ on the left side.   Musculoskeletal:      Right lower leg: 3+ Pitting Edema present.      Left lower leg: 3+ Pitting Edema present.   Feet:      Right foot:      Skin integrity: Dry skin present.      Left foot:      Skin integrity: Dry skin present.   Skin:     Capillary Refill: Capillary refill takes less than 2 seconds.      Comments: Patient has xerosis of skin.  All nails are dystrophic.  They demonstrate distal mycosis.  Hallux bilateral demonstrates wide incurvated toenail.   Neurological:      Mental Status: He is alert.   Psychiatric:         Mood and Affect: Mood normal.         Behavior: Behavior normal.         Thought Content: Thought content normal.         Judgment: Judgment normal.

## 2024-10-15 ENCOUNTER — TELEPHONE (OUTPATIENT)
Dept: GASTROENTEROLOGY | Facility: CLINIC | Age: 74
End: 2024-10-15

## 2024-10-21 ENCOUNTER — HOSPITAL ENCOUNTER (EMERGENCY)
Facility: HOSPITAL | Age: 74
Discharge: HOME | End: 2024-10-21
Attending: EMERGENCY MEDICINE
Payer: MEDICARE

## 2024-10-21 VITALS
HEART RATE: 76 BPM | SYSTOLIC BLOOD PRESSURE: 135 MMHG | TEMPERATURE: 97.8 F | DIASTOLIC BLOOD PRESSURE: 98 MMHG | RESPIRATION RATE: 18 BRPM | OXYGEN SATURATION: 99 %

## 2024-10-21 DIAGNOSIS — I45.2 BIFASCICULAR BLOCK: Primary | ICD-10-CM
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
## 2024-10-21 LAB
ATRIAL RATE: 91 BPM
B BURGDOR IGG+IGM SER QL IA: NEGATIVE
P AXIS: 66 DEGREES
PR INTERVAL: 172 MS
QRS AXIS: -61 DEGREES
QRSD INTERVAL: 134 MS
QT INTERVAL: 358 MS
QTC INTERVAL: 440 MS
T WAVE AXIS: 56 DEGREES
VENTRICULAR RATE: 91 BPM

## 2024-10-21 PROCEDURE — 36415 COLL VENOUS BLD VENIPUNCTURE: CPT | Performed by: EMERGENCY MEDICINE

## 2024-10-21 PROCEDURE — 93010 ELECTROCARDIOGRAM REPORT: CPT | Performed by: INTERNAL MEDICINE

## 2024-10-21 PROCEDURE — 86618 LYME DISEASE ANTIBODY: CPT | Performed by: EMERGENCY MEDICINE

## 2024-10-21 PROCEDURE — 99282 EMERGENCY DEPT VISIT SF MDM: CPT

## 2024-10-21 PROCEDURE — 93005 ELECTROCARDIOGRAM TRACING: CPT

## 2024-10-21 PROCEDURE — 99284 EMERGENCY DEPT VISIT MOD MDM: CPT | Performed by: EMERGENCY MEDICINE

## 2024-10-21 RX ORDER — DOXYCYCLINE 100 MG/1
100 CAPSULE ORAL ONCE
Status: COMPLETED | OUTPATIENT
Start: 2024-10-21 | End: 2024-10-21

## 2024-10-21 RX ORDER — DOXYCYCLINE 100 MG/1
100 CAPSULE ORAL 2 TIMES DAILY
Qty: 42 CAPSULE | Refills: 0 | Status: SHIPPED | OUTPATIENT
Start: 2024-10-21 | End: 2024-11-11

## 2024-10-21 RX ADMIN — DOXYCYCLINE 100 MG: 100 CAPSULE ORAL at 13:29
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
## 2024-11-11 ENCOUNTER — OFFICE VISIT (OUTPATIENT)
Age: 74
End: 2024-11-11
Payer: MEDICARE

## 2024-11-11 VITALS
HEIGHT: 68 IN | DIASTOLIC BLOOD PRESSURE: 72 MMHG | BODY MASS INDEX: 29.55 KG/M2 | RESPIRATION RATE: 17 BRPM | HEART RATE: 84 BPM | SYSTOLIC BLOOD PRESSURE: 122 MMHG | WEIGHT: 195 LBS

## 2024-11-11 DIAGNOSIS — I70.209 PERIPHERAL ARTERIOSCLEROSIS (HCC): ICD-10-CM

## 2024-11-11 DIAGNOSIS — M79.671 PAIN IN BOTH FEET: Primary | ICD-10-CM

## 2024-11-11 DIAGNOSIS — M20.41 HAMMER TOES OF BOTH FEET: ICD-10-CM

## 2024-11-11 DIAGNOSIS — M54.16 RADICULOPATHY OF LUMBAR REGION: ICD-10-CM

## 2024-11-11 DIAGNOSIS — M20.42 HAMMER TOES OF BOTH FEET: ICD-10-CM

## 2024-11-11 DIAGNOSIS — R60.9 CHRONIC EDEMA: ICD-10-CM

## 2024-11-11 DIAGNOSIS — M79.672 PAIN IN BOTH FEET: Primary | ICD-10-CM

## 2024-11-11 DIAGNOSIS — B35.1 ONYCHOMYCOSIS: ICD-10-CM

## 2024-11-11 PROCEDURE — 99213 OFFICE O/P EST LOW 20 MIN: CPT | Performed by: PODIATRIST

## 2024-11-11 NOTE — PROGRESS NOTES
Assessment/Plan: Pain upon ambulation secondary to hammertoe formation.  Mycosis of nail.  Hallux nail bilateral with ingrown toenail.  Acquired deformity of foot.  Mild peripheral artery disease.  Chronic edema.     Plan.  Chart reviewed.  Patient evaluated.  Primary care notes reviewed.  Patient advised on condition.  At this time patient's been given instruction for proper shoe gear.  He will elevate feet at end of day.  Nail cutting technique recommended.  Watch for signs of infection.  Aftercare instruction given.  Return for follow-up.     In order to help with radicular symptoms, we will add an empiric course of gabapentin.  Patient will take 100 mg daily at bedtime.  In addition he is advised to use compression socks and elevate his feet at the end of the day.     Patient is still having pain from hammertoes of the left foot.  Most pacifically second left toe.  He is considering surgical intervention.  We would consider flexor tenotomy performed in office.            Diagnoses and all orders for this visit:     Pain in both feet     Peripheral arteriosclerosis     Onychomycosis     Ingrown toenail     Hammer toes of both feet     Chronic edema     Radiculopathy            Subjective: Patient has pain in his feet.  He has painful toes.  He has hammertoes.  No history of pedal trauma.  He also has swollen legs.  Patient now is getting numbness in his toes.  He gets it on the top of his feet.  He gets this mostly at night.  Patient has chronic low back pain.               Allergies   Allergen Reactions    Cefpodoxime Other (See Comments)       Made heart race was given when he had covid pneumonia    Demerol [Meperidine] Itching    Codeine GI Intolerance    Diclofenac Sodium GI Intolerance    Erythromycin Other (See Comments)       Making ears ring    Meloxicam GI Intolerance    Oxaprozin GI Intolerance    Penicillins Hives and Itching            Current Outpatient Medications:     apixaban (ELIQUIS) 5 mg,  Take 2 tablets (10 mg total) by mouth 2 (two) times a day for 6 days, THEN 1 tablet (5 mg total) 2 (two) times a day., Disp: 84 tablet, Rfl: 0    lansoprazole (PREVACID) 30 mg capsule, Take 30 mg by mouth every morning  , Disp: , Rfl:     multivitamin (THERAGRAN) TABS, Take 1 tablet by mouth every morning, Disp: , Rfl:     oxyCODONE (ROXICODONE) 5 immediate release tablet, Take 1 tablet (5 mg total) by mouth 2 (two) times a day as needed for severe pain Max Daily Amount: 10 mg, Disp: 15 tablet, Rfl: 0           Patient Active Problem List   Diagnosis    Brady's esophagus with dysplasia    History of colon polyps    Elevated LFTs    Rotator cuff tear arthropathy of left shoulder    Rotator cuff tear arthropathy of right shoulder    Sensorineural hearing loss (SNHL), bilateral    Gastroesophageal reflux disease without esophagitis    Aftercare following right shoulder joint replacement surgery    Post-traumatic osteoarthritis of first carpometacarpal joint of right hand    Muscle strain of right shoulder    Acute pain of right shoulder    Hx of total shoulder replacement, right    Ambulatory dysfunction    Fall from standing    Traumatic rupture of quadriceps tendon, right, initial encounter    Acute pain    Contusion of left leg, initial encounter    Thrombocytopenia (HCC)    Melanoma (HCC)    Impaired fasting glucose    Nelyl-colored urine    Cellulitis    Acute deep vein thrombosis (DVT) of femoral vein of left lower extremity (HCC)    Acute left ankle pain             Patient ID: Basim Humphries is a 73 y.o. male.     HPI     The following portions of the patient's history were reviewed and updated as appropriate:      family history includes Arthritis in his maternal grandmother and mother; Diabetes in his father; Heart disease in his father; Lung cancer in his father; Other in his mother; Ovarian cancer in his maternal grandmother.       reports that he has never smoked. He has never used smokeless tobacco.  He reports that he does not currently use alcohol. He reports current drug use. Drug: Marijuana.   Objective:  Patient's shoes and socks removed.   Foot Exam     General  General Appearance: appears stated age and healthy   Orientation: alert and oriented to person, place, and time   Affect: appropriate   Gait: antalgic   Assistance: cane use         Right Foot/Ankle      Inspection and Palpation  Tenderness: metatarsals   Swelling: dorsum   Arch: pes cavus  Hammertoes: fifth toe, fourth toe, third toe and second toe  Hallux limitus: yes  Skin Exam: dry skin;      Neurovascular  Dorsalis pedis: 2+  Posterior tibial: 2+        Left Foot/Ankle       Inspection and Palpation  Swelling: dorsum   Arch: pes cavus  Hammertoes: second toe, fifth toe, fourth toe and third toe  Hallux limitus: yes  Skin Exam: dry skin;      Neurovascular  Dorsalis pedis: 2+  Posterior tibial: 2+        Physical Exam  Vitals and nursing note reviewed.   Constitutional:       Appearance: Normal appearance.   Cardiovascular:      Rate and Rhythm: Normal rate.      Pulses:           Dorsalis pedis pulses are 2+ on the right side and 2+ on the left side.        Posterior tibial pulses are 2+ on the right side and 2+ on the left side.   Musculoskeletal:      Right lower leg: 3+ Pitting Edema present.      Left lower leg: 3+ Pitting Edema present.   Feet:      Right foot:      Skin integrity: Dry skin present.      Left foot:      Skin integrity: Dry skin present.   Skin:     Capillary Refill: Capillary refill takes less than 2 seconds.      Comments: Patient has xerosis of skin.  All nails are dystrophic.  They demonstrate distal mycosis.  Hallux bilateral demonstrates wide incurvated toenail.   Neurological:      Mental Status: He is alert.   Psychiatric:         Mood and Affect: Mood normal.         Behavior: Behavior normal.         Thought Content: Thought content normal.         Judgment: Judgment normal.

## 2024-12-04 ENCOUNTER — APPOINTMENT (EMERGENCY)
Dept: RADIOLOGY | Facility: HOSPITAL | Age: 74
End: 2024-12-04
Payer: MEDICARE

## 2024-12-04 ENCOUNTER — HOSPITAL ENCOUNTER (EMERGENCY)
Facility: HOSPITAL | Age: 74
Discharge: HOME | End: 2024-12-04
Attending: EMERGENCY MEDICINE
Payer: MEDICARE

## 2024-12-04 VITALS
SYSTOLIC BLOOD PRESSURE: 151 MMHG | RESPIRATION RATE: 20 BRPM | DIASTOLIC BLOOD PRESSURE: 77 MMHG | OXYGEN SATURATION: 97 % | HEART RATE: 107 BPM | TEMPERATURE: 98.4 F

## 2024-12-04 DIAGNOSIS — S60.221A CONTUSION OF DORSUM OF RIGHT HAND: ICD-10-CM

## 2024-12-04 DIAGNOSIS — S61.401A AVULSION OF SKIN OF RIGHT HAND, INITIAL ENCOUNTER: ICD-10-CM

## 2024-12-04 PROCEDURE — 99283 EMERGENCY DEPT VISIT LOW MDM: CPT

## 2024-12-04 PROCEDURE — 99284 EMERGENCY DEPT VISIT MOD MDM: CPT | Performed by: EMERGENCY MEDICINE

## 2024-12-04 PROCEDURE — 90471 IMMUNIZATION ADMIN: CPT

## 2024-12-04 PROCEDURE — 90715 TDAP VACCINE 7 YRS/> IM: CPT | Performed by: EMERGENCY MEDICINE

## 2024-12-04 PROCEDURE — 73130 X-RAY EXAM OF HAND: CPT

## 2024-12-04 RX ORDER — GINSENG 100 MG
1 CAPSULE ORAL 2 TIMES DAILY
Qty: 28 G | Refills: 0 | Status: SHIPPED | OUTPATIENT
Start: 2024-12-04 | End: 2024-12-09

## 2024-12-04 RX ORDER — GINSENG 100 MG
1 CAPSULE ORAL ONCE
Status: COMPLETED | OUTPATIENT
Start: 2024-12-04 | End: 2024-12-04

## 2024-12-04 RX ORDER — ACETAMINOPHEN 325 MG/1
650 TABLET ORAL ONCE
Status: COMPLETED | OUTPATIENT
Start: 2024-12-04 | End: 2024-12-04

## 2024-12-04 RX ADMIN — BACITRACIN ZINC 1 SMALL APPLICATION: 500 OINTMENT TOPICAL at 19:44

## 2024-12-04 RX ADMIN — ACETAMINOPHEN 650 MG: 325 TABLET, FILM COATED ORAL at 19:45

## 2024-12-04 RX ADMIN — TETANUS TOXOID, REDUCED DIPHTHERIA TOXOID AND ACELLULAR PERTUSSIS VACCINE, ADSORBED 0.5 ML: 5; 2.5; 8; 8; 2.5 SUSPENSION INTRAMUSCULAR at 19:45
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
## 2024-12-05 NOTE — ED NOTES
Wound cleaned and bacitracin applied. Dressing applied to skin tear on right hand. Pt tolerated well.      Brandy Obregon RN  12/04/24 1957

## 2024-12-09 ENCOUNTER — TELEPHONE (OUTPATIENT)
Age: 74
End: 2024-12-09

## 2024-12-09 ENCOUNTER — OFFICE VISIT (OUTPATIENT)
Dept: OBGYN CLINIC | Facility: CLINIC | Age: 74
End: 2024-12-09
Payer: MEDICARE

## 2024-12-09 VITALS — HEIGHT: 68 IN | BODY MASS INDEX: 29.55 KG/M2 | WEIGHT: 195 LBS

## 2024-12-09 DIAGNOSIS — M17.12 PRIMARY OSTEOARTHRITIS OF LEFT KNEE: ICD-10-CM

## 2024-12-09 DIAGNOSIS — M76.892 TENDINITIS OF LEFT QUADRICEPS TENDON: Primary | ICD-10-CM

## 2024-12-09 PROCEDURE — 20610 DRAIN/INJ JOINT/BURSA W/O US: CPT | Performed by: ORTHOPAEDIC SURGERY

## 2024-12-09 PROCEDURE — 99214 OFFICE O/P EST MOD 30 MIN: CPT | Performed by: ORTHOPAEDIC SURGERY

## 2024-12-09 RX ADMIN — LIDOCAINE HYDROCHLORIDE 3 ML: 10 INJECTION, SOLUTION INFILTRATION; PERINEURAL at 09:45

## 2024-12-09 RX ADMIN — TRIAMCINOLONE ACETONIDE 40 MG: 40 INJECTION, SUSPENSION INTRA-ARTICULAR; INTRAMUSCULAR at 09:45

## 2024-12-09 NOTE — TELEPHONE ENCOUNTER
Caller: Patient     Doctor/Office: Luis     Call regarding :  appt for PT      Call was transferred to: ROWDY Horne
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
## 2024-12-12 ENCOUNTER — EVALUATION (OUTPATIENT)
Dept: PHYSICAL THERAPY | Facility: CLINIC | Age: 74
End: 2024-12-12
Payer: MEDICARE

## 2024-12-12 DIAGNOSIS — M76.892 TENDINITIS OF LEFT QUADRICEPS TENDON: Primary | ICD-10-CM

## 2024-12-12 PROCEDURE — 97161 PT EVAL LOW COMPLEX 20 MIN: CPT | Performed by: PHYSICAL THERAPIST

## 2024-12-12 PROCEDURE — 97110 THERAPEUTIC EXERCISES: CPT | Performed by: PHYSICAL THERAPIST

## 2024-12-12 RX ORDER — LIDOCAINE HYDROCHLORIDE 10 MG/ML
3 INJECTION, SOLUTION INFILTRATION; PERINEURAL
Status: COMPLETED | OUTPATIENT
Start: 2024-12-09 | End: 2024-12-09

## 2024-12-12 RX ORDER — TRIAMCINOLONE ACETONIDE 40 MG/ML
40 INJECTION, SUSPENSION INTRA-ARTICULAR; INTRAMUSCULAR
Status: COMPLETED | OUTPATIENT
Start: 2024-12-09 | End: 2024-12-09

## 2024-12-12 NOTE — PROGRESS NOTES
PT Evaluation     Today's date: 2024  Patient name: Basim Humphries  : 1950  MRN: [id number]  Referring provider: Sriram Smith MD  Dx:   Encounter Diagnosis     ICD-10-CM    1. Tendinitis of left quadriceps tendon  M76.892 Ambulatory Referral to Physical Therapy                     Assessment  Impairments: abnormal or restricted ROM, impaired physical strength, lacks appropriate home exercise program, pain with function and weight-bearing intolerance  Functional limitations: Stairs; Gait tolerance  Symptom irritability: low    Assessment details: Patient is a 74 y.o. male referred to PT by Dr. Smith with a dx of L knee patellar tendonitis.  Patient reports onset of pain complaints occurred approximately 1-2 months ago after walking for exercise.  The pain complaints were reported to be posterior about the L knee and since receiving an injection reports complete resolution of pain. He does however report continued difficulty with stairs and prolonged weight bearing with respect to strength.  No other referral appears necessary at this time.      Understanding of Dx/Px/POC: good     Prognosis: good    Goals  Short Term goals - 4 weeks  1.  Patient will be independent and compliant with a HEP.       Long Term goals - 8 weeks  1.  Patient will report elimination of pain complaints.  2.  Patient will return to all IADLs without restriction.  3.  Patient will return to all recreational activities without restriction.  4.  Patient will negotiate stairs in a step over step pattern with use of 1 handrail.      Plan  Patient would benefit from: skilled physical therapy    Planned therapy interventions: manual therapy, neuromuscular re-education, strengthening, balance/weight bearing training, therapeutic exercise and home exercise program    Frequency: 1x week  Duration in weeks: 4  Plan of Care beginning date: 2024  Plan of Care expiration date: 2025  Treatment plan discussed with:  patient    Subjective Evaluation    History of Present Illness  Date of onset: 2024          Not a recurrent problem   Quality of life: good    Patient Goals  Patient goals for therapy: increased strength and return to sport/leisure activities    Pain  Current pain ratin  At best pain ratin  At worst pain ratin  Pain location: Pain has resolved since injection.  Progression: improved    Treatments  Current treatment: physical therapy    Objective     Active Range of Motion   Left Knee   Flexion: WFL  Extension: -3 degrees     Right Knee   Flexion: WFL  Extension: -5 degrees     Passive Range of Motion   Left Knee   Flexion: WFL  Extension: WFL    Right Knee   Flexion: WFL  Extension: WFL    Strength/Myotome Testing     Left Knee   Flexion: 4+  Extension: 4  Quadriceps contraction: good    Functional Assessment        Comments  Sit to stand - 21 sec           Precautions: None      Manuals                                                                 Neuro Re-Ed             SLS - foam             Soide stepping                                                                              Ther Ex             Bike             Leg press - L             TG - single leg                                                                              Ther Activity                                       Gait Training                                       Modalities

## 2024-12-20 ENCOUNTER — OFFICE VISIT (OUTPATIENT)
Dept: PHYSICAL THERAPY | Facility: CLINIC | Age: 74
End: 2024-12-20
Payer: MEDICARE

## 2024-12-20 DIAGNOSIS — M76.892 TENDINITIS OF LEFT QUADRICEPS TENDON: Primary | ICD-10-CM

## 2024-12-20 PROCEDURE — 97112 NEUROMUSCULAR REEDUCATION: CPT | Performed by: PHYSICAL THERAPIST

## 2024-12-20 PROCEDURE — 97110 THERAPEUTIC EXERCISES: CPT | Performed by: PHYSICAL THERAPIST

## 2024-12-20 NOTE — PROGRESS NOTES
"Daily Note     Today's date: 2024  Patient name: Basim Humphries  : 1950  MRN: [id number]  Referring provider: Sriram Smith MD  Dx:   Encounter Diagnosis     ICD-10-CM    1. Tendinitis of left quadriceps tendon  M76.892                      Subjective: No new complaints      Objective: See treatment diary below      Assessment: Good tolerance throughout session      Plan: Continue per plan of care.      Precautions: None      Manuals             Manual knee flex/ext st [id number]                                                   Neuro Re-Ed             SLS - foam             Side stepping             LSU 8\" x20            squats 3s x20                                                   Ther Ex             Bike 7'            Leg press - B 75# 2x15            TG - single leg                                                                              Ther Activity                                       Gait Training                                       Modalities                                            "

## 2024-12-23 ENCOUNTER — OFFICE VISIT (OUTPATIENT)
Dept: PHYSICAL THERAPY | Facility: CLINIC | Age: 74
End: 2024-12-23
Payer: MEDICARE

## 2024-12-23 DIAGNOSIS — M76.892 TENDINITIS OF LEFT QUADRICEPS TENDON: Primary | ICD-10-CM

## 2024-12-23 PROCEDURE — 97110 THERAPEUTIC EXERCISES: CPT | Performed by: PHYSICAL THERAPIST

## 2024-12-23 NOTE — PROGRESS NOTES
"Daily Note     Today's date: 2024  Patient name: Basim Humphries  : 1950  MRN: [id number]  Referring provider: Sriram Smith MD  Dx:   Encounter Diagnosis     ICD-10-CM    1. Tendinitis of left quadriceps tendon  M76.892                      Subjective: Reports shoveling this weekend without knee pain      Objective: See treatment diary below      Assessment: Tolerated treatment well. Patient demonstrated fatigue post treatment      Plan: Continue per plan of care.      Precautions: None      Manuals            Manual knee flex/ext st JETHRO JETHRO                                                  Neuro Re-Ed             SLS - foam             Side stepping             LSU 8\" x20 8\" 2x10           squats 3s x20                                                   Ther Ex             Bike 7' 10'           Leg press - B 75# 2x15 75# 2x20           TG - single leg  2x20 B                                                                            Ther Activity                                       Gait Training                                       Modalities                                              "

## 2024-12-30 ENCOUNTER — OFFICE VISIT (OUTPATIENT)
Dept: PHYSICAL THERAPY | Facility: CLINIC | Age: 74
End: 2024-12-30
Payer: MEDICARE

## 2024-12-30 DIAGNOSIS — M76.892 TENDINITIS OF LEFT QUADRICEPS TENDON: Primary | ICD-10-CM

## 2024-12-30 PROCEDURE — 97110 THERAPEUTIC EXERCISES: CPT | Performed by: PHYSICAL THERAPIST

## 2024-12-30 PROCEDURE — 97112 NEUROMUSCULAR REEDUCATION: CPT | Performed by: PHYSICAL THERAPIST

## 2024-12-30 NOTE — PROGRESS NOTES
"Daily Note     Today's date: 2024  Patient name: Basim Humphries  : 1950  MRN: [id number]  Referring provider: Sriram Smith MD  Dx:   Encounter Diagnosis     ICD-10-CM    1. Tendinitis of left quadriceps tendon  M76.892                      Subjective: No new complaints.  Does feel L knee is weaker than R.        Objective: See treatment diary below      Assessment: Progressing well with strengthening.      Plan: Continue per plan of care.      Precautions: None      Manuals           Manual knee flex/ext st JETHRO JETHRO                                                  Neuro Re-Ed             SLS - foam             Side stepping             LSU/FSU 8\" x20 8\" 2x10 8\" 2x15          squats 3s x20  3s x20          Ecc lower   4\" 2x10          Sit to stand   2x10          Side stepping   3# x7          Ther Ex             Bike 7' 10' Lv 1 x10          Leg press - B 75# 2x15 75# 2x20 75# 2x20          TG - single leg  2x20 B                                                                            Ther Activity                                       Gait Training                                       Modalities                                                "

## 2025-01-06 ENCOUNTER — OFFICE VISIT (OUTPATIENT)
Dept: PHYSICAL THERAPY | Facility: CLINIC | Age: 75
End: 2025-01-06
Payer: MEDICARE

## 2025-01-06 DIAGNOSIS — M76.892 TENDINITIS OF LEFT QUADRICEPS TENDON: Primary | ICD-10-CM

## 2025-01-06 PROCEDURE — 97110 THERAPEUTIC EXERCISES: CPT | Performed by: PHYSICAL THERAPIST

## 2025-01-06 PROCEDURE — 97112 NEUROMUSCULAR REEDUCATION: CPT | Performed by: PHYSICAL THERAPIST

## 2025-01-06 NOTE — PROGRESS NOTES
"Daily Note     Today's date: 2025  Patient name: Basim Humphries  : 1950  MRN: [id number]  Referring provider: Sriram Smith MD  Dx: No diagnosis found.               Subjective: Reports his low back had been giving him difficulty over the weekend but no pain today.  It did prevent him from performing HEP.        Objective: See treatment diary below      Assessment: Progressing well with strengthening.  Struggled with SLS on foam.      Plan: Continue per plan of care.      Precautions: None      Manuals           Manual knee flex/ext st JETHRO JETHRO JETHRO                                                 Neuro Re-Ed             SLS - foam             Side stepping             LSU/FSU 8\" x20 8\" 2x10 8\" 2x15          squats 3s x20  3s x20          Ecc lower   4\" 2x10          Sit to stand   2x10          Side stepping   3# x7          Ther Ex             Bike 7' 10' Lv 1 x10          Leg press - B 75# 2x15 75# 2x20 75# 2x20          TG - single leg  2x20 B 2x20 B                                                                           Ther Activity                                       Gait Training                                       Modalities                                                  "

## 2025-01-13 ENCOUNTER — OFFICE VISIT (OUTPATIENT)
Age: 75
End: 2025-01-13
Payer: MEDICARE

## 2025-01-13 ENCOUNTER — APPOINTMENT (OUTPATIENT)
Dept: PHYSICAL THERAPY | Facility: CLINIC | Age: 75
End: 2025-01-13
Payer: MEDICARE

## 2025-01-13 VITALS — HEART RATE: 80 BPM | HEIGHT: 68 IN | WEIGHT: 195 LBS | RESPIRATION RATE: 20 BRPM | BODY MASS INDEX: 29.55 KG/M2

## 2025-01-13 DIAGNOSIS — I70.209 PERIPHERAL ARTERIOSCLEROSIS (HCC): ICD-10-CM

## 2025-01-13 DIAGNOSIS — L60.0 INGROWN TOENAIL: ICD-10-CM

## 2025-01-13 DIAGNOSIS — M79.672 PAIN IN BOTH FEET: Primary | ICD-10-CM

## 2025-01-13 DIAGNOSIS — M79.671 PAIN IN BOTH FEET: Primary | ICD-10-CM

## 2025-01-13 DIAGNOSIS — M20.42 HAMMER TOES OF BOTH FEET: ICD-10-CM

## 2025-01-13 DIAGNOSIS — B35.1 ONYCHOMYCOSIS: ICD-10-CM

## 2025-01-13 DIAGNOSIS — M54.16 RADICULOPATHY OF LUMBAR REGION: ICD-10-CM

## 2025-01-13 DIAGNOSIS — M20.41 HAMMER TOES OF BOTH FEET: ICD-10-CM

## 2025-01-13 DIAGNOSIS — R60.9 CHRONIC EDEMA: ICD-10-CM

## 2025-01-13 PROCEDURE — 99213 OFFICE O/P EST LOW 20 MIN: CPT | Performed by: PODIATRIST

## 2025-01-13 NOTE — PROGRESS NOTES
Assessment/Plan: Pain upon ambulation secondary to hammertoe formation.  Mycosis of nail.  Hallux nail bilateral with ingrown toenail.  Acquired deformity of foot.  Mild peripheral artery disease.  Chronic edema.     Plan.  Chart reviewed.  Patient evaluated.  Primary care notes reviewed.  Patient advised on condition.  At this time patient's been given instruction for proper shoe gear.  He will elevate feet at end of day.  Nail cutting technique recommended.  Watch for signs of infection.  Aftercare instruction given.  Return for follow-up.     Patient has chronic ingrown toenail, right hallux.  We will consider nail matrixectomy in the future..     Patient is still having pain from hammertoes of the left foot.  Most pacifically second left toe.  He is considering surgical intervention.  We would consider flexor tenotomy performed in office.            Diagnoses and all orders for this visit:     Pain in both feet     Peripheral arteriosclerosis     Onychomycosis     Ingrown toenail     Hammer toes of both feet     Chronic edema     Radiculopathy            Subjective: Patient has pain in his feet.  He has painful toes.  He has hammertoes.  No history of pedal trauma.  He also has swollen legs.  Patient now is getting numbness in his toes.  He gets it on the top of his feet.  He gets this mostly at night.  Patient has chronic low back pain.               Allergies   Allergen Reactions    Cefpodoxime Other (See Comments)       Made heart race was given when he had covid pneumonia    Demerol [Meperidine] Itching    Codeine GI Intolerance    Diclofenac Sodium GI Intolerance    Erythromycin Other (See Comments)       Making ears ring    Meloxicam GI Intolerance    Oxaprozin GI Intolerance    Penicillins Hives and Itching            Current Outpatient Medications:     apixaban (ELIQUIS) 5 mg, Take 2 tablets (10 mg total) by mouth 2 (two) times a day for 6 days, THEN 1 tablet (5 mg total) 2 (two) times a day., Disp: 84  tablet, Rfl: 0    lansoprazole (PREVACID) 30 mg capsule, Take 30 mg by mouth every morning  , Disp: , Rfl:     multivitamin (THERAGRAN) TABS, Take 1 tablet by mouth every morning, Disp: , Rfl:     oxyCODONE (ROXICODONE) 5 immediate release tablet, Take 1 tablet (5 mg total) by mouth 2 (two) times a day as needed for severe pain Max Daily Amount: 10 mg, Disp: 15 tablet, Rfl: 0           Patient Active Problem List   Diagnosis    Brady's esophagus with dysplasia    History of colon polyps    Elevated LFTs    Rotator cuff tear arthropathy of left shoulder    Rotator cuff tear arthropathy of right shoulder    Sensorineural hearing loss (SNHL), bilateral    Gastroesophageal reflux disease without esophagitis    Aftercare following right shoulder joint replacement surgery    Post-traumatic osteoarthritis of first carpometacarpal joint of right hand    Muscle strain of right shoulder    Acute pain of right shoulder    Hx of total shoulder replacement, right    Ambulatory dysfunction    Fall from standing    Traumatic rupture of quadriceps tendon, right, initial encounter    Acute pain    Contusion of left leg, initial encounter    Thrombocytopenia (HCC)    Melanoma (HCC)    Impaired fasting glucose    Nelly-colored urine    Cellulitis    Acute deep vein thrombosis (DVT) of femoral vein of left lower extremity (HCC)    Acute left ankle pain             Patient ID: Basim Humphries is a 74 y.o. male.     HPI     The following portions of the patient's history were reviewed and updated as appropriate:      family history includes Arthritis in his maternal grandmother and mother; Diabetes in his father; Heart disease in his father; Lung cancer in his father; Other in his mother; Ovarian cancer in his maternal grandmother.       reports that he has never smoked. He has never used smokeless tobacco. He reports that he does not currently use alcohol. He reports current drug use. Drug: Marijuana.   Objective:  Patient's shoes  and socks removed.   Foot Exam     General  General Appearance: appears stated age and healthy   Orientation: alert and oriented to person, place, and time   Affect: appropriate   Gait: antalgic   Assistance: cane use         Right Foot/Ankle      Inspection and Palpation  Tenderness: metatarsals   Swelling: dorsum   Arch: pes cavus  Hammertoes: fifth toe, fourth toe, third toe and second toe  Hallux limitus: yes  Skin Exam: dry skin;      Neurovascular  Dorsalis pedis: 2+  Posterior tibial: 2+        Left Foot/Ankle       Inspection and Palpation  Swelling: dorsum   Arch: pes cavus  Hammertoes: second toe, fifth toe, fourth toe and third toe  Hallux limitus: yes  Skin Exam: dry skin;      Neurovascular  Dorsalis pedis: 2+  Posterior tibial: 2+        Physical Exam  Vitals and nursing note reviewed.   Constitutional:       Appearance: Normal appearance.   Cardiovascular:      Rate and Rhythm: Normal rate.      Pulses:           Dorsalis pedis pulses are 2+ on the right side and 2+ on the left side.        Posterior tibial pulses are 2+ on the right side and 2+ on the left side.   Musculoskeletal:      Right lower leg: 3+ Pitting Edema present.      Left lower leg: 3+ Pitting Edema present.   Feet:      Right foot:      Skin integrity: Dry skin present.      Left foot:      Skin integrity: Dry skin present.   Skin:     Capillary Refill: Capillary refill takes less than 2 seconds.      Comments: Patient has xerosis of skin.  All nails are dystrophic.  They demonstrate distal mycosis.  Hallux bilateral demonstrates wide incurvated toenail.   Neurological:      Mental Status: He is alert.   Psychiatric:         Mood and Affect: Mood normal.         Behavior: Behavior normal.         Thought Content: Thought content normal.         Judgment: Judgment normal.

## 2025-01-16 ENCOUNTER — APPOINTMENT (OUTPATIENT)
Dept: PHYSICAL THERAPY | Facility: CLINIC | Age: 75
End: 2025-01-16
Payer: MEDICARE

## 2025-01-17 ENCOUNTER — OFFICE VISIT (OUTPATIENT)
Dept: PHYSICAL THERAPY | Facility: CLINIC | Age: 75
End: 2025-01-17
Payer: MEDICARE

## 2025-01-17 DIAGNOSIS — M76.892 TENDINITIS OF LEFT QUADRICEPS TENDON: Primary | ICD-10-CM

## 2025-01-17 PROCEDURE — 97112 NEUROMUSCULAR REEDUCATION: CPT | Performed by: PHYSICAL THERAPIST

## 2025-01-17 PROCEDURE — 97110 THERAPEUTIC EXERCISES: CPT | Performed by: PHYSICAL THERAPIST

## 2025-01-17 NOTE — PROGRESS NOTES
"Daily Note     Today's date: 2025  Patient name: Basim Humphries  : 1950  MRN: [id number]  Referring provider: Sriram Smith MD  Dx:   Encounter Diagnosis     ICD-10-CM    1. Tendinitis of left quadriceps tendon  M76.892                      Subjective: Feels he is ready for discharge       Objective: See treatment diary below      Assessment: All formal PT goals have been achieved.  Patient to continue with HEP.        Plan: D/C to HEP     Precautions: None      Manuals           Manual knee flex/ext st JETHRO JETHRO JETHRO                                                 Neuro Re-Ed             SLS - foam             Side stepping             LSU/FSU 8\" x20 8\" 2x10 8\" 2x15          squats 3s x20  3s x20          Ecc lower   4\" 2x10          Sit to stand   2x10          Side stepping   3# x7          Ther Ex             Bike 7' 10' Lv 1 x10          Leg press - B 75# 2x15 75# 2x20 75# 2x20          TG - single leg  2x20 B 2x20 B                                                                           Ther Activity                                       Gait Training                                       Modalities                                                    "

## 2025-01-20 ENCOUNTER — APPOINTMENT (OUTPATIENT)
Dept: PHYSICAL THERAPY | Facility: CLINIC | Age: 75
End: 2025-01-20
Payer: MEDICARE

## 2025-01-27 ENCOUNTER — APPOINTMENT (OUTPATIENT)
Dept: PHYSICAL THERAPY | Facility: CLINIC | Age: 75
End: 2025-01-27
Payer: MEDICARE

## 2025-01-28 ENCOUNTER — OFFICE VISIT (OUTPATIENT)
Dept: OTOLARYNGOLOGY | Facility: CLINIC | Age: 75
End: 2025-01-28
Payer: MEDICARE

## 2025-01-28 VITALS — HEIGHT: 68 IN | BODY MASS INDEX: 29.86 KG/M2 | WEIGHT: 197 LBS | TEMPERATURE: 98.5 F

## 2025-01-28 DIAGNOSIS — H61.23 BILATERAL IMPACTED CERUMEN: ICD-10-CM

## 2025-01-28 DIAGNOSIS — H90.3 SENSORINEURAL HEARING LOSS (SNHL), BILATERAL: Primary | ICD-10-CM

## 2025-01-28 PROCEDURE — 69210 REMOVE IMPACTED EAR WAX UNI: CPT | Performed by: NURSE PRACTITIONER

## 2025-01-28 PROCEDURE — 99213 OFFICE O/P EST LOW 20 MIN: CPT | Performed by: NURSE PRACTITIONER

## 2025-01-28 NOTE — PROGRESS NOTES
Assessment/Plan:      Diagnoses and all orders for this visit:    Sensorineural hearing loss (SNHL), bilateral    Bilateral impacted cerumen          On exam noted bilateral cerumen impaction and unable to fully view tympanic membrane.  Cerumen impaction removed bilateral eac with suction, pt tolerated procedure well.  Upon removal, improved hearing and decreased clogged sensation of bilateral ears.  Discussed routine cerumen care including avoidance of q-tips and cerumen softeners. Hydrocortisone cream to both ears at bedtime for 30 days then as needed for itching.  Debrox (cleaning drops) ear drops - put drops in ears after hair and ear trimming.   Encourage ongoing follow up in 4 to 6 months to monitor for cerumen and hearing.  Audiogram if symptoms worsen.  Continue binaural hearing aids    Subjective:     Patient ID: Basim Humphries is a 74 y.o. male.    Presents today as a follow up due to hearing loss.  Hearing aid facility in [facility].  Difficulty hearing TV at times.  Certain sounds difficulty hearing.  During routine audiology care they did remove some cerumen.  Both ears itch often.      Since last visit, obtained new hearing aids.  Feels hearing is improved.  Fall 10/2023 with injuries to knees.              Review of Systems   Constitutional: Negative.    HENT:  Positive for hearing loss. Negative for congestion, ear discharge, ear pain, nosebleeds, postnasal drip, rhinorrhea, sinus pressure, sinus pain, sore throat, tinnitus and voice change.    Respiratory:  Negative for chest tightness and shortness of breath.    Skin:  Negative for color change.   Neurological:  Negative for dizziness, numbness and headaches.   Psychiatric/Behavioral: Negative.           Objective:     Physical Exam  Constitutional:       Appearance: He is well-developed.   HENT:      Head: Normocephalic.      Right Ear: Hearing, tympanic membrane, ear canal and external ear normal. No decreased hearing noted. No  drainage or tenderness. There is impacted cerumen. Tympanic membrane is not perforated or erythematous.      Left Ear: Hearing, tympanic membrane, ear canal and external ear normal. No decreased hearing noted. No drainage or tenderness. There is impacted cerumen. Tympanic membrane is not perforated or erythematous.      Nose: Nose normal. No nasal deformity or septal deviation.      Mouth/Throat:      Mouth: Mucous membranes are not pale and not dry. No oral lesions.      Dentition: Normal dentition.      Pharynx: Uvula midline. No oropharyngeal exudate.   Neck:      Trachea: No tracheal deviation.   Pulmonary:      Effort: No accessory muscle usage or respiratory distress.   Musculoskeletal:      Cervical back: Neck supple.   Lymphadenopathy:      Cervical: No cervical adenopathy.   Skin:     General: Skin is warm and dry.   Neurological:      Mental Status: He is alert and oriented to person, place, and time.      Cranial Nerves: No cranial nerve deficit.      Sensory: No sensory deficit.   Psychiatric:         Behavior: Behavior is cooperative.         Ear cerumen removal    Date/Time: 1/28/2025 1:00 PM    Performed by: CHANEL Alvarado  Authorized by: CHANEL Alvarado  Universal Protocol:  procedure performed by consultantConsent: Verbal consent obtained.  Risks and benefits: risks, benefits and alternatives were discussed  Consent given by: patient  Patient understanding: patient states understanding of the procedure being performed    Patient location:  Clinic  Procedure details:     Local anesthetic:  None    Location:  L ear and R ear    Approach:  External  Post-procedure details:     Complication:  None    Hearing quality:  Normal    Patient tolerance of procedure:  Tolerated well, no immediate complications

## 2025-02-05 ENCOUNTER — TELEPHONE (OUTPATIENT)
Age: 75
End: 2025-02-05

## 2025-02-05 NOTE — TELEPHONE ENCOUNTER
Called patient to inform him dr shelton has nothing sooner for his P&A , He stated his toe is just sore , there is no drainage , no puss ,   no redness , he will call back if anything changes

## 2025-02-05 NOTE — TELEPHONE ENCOUNTER
Caller: Jose    Doctor and/or Office: Dr. Wright     #: [phone]     Escalation: Appointment Patient called in and asked if there is anyway he can get in any sooner for the p&a that his toe is in a lot of pain.  Thank you

## 2025-02-20 ENCOUNTER — PROCEDURE VISIT (OUTPATIENT)
Age: 75
End: 2025-02-20
Payer: MEDICARE

## 2025-02-20 ENCOUNTER — TELEPHONE (OUTPATIENT)
Age: 75
End: 2025-02-20

## 2025-02-20 VITALS — BODY MASS INDEX: 29.86 KG/M2 | HEIGHT: 68 IN | RESPIRATION RATE: 20 BRPM | WEIGHT: 197 LBS

## 2025-02-20 DIAGNOSIS — M79.671 RIGHT FOOT PAIN: ICD-10-CM

## 2025-02-20 DIAGNOSIS — L03.031 PARONYCHIA OF TOENAIL OF RIGHT FOOT: ICD-10-CM

## 2025-02-20 DIAGNOSIS — L60.0 INGROWN TOENAIL: Primary | ICD-10-CM

## 2025-02-20 PROCEDURE — RECHECK: Performed by: PODIATRIST

## 2025-02-20 PROCEDURE — 11750 EXCISION NAIL&NAIL MATRIX: CPT | Performed by: PODIATRIST

## 2025-02-20 NOTE — TELEPHONE ENCOUNTER
Called patient to r/s him the appointment I offered him he said he couldn't wait that long so he will risk it in coming out today for his appointment

## 2025-02-20 NOTE — PROGRESS NOTES
"Nail removal    Date/Time: 2/20/2025 4:45 PM    Performed by: Edwin Wright DPM  Authorized by: Edwin Wright DPM    Patient location:  ClinicUniversal Protocol:  procedure performed by consultantConsent: Verbal consent obtained. Written consent not obtained.  Risks and benefits: risks, benefits and alternatives were discussed  Consent given by: patient  Time out: Immediately prior to procedure a \"time out\" was called to verify the correct patient, procedure, equipment, support staff and site/side marked as required.  Timeout called at: 2/20/2025 4:47 PM.  Patient understanding: patient states understanding of the procedure being performed  Patient identity confirmed: verbally with patient    Location:     Foot:  R big toe  Pre-procedure details:     Skin preparation:  Betadine    Preparation: Patient was prepped and draped in the usual sterile fashion    Nail Removal:     Nail removed:  Partial    Nail side:  Medial    Nail bed sutured: no    Ingrown nail:     Wedge excision of skin: no      Nail matrix removed or ablated:  Partial (Nail matrixectomy performed via application of phenol.  3, 32nd applications of phenol applied to the tibial aspect nail matrix)  Post-procedure details:     Dressing:  4x4 sterile gauze, antibiotic ointment and gauze roll    Patient tolerance of procedure:  Tolerated well, no immediate complications     Foot Exam     General  General Appearance: appears stated age and healthy   Orientation: alert and oriented to person, place, and time   Affect: appropriate   Gait: antalgic   Assistance: cane use         Right Foot/Ankle      Inspection and Palpation  Tenderness: metatarsals   Swelling: dorsum   Arch: pes cavus  Hammertoes: fifth toe, fourth toe, third toe and second toe  Hallux limitus: yes  Skin Exam: dry skin;      Neurovascular  Dorsalis pedis: 2+  Posterior tibial: 2+        Left Foot/Ankle       Inspection and Palpation  Swelling: dorsum   Arch: pes cavus  Hammertoes: second " toe, fifth toe, fourth toe and third toe  Hallux limitus: yes  Skin Exam: dry skin;      Neurovascular  Dorsalis pedis: 2+  Posterior tibial: 2+        Physical Exam  Vitals and nursing note reviewed.   Constitutional:       Appearance: Normal appearance.   Cardiovascular:      Rate and Rhythm: Normal rate.      Pulses:           Dorsalis pedis pulses are 2+ on the right side and 2+ on the left side.        Posterior tibial pulses are 2+ on the right side and 2+ on the left side.   Musculoskeletal:      Right lower leg: 3+ Pitting Edema present.      Left lower leg: 3+ Pitting Edema present.   Feet:      Right foot:      Skin integrity: Dry skin present.      Left foot:      Skin integrity: Dry skin present.   Skin:     Capillary Refill: Capillary refill takes less than 2 seconds.      Comments: Patient has xerosis of skin.  All nails are dystrophic.  They demonstrate distal mycosis.  Hallux bilateral demonstrates wide incurvated toenail.  Right hallux demonstrates paronychia of the tibial nail groove.  Positive ingrown toenail.  Neurological:      Mental Status: He is alert.   Psychiatric:         Mood and Affect: Mood normal.         Behavior: Behavior normal.         Thought Content: Thought content normal.         Judgment: Judgment normal.

## 2025-02-20 NOTE — TELEPHONE ENCOUNTER
Caller: mary Humphries    Doctor: Dr. Wright    Reason for call: Mary cannot make it out due to the weather.  He was supposed to have office surgery on his great right toe.  Can we squeeze him in to do that?  Thank you.    Call back#: [phone]

## 2025-02-26 ENCOUNTER — TELEPHONE (OUTPATIENT)
Age: 75
End: 2025-02-26

## 2025-02-26 DIAGNOSIS — L03.031 PARONYCHIA OF TOENAIL OF RIGHT FOOT: Primary | ICD-10-CM

## 2025-02-26 RX ORDER — SULFAMETHOXAZOLE AND TRIMETHOPRIM 800; 160 MG/1; MG/1
1 TABLET ORAL EVERY 12 HOURS SCHEDULED
Qty: 20 TABLET | Refills: 0 | Status: SHIPPED | OUTPATIENT
Start: 2025-02-26 | End: 2025-03-08

## 2025-02-26 NOTE — TELEPHONE ENCOUNTER
Spoke with patient  , he stated he will take a picture and send it with in my chart as well once we get the image we will call him back , patient is also aware he is to apply betadine and a bandage , dr shelton did send in medication as well for the patient

## 2025-02-26 NOTE — TELEPHONE ENCOUNTER
Spoke with patient As advised, start antibiotic as soon as possible.  Betadine and a Band-Aid 2 times daily.

## 2025-02-26 NOTE — PROGRESS NOTES
Patient with paronychia of toe.  He will be placed on antibiotic.  He will cover daily with Betadine and a Band-Aid.  Present for office visit.

## 2025-02-26 NOTE — TELEPHONE ENCOUNTER
Caller: Jose Humphries    Doctor and/or Office: Dr. Adriana MOLINA#: [phone]    Escalation: Last office visit Patient thinks his toe is infected since his in office procedure. Its red and sore to the touch. Please return call. Thank you

## 2025-03-07 ENCOUNTER — TELEPHONE (OUTPATIENT)
Age: 75
End: 2025-03-07

## 2025-03-07 ENCOUNTER — OFFICE VISIT (OUTPATIENT)
Age: 75
End: 2025-03-07
Payer: MEDICARE

## 2025-03-07 VITALS — WEIGHT: 197 LBS | HEIGHT: 68 IN | BODY MASS INDEX: 29.86 KG/M2 | RESPIRATION RATE: 20 BRPM

## 2025-03-07 DIAGNOSIS — S91.109A OPEN WOUND OF TOE, INITIAL ENCOUNTER: ICD-10-CM

## 2025-03-07 DIAGNOSIS — L03.031 CELLULITIS OF TOE, RIGHT: Primary | ICD-10-CM

## 2025-03-07 PROCEDURE — 87070 CULTURE OTHR SPECIMN AEROBIC: CPT | Performed by: PODIATRIST

## 2025-03-07 PROCEDURE — 87186 SC STD MICRODIL/AGAR DIL: CPT | Performed by: PODIATRIST

## 2025-03-07 PROCEDURE — 87205 SMEAR GRAM STAIN: CPT | Performed by: PODIATRIST

## 2025-03-07 PROCEDURE — 87077 CULTURE AEROBIC IDENTIFY: CPT | Performed by: PODIATRIST

## 2025-03-07 PROCEDURE — 99212 OFFICE O/P EST SF 10 MIN: CPT | Performed by: PODIATRIST

## 2025-03-07 RX ORDER — FLUTICASONE PROPIONATE 50 MCG
2 SPRAY, SUSPENSION (ML) NASAL DAILY
COMMUNITY
Start: 2025-03-03

## 2025-03-07 RX ORDER — CLINDAMYCIN HYDROCHLORIDE 300 MG/1
300 CAPSULE ORAL 4 TIMES DAILY
Qty: 40 CAPSULE | Refills: 0 | Status: SHIPPED | OUTPATIENT
Start: 2025-03-07 | End: 2025-03-17

## 2025-03-07 NOTE — TELEPHONE ENCOUNTER
Caller: Jose Humphries    Doctor and/or Office: Dr. Wright/Ashli    #: [phone]    Escalation: Last office visit Patient is experiencing pain since his in office procedure on 2-20. Sometimes its 10 out of 10 for pain, other times no pain at all. Is this normal? Please call and advise. Thank you

## 2025-03-07 NOTE — TELEPHONE ENCOUNTER
Called ptDavid NICOLE seeing if he can come in for 2:00 today. I did put him on the schedule for it and asked if he can not make to give us a call and let us know so we can see what else we can do.

## 2025-03-07 NOTE — TELEPHONE ENCOUNTER
Caller: Jose Humphries through transfer from PCP office    Doctor: Dr. Wright    Reason for call: We LM about appt for today and he called PCP to answer/transferred to me and he will attend appt today at 2 pm    Call back#: NA

## 2025-03-07 NOTE — TELEPHONE ENCOUNTER
Caller: Jose Humphries    Doctor: Dr. Wright    Reason for call: appt/called PCP to confirm appt w/ [facility]/Podiatry so they transferred call to me and I attached to LM they left, so had to do this to close my message.    Call back#: NA

## 2025-03-07 NOTE — PROGRESS NOTES
Assessment/Plan: Cellulitis right hallux.  Pain.    Plan.  Chart reviewed.  PCP notes reviewed.  Patient evaluated.  Culture and sensitivity of wound done.  Gentian violet dry sterile dressing applied.  Patient be started on oral clindamycin.  He will bandage daily.  Return for follow-up.  Watch for signs of increasing infection.  Aftercare instruction given.         Diagnoses and all orders for this visit:    Cellulitis of toe, right  -     clindamycin (CLEOCIN) 300 MG capsule; Take 1 capsule (300 mg total) by mouth 4 (four) times a day for 10 days    Open wound of toe, initial encounter    Other orders  -     fluticasone (FLONASE) 50 mcg/act nasal spray; 2 sprays into each nostril daily          Subjective: Patient has pain of his right big toe.  No history of trauma.  No history of fever or night sweats.    Allergies   Allergen Reactions    Cefpodoxime Other (See Comments)     Made heart race was given when he had covid pneumonia    Demerol [Meperidine] Itching    Codeine GI Intolerance    Diclofenac Sodium GI Intolerance    Erythromycin Other (See Comments)     Making ears ring    Meloxicam GI Intolerance    Oxaprozin GI Intolerance    Penicillins Hives and Itching         Current Outpatient Medications:     clindamycin (CLEOCIN) 300 MG capsule, Take 1 capsule (300 mg total) by mouth 4 (four) times a day for 10 days, Disp: 40 capsule, Rfl: 0    fluticasone (FLONASE) 50 mcg/act nasal spray, 2 sprays into each nostril daily, Disp: , Rfl:     lansoprazole (PREVACID) 30 mg capsule, Take 30 mg by mouth every morning  , Disp: , Rfl:     sulfamethoxazole-trimethoprim (BACTRIM DS) 800-160 mg per tablet, Take 1 tablet by mouth every 12 (twelve) hours for 10 days, Disp: 20 tablet, Rfl: 0    bacitracin topical ointment 500 units/g topical ointment, Apply 1 large application topically 2 (two) times a day for 5 days, Disp: 28 g, Rfl: 0    gabapentin (NEURONTIN) 100 mg capsule, Take 1 capsule (100 mg total) by mouth daily at  bedtime (Patient not taking: Reported on 8/1/2024), Disp: 30 capsule, Rfl: 1    multivitamin (THERAGRAN) TABS, Take 1 tablet by mouth every morning, Disp: , Rfl:     oxyCODONE (ROXICODONE) 5 immediate release tablet, Take 1 tablet (5 mg total) by mouth 2 (two) times a day as needed for severe pain Max Daily Amount: 10 mg (Patient not taking: Reported on 5/26/2024), Disp: 15 tablet, Rfl: 0    Patient Active Problem List   Diagnosis    Brady's esophagus with dysplasia    History of colon polyps    Elevated LFTs    Rotator cuff tear arthropathy of left shoulder    Rotator cuff tear arthropathy of right shoulder    Sensorineural hearing loss (SNHL), bilateral    Gastroesophageal reflux disease without esophagitis    Aftercare following right shoulder joint replacement surgery    Post-traumatic osteoarthritis of first carpometacarpal joint of right hand    Muscle strain of right shoulder    Acute pain of right shoulder    Hx of total shoulder replacement, right    Ambulatory dysfunction    Fall from standing    Traumatic rupture of quadriceps tendon, right, initial encounter    Acute pain    Contusion of left leg, initial encounter    Thrombocytopenia (HCC)    Melanoma (HCC)    Impaired fasting glucose    Nelly-colored urine    Cellulitis    Acute deep vein thrombosis (DVT) of femoral vein of left lower extremity (HCC)    Acute left ankle pain    Squamous cell carcinoma of forehead          Patient ID: Basim Humphries is a 74 y.o. male.    HPI    The following portions of the patient's history were reviewed and updated as appropriate:     family history includes Arthritis in his maternal grandmother and mother; Diabetes in his father; Heart disease in his father; Lung cancer in his father; Other in his mother; Ovarian cancer in his maternal grandmother.      reports that he has never smoked. He has never used smokeless tobacco. He reports that he does not currently use alcohol. He reports current drug use. Drug:  Marijuana.    Vitals:    03/07/25 1356   Resp: 20       Review of Systems      Objective:  Patient's shoes and socks removed.   Foot ExamPhysical Exam  Vitals and nursing note reviewed.   Constitutional:       Appearance: Normal appearance.   Cardiovascular:      Rate and Rhythm: Normal rate and regular rhythm.   Skin:     Capillary Refill: Capillary refill takes less than 2 seconds.      Comments: Patient demonstrates ulcerated nail groove, tibial.  Mild surrounding erythema.  Negative pus or abscess   Neurological:      Mental Status: He is alert.   Psychiatric:         Mood and Affect: Mood normal.         Behavior: Behavior normal.         Thought Content: Thought content normal.         Judgment: Judgment normal.

## 2025-03-09 LAB
BACTERIA WND AEROBE CULT: ABNORMAL
GRAM STN SPEC: ABNORMAL

## 2025-03-10 ENCOUNTER — OFFICE VISIT (OUTPATIENT)
Dept: DERMATOLOGY | Facility: CLINIC | Age: 75
End: 2025-03-10
Payer: MEDICARE

## 2025-03-10 VITALS — WEIGHT: 205 LBS | TEMPERATURE: 97.7 F | BODY MASS INDEX: 31.17 KG/M2

## 2025-03-10 DIAGNOSIS — Z85.820 HISTORY OF MELANOMA: ICD-10-CM

## 2025-03-10 DIAGNOSIS — D22.9 MULTIPLE MELANOCYTIC NEVI: ICD-10-CM

## 2025-03-10 DIAGNOSIS — L81.4 SOLAR LENTIGO: ICD-10-CM

## 2025-03-10 DIAGNOSIS — Z85.828 HISTORY OF BASAL CELL CARCINOMA (BCC): ICD-10-CM

## 2025-03-10 DIAGNOSIS — L57.0 KERATOSIS, ACTINIC: ICD-10-CM

## 2025-03-10 DIAGNOSIS — L82.1 SEBORRHEIC KERATOSES: ICD-10-CM

## 2025-03-10 DIAGNOSIS — Z85.89 HISTORY OF SQUAMOUS CELL CARCINOMA: ICD-10-CM

## 2025-03-10 DIAGNOSIS — D18.01 CHERRY ANGIOMA: Primary | ICD-10-CM

## 2025-03-10 DIAGNOSIS — D48.9 NEOPLASM OF UNCERTAIN BEHAVIOR: ICD-10-CM

## 2025-03-10 PROCEDURE — 99214 OFFICE O/P EST MOD 30 MIN: CPT | Performed by: NURSE PRACTITIONER

## 2025-03-10 PROCEDURE — 88342 IMHCHEM/IMCYTCHM 1ST ANTB: CPT | Performed by: STUDENT IN AN ORGANIZED HEALTH CARE EDUCATION/TRAINING PROGRAM

## 2025-03-10 PROCEDURE — 88341 IMHCHEM/IMCYTCHM EA ADD ANTB: CPT | Performed by: STUDENT IN AN ORGANIZED HEALTH CARE EDUCATION/TRAINING PROGRAM

## 2025-03-10 PROCEDURE — 11102 TANGNTL BX SKIN SINGLE LES: CPT | Performed by: NURSE PRACTITIONER

## 2025-03-10 PROCEDURE — 17000 DESTRUCT PREMALG LESION: CPT | Performed by: NURSE PRACTITIONER

## 2025-03-10 PROCEDURE — 11103 TANGNTL BX SKIN EA SEP/ADDL: CPT | Performed by: NURSE PRACTITIONER

## 2025-03-10 PROCEDURE — 88305 TISSUE EXAM BY PATHOLOGIST: CPT | Performed by: STUDENT IN AN ORGANIZED HEALTH CARE EDUCATION/TRAINING PROGRAM

## 2025-03-10 NOTE — PATIENT INSTRUCTIONS
HISTORY OF SQUAMOUS CELL CARCINOMA IN SITU     Physical Exam:  Anatomic Location Affected:  Left inferior medial forehead  Morphological Description of Scar:  well healed  Suspected Recurrence: no  Regional adenopathy: no    Additional History of Present Condition:  Patient presents for full body skin exam. Patient has a history of SCCIS. Patient was referred to radiation oncology who re biopsied the site and pathology came back [place] .    Assessment and Plan:  Based on a thorough discussion of this condition and the management approach to it (including a comprehensive discussion of the known risks, side effects and potential benefits of treatment), the patient (family) agrees to implement the following specific plan:  Skin Exams every 3 month     How can SCC be prevented?  The most important way to prevent SCC is to avoid sunburn. This is especially important in childhood and early life. Fair skinned individuals and those with a personal or family history of BCC should protect their skin from sun exposure daily, year-round and lifelong.  Stay indoors or under the shade in the middle of the day   Wear covering clothing   Apply high protection factor SPF50+ broad-spectrum sunscreens generously to exposed skin if outdoors   Avoid indoor tanning (sun beds, solaria)      What is the outlook for SCC?  Most SCC are cured by treatment. Cure is most likely if treatment is undertaken when the lesion is small. A small percent of SCC's can spread to lymph  nodes and long term monitoring is indicated.   They are also at increased risk of other skin cancers, especially melanoma. Regular self-skin examinations and long-term annual skin checks by an experienced health professional are recommended.     HISTORY OF BASAL CELL CARCINOMA    How can basal cell carcinoma be prevented?  The most important way to prevent BCC is to avoid sunburn. This is especially important in childhood and early life. Fair skinned individuals and those  "with a personal or family history of BCC should protect their skin from sun exposure daily, year-round and lifelong.  Stay indoors or under the shade in the middle of the day   Wear covering clothing   Apply high protection factor SPF50+ broad-spectrum sunscreens generously to exposed skin if outdoors   Avoid indoor tanning (sun beds, solaria)  Oral nicotinamide (vitamin B3) in a dose of 500 mg twice daily may reduce the number and severity of BCCs.    What is the outlook for basal cell carcinoma?  Most BCCs are cured by treatment. Cure is most likely if treatment is undertaken when the lesion is small.  About 50% of people with BCC develop a second one within 3 years of the first. They are also at increased risk of other skin cancers, especially melanoma. Regular self-skin examinations and long-term annual skin checks by an experienced health professional are recommended.     HISTORY OF MELANOMA        What happens at follow-up?  The main purpose of follow-up is to detect recurrences early (metastatic melanoma), but it also offers an opportunity to diagnose a new primary melanoma at the first possible opportunity. A second invasive melanoma occurs in 5-10% of melanoma patients and a new melanoma in situ is diagnosed in more than 20% of melanoma patients.    Our practice makes the following recommendations for follow-up for patients with invasive melanoma.  At-least \"monthly\" self-skin examinations   Routine skin checks by a board certified dermatologist  Follow-up intervals are \"every 3 months\" within 2 years of a new melanoma diagnosis; \"every 6 months\" between 2-4 years of a new melanoma diagnosis; and \"annually\" after 4 years of a new melanoma diagnosis  Individual patient's needs should be considered before an appropriate follow-up is offered  Provide education and support to help the patient adjust to their illness    Follow-up appointments should include:  A check of the scar where the primary melanoma was " "removed  Checking the regional lymph nodes  A general skin examination  A full physical examination at least annually by your primary care physician    In those with more advanced primary disease, follow-up may include:  Blood tests  Imaging: ultrasound, X-ray, CT, MRI and PET scan.    Most tests are not worthwhile for patients with stage 1 or 2 melanoma unless there are signs or symptoms of disease recurrence or metastasis. No tests are necessary for healthy patients who have remained well for five years or longer after removal of their melanoma.    What is the outlook for patients with melanoma?  Melanoma in situ is cured by excision because it has no potential to spread around the body.  The risk of spread and ultimate death from invasive melanoma depends on several factors, but the main one is the Breslow thickness of the melanoma at the time it was surgically removed.  Metastases are rare for melanomas < 0.75 mm and the risk for tumours 0.75-1 mm thick is about 5%. The risk steadily increases with thickness so that melanomas > 4 mm have a risk of metastasis of about 40%.    Melanoma is a potentially serious type of skin cancer, in which there is uncontrolled growth of melanocytes (pigment cells). Melanoma is sometimes called malignant melanoma.  Normal melanocytes are found in the basal layer of the epidermis (the outer layer of skin). Melanocytes produce a protein called melanin, which protects skin cells by absorbing ultraviolet (UV) radiation. Melanocytes are found in equal numbers in black and white skin, but melanocytes in black skin produce much more melanin. People with dark brown or black skin are very much less likely to be damaged by UV radiation than those with white skin.       NEOPLASM OF UNCERTAIN BEHAVIOR OF SKIN      Assessment and Plan:  I have discussed with the patient that a sample of skin via a \"skin biopsy” would be potentially helpful to further make a specific diagnosis under the " "microscope.  Based on a thorough discussion of this condition and the management approach to it (including a comprehensive discussion of the known risks, side effects and potential benefits of treatment), the patient (family) agrees to implement the following specific plan:    Procedure:  Skin Biopsy.  After a thorough discussion of treatment options and risk/benefits/alternatives (including but not limited to local pain, scarring, dyspigmentation, blistering, possible superinfection, and inability to confirm a diagnosis via histopathology), verbal and written consent were obtained and portion of the rash was biopsied for tissue sample.  See below for consent that was obtained from patient and subsequent Procedure Note.   PROCEDURE TANGENTIAL (SHAVE) BIOPSY NOTE:      INFORMED CONSENT DISCUSSION AND POST-OPERATIVE INSTRUCTIONS FOR PATIENT    I.  RATIONALE FOR PROCEDURE  I understand that a skin biopsy allows the Dermatologist to test a lesion or rash under the microscope to obtain a diagnosis.  It usually involves numbing the area with numbing medication and removing a small piece of skin; sometimes the area will be closed with sutures. In this specific procedure, sutures are not usually needed.  If any sutures are placed, then they are usually need to be removed in 2 weeks or less.    I understand that my Dermatologist recommends that a skin \"shave\" biopsy be performed today.  A local anesthetic, similar to the kind that a dentist uses when filling a cavity, will be injected with a very small needle into the skin area to be sampled.  The injected skin and tissue underneath \"will go to sleep” and become numb so no pain should be felt afterwards.  An instrument shaped like a tiny \"razor blade\" (shave biopsy instrument) will be used to cut a small piece of tissue and skin from the area so that a sample of tissue can be taken and examined more closely under the microscope.  A slight amount of bleeding will occur, but " "it will be stopped with direct pressure and a pressure bandage and any other appropriate methods.  I understands that a scar will form where the wound was created.  Surgical ointment will be applied to help protect the wound.  Sutures are not usually needed.    II.  RISKS AND POTENTIAL COMPLICATIONS   I understand the risks and potential complications of a skin biopsy include but are not limited to the following:  Bleeding  Infection  Pain  Scar/keloid  Skin discoloration  Incomplete Removal  Recurrence  Nerve Damage/Numbness/Loss of Function  Allergic Reaction to Anesthesia  Biopsies are diagnostic procedures and based on findings additional treatment or evaluation may be required  Loss or destruction of specimen resulting in no additional findings    My Dermatologist has explained to me the nature of the condition, the nature of the procedure, and the benefits to be reasonably expected compared with alternative approaches.  My Dermatologist has discussed the likelihood of major risks or complications of this procedure including the specific risks listed above, such as bleeding, infection, and scarring/keloid.  I understand that a scar is expected after this procedure.  I understand that my physician cannot predict if the scar will form a \"keloid,\" which extends beyond the borders of the wound that is created.  A keloid is a thick, painful, and bumpy scar.  A keloid can be difficult to treat, as it does not always respond well to therapy, which includes injecting cortisone directly into the keloid every few weeks.  While this usually reduces the pain and size of the scar, it does not eliminate it.      I understand that photographs may be taken before and after the procedure.  These will be maintained as part of the medical providers confidential records and may not be made available to me.  I further authorize the medical provider to use the photographs for teaching purposes or to illustrate scientific papers, " "books, or lectures if in his/her judgment, medical research, education, or science may benefit from its use.    I have had an opportunity to fully inquire about the risks and benefits of this procedure and its alternatives.   I have been given ample time and opportunity to ask questions and to seek a second opinion if I wished to do so.  I acknowledge that there have specifically been no guarantees as to the cosmetic results from the procedure.  I am aware that with any procedure there is always the possibility of an unexpected complication.    III. POST-PROCEDURAL CARE (WHAT YOU WILL NEED TO DO \"AFTER THE BIOPSY\" TO OPTIMIZE HEALING)    Keep the area clean and dry.  Try NOT to remove the bandage or get it wet for the first 24 hours.    Gently clean the area and apply surgical ointment (such as Vaseline petrolatum ointment, which is available \"over the counter\" and not a prescription) to the biopsy site for up to 2 weeks straight.  This acts to protect the wound from the outside world.      Sutures are not usually placed in this procedure.  If any sutures were placed, return for suture removal as instructed (generally 1 week for the face, 2 weeks for the body).      Take Acetaminophen (Tylenol) for discomfort, if no contraindications.  Ibuprofen or aspirin could make bleeding worse.    Call our office immediately for signs of infection: fever, chills, increased redness, warmth, tenderness, discomfort/pain, or pus or foul smell coming from the wound.    WHAT TO DO IF THERE IS ANY BLEEDING?  If a small amount of bleeding is noticed, place a clean cloth over the area and apply firm pressure for ten minutes.  Check the wound after 10 minutes of direct pressure.  If bleeding persists, try one more time for an additional 10 minutes of direct pressure on the area.  If the bleeding becomes heavier or does not stop after the second attempt, or if you have any other questions about this procedure, then please call your [facility] [facility] by calling [phone] (SKIN).     I hereby acknowledge that I have reviewed and verified the site with my Dermatologist and have requested and authorized my Dermatologist to proceed with the procedure.      ACTINIC KERATOSIS      Plan:  PRESCRIPTION MANAGEMENT:  Several topical management options and their side effects were discussed including \"field therapies\" such as Efudex (5-fluorouracil) and/or Aldara (imiquimod). Efudex may be used alone or in combination with Calcipotriene. Begin treatment once regions that have been sprayed with liquid nitrogen are healed. Redness and irritation are to be expected within a few days of field therapy treatment and should resolve within 1 to 2 weeks following treatment. Do NOT cover the treatment areas with bandages. Use a sunscreen with an SPF 50+ while using field therapy.  We discussed the pre-cancerous nature of the condition. Actinic keratosis is found on sun-damaged skin and there is small risk that the condition could turn into a skin cancer called squamous cell carcinoma. There is no risk of actinic keratosis turning into melanoma.  Proliferative actinic keratosis tends to be resistant against standard treatments, including topical therapies and cryotherapy. It has a tendency to develop into infiltrative squamous cell carcinoma. Excision may be considered.  We discussed sun protection measures, including using sunscreen with an SPF 50+ year round, avoiding the sun, and wearing protective clothing such as long sleeves and pants when out in the sun.  Continue to monitor clinically for signs of recurrence. Discussed with patient the importance of keeping up to date with full body skin exams.  Cryotherapy performed in the office (See Procedure Note). We discussed that a hypopigmentation or scar may form in the region following cryotherapy.     PROCEDURES PERFORMED TODAY ASSOCIATED WITH THIS CONDITION:          Cryotherapy: PROCEDURE:  DESTRUCTION OF " PRE-MALIGNANT LESIONS  After a thorough discussion of treatment options and risk/benefits/alternatives (including but not limited to local pain, scarring, dyspigmentation, blistering, and possible superinfection), verbal and written consent were obtained and the aforementioned lesions were treated on with cryotherapy using liquid nitrogen x 1 cycle for 5-10 seconds.    TOTAL NUMBER of 1 pre-malignant lesions were treated today on the ANATOMIC LOCATION: 0.2 cm on right cheek.     The patient tolerated the procedure well, and after-care instructions were provided.                 EPIDERMAL INCLUSION CYST        Plan:  Cysts may remain stable or progressively enlarge over time. Often, cysts go away without any treatment but may return. There are no reliable predictive factors to tell if an epidermal inclusion cyst will enlarge, become inflamed, or remain quiescent. Infected cysts tend to become larger, turn red, and are more noticeable to the patient. There may be accompanying pain and discomfort.  Certain genetic disorders may increase the risk of developing multiple epidermal inclusion cysts. These disorders include Powers syndrome, pachyonychia congenita type 2, and basal cell nevus syndrome.  In cases where the cyst is not infected or inflamed, treatment is not emergent unless desired by the patient. Home remedies include placing a warm, moist cloth over the area to help the cyst drain and heal. DO NOT attempt to squeeze or extract the cyst yourself. Doing so may lead to a worsening infection or scarring.  If fever develops or increased pain, swelling or redness occurs, call the office.     PROCEDURES PERFORMED TODAY ASSOCIATED WITH THIS CONDITION:          NONE           CHERRY ANGIOMAS       Assessment and Plan:  Based on a thorough discussion of this condition and the management approach to it (including a comprehensive discussion of the known risks, side effects and potential benefits of treatment), the  "patient (family) agrees to implement the following specific plan:  Reassure benign        SEBORRHEIC KERATOSIS; NON-INFLAMED          Assessment and Plan:  Based on a thorough discussion of this condition and the management approach to it (including a comprehensive discussion of the known risks, side effects and potential benefits of treatment), the patient (family) agrees to implement the following specific plan:  Reassure benign  Use sun protection.  Apply SPF 30 or higher at least three times a day.  Wear sun protecting clothing and hats.        SOLAR LENTIGINES   OTHER SKIN CHANGES DUE TO CHRONIC EXPOSURE TO NONIONIZING RADIATION          Assessment and Plan:  Based on a thorough discussion of this condition and the management approach to it (including a comprehensive discussion of the known risks, side effects and potential benefits of treatment), the patient (family) agrees to implement the following specific plan:  Reassure benign  Use sun protection.  Apply SPF 30 or higher at least three times a day.  Wear sun protecting clothing and hats.         MULTIPLE MELANOCYTIC NEVI (\"Moles\")       Assessment and Plan:  Based on a thorough discussion of this condition and the management approach to it (including a comprehensive discussion of the known risks, side effects and potential benefits of treatment), the patient (family) agrees to implement the following specific plan:  Reassure benign  Monitor for changes  Use sun protection.  Apply SPF 30 or higher at least three times a day.  Wear sun protecting clothing and hats.       Worrisome signs of skin malignancy discussed, questions answered. Regular self-skin check discussed. Advised to call or return to office if patient notices any spots of concern, rapidly growing/changing lesions, bleeding lesions, non-healing lesions. Advised regular SPF use.  "

## 2025-03-10 NOTE — PROGRESS NOTES
"[facility] Note     Patient Name: Basim Humphries  Encounter Date: 3/10/2025     Have you been cared for by a [facility] Dermatologist in the last 3 years and, if so, which description applies to you?    Yes.  I have been here within the last 3 years, and my medical history has NOT changed since that time.  I am MALE/not capable of bearing children.    REVIEW OF SYSTEMS:  Have you recently had or currently have any of the following? No changes in my recent health.   PAST MEDICAL HISTORY:  Have you personally ever had or currently have any of the following?  If \"YES,\" then please provide more detail. No changes in my medical history.   HISTORY OF IMMUNOSUPPRESSION: Do you have a history of any of the following:  Systemic Immunosuppression such as Diabetes, Biologic or Immunotherapy, Chemotherapy, Organ Transplantation, Bone Marrow Transplantation or Prednisone?  No     Answering \"YES\" requires the addition of the dotphrase \"IMMUNOSUPPRESSED\" as the first diagnosis of the patient's visit.   FAMILY HISTORY:  Any \"first degree relatives\" (parent, brother, sister, or child) with the following?    No changes in my family's known health.   PATIENT EXPERIENCE:    Do you want the Dermatologist to perform a COMPLETE skin exam today including a clinical examination under the \"bra and underwear\" areas?  NO  If necessary, do we have your permission to call and leave a detailed message on your Preferred Phone number that includes your specific medical information?  Yes      Allergies   Allergen Reactions    Cefpodoxime Other (See Comments)     Made heart race was given when he had covid pneumonia    Demerol [Meperidine] Itching    Codeine GI Intolerance    Diclofenac Sodium GI Intolerance    Erythromycin Other (See Comments)     Making ears ring    Meloxicam GI Intolerance    Oxaprozin GI Intolerance    Penicillins Hives and Itching      Current Outpatient Medications:     bacitracin topical ointment 500 " units/g topical ointment, Apply 1 large application topically 2 (two) times a day for 5 days, Disp: 28 g, Rfl: 0    clindamycin (CLEOCIN) 300 MG capsule, Take 1 capsule (300 mg total) by mouth 4 (four) times a day for 10 days, Disp: 40 capsule, Rfl: 0    fluticasone (FLONASE) 50 mcg/act nasal spray, 2 sprays into each nostril daily, Disp: , Rfl:     gabapentin (NEURONTIN) 100 mg capsule, Take 1 capsule (100 mg total) by mouth daily at bedtime (Patient not taking: Reported on 8/1/2024), Disp: 30 capsule, Rfl: 1    lansoprazole (PREVACID) 30 mg capsule, Take 30 mg by mouth every morning  , Disp: , Rfl:     multivitamin (THERAGRAN) TABS, Take 1 tablet by mouth every morning, Disp: , Rfl:     oxyCODONE (ROXICODONE) 5 immediate release tablet, Take 1 tablet (5 mg total) by mouth 2 (two) times a day as needed for severe pain Max Daily Amount: 10 mg (Patient not taking: Reported on 5/26/2024), Disp: 15 tablet, Rfl: 0          Whom besides the patient is providing clinical information about today's encounter?   NO ADDITIONAL HISTORIAN (patient alone provided history)    Physical Exam and Assessment/Plan by Diagnosis: Patient last evaluated by Dr. Colon on 8/26/24.  States he has 30 year history of skin cancers.      HISTORY OF SQUAMOUS CELL CARCINOMA IN SITU     Physical Exam:  Anatomic Location Affected:  Left inferior medial forehead  Morphological Description of Scar:  well healed  Suspected Recurrence: no  Regional adenopathy: no    Additional History of Present Condition:  Patient initially underwent biopsy at [facility] in February 2023.  States pathology showed SCCIS.  Repeat biopsy performed on 8/26/24 showing actinic keratosis.  Reports history of other SCC, but pathology and location unknown.      Assessment and Plan:  Based on a thorough discussion of this condition and the management approach to it (including a comprehensive discussion of the known risks, side effects and potential benefits of treatment), the  patient (family) agrees to implement the following specific plan:  Continue skin exams.  Discussed 3 month given his recurrent skin cancers    HISTORY OF BASAL CELL CARCINOMA    Physical Exam:  Anatomic Location Affected:  locations unknown   Morphological Description of scar:  well healed  Suspected Recurrence: No  Pertinent Positives:  Pertinent Negatives:      Additional History of Present Condition:  Patient reports history of >20 BCC.  Pathology and locations unknown.    Assessment and Plan:  Based on a thorough discussion of this condition and the management approach to it (including a comprehensive discussion of the known risks, side effects and potential benefits of treatment), the patient (family) agrees to implement the following specific plan:  Continue skin exams.  Discussed 3 month given his recurrent skin cancers    HISTORY OF MELANOMA    Physical Exam:  Anatomic Location Affected:  left neck, left upper abdomen, back  Morphological Description of Scar:  well healed  Year Treated: >10 years ago  TNM Classification: unknown  Suspected Recurrence: no  Regional adenopathy: no    Additional History of Present Condition:  Patient reports history of melanoma.  Underwent excision >10 years ago.  States he had lymph nodes removed from melanoma on left upper abdomen.  Pathology not available.    Assessment and Plan:  Based on a thorough discussion of this condition and the management approach to it (including a comprehensive discussion of the known risks, side effects and potential benefits of treatment), the patient (family) agrees to implement the following specific plan:  Continue skin checks  Monitor for reoccurrence    NEOPLASM OF UNCERTAIN BEHAVIOR OF SKIN    Physical Exam:  (Anatomic Location); (Size and Morphological Description); (Differential Diagnosis):  Specimen A: Right dorsal hand; Skin; Shave biopsy; 74 year old male with a 1.1 x 0.7 cm hyperkeratotic papule; Differential Diagnosis: SCC versus  "BCC          Specimen B: Mid upper chest;Skin; Shave Biopsy; 74 year old male with a 0.7 x 0.9 pink plaque; Differential Diagnosis; Rule out SCC           Pertinent Positives:  Pertinent Negatives:    Additional History of Present Condition:  Found on exam.  Patient states he injured his hand, and developed a bump.  He is unsure if chest was from an injury carrying wood at his home.  Denies any bleeding or pain.  History of SCC, BCC, and melanoma.     Assessment and Plan:  I have discussed with the patient that a sample of skin via a \"skin biopsy” would be potentially helpful to further make a specific diagnosis under the microscope.  Based on a thorough discussion of this condition and the management approach to it (including a comprehensive discussion of the known risks, side effects and potential benefits of treatment), the patient (family) agrees to implement the following specific plan:    Procedure:  Skin Biopsy.  After a thorough discussion of treatment options and risk/benefits/alternatives (including but not limited to local pain, scarring, dyspigmentation, blistering, possible superinfection, and inability to confirm a diagnosis via histopathology), verbal and written consent were obtained and portion of the rash was biopsied for tissue sample.  See below for consent that was obtained from patient and subsequent Procedure Note.   PROCEDURE TANGENTIAL (SHAVE) BIOPSY NOTE:    Performing Physician:  ADRIANO CHERY   Anatomic Location; Clinical Description with size (cm); Pre-Op Diagnosis:   Specimen A: Right dorsal hand; Skin; Shave biopsy; 74 year old male with a 1.1 x 0.7 cm hyperkeratotic papule; Differential Diagnosis: SCC versus BCC  Specimen B: Mid upper chest;Skin; Shave Biopsy; 74 year old male with a 0.7 x 0.9 pink plaque; Differential Diagnosis; Rule out SCC   Post-op diagnosis: Same     Local anesthesia: 1% xylocaine with epi      Topical anesthesia: None    Hemostasis: Aluminum chloride       After " "obtaining informed consent  at which time there was a discussion about the purpose of biopsy  and low risks of infection and bleeding.  The area was prepped and draped in the usual fashion. Anesthesia was obtained with 1% lidocaine with epinephrine. A shave biopsy to an appropriate sampling depth was obtained by Shave (Dermablade or 15 blade) The resulting wound was covered with surgical ointment and bandaged appropriately.     The patient tolerated the procedure well without complications and was without signs of functional compromise.      Specimen has been sent for review by Dermatopathology.    Standard post-procedure care has been explained and has been included in written form within the patient's copy of Informed Consent.    INFORMED CONSENT DISCUSSION AND POST-OPERATIVE INSTRUCTIONS FOR PATIENT    I.  RATIONALE FOR PROCEDURE  I understand that a skin biopsy allows the Dermatologist to test a lesion or rash under the microscope to obtain a diagnosis.  It usually involves numbing the area with numbing medication and removing a small piece of skin; sometimes the area will be closed with sutures. In this specific procedure, sutures are not usually needed.  If any sutures are placed, then they are usually need to be removed in 2 weeks or less.    I understand that my Dermatologist recommends that a skin \"shave\" biopsy be performed today.  A local anesthetic, similar to the kind that a dentist uses when filling a cavity, will be injected with a very small needle into the skin area to be sampled.  The injected skin and tissue underneath \"will go to sleep” and become numb so no pain should be felt afterwards.  An instrument shaped like a tiny \"razor blade\" (shave biopsy instrument) will be used to cut a small piece of tissue and skin from the area so that a sample of tissue can be taken and examined more closely under the microscope.  A slight amount of bleeding will occur, but it will be stopped with direct " "pressure and a pressure bandage and any other appropriate methods.  I understands that a scar will form where the wound was created.  Surgical ointment will be applied to help protect the wound.  Sutures are not usually needed.    II.  RISKS AND POTENTIAL COMPLICATIONS   I understand the risks and potential complications of a skin biopsy include but are not limited to the following:  Bleeding  Infection  Pain  Scar/keloid  Skin discoloration  Incomplete Removal  Recurrence  Nerve Damage/Numbness/Loss of Function  Allergic Reaction to Anesthesia  Biopsies are diagnostic procedures and based on findings additional treatment or evaluation may be required  Loss or destruction of specimen resulting in no additional findings    My Dermatologist has explained to me the nature of the condition, the nature of the procedure, and the benefits to be reasonably expected compared with alternative approaches.  My Dermatologist has discussed the likelihood of major risks or complications of this procedure including the specific risks listed above, such as bleeding, infection, and scarring/keloid.  I understand that a scar is expected after this procedure.  I understand that my physician cannot predict if the scar will form a \"keloid,\" which extends beyond the borders of the wound that is created.  A keloid is a thick, painful, and bumpy scar.  A keloid can be difficult to treat, as it does not always respond well to therapy, which includes injecting cortisone directly into the keloid every few weeks.  While this usually reduces the pain and size of the scar, it does not eliminate it.      I understand that photographs may be taken before and after the procedure.  These will be maintained as part of the medical providers confidential records and may not be made available to me.  I further authorize the medical provider to use the photographs for teaching purposes or to illustrate scientific papers, books, or lectures if in his/her " "judgment, medical research, education, or science may benefit from its use.    I have had an opportunity to fully inquire about the risks and benefits of this procedure and its alternatives.   I have been given ample time and opportunity to ask questions and to seek a second opinion if I wished to do so.  I acknowledge that there have specifically been no guarantees as to the cosmetic results from the procedure.  I am aware that with any procedure there is always the possibility of an unexpected complication.    III. POST-PROCEDURAL CARE (WHAT YOU WILL NEED TO DO \"AFTER THE BIOPSY\" TO OPTIMIZE HEALING)    Keep the area clean and dry.  Try NOT to remove the bandage or get it wet for the first 24 hours.    Gently clean the area and apply surgical ointment (such as Vaseline petrolatum ointment, which is available \"over the counter\" and not a prescription) to the biopsy site for up to 2 weeks straight.  This acts to protect the wound from the outside world.      Sutures are not usually placed in this procedure.  If any sutures were placed, return for suture removal as instructed (generally 1 week for the face, 2 weeks for the body).      Take Acetaminophen (Tylenol) for discomfort, if no contraindications.  Ibuprofen or aspirin could make bleeding worse.    Call our office immediately for signs of infection: fever, chills, increased redness, warmth, tenderness, discomfort/pain, or pus or foul smell coming from the wound.    WHAT TO DO IF THERE IS ANY BLEEDING?  If a small amount of bleeding is noticed, place a clean cloth over the area and apply firm pressure for ten minutes.  Check the wound after 10 minutes of direct pressure.  If bleeding persists, try one more time for an additional 10 minutes of direct pressure on the area.  If the bleeding becomes heavier or does not stop after the second attempt, or if you have any other questions about this procedure, then please call your [facility] Dermatologist by calling " "[phone] (SKIN).     I hereby acknowledge that I have reviewed and verified the site with my Dermatologist and have requested and authorized my Dermatologist to proceed with the procedure.      ACTINIC KERATOSIS    Physical Exam:  Anatomic Location: Right cheek   Morphologic Description:  Scaly pink papule 0.2 cm  Pertinent Positives:  Pertinent Negatives:    Additional History of Present Condition:  Found on exam    History of bleeding from this lesion? NO  History of pain, tenderness, and/or itching within this lesion? NO  Personal history of skin cancer? YES, BCC, SCC, MELANOMA   Family history of skin cancer? NO  Previous treatment to the same site? NO    Plan:  PRESCRIPTION MANAGEMENT:  Several topical management options and their side effects were discussed including \"field therapies\" such as Efudex (5-fluorouracil) and/or Aldara (imiquimod). Efudex may be used alone or in combination with Calcipotriene. Begin treatment once regions that have been sprayed with liquid nitrogen are healed. Redness and irritation are to be expected within a few days of field therapy treatment and should resolve within 1 to 2 weeks following treatment. Do NOT cover the treatment areas with bandages. Use a sunscreen with an SPF 50+ while using field therapy.  We discussed the pre-cancerous nature of the condition. Actinic keratosis is found on sun-damaged skin and there is small risk that the condition could turn into a skin cancer called squamous cell carcinoma. There is no risk of actinic keratosis turning into melanoma.  Proliferative actinic keratosis tends to be resistant against standard treatments, including topical therapies and cryotherapy. It has a tendency to develop into infiltrative squamous cell carcinoma. Excision may be considered.  We discussed sun protection measures, including using sunscreen with an SPF 50+ year round, avoiding the sun, and wearing protective clothing such as long sleeves and pants when out " in the sun.  Continue to monitor clinically for signs of recurrence. Discussed with patient the importance of keeping up to date with full body skin exams.  Cryotherapy performed in the office (See Procedure Note). We discussed that a hypopigmentation or scar may form in the region following cryotherapy.     PROCEDURES PERFORMED TODAY ASSOCIATED WITH THIS CONDITION:          Cryotherapy: PROCEDURE:  DESTRUCTION OF PRE-MALIGNANT LESIONS  After a thorough discussion of treatment options and risk/benefits/alternatives (including but not limited to local pain, scarring, dyspigmentation, blistering, and possible superinfection), verbal and written consent were obtained and the aforementioned lesions were treated on with cryotherapy using liquid nitrogen x 1 cycle for 5-10 seconds.    TOTAL NUMBER of 1 pre-malignant lesions were treated today on the ANATOMIC LOCATION: 0.2 cm on right cheek.     The patient tolerated the procedure well, and after-care instructions were provided.         MEDICAL DECISION MAKING  Treatment Goal:  Resolution of this ACUTE, UNCOMPLICATED condition.       A recent or new short-term problem for which treatment is CONSIDERED OR INITIATED. There is little to no risk of mortality with treatment, and full recovery without functional impairment is expected.  Diagnosis or treatment significantly limited by the following social determinants of health:  NONE IDENTIFIED              EPIDERMAL INCLUSION CYST    Physical Exam:  Anatomic Location: Left upper back   Morphologic Description:  Firm, flesh-colored fluctuant nodule with central punctum Size: 2.5 x 2.0  cm  Inflamed? No  Painful? No  Active infection? No  Pertinent Positives:  Pertinent Negatives:    Additional History of Present Condition:  Present on exam.  States wife noticed it a few months ago.  Denies any changes, asymptomatic, no drainage or tenderness.    History of pain? No  History of infection? No  History of colon polyps  "(genoderm-related)? No  History of multiple cysts (genoderm-related)? No    Plan:  Cysts may remain stable or progressively enlarge over time. Often, cysts go away without any treatment but may return. There are no reliable predictive factors to tell if an epidermal inclusion cyst will enlarge, become inflamed, or remain quiescent. Infected cysts tend to become larger, turn red, and are more noticeable to the patient. There may be accompanying pain and discomfort.  Certain genetic disorders may increase the risk of developing multiple epidermal inclusion cysts. These disorders include Powers syndrome, pachyonychia congenita type 2, and basal cell nevus syndrome.  In cases where the cyst is not infected or inflamed, treatment is not emergent unless desired by the patient. Home remedies include placing a warm, moist cloth over the area to help the cyst drain and heal. DO NOT attempt to squeeze or extract the cyst yourself. Doing so may lead to a worsening infection or scarring.  If fever develops or increased pain, swelling or redness occurs, call the office.  Discussed excision, but he defers at this time     PROCEDURES PERFORMED TODAY ASSOCIATED WITH THIS CONDITION:          NONE     Medical Complexity:    CHRONIC ILLNESS (expected duration of >1 year):  STABLE (i.e., AT TREATMENT GOAL). \"Stable\" refers to treatment goals for the individual patient.  A patient not at treatment goal is NOT stable even if the condition is not changing and the patient is asymptomatic because the risk of morbidity without treatment is significant.      SALVADOR ANGIOMAS     Physical Exam:  Anatomic Location Affected:  Trunk and extremities  Morphological Description:  Scattered cherry red papules  Denies pain, itch, bleeding. No treatments tried. Present for years. Present constantly; no modifying factors which make it worse or better.     Assessment and Plan:  Based on a thorough discussion of this condition and the management approach " "to it (including a comprehensive discussion of the known risks, side effects and potential benefits of treatment), the patient (family) agrees to implement the following specific plan:  Reassure benign        SEBORRHEIC KERATOSIS; NON-INFLAMED     Physical Exam:  Anatomic Location Affected:  Trunk and extremities  Morphological Description:  Waxy, smooth to warty textured, yellow to brownish-grey to dark brown to blackish, discrete, \"stuck-on\" appearing papules.  Present for years. Denies pain, itch, bleeding.      Additional History of Present Condition:  Present constantly; no modifying factors which make it worse or better. No prior treatment.       Assessment and Plan:  Based on a thorough discussion of this condition and the management approach to it (including a comprehensive discussion of the known risks, side effects and potential benefits of treatment), the patient (family) agrees to implement the following specific plan:  Reassure benign  Use sun protection.  Apply SPF 30 or higher at least three times a day.  Wear sun protecting clothing and hats.        SOLAR LENTIGINES   OTHER SKIN CHANGES DUE TO CHRONIC EXPOSURE TO NONIONIZING RADIATION     Physical Exam:  Anatomic Location Affected:  Sun exposed areas of back, chest, arms, legs  Morphological Description:  Multiple scattered brown to tan evenly pigmented macules   Denies pain, itch, bleeding. No treatments tried. Present for months - years. Reports getting newer lesions with sun exposure.         Assessment and Plan:  Based on a thorough discussion of this condition and the management approach to it (including a comprehensive discussion of the known risks, side effects and potential benefits of treatment), the patient (family) agrees to implement the following specific plan:  Reassure benign  Use sun protection.  Apply SPF 30 or higher at least three times a day.  Wear sun protecting clothing and hats.         MULTIPLE MELANOCYTIC NEVI (\"Moles\")   "   Physical Exam:  Anatomic Location Affected: Trunk and extremities  Morphological Description:  Scattered, round to ovoid, symmetrical-appearing, even bordered, skin colored to dark brown macules/papules  Denies pain, itch, bleeding. No treatments tried. Present for years. Present constantly; no modifying factors which make it worse or better. Denies actively changing or growing moles.      Assessment and Plan:  Based on a thorough discussion of this condition and the management approach to it (including a comprehensive discussion of the known risks, side effects and potential benefits of treatment), the patient (family) agrees to implement the following specific plan:  Reassure benign  Monitor for changes  Use sun protection.  Apply SPF 30 or higher at least three times a day.  Wear sun protecting clothing and hats.       Worrisome signs of skin malignancy discussed, questions answered. Regular self-skin check discussed. Advised to call or return to office if patient notices any spots of concern, rapidly growing/changing lesions, bleeding lesions, non-healing lesions. Advised regular SPF use.    Scribe Attestation      I,:  Ana Carpenter am acting as a scribe while in the presence of the attending physician.:       I,:  CHANEL Albert personally performed the services described in this documentation    as scribed in my presence.:

## 2025-03-11 LAB
BACTERIA WND AEROBE CULT: ABNORMAL
GRAM STN SPEC: ABNORMAL

## 2025-03-17 ENCOUNTER — RESULTS FOLLOW-UP (OUTPATIENT)
Dept: DERMATOLOGY | Facility: CLINIC | Age: 75
End: 2025-03-17

## 2025-03-17 ENCOUNTER — TELEPHONE (OUTPATIENT)
Dept: DERMATOLOGY | Facility: CLINIC | Age: 75
End: 2025-03-17

## 2025-03-17 DIAGNOSIS — C44.92 SCCA (SQUAMOUS CELL CARCINOMA) OF SKIN: Primary | ICD-10-CM

## 2025-03-17 PROCEDURE — 88305 TISSUE EXAM BY PATHOLOGIST: CPT | Performed by: STUDENT IN AN ORGANIZED HEALTH CARE EDUCATION/TRAINING PROGRAM

## 2025-03-17 PROCEDURE — 88342 IMHCHEM/IMCYTCHM 1ST ANTB: CPT | Performed by: STUDENT IN AN ORGANIZED HEALTH CARE EDUCATION/TRAINING PROGRAM

## 2025-03-17 PROCEDURE — 88341 IMHCHEM/IMCYTCHM EA ADD ANTB: CPT | Performed by: STUDENT IN AN ORGANIZED HEALTH CARE EDUCATION/TRAINING PROGRAM

## 2025-03-17 NOTE — LETTER
Basim Humphries     1950    [address] [facility] [id number]    Dear Basim Humphries,    You are scheduled to have the Mohs procedure on APRIL 7, 2025 at 11 AM for RIGHT HAND with Dr. Romero Parada. Our office is located in [facility] our address is [address] [place] [address]. Once you arrive please check in with our front staff in [address] and they will escort you to the Mohs waiting room.  If you have someone bringing you to your appointment they may wait in the waiting room or accompany you in your visit.    Below you will find some pre-op instructions along with some information regarding the Mohs procedure.     If you have any questions please call our office at [phone].     Thank you,    [facility] Department         PRE-OPERATIVE INSTRUCTIONS - MOHS    Before your scheduled surgery, there are a number of important precautions and positive steps you should take to help prepare yourself for a successful treatment and speedy recovery.    Some of the steps, which are listed below, may seem unnecessary and inconvenient, but they are important. For example, when you stop smoking, you increase your ability to heal. Occasionally, there may be valid reasons, personal or medical, why you can't comply. In such cases, please call the office so we can discuss possible ways to overcome any obstacles you may be encountering.    If you have any questions about the surgery, or remember additional medical information that you forgot to mention to our staff, please contact the office prior to your surgery.    GENERAL INFORMATION REGARDING MOHS MICROGRAPHIC SURGERY    Mohs surgery is a specialized technique for the removal of skin cancer developed by Dr. Frederick Mohs over 50 years ago to improve the cure rates of skin cancer. Traditionally, skin cancers are treated by destructive methods (radiation, freezing, scraping, and burning) or  excision (cutting out the tissue with standards margins and sending it to an outside laboratory for testing). These methods all yield cure rates between 65%-94%. However, for cancers located in cosmetically sensitive areas, large tumors, or tumors unsuccessfully treated by other means, Mohs surgery offers a higher cure rate. In most cases, Mohs surgery provides you with a 99% cure rate for primary (previously untreated) basal cell cancer and a 95% cure rate for primary squamous cell cancer. In Mohs surgery, tissue is removed and processed in a way that we are able to check 100% of the margins, giving the highest cure rate for any method of treating skin cancers while providing maximal preservation of normal skin. This allows the surgeon to produce an optimal cosmetic result for the patient by maximizing the amount of tissue removed yielding as small a scar as possible    On the day of surgery, you will be given local anesthesia only (similar to what was given to you during your initial biopsy). You will remain awake. You will verify the location of the skin cancer prior to the onset of the surgery. Once the area is numb, the tissue containing the skin cancer will be removed, taking a small safety margin. This margin is usually smaller than what would be taken with a standard excision. Once the tissue is removed, it is marked and oriented. The first layer (“Stage I”) will be processed in our laboratory. The wound will be treated for bleeding and a bandage will be placed to keep you comfortable while you wait an approximate 45 minutes-1 hour (for the processing of the tissue) in your room. Your Mohs surgeon will examine the pathology in the lab, checking all the margins. If any tumor remains, you will need to take a second layer of skin (“Stage 2”). The area will be re-anesthetized and your Mohs surgeon will remove more skin only in the area where the tumor exists. This process will continue until all the skin cancer  is removed. Unfortunately, there is no method to predict how many layers or stages will be taken.    Once the tumor has been removed completely, we will discuss the best ways to close the defect. Most wounds may be closed with stitches. A larger wound may require a skin graft or a flap. In rare instances, especially for cancers around the eye or for larger cancers, we may work with another surgeon (oculoplastic, ENT, plastics) with special skills to assist with reconstruction.  If the surgery is coordinated with another specialist, you will have the Mohs portion of the procedure first and see the coordinated specialist after the skin cancer has been removed.  Always follow the pre-operative instructions of the surgeon doing the closure.    Medications: Please take all your normal medications the morning of your surgery. If you are a diabetic, please bring your insulin or medications with you, as well as a snack to avoid having low blood sugar during your day with us.    1) Blood Thinners  VERY IMPORTANT: We do NOT stop or hold prescribed blood thinners (such as Coumadin/Warfarin, Plavix, Eliquis, Pradaxa, Brilinta, Apixaban, Xarelto, Lovonox, Rivaroxaban, or Aggrenox) before Mohs surgery. Additionally, If you take aspirin because you have had a stroke, heart attack, heart disease, other condition, or your physician has prescribed you to take it, please continue your aspirin.    Although there is a risk of increased bruising and bleeding, we are still able to safely perform surgery while continuing these medications. Please NEVER stop your prescribed blood thinner without the managing doctor's (the doctor that prescribed the medication) permission or knowledge. If you have any concerns about not holding your blood thinner, please address these with your Mohs surgeon.    Most people should stop all non-steroidal anti-inflammatory medications (Motrin, Naproxen, Advil, Midol, Aleve, etc.) for 7 days prior to your  scheduled surgery and 2 days after (unless instructed otherwise after surgery).  You may take Tylenol for pain.     Vitamins and Supplements  Avoid taking any supplements with Vitamin E, Fish Oil, Gingko, Ginseng, and Garlic for 2 weeks before and 2 days after your surgery. These thin your blood.    Lidocaine Patches  Avoid wearing any over the counter or prescribed Lidocaine patches the day of the surgery.    Alcohol: Avoid drinking alcohol for 2 days prior to your surgery, and for 2 days afterwards (it thins the blood and causes more bruising and swelling).    Smoking: Try to STOP or reduce smoking significantly the week before your surgery, and especially the week afterwards (it greatly improves how well you heal). Tobacco smoke deprives the blood of oxygen, which is urgently needed by the wound during the healing process.    Contact Lenses: Do not wear them on the day of the surgery. Instead, wear glasses and bring your case, in case we need to remove them.    Clothing: Do not wear your nicest clothing on your surgery day. We recommend wearing a button down shirt that will not disrupt your post-operative dressing when changing later that night. Please avoid wearing jeans during the procedure to help prevent damage to our equipment.     Bathing: On the morning of your surgery, you may bathe or shower normally. If you get your hair done on a weekly basis, remember to get your hair washed the day before surgery.   - You will need to keep your surgical site dry for a minimum of 48 hours after surgery.    Makeup: If your surgery is on the face, please do not wear any makeup on the day of the surgery.    Jewelry: Please try to avoid wearing jewelry on the day of surgery.    Food: On the morning of surgery, have breakfast but limit your intake of caffeinated beverages. They are diuretic and may inconvenience you during surgery. If you are following up with another surgeon the same day as your Mohs surgery, you must  receive permission to eat breakfast from that surgeon.    What to bring with you on the day of your surgery:  Bring snacks - Since you could be at the office long, you may bring snacks and/or lunch with you. Some snacks and drinks are available at the office as well.   Bring a sweater - Bring a sweater or jacket that buttons or zips down the front and will not disturb your wound dressing during removal.  Bring something to do - You will be spending much of the day in our office. There will be 45-60 minute waiting periods  between layers/stages, and while there is a television with cable in every room, it is nice to have something to keep you occupied such as books, magazines, knitting, music, or work.     Planning Ahead:  Appointment Length - Understand this procedure length is unpredictable, therefore, plan to be in the office for at least half a day. Depending on procedures scheduled prior to yours, there may be waiting prior to the start of your procedure.  Other Appointments - It is important to realize that no matter how small the skin cancer appears to be, looks can be deceiving. Since your surgery may last the entire day, you should not schedule any other appointments that day.  Special Occasions - Surgery often creates swelling and bruising. Also, the post-op dressing may be rather large and obvious. Keep this in mind as you arrange your social/work schedule. If an important event is already planned, please check with your referring physician or your Mohs surgeon to see if the surgery can be postponed.  Activity Limits after Surgery - If surgery was performed on your face, we recommend that you keep your activity level to a minimum for 2-3 days (the blood pressure elevation related to exercise can lead to bleeding). If you have stitches in an area that will be under tension or significant movement (neck, back, arms, legs), you will need to avoid heavy lifting (anything over 5 lbs) or exercise for at least 2  weeks and possibly longer. We also advise that you limit out of town travel for the first 7 days after surgery. You should also wait at least 7 days before going into a pool or the ocean.  Housework - Since you will need to minimize activity after surgery, plan to do your groceries, laundry, gardening, and other heavy household chores prior to your surgery. Please make arrangements for assistance during the post-op period. If surgery is around your mouth area, you may need to eat soft foods, such as soup, milkshakes, or yogurt for 48 hours.    Purchasing bandage supplies: Prior to surgery, please purchase the following items to care for your surgical wound properly.  Cotton swabs (Q-tips)  Vaseline or Aquaphor  Telfa pads (or any non-stick dressing)  Paper tape or Hypafix tape  Gauze pads (3x3)    Transportation: It is often reassuring and comforting to have a  drive you to and from the surgery. He or she is welcome to wait in the office during the surgery. If you do not have a , you may drive to and from your procedure (unless stated otherwise). If the site being treated is near your eye, be aware that the final bandage may cause some obstruction of vision.     Rescheduling: If you need to reschedule your surgery, please notify the office as soon as possible.

## 2025-03-17 NOTE — TELEPHONE ENCOUNTER
Pre- operative Mohs Telephone Scheduling Note    Do you have a pacemaker or defibrillator? no    Do you have a cochlear implant, spinal or brain stimulator? no    Do you take antibiotics before skin or dental procedures? no  If yes, will likely require pre-operative antibiotics. Ask  the patient why they take the antibiotics (usually because of joint replacement).    Do you have a history of a joint replacements within the past 2 years? NO   If yes, will likely require pre-operative antibiotics. Ask if orthopaedic surgeon has prescribed pre-operative antibiotics to take before procedures/dental work?    Do you take any OTC medications that thin your blood (Aspirin, Aleve, Ibuprofen) or supplements that thin your blood (fish oil, garlic, vitamin E, Ginko Biloba)? no    Do you take any prescribed medications that thin your blood (Coumadin, Plavix, Xarelto, Eliquis or another prescribed blood thinner)? no    Do you have an allergy to lidocaine or epinephrine? no    Do you have allergies to Iodine? no    Do you wear a lidocaine patch? no    Have you ever been diagnosed with HIV, AIDS, Hep B and Hep C? no    Do you use a cane, walker or wheelchair? no    Is the patient from a nursing home? no If yes, Is there any special accommodations that is needed for patient N/A    Do you smoke? yes: MEDICAL MARIJUANA CARD       If yes,  patient to try and stop 2 days before surgery and 7 days after the surgery. Minimizing smoking as much as possible during this time will improve healing and the cosmetic result after surgery.    Do you use supplemental oxygen? If so, how many liters and can you be off it for a short period of time? N/A    Date scheduled: 4/7/25 AT 11 WITH DR CORNELL    Coordination of Care with other provider (Oculoplastics, Plastics, ENT) required? no   IF YES, PLEASE FORWARD TO APPROPRIATE PERSONNEL TO HELP COORDINATE.    Are there remaining tumors to be scheduled? no    Was Prior Authorization obtained? No  (please use [email or web address] to document prior auth)

## 2025-03-17 NOTE — RESULT ENCOUNTER NOTE
DERMATOPATHOLOGY RESULT NOTE    Results reviewed by ordering physician.  Called patient to personally discuss results. Discussed results with patient.  Recommend referral to Mohs for Specimen A.  Informed consent reviewed, referral order placed.  Discussed treatment options for Specimen B including Efudex or cryotherapy followed by Imiquimod 5% cream for 6 weeks.  Informed consent reviewed for both options, and patient would like to proceed with cryotherapy.  He verbalizes understanding.  Note routed to clinical to assist with scheduling.         Instructions for Clinical Derm Team:   (remember to route Result Note to appropriate staff):    Refer patient to Mohs for Specimen A and schedule follow up for cryotherapy followed by Imiquimod 5% cream for 6 weeks.    Result & Plan by Specimen:    Specimen A: malignant  Plan: MOHs      Specimen B: malignant  Plan: clinic appointment for liquid nitrogen, followed by Imiquimod 5% cream for 6 weeks    Tissue Exam: [id number]  Order: [id number]   Status: Final result      Dx: Neoplasm of uncertain behavior    Test Result Released: Yes (seen)    View Follow-Up Encounter     Component  Ref Range & Units (hover)   Case Report  Surgical Pathology Report                         Case: [id number]                                  Authorizing Provider:  Consuelo Agarwal,     Collected:           03/10/2025 1342                                     CHANEL                                                                        Ordering Location:     [facility]      Received:            03/10/2025 [address] [place] [address]                                                                      Pathologist:           Berkley Hunter MD                                                          Specimens:   A) - Skin, Other, Specimen A: Right Dorsal Hand                                                     B) - Skin, Other, Specimen B: Mid upper Chest                                             Final Diagnosis  A. Skin, right dorsal hand, shave biopsy:    SQUAMOUS CELL CARCINOMA, WELL DIFFERENTIATED, INVASIVE; transected.         B. Skin, mid upper chest, shave biopsy:    SQUAMOUS CELL CARCINOMA IN SITU; extends to the tissue edges.      Electronically signed by Berkley Hunter MD on 3/17/2025 at 1358 EDT

## 2025-03-25 NOTE — TELEPHONE ENCOUNTER
[facility]    Does patient need blood work (takes Warfarin/Coumadin)? No    Was it completed? Not applicable    If patient is coming from a facility, has facility been contacted to confirm appointment regardless of MyChart response? N/A    If patient is a plastic surgery closure, has patient been called to confirm understanding of when and where to report at what times? N/A    Did patient confirm on MyChart or via phone call (noted in appointment desk)? Yes    If no, does patient understand preoperative instructions? N/A

## 2025-03-31 ENCOUNTER — PROCEDURE VISIT (OUTPATIENT)
Dept: DERMATOLOGY | Facility: CLINIC | Age: 75
End: 2025-03-31
Payer: MEDICARE

## 2025-03-31 VITALS
OXYGEN SATURATION: 98 % | DIASTOLIC BLOOD PRESSURE: 68 MMHG | TEMPERATURE: 98.1 F | SYSTOLIC BLOOD PRESSURE: 156 MMHG | HEART RATE: 68 BPM | BODY MASS INDEX: 31.43 KG/M2 | WEIGHT: 207.4 LBS | HEIGHT: 68 IN

## 2025-03-31 DIAGNOSIS — C44.622 SQUAMOUS CELL CARCINOMA OF DORSUM OF RIGHT HAND: Primary | ICD-10-CM

## 2025-03-31 PROCEDURE — 17311 MOHS 1 STAGE H/N/HF/G: CPT | Performed by: DERMATOLOGY

## 2025-03-31 PROCEDURE — 12002 RPR S/N/AX/GEN/TRNK2.6-7.5CM: CPT | Performed by: DERMATOLOGY
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
## 2025-03-31 NOTE — PATIENT INSTRUCTIONS
"Mohs Microscopic Surgery After Care    Your wound will heal via secondary intention, which means no additional surgery was performed after removal of your skin cancer. The wound was left open to heal gradually over time using wound care alone.     Wounds left to heal secondarily can take 2-3 months on average to heal.  The healing occurs step by step. You will notice that over the first three weeks the area will drain straw colored fluid that may have some blood within it. This is normal. Over the first three weeks, you form a nice healing base called fibrin that serves as the scaffold or map for the rest of your body's healing process. The fibrin is a thick yellow tissue. People often worry that it is pus given the yellow color. Unlike pus, this is a thick layer that cannot be rubbed off. After this, you should expect the wound to \"fill in\" to the surface of your skin over the course of several weeks. Lastly, a new layer of skin will form over the wound.    Until your body forms a good fibrin base (which takes ~3 weeks) you should avoid immersing the wound in water (such as in baths, whirlpools, swimming pools, hot tubs, lakes and oceans). You however CAN and should wash the area daily, as instructed below.     After healing, the scar will initially be red (and often times a deep red to purple color on the legs) but will gradually fade over the course of 1 year to a round scar lighter than the skin surrounding it.    We advise you follow the wound care as noted below the entire time it takes for the areas to heal completely.    WOUND CARE AFTER YOUR PROCEDURE    Try NOT to remove the pressure bandage for 48 hours. Keep the area clean and dry while this bandage is on.     After removing the bandage for the first time, gently clean the area with soap and water. If the bandage is difficult to remove, getting the bandage wet in the shower will sometimes help soften the adhesive and allow it to be removed more easily. "     You will now need to cleanse this area daily in the shower with gentle soap. There is no need to scrub the area. You will need to apply plain Vaseline ointment (this is over the counter and not a prescription) to the site until it has healed over completely followed by a clean appropriately sized bandage to area.  Non stick dressing and paper tape (or Hypafix) are recommended for sensitive skin but a bandaid is fine if it covers the area well.    MANAGING YOUR PAIN AFTER SURGERY     You can expect to have some pain after surgery. This is normal. The pain is typically worse the first two days after surgery, and quickly begins to get better.     The best strategy for controlling your pain after surgery is around the clock pain control. You can take over the counter Acetaminophen (Tylenol) for discomfort, if no contraindications.     If you are taking this at the maximum dose, you can alternate this with Motrin (ibuprofen or Advil) as well. Alternating these medications with each other allows you to maximize your pain control. In addition to Tylenol and Motrin, you can use heating pads or ice packs on your incisions to help reduce your pain.     How will I alternate your regular strength over-the-counter pain medication?  You will take a dose of pain medication every three hours.   Start by taking 650 mg of Tylenol (2 pills of 325 mg)   3 hours later take 600 mg of Motrin (3 pills of 200 mg)   3 hours after taking the Motrin take 650 mg of Tylenol   3 hours after that take 600 mg of Motrin.    See example - if your first dose of Tylenol is at 12:00 PM     12:00 PM  Tylenol 650 mg (2 pills of 325 mg)    3:00 PM  Motrin 600 mg (3 pills of 200 mg)    6:00 PM  Tylenol 650 mg (2 pills of 325 mg)    9:00 PM  Motrin 600 mg (3 pills of 200 mg)    Continue alternating every 3 hours      Important:   Do not take more than 4000mg of Tylenol or 3200mg of Motrin in a 24-hour period.     What if I still have pain?   If you have  pain that is not controlled with the over-the-counter pain medications (Tylenol and Motrin or Advil), don't hesitate to call our staff using the number provided. We will help make sure you are managing your pain in the best way possible, and if necessary, we can provide a prescription for additional pain medication.     CALL OUR OFFICE IMMEDIATELY FOR ANY SIGNS OF INFECTION:    This includes fever, chills, increased redness, warmth, tenderness, severe discomfort/pain, or pus or foul smell coming from the wound. If you are experiencing any of the above, please call [facility]'s Mohs Department directly at [phone].    IF BLEEDING IS NOTICED:    Place a clean cloth over the area and apply firm pressure for thirty minutes.  Check the wound ONLY after 30 minutes of direct pressure; do not cheat and sneak a peak, as that does not count.  If bleeding persists after 30 minutes of legitimate direct pressure, then try one more round of direct pressure to the area.  Should the bleeding become heavier or not stop after the second attempt, call [facility] directly at [phone]. Your call will get routed to the dermatology surgeon on call even after hours.

## 2025-04-07 ENCOUNTER — TELEPHONE (OUTPATIENT)
Age: 75
End: 2025-04-07

## 2025-04-07 NOTE — TELEPHONE ENCOUNTER
Patient called again to schedule an appointment , per notes patient needs cryotherapy .   Appointment made with Vincent on 04/23.

## 2025-04-07 NOTE — TELEPHONE ENCOUNTER
Patient called stating he had missed a call from the office to schedule an appt. Please advise where and what is to be scheduled, thank you.

## 2025-04-14 ENCOUNTER — TELEPHONE (OUTPATIENT)
Dept: DERMATOLOGY | Facility: CLINIC | Age: 75
End: 2025-04-14

## 2025-04-14 ENCOUNTER — CLINICAL SUPPORT (OUTPATIENT)
Dept: DERMATOLOGY | Facility: CLINIC | Age: 75
End: 2025-04-14

## 2025-04-14 DIAGNOSIS — Z51.89 VISIT FOR WOUND CHECK: Primary | ICD-10-CM

## 2025-04-14 PROCEDURE — RECHECK: Performed by: DERMATOLOGY

## 2025-04-14 NOTE — PROGRESS NOTES
"WOUND CHECK    Physical Exam:  Anatomic Location Affected: Right dorsal hand   Description of wound: healing wound with serous fluid   Closure Type: Purse string closure     Additional History of Present Condition:  Patient had MOHS done on 03/31/2025. Patient states he has \"water like drainage\" coming out of wound. Denies fever, chills and spontaneous pain.     Assessment and Plan:  Based on a thorough discussion of this condition and the management approach to it (including a comprehensive discussion of the known risks, side effects and potential benefits of treatment), the patient (family) agrees to implement the following specific plan:  Keep wound covered and clean with Vaseline applied   Elevate arm to help with swelling   Call office if wound is worsening or not healing      "

## 2025-04-23 ENCOUNTER — OFFICE VISIT (OUTPATIENT)
Dept: DERMATOLOGY | Facility: CLINIC | Age: 75
End: 2025-04-23
Payer: MEDICARE

## 2025-04-23 VITALS — HEIGHT: 68 IN | TEMPERATURE: 97.5 F | WEIGHT: 207.4 LBS | BODY MASS INDEX: 31.43 KG/M2

## 2025-04-23 DIAGNOSIS — D09.9 SQUAMOUS CELL CARCINOMA IN SITU (SCCIS): Primary | ICD-10-CM

## 2025-04-23 PROCEDURE — 17003 DESTRUCT PREMALG LES 2-14: CPT | Performed by: NURSE PRACTITIONER

## 2025-04-23 PROCEDURE — 99214 OFFICE O/P EST MOD 30 MIN: CPT | Performed by: NURSE PRACTITIONER

## 2025-04-23 PROCEDURE — 17000 DESTRUCT PREMALG LESION: CPT | Performed by: NURSE PRACTITIONER

## 2025-04-23 RX ORDER — IMIQUIMOD 12.5 MG/.25G
1 CREAM TOPICAL DAILY
Qty: 44 PACKET | Refills: 0 | Status: SHIPPED | OUTPATIENT
Start: 2025-04-23 | End: 2025-06-06

## 2025-04-23 NOTE — PATIENT INSTRUCTIONS
"HISTORY OF SQUAMOUS CELL CARCINOMA IN SITU        Assessment and Plan:  Based on a thorough discussion of this condition and the management approach to it (including a comprehensive discussion of the known risks, side effects and potential benefits of treatment), the patient (family) agrees to implement the following specific plan:  Continue skin exams.  Discussed 3 month given his recurrent skin cancers     HISTORY OF BASAL CELL CARCINOMA     Assessment and Plan:  Based on a thorough discussion of this condition and the management approach to it (including a comprehensive discussion of the known risks, side effects and potential benefits of treatment), the patient (family) agrees to implement the following specific plan:  Continue skin exams.  Discussed 3 month given his recurrent skin cancers     HISTORY OF MELANOMA     Assessment and Plan:  Based on a thorough discussion of this condition and the management approach to it (including a comprehensive discussion of the known risks, side effects and potential benefits of treatment), the patient (family) agrees to implement the following specific plan:  Continue skin checks  Monitor for reoccurrence      SQUAMOUS CELL CARCINOMA IN SITU (\"SCC IN SITU\")      Assessment and Plan:  Based on a thorough discussion of this condition and the management approach to it (including a comprehensive discussion of the known risks, side effects and potential benefits of treatment), the patient (family) agrees to implement the following specific plan:  Start Imiquimod 5% cream for 6 weeks. Start around May 23rd 2025    What is cutaneous squamous cell carcinoma?  Cutaneous squamous cell carcinoma (SCC) is a common type of keratinocyte or non-melanoma skin cancer. It is derived from cells within the epidermis that make keratin -- the horny protein that makes up skin, hair and nails.  Cutaneous SCC  in situ is early  noninvasive stage of squamous cell carcinoma.   The insitu designation " means that lesion has not yet become invasive.  Risk factors for cutaneous SCC include:  Age and sex: SCCs are particularly prevalent in elderly males. However, they also affect females and younger adults.   Previous SCC or another form of skin cancer (basal cell carcinoma, melanoma) are a strong predictor for further skin cancers.   Actinic keratoses   Outdoor occupation or recreation   Smoking   Fair skin, blue eyes and blond or red hair   Previous cutaneous injury, thermal burn, disease (eg cutaneous lupus, epidermolysis bullosa, leg ulcer)   Inherited syndromes: SCC is a particular problem for families with xeroderma pigmentosum and albinism   Other risk factors include ionising radiation, exposure to arsenic, and immune suppression due to disease (eg chronic lymphocytic leukaemia) or medicines. Organ transplant recipients have a massively increased risk of developing SCC.    What causes cutaneous squamous cell carcinoma?  More than 90% of cases of SCC are associated with numerous DNA mutations in multiple somatic genes. Mutations in the p53 tumour suppressor gene are caused by exposure to ultraviolet radiation (UV), especially UVB (known as signature 7). Other signature mutations relate to cigarette smoking, ageing and immune suppression (eg, to drugs such as azathioprine). Mutations in signalling pathways affect the epidermal growth factor receptor, RASHARD, Fyn, and [id number] signalling.   Beta-genus human papillomaviruses (wart virus) are thought to play a role in SCC arising in immune-suppressed populations. ?-HPV and HPV subtypes 5, 8, 17, 20, 24, and 38 have also been associated with an increased risk of cutaneous SCC in immunocompetent individuals.     What are the clinical features of cutaneous squamous cell carcinoma?  Cutaneous SCCs present as enlarging scaly or crusted lumps. They usually arise within pre-existing actinic keratosis or intraepidermal carcinoma.  They grow over weeks to months   They may  ulcerate   They are often tender or painful   Located on sun-exposed sites, particularly the face, lips, ears, hands, forearms and lower legs   Size varies from a few millimetres to several centimetres in diameter.    Classification of squamous cell carcinoma by risk  SCC in situ is considered  low risk but could progress if not treated.    How is squamous cell carcinoma diagnosed?  Diagnosis of cutaneous SCC is based on clinical features. The diagnosis and histological subtype are confirmed pathologically by diagnostic biopsy or following excision. See squamous cell carcinoma - pathology.    What is the treatment for cutaneous squamous cell carcinoma in situ?  Because lesion in situ there are several approached available.  Options include local desturction with liquid nitrogen, topical chemotherapy and escisional surgery.  The merits of each will be reviewed.      How can cutaneous squamous cell carcinoma be prevented?  There is a great deal of evidence to show that very careful sun protection at any time of life reduces the number of SCCs. This is particularly important in ageing, sun-damaged, fair skin; in patients that are immune suppressed; and in those who already have actinic keratoses or previous SCC.  Stay indoors or under the shade in the middle of the day   Wear covering clothing   Apply high protection factor SPF50+ broad-spectrum sunscreens generously to exposed skin if outdoors   Avoid indoor tanning (sun beds, solaria)    Oral nicotinamide (vitamin B3) in a dose of 500 mg twice daily may reduce the number and severity of SCCs in people at high risk.  Patients with multiple squamous cell carcinomas may be prescribed an oral retinoid (acitretin or isotretinoin). These reduce the number of tumours but have some nuisance side effects.    What is the outlook for cutaneous squamous cell carcinoma?  Most SCCs are cured by treatment. A cure is most likely if treatment is undertaken when the lesion is small.  "The risk of recurrence or disease-associated death is greater for tumours that are > 20 mm in diameter and/or > 2 mm in thickness at the time of surgical excision.  About 50% of people at high risk of SCC develop a second one within 5 years of the first. They are also at increased risk of other skin cancers, especially melanoma. Regular self-skin examinations and long-term annual skin checks by an experienced health professional are recommended.           ACTINIC KERATOSIS      Plan:  PRESCRIPTION MANAGEMENT:  Several topical management options and their side effects were discussed including \"field therapies\" such as Efudex (5-fluorouracil) and/or Aldara (imiquimod). Efudex may be used alone or in combination with Calcipotriene. Begin treatment once regions that have been sprayed with liquid nitrogen are healed. Redness and irritation are to be expected within a few days of field therapy treatment and should resolve within 1 to 2 weeks following treatment. Do NOT cover the treatment areas with bandages. Use a sunscreen with an SPF 50+ while using field therapy.  We discussed the pre-cancerous nature of the condition. Actinic keratosis is found on sun-damaged skin and there is small risk that the condition could turn into a skin cancer called squamous cell carcinoma. There is no risk of actinic keratosis turning into melanoma.  Proliferative actinic keratosis tends to be resistant against standard treatments, including topical therapies and cryotherapy. It has a tendency to develop into infiltrative squamous cell carcinoma. Excision may be considered.  We discussed sun protection measures, including using sunscreen with an SPF 50+ year round, avoiding the sun, and wearing protective clothing such as long sleeves and pants when out in the sun.  Continue to monitor clinically for signs of recurrence. Discussed with patient the importance of keeping up to date with full body skin exams.  Cryotherapy performed in the " office (See Procedure Note). We discussed that a hypopigmentation or scar may form in the region following cryotherapy.     PROCEDURES PERFORMED TODAY ASSOCIATED WITH THIS CONDITION:          Cryotherapy: PROCEDURE:  DESTRUCTION OF PRE-MALIGNANT LESIONS  After a thorough discussion of treatment options and risk/benefits/alternatives (including but not limited to local pain, scarring, dyspigmentation, blistering, and possible superinfection), verbal and written consent were obtained and the aforementioned lesions were treated on with cryotherapy using liquid nitrogen x 1 cycle for 5-10 seconds.    TOTAL NUMBER of 2 pre-malignant lesions were treated today on the ANATOMIC LOCATION: Right upper chest, right dorsal hand.     The patient tolerated the procedure well, and after-care instructions were provided.         MEDICAL DECISION MAKING  Treatment Goal:  Resolution of this ACUTE, UNCOMPLICATED condition.       A recent or new short-term problem for which treatment is CONSIDERED OR INITIATED. There is little to no risk of mortality with treatment, and full recovery without functional impairment is expected.  Diagnosis or treatment significantly limited by the following social determinants of health:  NONE IDENTIFIED

## 2025-04-23 NOTE — PROGRESS NOTES
"[facility] Note     Patient Name: Basim Humphries  Encounter Date: 4/23/25       Have you been cared for by a [facility] Dermatologist in the last 3 years and, if so, which description applies to you? Yes. I have been here within the last 3 years, and my medical history has NOT changed since that time. I am not of child-bearing potential.     REVIEW OF SYSTEMS:  Have you recently had or currently have any of the following? No changes in my recent health.   PAST MEDICAL HISTORY:  Have you personally ever had or currently have any of the following?  If \"YES,\" then please provide more detail. No changes in my medical history.   HISTORY OF IMMUNOSUPPRESSION: Do you have a history of any of the following:  Systemic Immunosuppression such as Diabetes, Biologic or Immunotherapy, Chemotherapy, Organ Transplantation, Bone Marrow Transplantation or Prednisone?  No     Answering \"YES\" requires the addition of the dotphrase \"IMMUNOSUPPRESSED\" as the first diagnosis of the patient's visit.   FAMILY HISTORY:  Any \"first degree relatives\" (parent, brother, sister, or child) with the following?    No changes in my family's known health.   PATIENT EXPERIENCE:    Do you want the Dermatologist to perform a COMPLETE skin exam today including a clinical examination under the \"bra and underwear\" areas?  NO  If necessary, do we have your permission to call and leave a detailed message on your Preferred Phone number that includes your specific medical information?  Yes      Allergies   Allergen Reactions    Cefpodoxime Other (See Comments)     Made heart race was given when he had covid pneumonia    Demerol [Meperidine] Itching    Codeine GI Intolerance    Diclofenac Sodium GI Intolerance    Erythromycin Other (See Comments)     Making ears ring    Meloxicam GI Intolerance    Oxaprozin GI Intolerance    Penicillins Hives and Itching      Current Outpatient Medications:     lansoprazole (PREVACID) 30 mg capsule, Take 30 " mg by mouth every morning  , Disp: , Rfl:     multivitamin (THERAGRAN) TABS, Take 1 tablet by mouth every morning, Disp: , Rfl:     bacitracin topical ointment 500 units/g topical ointment, Apply 1 large application topically 2 (two) times a day for 5 days, Disp: 28 g, Rfl: 0    fluticasone (FLONASE) 50 mcg/act nasal spray, 2 sprays into each nostril daily (Patient not taking: Reported on 4/23/2025), Disp: , Rfl:     gabapentin (NEURONTIN) 100 mg capsule, Take 1 capsule (100 mg total) by mouth daily at bedtime (Patient not taking: Reported on 8/1/2024), Disp: 30 capsule, Rfl: 1    oxyCODONE (ROXICODONE) 5 immediate release tablet, Take 1 tablet (5 mg total) by mouth 2 (two) times a day as needed for severe pain Max Daily Amount: 10 mg (Patient not taking: Reported on 5/26/2024), Disp: 15 tablet, Rfl: 0              Whom besides the patient is providing clinical information about today's encounter?   NO ADDITIONAL HISTORIAN (patient alone provided history)    Physical Exam and Assessment/Plan by Diagnosis:    HISTORY OF SQUAMOUS CELL CARCINOMA     Physical Exam:  Anatomic Location Affected:  Left inferior medial forehead, right dorsum hand (Mohs 3/31/25)  Morphological Description of Scar:  well healed  Suspected Recurrence: no  Regional adenopathy: no     Additional History of Present Condition:  Patient initially underwent biopsy at [facility] in February 2023.  States pathology showed SCCIS.  Repeat biopsy performed on 8/26/24 showing actinic keratosis.  Reports history of other SCC, but pathology and location unknown.  More recently underwent Mohs to Right dorsum hand done on 3/31/25 by Dr. Coles.     Assessment and Plan:  Based on a thorough discussion of this condition and the management approach to it (including a comprehensive discussion of the known risks, side effects and potential benefits of treatment), the patient (family) agrees to implement the following specific plan:  Follow up as scheduled on  "6/10/25 for next skin check  Monitor for any changes     HISTORY OF BASAL CELL CARCINOMA     Physical Exam:  Anatomic Location Affected:  locations unknown   Morphological Description of scar:  well healed  Suspected Recurrence: No  Pertinent Positives:  Pertinent Negatives:        Additional History of Present Condition:  Patient reports history of >20 BCC.  Pathology and locations unknown.     Assessment and Plan:  Based on a thorough discussion of this condition and the management approach to it (including a comprehensive discussion of the known risks, side effects and potential benefits of treatment), the patient (family) agrees to implement the following specific plan:  Continue skin exams.  Discussed 3 month given his recurrent skin cancers     HISTORY OF MELANOMA     Physical Exam:  Anatomic Location Affected:  left neck, left upper abdomen, back  Morphological Description of Scar:  well healed  Year Treated: >10 years ago  TNM Classification: unknown  Suspected Recurrence: no  Regional adenopathy: no     Additional History of Present Condition:  Patient reports history of melanoma.  Underwent excision >10 years ago.  States he had lymph nodes removed from melanoma on left upper abdomen.  Pathology not available.     Assessment and Plan:  Based on a thorough discussion of this condition and the management approach to it (including a comprehensive discussion of the known risks, side effects and potential benefits of treatment), the patient (family) agrees to implement the following specific plan:  Continue skin checks  Monitor for reoccurrence      SQUAMOUS CELL CARCINOMA IN SITU (\"SCC IN SITU\")    Physical Exam:  Anatomic Location Affected:  Mid Upper Chest  Morphological Description:  pink plaque  Pertinent Positives:  Pertinent Negatives:    Additional History of Present Condition:  Patient presents today for cryotherapy to SCCIS spot that was previously biopsied proven SCCIS Case #: [id number].  Will then " "proceed with Imiquimod 5% cream daily for 6 weeks    Assessment and Plan:  Based on a thorough discussion of this condition and the management approach to it (including a comprehensive discussion of the known risks, side effects and potential benefits of treatment), the patient (family) agrees to implement the following specific plan:  Start Imiquimod 5% cream for 6 weeks. Start around May 23rd 2025          ACTINIC KERATOSIS    Physical Exam:  Anatomic Location: Right dorsal hand, right upper chest  Morphologic Description:  Scaly pink papules  Pertinent Positives:  Pertinent Negatives:    Additional History of Present Condition:  Present on exam.  Asymptomatic.  Patient has never received treatment to these sites.    History of bleeding from this lesion? NO  History of pain, tenderness, and/or itching within this lesion? YES, Itching  Personal history of skin cancer? YES, SCC, BCC ,Melanoma  Family history of skin cancer? NO  Previous treatment to the same site? NO    Plan:  PRESCRIPTION MANAGEMENT:  Several topical management options and their side effects were discussed including \"field therapies\" such as Efudex (5-fluorouracil) and/or Aldara (imiquimod). Efudex may be used alone or in combination with Calcipotriene. Begin treatment once regions that have been sprayed with liquid nitrogen are healed. Redness and irritation are to be expected within a few days of field therapy treatment and should resolve within 1 to 2 weeks following treatment. Do NOT cover the treatment areas with bandages. Use a sunscreen with an SPF 50+ while using field therapy.  We discussed the pre-cancerous nature of the condition. Actinic keratosis is found on sun-damaged skin and there is small risk that the condition could turn into a skin cancer called squamous cell carcinoma. There is no risk of actinic keratosis turning into melanoma.  Proliferative actinic keratosis tends to be resistant against standard treatments, including " topical therapies and cryotherapy. It has a tendency to develop into infiltrative squamous cell carcinoma. Excision may be considered.  We discussed sun protection measures, including using sunscreen with an SPF 50+ year round, avoiding the sun, and wearing protective clothing such as long sleeves and pants when out in the sun.  Continue to monitor clinically for signs of recurrence. Discussed with patient the importance of keeping up to date with full body skin exams.  Cryotherapy performed in the office (See Procedure Note). We discussed that a hypopigmentation or scar may form in the region following cryotherapy.     PROCEDURES PERFORMED TODAY ASSOCIATED WITH THIS CONDITION:          Cryotherapy: PROCEDURE:  DESTRUCTION OF PRE-MALIGNANT LESIONS  After a thorough discussion of treatment options and risk/benefits/alternatives (including but not limited to local pain, scarring, dyspigmentation, blistering, and possible superinfection), verbal and written consent were obtained and the aforementioned lesions were treated on with cryotherapy using liquid nitrogen x 1 cycle for 5-10 seconds.    TOTAL NUMBER of 2 pre-malignant lesions were treated today on the ANATOMIC LOCATION: Right upper chest, right dorsal hand.     The patient tolerated the procedure well, and after-care instructions were provided.         MEDICAL DECISION MAKING  Treatment Goal:  Resolution of this ACUTE, UNCOMPLICATED condition.       A recent or new short-term problem for which treatment is CONSIDERED OR INITIATED. There is little to no risk of mortality with treatment, and full recovery without functional impairment is expected.  Diagnosis or treatment significantly limited by the following social determinants of health:  NONE IDENTIFIED             Scribe Attestation      I,:  Lydia Castro am acting as a scribe while in the presence of the attending physician.:       I,:  CHANEL Albert personally performed the services described  in this documentation    as scribed in my presence.:

## 2025-05-16 ENCOUNTER — OFFICE VISIT (OUTPATIENT)
Age: 75
End: 2025-05-16
Payer: MEDICARE

## 2025-05-16 VITALS — RESPIRATION RATE: 18 BRPM | WEIGHT: 207 LBS | BODY MASS INDEX: 31.37 KG/M2 | HEIGHT: 68 IN

## 2025-05-16 DIAGNOSIS — M20.42 HAMMER TOES OF BOTH FEET: ICD-10-CM

## 2025-05-16 DIAGNOSIS — M79.672 PAIN IN BOTH FEET: Primary | ICD-10-CM

## 2025-05-16 DIAGNOSIS — I70.209 PERIPHERAL ARTERIOSCLEROSIS (HCC): ICD-10-CM

## 2025-05-16 DIAGNOSIS — R60.9 CHRONIC EDEMA: ICD-10-CM

## 2025-05-16 DIAGNOSIS — M79.671 PAIN IN BOTH FEET: Primary | ICD-10-CM

## 2025-05-16 DIAGNOSIS — M54.16 RADICULOPATHY OF LUMBAR REGION: ICD-10-CM

## 2025-05-16 DIAGNOSIS — M20.41 HAMMER TOES OF BOTH FEET: ICD-10-CM

## 2025-05-16 PROCEDURE — 99213 OFFICE O/P EST LOW 20 MIN: CPT | Performed by: PODIATRIST

## 2025-05-16 NOTE — PROGRESS NOTES
Assessment/Plan: Pain upon ambulation secondary to hammertoe formation.  Mycosis of nail.  Hallux nail bilateral with ingrown toenail.  Acquired deformity of foot.  Mild peripheral artery disease.  Chronic edema.     Plan.  Chart reviewed.  Patient evaluated.  Primary care notes reviewed.  Patient advised on condition.  At this time patient's been given instruction for proper shoe gear.  He will elevate feet at end of day.  Nail cutting technique recommended.  Watch for signs of infection.  Aftercare instruction given.  Return for follow-up.     Patient has chronic ingrown toenail, left hallux.  We will consider nail matrixectomy in the future..     Patient is still having pain from hammertoes of the left foot.  Most pacifically second left toe.  He is considering surgical intervention.  We would consider flexor tenotomy performed in office.            Diagnoses and all orders for this visit:     Pain in both feet     Peripheral arteriosclerosis     Onychomycosis     Ingrown toenail     Hammer toes of both feet     Chronic edema     Radiculopathy            Subjective: Patient has pain in his feet.  He has painful toes.  He has hammertoes.  No history of pedal trauma.  He also has swollen legs.  Patient now is getting numbness in his toes.  He gets it on the top of his feet.  He gets this mostly at night.  Patient has chronic low back pain.               Allergies   Allergen Reactions    Cefpodoxime Other (See Comments)       Made heart race was given when he had covid pneumonia    Demerol [Meperidine] Itching    Codeine GI Intolerance    Diclofenac Sodium GI Intolerance    Erythromycin Other (See Comments)       Making ears ring    Meloxicam GI Intolerance    Oxaprozin GI Intolerance    Penicillins Hives and Itching            Current Outpatient Medications:     apixaban (ELIQUIS) 5 mg, Take 2 tablets (10 mg total) by mouth 2 (two) times a day for 6 days, THEN 1 tablet (5 mg total) 2 (two) times a day., Disp: 84  tablet, Rfl: 0    lansoprazole (PREVACID) 30 mg capsule, Take 30 mg by mouth every morning  , Disp: , Rfl:     multivitamin (THERAGRAN) TABS, Take 1 tablet by mouth every morning, Disp: , Rfl:     oxyCODONE (ROXICODONE) 5 immediate release tablet, Take 1 tablet (5 mg total) by mouth 2 (two) times a day as needed for severe pain Max Daily Amount: 10 mg, Disp: 15 tablet, Rfl: 0           Patient Active Problem List   Diagnosis    Brady's esophagus with dysplasia    History of colon polyps    Elevated LFTs    Rotator cuff tear arthropathy of left shoulder    Rotator cuff tear arthropathy of right shoulder    Sensorineural hearing loss (SNHL), bilateral    Gastroesophageal reflux disease without esophagitis    Aftercare following right shoulder joint replacement surgery    Post-traumatic osteoarthritis of first carpometacarpal joint of right hand    Muscle strain of right shoulder    Acute pain of right shoulder    Hx of total shoulder replacement, right    Ambulatory dysfunction    Fall from standing    Traumatic rupture of quadriceps tendon, right, initial encounter    Acute pain    Contusion of left leg, initial encounter    Thrombocytopenia (HCC)    Melanoma (HCC)    Impaired fasting glucose    Nelly-colored urine    Cellulitis    Acute deep vein thrombosis (DVT) of femoral vein of left lower extremity (HCC)    Acute left ankle pain             Patient ID: Basim Humphries is a 74 y.o. male.     HPI     The following portions of the patient's history were reviewed and updated as appropriate:      family history includes Arthritis in his maternal grandmother and mother; Diabetes in his father; Heart disease in his father; Lung cancer in his father; Other in his mother; Ovarian cancer in his maternal grandmother.       reports that he has never smoked. He has never used smokeless tobacco. He reports that he does not currently use alcohol. He reports current drug use. Drug: Marijuana.   Objective:  Patient's shoes  and socks removed.   Foot Exam     General  General Appearance: appears stated age and healthy   Orientation: alert and oriented to person, place, and time   Affect: appropriate   Gait: antalgic   Assistance: cane use         Right Foot/Ankle      Inspection and Palpation  Tenderness: metatarsals   Swelling: dorsum   Arch: pes cavus  Hammertoes: fifth toe, fourth toe, third toe and second toe  Hallux limitus: yes  Skin Exam: dry skin;      Neurovascular  Dorsalis pedis: 2+  Posterior tibial: 2+        Left Foot/Ankle       Inspection and Palpation  Swelling: dorsum   Arch: pes cavus  Hammertoes: second toe, fifth toe, fourth toe and third toe  Hallux limitus: yes  Skin Exam: dry skin;      Neurovascular  Dorsalis pedis: 2+  Posterior tibial: 2+        Physical Exam  Vitals and nursing note reviewed.   Constitutional:       Appearance: Normal appearance.   Cardiovascular:      Rate and Rhythm: Normal rate.      Pulses:           Dorsalis pedis pulses are 2+ on the right side and 2+ on the left side.        Posterior tibial pulses are 2+ on the right side and 2+ on the left side.   Musculoskeletal:      Right lower leg: 3+ Pitting Edema present.      Left lower leg: 3+ Pitting Edema present.   Feet:      Right foot:      Skin integrity: Dry skin present.      Left foot:      Skin integrity: Dry skin present.   Skin:     Capillary Refill: Capillary refill takes less than 2 seconds.      Comments: Patient has xerosis of skin.  All nails are dystrophic.  They demonstrate distal mycosis.  Hallux bilateral demonstrates wide incurvated toenail.   Neurological:      Mental Status: He is alert.   Psychiatric:         Mood and Affect: Mood normal.         Behavior: Behavior normal.         Thought Content: Thought content normal.         Judgment: Judgment normal.

## 2025-06-10 ENCOUNTER — OFFICE VISIT (OUTPATIENT)
Dept: DERMATOLOGY | Facility: CLINIC | Age: 75
End: 2025-06-10
Payer: MEDICARE

## 2025-06-10 VITALS — HEIGHT: 68 IN | TEMPERATURE: 96.3 F | BODY MASS INDEX: 31.47 KG/M2

## 2025-06-10 DIAGNOSIS — Z85.89 HISTORY OF SQUAMOUS CELL CARCINOMA: Primary | ICD-10-CM

## 2025-06-10 DIAGNOSIS — Z85.820 HISTORY OF MELANOMA: ICD-10-CM

## 2025-06-10 DIAGNOSIS — Z85.828 HISTORY OF BASAL CELL CARCINOMA (BCC): ICD-10-CM

## 2025-06-10 DIAGNOSIS — L57.0 AK (ACTINIC KERATOSIS): ICD-10-CM

## 2025-06-10 PROCEDURE — 17003 DESTRUCT PREMALG LES 2-14: CPT | Performed by: NURSE PRACTITIONER

## 2025-06-10 PROCEDURE — 99214 OFFICE O/P EST MOD 30 MIN: CPT | Performed by: NURSE PRACTITIONER

## 2025-06-10 PROCEDURE — 17000 DESTRUCT PREMALG LESION: CPT | Performed by: NURSE PRACTITIONER

## 2025-06-10 RX ORDER — FLUOROURACIL 50 MG/G
CREAM TOPICAL 2 TIMES DAILY
Qty: 40 G | Refills: 1 | Status: SHIPPED | OUTPATIENT
Start: 2025-06-10

## 2025-06-10 NOTE — PROGRESS NOTES
"[facility] Note     Patient Name: Basim Humphries  Encounter Date:06/10/2025      Have you been cared for by a [facility] in the last 3 years and, if so, which description applies to you? Yes. I have been here within the last 3 years, and my medical history has NOT changed since that time. I am not of child-bearing potential.     REVIEW OF SYSTEMS:  Have you recently had or currently have any of the following? No changes in my recent health.   PAST MEDICAL HISTORY:  Have you personally ever had or currently have any of the following?  If \"YES,\" then please provide more detail. No changes in my medical history.   HISTORY OF IMMUNOSUPPRESSION: Do you have a history of any of the following:  Systemic Immunosuppression such as Diabetes, Biologic or Immunotherapy, Chemotherapy, Organ Transplantation, Bone Marrow Transplantation or Prednisone?  No     Answering \"YES\" requires the addition of the dotphrase \"IMMUNOSUPPRESSED\" as the first diagnosis of the patient's visit.   FAMILY HISTORY:  Any \"first degree relatives\" (parent, brother, sister, or child) with the following?    No changes in my family's known health.   PATIENT EXPERIENCE:    Do you want the Dermatologist to perform a COMPLETE skin exam today including a clinical examination under the \"bra and underwear\" areas?  Yes  If necessary, do we have your permission to call and leave a detailed message on your Preferred Phone number that includes your specific medical information?  NO      Allergies[1] Current Medications[2]        Whom besides the patient is providing clinical information about today's encounter?   NO ADDITIONAL HISTORIAN (patient alone provided history)    Physical Exam and Assessment/Plan by Diagnosis: Patient last evaluated on 4/23/25.      HISTORY OF SQUAMOUS CELL CARCINOMA     Physical Exam:  Anatomic Location Affected:  Left inferior medial forehead, right dorsum hand (Mohs 3/31/25), mid chest " (SCCIS)  Morphological Description of Scar:  well healed  Suspected Recurrence: no  Regional adenopathy: no     Additional History of Present Condition:  Patient initially underwent biopsy at [facility] in February 2023.  States pathology showed SCCIS.  Repeat biopsy performed on 8/26/24 showing actinic keratosis.  Reports history of other SCC, but pathology and location unknown.  More recently underwent Mohs to Right dorsum hand done on 3/31/25 by Dr. Coles.  Currently applying Imiquimod 5% cream daily to mid chest for SCCIS.  States he has a few more weeks left.     Assessment and Plan:  Based on a thorough discussion of this condition and the management approach to it (including a comprehensive discussion of the known risks, side effects and potential benefits of treatment), the patient (family) agrees to implement the following specific plan:  Follow up in 3 months for re-evaluation of SCCIS on mid chest, and AK  Monitor for any changes     HISTORY OF BASAL CELL CARCINOMA     Physical Exam:  Anatomic Location Affected:  locations unknown   Morphological Description of scar:  well healed  Suspected Recurrence: No  Pertinent Positives:  Pertinent Negatives:        Additional History of Present Condition:  Patient reports history of >20 BCC.  Pathology and locations unknown.     Assessment and Plan:  Based on a thorough discussion of this condition and the management approach to it (including a comprehensive discussion of the known risks, side effects and potential benefits of treatment), the patient (family) agrees to implement the following specific plan:  Continue skin exams.    Monitor for any reoccurrences      HISTORY OF MELANOMA     Physical Exam:  Anatomic Location Affected:  left neck, left upper abdomen, back  Morphological Description of Scar:  well healed  Year Treated: >10 years ago  TNM Classification: unknown  Suspected Recurrence: no  Regional adenopathy: no     Additional History of Present  "Condition:  Patient reports history of melanoma.  Underwent excision >10 years ago.  States he had lymph nodes removed from melanoma on left upper abdomen.  Pathology not available.     Assessment and Plan:  Based on a thorough discussion of this condition and the management approach to it (including a comprehensive discussion of the known risks, side effects and potential benefits of treatment), the patient (family) agrees to implement the following specific plan:  Continue routine skin checks  Monitor for reoccurrence  Continue routine dental and eye exams       ACTINIC KERATOSIS    Physical Exam:  Anatomic Location: right cheek, left calf, right hand, bilateral forearms  Morphologic Description:  Scaly pink papules  Pertinent Positives:  Pertinent Negatives:    Additional History of Present Condition:  Present on exam.  Patient notes scaly, flaking areas.  He was has been treated with cryotherapy and Efudex in the past.      History of bleeding from this lesion? NO  History of pain, tenderness, and/or itching within this lesion? NO  Personal history of skin cancer? YES, SCC, BCC, melanoma   Family history of skin cancer? NO  Previous treatment to the same site? YES, cryotherapy, Efudex    Plan:  PRESCRIPTION MANAGEMENT:  Several topical management options and their side effects were discussed including \"field therapies\" such as Efudex (5-fluorouracil) and/or Aldara (imiquimod). Efudex may be used alone or in combination with Calcipotriene. Begin treatment once regions that have been sprayed with liquid nitrogen are healed. Redness and irritation are to be expected within a few days of field therapy treatment and should resolve within 1 to 2 weeks following treatment. Do NOT cover the treatment areas with bandages. Use a sunscreen with an SPF 50+ while using field therapy.  We discussed the pre-cancerous nature of the condition. Actinic keratosis is found on sun-damaged skin and there is small risk that the " condition could turn into a skin cancer called squamous cell carcinoma. There is no risk of actinic keratosis turning into melanoma.  Proliferative actinic keratosis tends to be resistant against standard treatments, including topical therapies and cryotherapy. It has a tendency to develop into infiltrative squamous cell carcinoma. Excision may be considered.  We discussed sun protection measures, including using sunscreen with an SPF 50+ year round, avoiding the sun, and wearing protective clothing such as long sleeves and pants when out in the sun.  Continue to monitor clinically for signs of recurrence. Discussed with patient the importance of keeping up to date with full body skin exams.  Cryotherapy performed in the office (See Procedure Note). We discussed that a hypopigmentation or scar may form in the region following cryotherapy.  5-Fluorouracil: Apply 2 times daily for 2 weeks. We discussed that 5-Fluorouracil will result in skin redness and irritation, which is expected. Begin 5-Fluorouracil treatment once regions that have been sprayed with liquid nitrogen are healed.  Apply Efudex to right forearm, then left forearm  Reviewed side effects     PROCEDURES PERFORMED TODAY ASSOCIATED WITH THIS CONDITION:          Cryotherapy: PROCEDURE:  DESTRUCTION OF PRE-MALIGNANT LESIONS  After a thorough discussion of treatment options and risk/benefits/alternatives (including but not limited to local pain, scarring, dyspigmentation, blistering, and possible superinfection), verbal and written consent were obtained and the aforementioned lesions were treated on with cryotherapy using liquid nitrogen x 1 cycle for 5-10 seconds.    TOTAL NUMBER of 2 pre-malignant lesions were treated today on the ANATOMIC LOCATION: right cheek, left calf.     The patient tolerated the procedure well, and after-care instructions were provided.         MEDICAL DECISION MAKING  Treatment Goal:  Resolution of this ACUTE, UNCOMPLICATED  condition.       A recent or new short-term problem for which treatment is CONSIDERED OR INITIATED. There is little to no risk of mortality with treatment, and full recovery without functional impairment is expected.  Diagnosis or treatment significantly limited by the following social determinants of health:  NONE IDENTIFIED          Scribe Attestation      I,:  Ev Carty MA am acting as a scribe while in the presence of the attending physician.:       I,:  CHANEL Albert personally performed the services described in this documentation    as scribed in my presence.:                 [1]   Allergies  Allergen Reactions    Cefpodoxime Other (See Comments)     Made heart race was given when he had covid pneumonia    Demerol [Meperidine] Itching    Codeine GI Intolerance    Diclofenac Sodium GI Intolerance    Erythromycin Other (See Comments)     Making ears ring    Meloxicam GI Intolerance    Oxaprozin GI Intolerance    Penicillins Hives and Itching   [2]   Current Outpatient Medications:     fluorouracil (EFUDEX) 5 % cream, Apply topically 2 (two) times a day To right forearm for 14 days, then 2 times a day to left forearm for 14 days.  Will cause redness and irritation, Disp: 40 g, Rfl: 1    lansoprazole (PREVACID) 30 mg capsule, Take 30 mg by mouth every morning, Disp: , Rfl:     multivitamin (THERAGRAN) TABS, Take 1 tablet by mouth every morning, Disp: , Rfl:     bacitracin topical ointment 500 units/g topical ointment, Apply 1 large application topically 2 (two) times a day for 5 days, Disp: 28 g, Rfl: 0    fluticasone (FLONASE) 50 mcg/act nasal spray, 2 sprays into each nostril daily (Patient not taking: Reported on 5/16/2025), Disp: , Rfl:     gabapentin (NEURONTIN) 100 mg capsule, Take 1 capsule (100 mg total) by mouth daily at bedtime (Patient not taking: Reported on 8/1/2024), Disp: 30 capsule, Rfl: 1    imiquimod (ALDARA) 5 % cream, Apply 1 packet topically in the morning, Disp: 44 packet,  Rfl: 0    oxyCODONE (ROXICODONE) 5 immediate release tablet, Take 1 tablet (5 mg total) by mouth 2 (two) times a day as needed for severe pain Max Daily Amount: 10 mg (Patient not taking: Reported on 5/26/2024), Disp: 15 tablet, Rfl: 0

## 2025-06-10 NOTE — PATIENT INSTRUCTIONS
"HISTORY OF SQUAMOUS CELL CARCINOMA       Assessment and Plan:  Based on a thorough discussion of this condition and the management approach to it (including a comprehensive discussion of the known risks, side effects and potential benefits of treatment), the patient (family) agrees to implement the following specific plan:  Follow up as scheduled   Monitor for any changes     HISTORY OF BASAL CELL CARCINOMA        Assessment and Plan:  Based on a thorough discussion of this condition and the management approach to it (including a comprehensive discussion of the known risks, side effects and potential benefits of treatment), the patient (family) agrees to implement the following specific plan:  Continue skin exams.    HISTORY OF MELANOMA       Assessment and Plan:  Based on a thorough discussion of this condition and the management approach to it (including a comprehensive discussion of the known risks, side effects and potential benefits of treatment), the patient (family) agrees to implement the following specific plan:  Continue skin checks  Monitor for reoccurrence        SQUAMOUS CELL CARCINOMA IN SITU (\"SCC IN SITU\")     Assessment and Plan:  Based on a thorough discussion of this condition and the management approach to it (including a comprehensive discussion of the known risks, side effects and potential benefits of treatment), the patient (family) agrees to implement the following specific plan:  Complete prior regiment of Imiquimod 5% cream   Follow up as scheduled for re-evaluation       ACTINIC KERATOSIS    Plan:  PRESCRIPTION MANAGEMENT:  Several topical management options and their side effects were discussed including \"field therapies\" such as Efudex (5-fluorouracil) and/or Aldara (imiquimod). Efudex may be used alone or in combination with Calcipotriene. Begin treatment once regions that have been sprayed with liquid nitrogen are healed. Redness and irritation are to be expected within a few days of " field therapy treatment and should resolve within 1 to 2 weeks following treatment. Do NOT cover the treatment areas with bandages. Use a sunscreen with an SPF 50+ while using field therapy.  We discussed the pre-cancerous nature of the condition. Actinic keratosis is found on sun-damaged skin and there is small risk that the condition could turn into a skin cancer called squamous cell carcinoma. There is no risk of actinic keratosis turning into melanoma.  Proliferative actinic keratosis tends to be resistant against standard treatments, including topical therapies and cryotherapy. It has a tendency to develop into infiltrative squamous cell carcinoma. Excision may be considered.  We discussed sun protection measures, including using sunscreen with an SPF 50+ year round, avoiding the sun, and wearing protective clothing such as long sleeves and pants when out in the sun.  Continue to monitor clinically for signs of recurrence. Discussed with patient the importance of keeping up to date with full body skin exams.  Cryotherapy performed in the office (See Procedure Note). We discussed that a hypopigmentation or scar may form in the region following cryotherapy.   follow up in 3 months for re-evaluation  Call with any questions/ concern  Start efudex Apply twice daily to areas of involvement on forearms. Wash hands after application. Apply evening application at least two hours prior to bed to avoid transfer to sheets. Practice good sun protection during and immediately following treatment. Keep away from household pets (very toxic to them).    PROCEDURES PERFORMED TODAY ASSOCIATED WITH THIS CONDITION:          Cryotherapy: PROCEDURE:  DESTRUCTION OF PRE-MALIGNANT LESIONS  After a thorough discussion of treatment options and risk/benefits/alternatives (including but not limited to local pain, scarring, dyspigmentation, blistering, and possible superinfection), verbal and written consent were obtained and the  aforementioned lesions were treated on with cryotherapy using liquid nitrogen x 1 cycle for 5-10 seconds.    TOTAL NUMBER of 2 pre-malignant lesions were treated today on the ANATOMIC LOCATION: Right cheek, left calf            .     The patient tolerated the procedure well, and after-care instructions were provided.

## 2025-07-02 ENCOUNTER — APPOINTMENT (EMERGENCY)
Dept: RADIOLOGY | Facility: HOSPITAL | Age: 75
End: 2025-07-02
Payer: MEDICARE

## 2025-07-02 ENCOUNTER — HOSPITAL ENCOUNTER (EMERGENCY)
Facility: HOSPITAL | Age: 75
Discharge: HOME | End: 2025-07-02
Attending: EMERGENCY MEDICINE | Admitting: EMERGENCY MEDICINE
Payer: MEDICARE

## 2025-07-02 ENCOUNTER — APPOINTMENT (EMERGENCY)
Dept: CT IMAGING | Facility: HOSPITAL | Age: 75
End: 2025-07-02
Payer: MEDICARE

## 2025-07-02 VITALS
RESPIRATION RATE: 16 BRPM | DIASTOLIC BLOOD PRESSURE: 65 MMHG | SYSTOLIC BLOOD PRESSURE: 135 MMHG | HEART RATE: 81 BPM | TEMPERATURE: 97.8 F | OXYGEN SATURATION: 97 %

## 2025-07-02 DIAGNOSIS — M25.551 RIGHT HIP PAIN: Primary | ICD-10-CM

## 2025-07-02 LAB
ALBUMIN SERPL BCG-MCNC: 4.5 G/DL (ref 3.5–5)
ALP SERPL-CCNC: 101 U/L (ref 34–104)
ALT SERPL W P-5'-P-CCNC: 30 U/L (ref 7–52)
ANION GAP SERPL CALCULATED.3IONS-SCNC: 8 MMOL/L (ref 4–13)
APTT PPP: 35 SECONDS (ref 23–34)
AST SERPL W P-5'-P-CCNC: 29 U/L (ref 13–39)
ATRIAL RATE: 85 BPM
BASOPHILS # BLD AUTO: 0.01 THOUSANDS/ÂΜL (ref 0–0.1)
BASOPHILS NFR BLD AUTO: 0 % (ref 0–1)
BILIRUB SERPL-MCNC: 0.53 MG/DL (ref 0.2–1)
BUN SERPL-MCNC: 21 MG/DL (ref 5–25)
CALCIUM SERPL-MCNC: 9.2 MG/DL (ref 8.4–10.2)
CHLORIDE SERPL-SCNC: 103 MMOL/L (ref 96–108)
CO2 SERPL-SCNC: 28 MMOL/L (ref 21–32)
CREAT SERPL-MCNC: 0.79 MG/DL (ref 0.6–1.3)
EOSINOPHIL # BLD AUTO: 0.04 THOUSAND/ÂΜL (ref 0–0.61)
EOSINOPHIL NFR BLD AUTO: 1 % (ref 0–6)
ERYTHROCYTE [DISTWIDTH] IN BLOOD BY AUTOMATED COUNT: 14.9 % (ref 11.6–15.1)
GFR SERPL CREATININE-BSD FRML MDRD: 87 ML/MIN/1.73SQ M
GLUCOSE SERPL-MCNC: 141 MG/DL (ref 65–140)
HCT VFR BLD AUTO: 42.5 % (ref 36.5–49.3)
HGB BLD-MCNC: 14.2 G/DL (ref 12–17)
IMM GRANULOCYTES # BLD AUTO: 0.11 THOUSAND/UL (ref 0–0.2)
IMM GRANULOCYTES NFR BLD AUTO: 2 % (ref 0–2)
INR PPP: 1.02 (ref 0.85–1.19)
LYMPHOCYTES # BLD AUTO: 1.59 THOUSANDS/ÂΜL (ref 0.6–4.47)
LYMPHOCYTES NFR BLD AUTO: 25 % (ref 14–44)
MCH RBC QN AUTO: 30.2 PG (ref 26.8–34.3)
MCHC RBC AUTO-ENTMCNC: 33.4 G/DL (ref 31.4–37.4)
MCV RBC AUTO: 90 FL (ref 82–98)
MONOCYTES # BLD AUTO: 1.22 THOUSAND/ÂΜL (ref 0.17–1.22)
MONOCYTES NFR BLD AUTO: 19 % (ref 4–12)
NEUTROPHILS # BLD AUTO: 3.31 THOUSANDS/ÂΜL (ref 1.85–7.62)
NEUTS SEG NFR BLD AUTO: 53 % (ref 43–75)
NRBC BLD AUTO-RTO: 0 /100 WBCS
P AXIS: 60 DEGREES
PLATELET # BLD AUTO: 114 THOUSANDS/UL (ref 149–390)
PMV BLD AUTO: 11.9 FL (ref 8.9–12.7)
POTASSIUM SERPL-SCNC: 4.4 MMOL/L (ref 3.5–5.3)
PR INTERVAL: 196 MS
PROT SERPL-MCNC: 7.2 G/DL (ref 6.4–8.4)
PROTHROMBIN TIME: 13.8 SECONDS (ref 12.3–15)
QRS AXIS: -47 DEGREES
QRSD INTERVAL: 102 MS
QT INTERVAL: 364 MS
QTC INTERVAL: 433 MS
RBC # BLD AUTO: 4.7 MILLION/UL (ref 3.88–5.62)
SODIUM SERPL-SCNC: 139 MMOL/L (ref 135–147)
T WAVE AXIS: 65 DEGREES
VENTRICULAR RATE: 85 BPM
WBC # BLD AUTO: 6.24 THOUSAND/UL (ref 4.31–10.16)

## 2025-07-02 PROCEDURE — 73700 CT LOWER EXTREMITY W/O DYE: CPT

## 2025-07-02 PROCEDURE — 85025 COMPLETE CBC W/AUTO DIFF WBC: CPT | Performed by: EMERGENCY MEDICINE

## 2025-07-02 PROCEDURE — 85730 THROMBOPLASTIN TIME PARTIAL: CPT | Performed by: EMERGENCY MEDICINE

## 2025-07-02 PROCEDURE — 85610 PROTHROMBIN TIME: CPT | Performed by: EMERGENCY MEDICINE

## 2025-07-02 PROCEDURE — 36415 COLL VENOUS BLD VENIPUNCTURE: CPT | Performed by: EMERGENCY MEDICINE

## 2025-07-02 PROCEDURE — 93005 ELECTROCARDIOGRAM TRACING: CPT

## 2025-07-02 PROCEDURE — 73502 X-RAY EXAM HIP UNI 2-3 VIEWS: CPT

## 2025-07-02 PROCEDURE — 93010 ELECTROCARDIOGRAM REPORT: CPT | Performed by: INTERNAL MEDICINE

## 2025-07-02 PROCEDURE — 80053 COMPREHEN METABOLIC PANEL: CPT | Performed by: EMERGENCY MEDICINE

## 2025-07-02 PROCEDURE — 99284 EMERGENCY DEPT VISIT MOD MDM: CPT

## 2025-07-02 PROCEDURE — 99284 EMERGENCY DEPT VISIT MOD MDM: CPT | Performed by: EMERGENCY MEDICINE

## 2025-07-02 RX ORDER — ACETAMINOPHEN 325 MG/1
650 TABLET ORAL ONCE
Status: COMPLETED | OUTPATIENT
Start: 2025-07-02 | End: 2025-07-02

## 2025-07-02 RX ADMIN — ACETAMINOPHEN 650 MG: 325 TABLET, FILM COATED ORAL at 17:11
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
## 2025-07-03 ENCOUNTER — RESULTS FOLLOW-UP (OUTPATIENT)
Dept: EMERGENCY DEPT | Facility: HOSPITAL | Age: 75
End: 2025-07-03

## 2025-07-22 ENCOUNTER — OFFICE VISIT (OUTPATIENT)
Age: 75
End: 2025-07-22
Payer: MEDICARE

## 2025-07-22 VITALS
SYSTOLIC BLOOD PRESSURE: 162 MMHG | HEART RATE: 65 BPM | DIASTOLIC BLOOD PRESSURE: 89 MMHG | WEIGHT: 207 LBS | BODY MASS INDEX: 32.49 KG/M2 | HEIGHT: 67 IN

## 2025-07-22 DIAGNOSIS — Z86.0100 HISTORY OF COLON POLYPS: ICD-10-CM

## 2025-07-22 DIAGNOSIS — D69.3 IDIOPATHIC THROMBOCYTOPENIC PURPURA (ITP) (HCC): ICD-10-CM

## 2025-07-22 DIAGNOSIS — K22.70 BARRETT'S ESOPHAGUS WITHOUT DYSPLASIA: Primary | ICD-10-CM

## 2025-07-22 PROCEDURE — 99204 OFFICE O/P NEW MOD 45 MIN: CPT | Performed by: INTERNAL MEDICINE

## 2025-07-22 RX ORDER — MECOBALAMIN 5000 MCG
15 TABLET,DISINTEGRATING ORAL EVERY MORNING
Qty: 90 CAPSULE | Refills: 3 | Status: SHIPPED | OUTPATIENT
Start: 2025-07-22 | End: 2025-10-20

## 2025-07-22 RX ORDER — SODIUM CHLORIDE, SODIUM LACTATE, POTASSIUM CHLORIDE, CALCIUM CHLORIDE 600; 310; 30; 20 MG/100ML; MG/100ML; MG/100ML; MG/100ML
125 INJECTION, SOLUTION INTRAVENOUS CONTINUOUS
OUTPATIENT
Start: 2025-07-22

## 2025-07-22 NOTE — ASSESSMENT & PLAN NOTE
- Most recent platelet count of 114.  Liver enzymes normal.  No clinical evidence of portal hypertension.  Imaging in 2018 showed fatty infiltration of the liver at an outside facility.  Liver enzymes most recently are normal.

## 2025-07-22 NOTE — PROGRESS NOTES
Name: Basim Humphries      : 1950      MRN: [id number]  Encounter Provider: Syed Carrasquillo MD  Encounter Date: 2025   Encounter department: [facility]  :  Assessment & Plan  Brady's esophagus without dysplasia  - Patient reports chronic symptoms of acid reflux and diagnosis of Brady's esophagus for which he has undergone EGDs in the past.  Does not recall having had an EGD in the past few years.  -No alarm symptoms, no weight loss unintentionally.  Hemoglobin normal.  Will continue lansoprazole 15 mg at this time.  -Will schedule EGD for surveillance of Brady's esophagus.  -Patient was explained about the risks and benefits of the procedure. Risks including but not limited to bleeding, infection, perforation were explained in detail. Also explained about less than 100% sensitivity with the exam and other alternatives.  Orders:    EGD; Future    lansoprazole (PREVACID) 15 mg capsule; Take 1 capsule (15 mg total) by mouth every morning    History of colon polyps  - Noted to have 9 colon polyps on a colonoscopy in , was recommended repeat at 3-year interval, patient is overdue for colonoscopy at this time.  -Patient was explained about the risks and benefits of the procedure. Risks including but not limited to bleeding, infection, perforation were explained in detail. Also explained about less than 100% sensitivity with the exam and other alternatives.  -Recommend GoLytely bowel prep.  -Not on any antiplatelets or anticoagulants.  Orders:    Colonoscopy; Future    polyethylene glycol (GOLYTELY) 4000 mL solution; Take 4,000 mL by mouth once for 1 dose    Idiopathic thrombocytopenic purpura (ITP) (HCC)  - Most recent platelet count of 114.  Liver enzymes normal.  No clinical evidence of portal hypertension.  Imaging in 2018 showed fatty infiltration of the liver at an outside facility.  Liver enzymes most recently are normal.           History of Present  "Illness   Basim Humphries is a 74 y.o. male with history of chronic GERD, Brady's esophagus, on lansoprazole 15 mg, history of colon polyps, presents to \[facility]\"" care.  Patient reports that he has previously followed with Dr. Kurtz and Dr. Cordero.  He reports having had an EGD many years ago.  That he then ran out of lansoprazole 30 mg that was previously prescribed by Dr. Kurtz and has been taking lansoprazole 15 mg for several years with control of acid reflux symptoms.  He denies any dysphagia, loss of appetite or early satiety.  No hematemesis, coffee emesis or melena.  No weight loss unintentionally.  No change in bowel habits, hematochezia or abdominal pain.  He last underwent colonoscopy in 2021 with removal of 9 polyps and was recommended repeat colonoscopy at 3-year interval.  Patient is overdue for colonoscopy this time.  He was noted to have several hyperplastic polyps and tubular adenomas.  Labs are reviewed and notable for normal hemoglobin, platelets of 114.  Liver enzymes normal.  INR normal.    HPI    Review of Systems   Constitutional: Negative.    HENT: Negative.     Eyes: Negative.    Respiratory: Negative.     Cardiovascular: Negative.    Gastrointestinal:         See HPI.   Endocrine: Negative.    Genitourinary: Negative.    Musculoskeletal: Negative.    Skin: Negative.    Allergic/Immunologic: Negative.    Neurological: Negative.    Hematological: Negative.    Psychiatric/Behavioral: Negative.     All other systems reviewed and are negative.   A complete review of systems is negative other than that noted above in the HPI.         Objective   /89 (Patient Position: Sitting, Cuff Size: Standard)   Pulse 65   Ht 5' 7\" (1.702 m)   Wt 93.9 kg (207 lb)   BMI 32.42 kg/m²     Physical Exam  Vitals and nursing note reviewed.   Constitutional:       General: He is not in acute distress.     Appearance: He is well-developed.   HENT:      Head: Normocephalic and atraumatic.     Eyes:      " Conjunctiva/sclera: Conjunctivae normal.       Cardiovascular:      Rate and Rhythm: Normal rate and regular rhythm.      Heart sounds: No murmur heard.  Pulmonary:      Effort: Pulmonary effort is normal. No respiratory distress.      Breath sounds: Normal breath sounds.   Abdominal:      Palpations: Abdomen is soft.      Tenderness: There is no abdominal tenderness.     Musculoskeletal:         General: No swelling.      Cervical back: Neck supple.     Skin:     General: Skin is warm and dry.      Capillary Refill: Capillary refill takes less than 2 seconds.     Neurological:      Mental Status: He is alert.     Psychiatric:         Mood and Affect: Mood normal.           Lab Results: I personally reviewed relevant lab results.       Results for orders placed during the hospital encounter of 09/09/21    Colonoscopy    Impression  Nine polyps removed as above    RECOMMENDATION:  Await pathology results  Repeat colonoscopy in 3 years due to a personal history of colon polyps.      Liborio Cordero MD

## 2025-07-22 NOTE — PATIENT INSTRUCTIONS
Scheduled date of EGD/colonoscopy (as of today):08/12/25  Physician performing EGD/colonoscopy:Dr. Carrasquillo  Location of EGD[facility]colonoscopy[facility]  Desired bowel prep reviewed with patient:Mohit  Instructions reviewed with patient by:eliz  Clearances:   n/a

## 2025-07-22 NOTE — ASSESSMENT & PLAN NOTE
- Noted to have 9 colon polyps on a colonoscopy in 2021, was recommended repeat at 3-year interval, patient is overdue for colonoscopy at this time.  -Patient was explained about the risks and benefits of the procedure. Risks including but not limited to bleeding, infection, perforation were explained in detail. Also explained about less than 100% sensitivity with the exam and other alternatives.  -Recommend GoLytely bowel prep.  -Not on any antiplatelets or anticoagulants.  Orders:    Colonoscopy; Future    polyethylene glycol (GOLYTELY) 4000 mL solution; Take 4,000 mL by mouth once for 1 dose

## 2025-07-28 ENCOUNTER — OFFICE VISIT (OUTPATIENT)
Dept: OTOLARYNGOLOGY | Facility: CLINIC | Age: 75
End: 2025-07-28
Payer: MEDICARE

## 2025-07-28 VITALS
TEMPERATURE: 98.4 F | WEIGHT: 207 LBS | BODY MASS INDEX: 32.49 KG/M2 | HEART RATE: 98 BPM | OXYGEN SATURATION: 98 % | HEIGHT: 67 IN

## 2025-07-28 DIAGNOSIS — H61.23 BILATERAL IMPACTED CERUMEN: ICD-10-CM

## 2025-07-28 DIAGNOSIS — T16.1XXA FOREIGN BODY OF RIGHT EAR, INITIAL ENCOUNTER: ICD-10-CM

## 2025-07-28 DIAGNOSIS — H90.3 SENSORINEURAL HEARING LOSS (SNHL), BILATERAL: Primary | ICD-10-CM

## 2025-07-28 PROCEDURE — 69200 CLEAR OUTER EAR CANAL: CPT | Performed by: NURSE PRACTITIONER

## 2025-07-28 PROCEDURE — 69210 REMOVE IMPACTED EAR WAX UNI: CPT | Performed by: NURSE PRACTITIONER

## 2025-07-28 PROCEDURE — 99213 OFFICE O/P EST LOW 20 MIN: CPT | Performed by: NURSE PRACTITIONER

## 2025-07-29 ENCOUNTER — ANESTHESIA (OUTPATIENT)
Dept: ANESTHESIOLOGY | Facility: HOSPITAL | Age: 75
End: 2025-07-29

## 2025-07-29 ENCOUNTER — ANESTHESIA EVENT (OUTPATIENT)
Dept: ANESTHESIOLOGY | Facility: HOSPITAL | Age: 75
End: 2025-07-29

## 2025-08-12 ENCOUNTER — HOSPITAL ENCOUNTER (OUTPATIENT)
Dept: GASTROENTEROLOGY | Facility: AMBULARY SURGERY CENTER | Age: 75
Setting detail: OUTPATIENT SURGERY
Discharge: HOME | End: 2025-08-12
Attending: INTERNAL MEDICINE
Payer: MEDICARE

## 2025-08-12 ENCOUNTER — ANESTHESIA EVENT (OUTPATIENT)
Dept: GASTROENTEROLOGY | Facility: AMBULARY SURGERY CENTER | Age: 75
End: 2025-08-12
Payer: MEDICARE

## 2025-08-12 ENCOUNTER — ANESTHESIA (OUTPATIENT)
Dept: GASTROENTEROLOGY | Facility: AMBULARY SURGERY CENTER | Age: 75
End: 2025-08-12
Payer: MEDICARE

## (undated) DEVICE — ACE WRAP 4 IN UNSTERILE

## (undated) DEVICE — GLOVE SRG BIOGEL 7.5

## (undated) DEVICE — SPONGE SCRUB 4 PCT CHLORHEXIDINE

## (undated) DEVICE — SUT VICRYL PLUS 0 CTB-1 27 IN VCPB260H

## (undated) DEVICE — PENCIL ELECTROSURG E-Z CLEAN -0035H

## (undated) DEVICE — THE SIMPULSE SOLO SYSTEM WITH ULTREX RETRACTABLE SPLASH SHIELD TIP: Brand: SIMPULSE SOLO

## (undated) DEVICE — DISPOSABLE OR TOWEL: Brand: CARDINAL HEALTH

## (undated) DEVICE — PADDING CAST 4 IN  COTTON STRL

## (undated) DEVICE — DRAPE SHEET X-LG

## (undated) DEVICE — GLOVE SRG BIOGEL 8

## (undated) DEVICE — HOOD: Brand: FLYTE, SURGICOOL

## (undated) DEVICE — ELECTRODE BLADE MOD E-Z CLEAN  2.75IN 7CM -0012AM

## (undated) DEVICE — ACE WRAP 6 IN UNSTERILE

## (undated) DEVICE — GLOVE INDICATOR PI UNDERGLOVE SZ 7.5 BLUE

## (undated) DEVICE — DRESSING MEPILEX AG BORDER POST-OP 4 X 10 IN

## (undated) DEVICE — SUT MONOCRYL 2-0 SH 27 IN Y417H

## (undated) DEVICE — IMPERVIOUS STOCKINETTE: Brand: DEROYAL

## (undated) DEVICE — DRAPE EQUIPMENT RF WAND

## (undated) DEVICE — SUT 2 ORTHOCORD MO7

## (undated) DEVICE — SUT PDS II 0 CT-1 27 IN Z340H

## (undated) DEVICE — Device

## (undated) DEVICE — HEAVY DUTY TABLE COVER: Brand: CONVERTORS

## (undated) DEVICE — GUIDE PIN 2.5 X 220MM AEQUALIS PERFORM PLUS

## (undated) DEVICE — DRESSING MEPILEX AG BORDER 4 X 8 IN

## (undated) DEVICE — BETHLEHEM UNIVERSAL  MIONR EXT: Brand: CARDINAL HEALTH

## (undated) DEVICE — PROXIMATE PLUS MD MULTI-DIRECTIONAL RELEASE SKIN STAPLERS CONTAINS 35 STAINLESS STEEL STAPLES APPROXIMATE CLOSED DIMENSIONS: 6.9MM X 3.9MM WIDE: Brand: PROXIMATE

## (undated) DEVICE — SUT 2 ORTHOCORD 36 IN W/O NEEDLES

## (undated) DEVICE — INTENDED FOR TISSUE SEPARATION, AND OTHER PROCEDURES THAT REQUIRE A SHARP SURGICAL BLADE TO PUNCTURE OR CUT.: Brand: BARD-PARKER SAFETY BLADES SIZE 15, STERILE

## (undated) DEVICE — GLOVE INDICATOR PI UNDERGLOVE SZ 8 BLUE

## (undated) DEVICE — CAPIT ASCEND FLEX REVERSE W PERFORM

## (undated) DEVICE — ARTHROSCOPY FLOOR MAT

## (undated) DEVICE — INTENDED FOR TISSUE SEPARATION, AND OTHER PROCEDURES THAT REQUIRE A SHARP SURGICAL BLADE TO PUNCTURE OR CUT.: Brand: BARD-PARKER SAFETY BLADES SIZE 10, STERILE

## (undated) DEVICE — CHLORAPREP HI-LITE 26ML ORANGE

## (undated) DEVICE — BANDAGE, ESMARK LF STR 6"X9' (20/CS): Brand: CYPRESS

## (undated) DEVICE — INTENT TO BE USED WITH SUTURE MATERIAL FOR TISSUE CLOSURE: Brand: RICHARD-ALLAN®  NEEDLE 1/2 CIRCLE REVERSE CUTTING

## (undated) DEVICE — ALCOHOL ISOPROPYL BLUE

## (undated) DEVICE — DRAPE SHEET THREE QUARTER

## (undated) DEVICE — DRILL BIT 3.2MM

## (undated) DEVICE — DUAL CUT SAGITTAL BLADE

## (undated) DEVICE — 3M™ IOBAN™ 2 ANTIMICROBIAL INCISE DRAPE 6650EZ: Brand: IOBAN™ 2

## (undated) DEVICE — PACK MAJOR ORTHO W/SPLITS PBDS

## (undated) DEVICE — KIT STABILIZATION SHOULDER MARCO

## (undated) DEVICE — SUT VICRYL PLUS 2-0 CTB-1 27 IN VCPB259H

## (undated) DEVICE — SUT ETHIBOND 5 V-37 30 IN MB66G